# Patient Record
Sex: MALE | Race: WHITE | Employment: OTHER | URBAN - NONMETROPOLITAN AREA
[De-identification: names, ages, dates, MRNs, and addresses within clinical notes are randomized per-mention and may not be internally consistent; named-entity substitution may affect disease eponyms.]

---

## 2017-03-10 ENCOUNTER — HOSPITAL ENCOUNTER (OUTPATIENT)
Dept: MRI IMAGING | Age: 72
Discharge: HOME | End: 2017-03-10
Payer: MEDICARE

## 2017-03-10 DIAGNOSIS — M25.551 RIGHT HIP PAIN: ICD-10-CM

## 2017-03-10 PROCEDURE — 73721 MRI JNT OF LWR EXTRE W/O DYE: CPT

## 2017-07-27 ENCOUNTER — TELEPHONE (OUTPATIENT)
Dept: CARDIOLOGY | Facility: CLINIC | Age: 72
End: 2017-07-27

## 2017-07-27 NOTE — TELEPHONE ENCOUNTER
Called [facility] and got the Script for lovenox Straight aftered I talked to Vicky CORONA

## 2018-03-01 RX ORDER — LOSARTAN POTASSIUM 50 MG/1
50 TABLET ORAL DAILY
COMMUNITY
End: 2021-08-02 | Stop reason: SDUPTHER

## 2018-03-01 RX ORDER — SPIRONOLACTONE AND HYDROCHLOROTHIAZIDE 25; 25 MG/1; MG/1
1 TABLET ORAL DAILY
COMMUNITY
End: 2021-08-02 | Stop reason: SDUPTHER

## 2018-03-01 RX ORDER — NAPROXEN 500 MG/1
500 TABLET ORAL 2 TIMES DAILY WITH MEALS
COMMUNITY
End: 2018-03-21 | Stop reason: ALTCHOICE

## 2018-03-01 RX ORDER — BUTALBITAL, ACETAMINOPHEN AND CAFFEINE 50; 325; 40 MG/1; MG/1; MG/1
1 TABLET ORAL EVERY 6 HOURS PRN
COMMUNITY
End: 2018-03-21 | Stop reason: ALTCHOICE

## 2018-03-01 RX ORDER — PHENOL 1.4 %
10 AEROSOL, SPRAY (ML) MUCOUS MEMBRANE DAILY
COMMUNITY
End: 2019-04-08

## 2018-03-05 ENCOUNTER — TELEPHONE (OUTPATIENT)
Dept: CARDIOLOGY | Age: 73
End: 2018-03-05

## 2018-03-05 NOTE — TELEPHONE ENCOUNTER
----- Message from Doron Barfield sent at 2/28/2018  4:04 PM CST -----  Not sure if this goes to you or not. The patient daughter called and stated that her dad only wants to see Dr Michelle Quigley. They do not want to see Dr Audi Ahn. They are only wanting to see Dr Michelle Quigley. Can you please call the daughter back Alva Reed at [phone].   Thanks

## 2018-03-05 NOTE — TELEPHONE ENCOUNTER
Called patient back and spoke with him, patient states he just fine with seeing Dr. Vince Fuller and he doesn't see a problem with this and requests to keep it with him since it is this week.

## 2018-03-21 ENCOUNTER — OFFICE VISIT (OUTPATIENT)
Dept: CARDIOLOGY | Age: 73
End: 2018-03-21
Payer: MEDICARE

## 2018-03-21 VITALS
DIASTOLIC BLOOD PRESSURE: 64 MMHG | SYSTOLIC BLOOD PRESSURE: 128 MMHG | BODY MASS INDEX: 44.28 KG/M2 | WEIGHT: 299 LBS | HEIGHT: 69 IN | HEART RATE: 74 BPM

## 2018-03-21 DIAGNOSIS — E66.09 OBESITY DUE TO EXCESS CALORIES WITH SERIOUS COMORBIDITY, UNSPECIFIED CLASSIFICATION: ICD-10-CM

## 2018-03-21 DIAGNOSIS — Z86.79: ICD-10-CM

## 2018-03-21 DIAGNOSIS — N18.30 STAGE 3 CHRONIC KIDNEY DISEASE (HCC): ICD-10-CM

## 2018-03-21 DIAGNOSIS — I25.10 CORONARY ARTERY DISEASE INVOLVING NATIVE CORONARY ARTERY OF NATIVE HEART WITHOUT ANGINA PECTORIS: ICD-10-CM

## 2018-03-21 DIAGNOSIS — I10 ESSENTIAL HYPERTENSION: ICD-10-CM

## 2018-03-21 DIAGNOSIS — M47.816 SPONDYLOSIS OF LUMBAR REGION WITHOUT MYELOPATHY OR RADICULOPATHY: ICD-10-CM

## 2018-03-21 DIAGNOSIS — E78.5 HYPERLIPIDEMIA, UNSPECIFIED HYPERLIPIDEMIA TYPE: Primary | ICD-10-CM

## 2018-03-21 DIAGNOSIS — E78.5 DYSLIPIDEMIA: ICD-10-CM

## 2018-03-21 DIAGNOSIS — E78.5 HYPERLIPIDEMIA, UNSPECIFIED HYPERLIPIDEMIA TYPE: Chronic | ICD-10-CM

## 2018-03-21 DIAGNOSIS — G47.33 OSA (OBSTRUCTIVE SLEEP APNEA): ICD-10-CM

## 2018-03-21 PROBLEM — E66.9 OBESITY: Status: ACTIVE | Noted: 2018-03-21

## 2018-03-21 PROCEDURE — 99203 OFFICE O/P NEW LOW 30 MIN: CPT | Performed by: INTERNAL MEDICINE

## 2018-03-21 PROCEDURE — 93000 ELECTROCARDIOGRAM COMPLETE: CPT | Performed by: INTERNAL MEDICINE

## 2018-03-21 RX ORDER — ALLOPURINOL 100 MG/1
100 TABLET ORAL DAILY
COMMUNITY

## 2018-03-21 ASSESSMENT — ENCOUNTER SYMPTOMS
NAUSEA: 0
BACK PAIN: 1
ABDOMINAL DISTENTION: 0
APNEA: 0
STRIDOR: 0
SHORTNESS OF BREATH: 1
CHOKING: 0
BLOOD IN STOOL: 0
WHEEZING: 0
CHEST TIGHTNESS: 0

## 2018-03-21 NOTE — PROGRESS NOTES
by mouth 2 times daily, Disp: , Rfl:     losartan (COZAAR) 50 MG tablet, Take 50 mg by mouth daily, Disp: , Rfl:     aspirin 81 MG tablet, Take 81 mg by mouth daily, Disp: , Rfl:     spironolactone-hydrochlorothiazide (ALDACTAZIDE) 25-25 MG per tablet, Take 1 tablet by mouth daily, Disp: , Rfl:     TRAZODONE HCL PO, Take 50 mg by mouth every evening, Disp: , Rfl:     Melatonin 10 MG TABS, Take 10 mg by mouth daily, Disp: , Rfl:     Review of Systems   Constitutional: Positive for unexpected weight change (some recent increase (excess calories)). Negative for activity change, diaphoresis and fatigue. HENT: Negative for congestion, dental problem, hearing loss and tinnitus. Eyes: Negative for visual disturbance. Respiratory: Positive for shortness of breath (chronic NEGRO without recent change). Negative for apnea, choking, chest tightness, wheezing and stridor. Cardiovascular: Positive for chest pain (no CP at present or antecedent to his 2005 coronary stent (dyspnea only)) and leg swelling. Negative for palpitations. Gastrointestinal: Negative for abdominal distention, blood in stool and nausea. Endocrine: Negative for cold intolerance, heat intolerance, polydipsia and polyuria. Genitourinary: Negative for decreased urine volume and difficulty urinating. Musculoskeletal: Positive for back pain (LS spine surgery 7/17). Negative for arthralgias, gait problem and myalgias. Skin: Negative for pallor and rash. Allergic/Immunologic: Negative for environmental allergies, food allergies and immunocompromised state. Neurological: Negative for dizziness, tremors, seizures, syncope, speech difficulty, weakness, light-headedness, numbness and headaches. Psychiatric/Behavioral: Negative for agitation, confusion, dysphoric mood and sleep disturbance. The patient is not nervous/anxious.         /64   Pulse 74   Ht 5' 9\" (1.753 m)   Wt 299 lb (135.6 kg)   BMI 44.15 kg/m²   Physical Exam

## 2018-10-02 ENCOUNTER — OFFICE VISIT (OUTPATIENT)
Dept: CARDIOLOGY | Age: 73
End: 2018-10-02
Payer: MEDICARE

## 2018-10-02 VITALS
SYSTOLIC BLOOD PRESSURE: 118 MMHG | WEIGHT: 288 LBS | DIASTOLIC BLOOD PRESSURE: 64 MMHG | HEIGHT: 69 IN | BODY MASS INDEX: 42.65 KG/M2 | HEART RATE: 82 BPM

## 2018-10-02 DIAGNOSIS — I25.10 CORONARY ARTERY DISEASE INVOLVING NATIVE CORONARY ARTERY OF NATIVE HEART WITHOUT ANGINA PECTORIS: Primary | ICD-10-CM

## 2018-10-02 PROCEDURE — 1036F TOBACCO NON-USER: CPT | Performed by: INTERNAL MEDICINE

## 2018-10-02 PROCEDURE — 4040F PNEUMOC VAC/ADMIN/RCVD: CPT | Performed by: INTERNAL MEDICINE

## 2018-10-02 PROCEDURE — G8417 CALC BMI ABV UP PARAM F/U: HCPCS | Performed by: INTERNAL MEDICINE

## 2018-10-02 PROCEDURE — G8484 FLU IMMUNIZE NO ADMIN: HCPCS | Performed by: INTERNAL MEDICINE

## 2018-10-02 PROCEDURE — 1101F PT FALLS ASSESS-DOCD LE1/YR: CPT | Performed by: INTERNAL MEDICINE

## 2018-10-02 PROCEDURE — G8427 DOCREV CUR MEDS BY ELIG CLIN: HCPCS | Performed by: INTERNAL MEDICINE

## 2018-10-02 PROCEDURE — 99212 OFFICE O/P EST SF 10 MIN: CPT | Performed by: INTERNAL MEDICINE

## 2018-10-02 PROCEDURE — 1123F ACP DISCUSS/DSCN MKR DOCD: CPT | Performed by: INTERNAL MEDICINE

## 2018-10-02 PROCEDURE — G8598 ASA/ANTIPLAT THER USED: HCPCS | Performed by: INTERNAL MEDICINE

## 2018-10-02 PROCEDURE — 3017F COLORECTAL CA SCREEN DOC REV: CPT | Performed by: INTERNAL MEDICINE

## 2018-10-02 RX ORDER — ACETAMINOPHEN 500 MG
1000 TABLET ORAL 2 TIMES DAILY
COMMUNITY
End: 2019-10-07

## 2018-10-02 NOTE — LETTER
Dear Kyra Cantu MD,     Patient Active Problem List   Diagnosis    Coronary artery disease    Obesity    Dyslipidemia    Stage 3 chronic kidney disease ([address]    Degenerative joint disease (DJD) of lumbar spine    JENNIFER (obstructive sleep apnea)    Hypertension         Mr. Sendy Arias returns for routine follow up. He has CAD (2005 stent), hypertension, dyslipidemia and JENNIFER. His prior ischemic symptoms were severe NEGRO without chest pain. He has had no recurrence of those symptoms. He is becoming more active since his LS spine surgery from last summer. On exam his pressure is 118/64 with a pulse of 82. He carries 288 pounds on a 5'9\" frame. I appreciate no JVD at 45 degrees, his carotids are normal and his lungs are clear. Heart sounds are normal. There is 2+ lower extremity edema. I discussed the value of exercise and weight loss as applies to his situation. I have made no changes in his regimen.        Sincerely yours,    Benita York MD  [id number] [facility]

## 2018-10-02 NOTE — PROGRESS NOTES
Mr. Marsha Barlow returns for routine follow up. He has CAD (2005 stent), hypertension, dyslipidemia and JENNIFER. His prior ischemic symptoms were severe NEGRO without chest pain. He has had no recurrence of those symptoms. He is becoming more active since his LS spine surgery from last summer. On exam his pressure is 118/64 with a pulse of 82. He carries 288 pounds on a 5'9\" frame. I appreciate no JVD at 45 degrees, his carotids are normal and his lungs are clear. Heart sounds are normal. There is 2+ lower extremity edema. I discussed the value of exercise and weight loss as applies to his situation. I have made no changes in his regimen.

## 2019-02-05 ENCOUNTER — OFFICE VISIT (OUTPATIENT)
Dept: CARDIOLOGY | Age: 74
End: 2019-02-05
Payer: MEDICARE

## 2019-02-05 VITALS
WEIGHT: 293 LBS | HEART RATE: 71 BPM | SYSTOLIC BLOOD PRESSURE: 126 MMHG | DIASTOLIC BLOOD PRESSURE: 72 MMHG | HEIGHT: 69 IN | BODY MASS INDEX: 43.4 KG/M2

## 2019-02-05 DIAGNOSIS — G47.33 OSA (OBSTRUCTIVE SLEEP APNEA): ICD-10-CM

## 2019-02-05 DIAGNOSIS — E78.5 DYSLIPIDEMIA: ICD-10-CM

## 2019-02-05 DIAGNOSIS — I25.10 CORONARY ARTERY DISEASE INVOLVING NATIVE CORONARY ARTERY OF NATIVE HEART WITHOUT ANGINA PECTORIS: ICD-10-CM

## 2019-02-05 DIAGNOSIS — R06.09 DOE (DYSPNEA ON EXERTION): Primary | ICD-10-CM

## 2019-02-05 DIAGNOSIS — I10 ESSENTIAL HYPERTENSION: ICD-10-CM

## 2019-02-05 DIAGNOSIS — R94.31 ABNORMAL EKG: ICD-10-CM

## 2019-02-05 DIAGNOSIS — E78.5 DYSLIPIDEMIA: Primary | ICD-10-CM

## 2019-02-05 PROCEDURE — G8417 CALC BMI ABV UP PARAM F/U: HCPCS | Performed by: INTERNAL MEDICINE

## 2019-02-05 PROCEDURE — G8598 ASA/ANTIPLAT THER USED: HCPCS | Performed by: INTERNAL MEDICINE

## 2019-02-05 PROCEDURE — 99213 OFFICE O/P EST LOW 20 MIN: CPT | Performed by: INTERNAL MEDICINE

## 2019-02-05 PROCEDURE — 1036F TOBACCO NON-USER: CPT | Performed by: INTERNAL MEDICINE

## 2019-02-05 PROCEDURE — G8427 DOCREV CUR MEDS BY ELIG CLIN: HCPCS | Performed by: INTERNAL MEDICINE

## 2019-02-05 PROCEDURE — 3017F COLORECTAL CA SCREEN DOC REV: CPT | Performed by: INTERNAL MEDICINE

## 2019-02-05 PROCEDURE — 1123F ACP DISCUSS/DSCN MKR DOCD: CPT | Performed by: INTERNAL MEDICINE

## 2019-02-05 PROCEDURE — 1101F PT FALLS ASSESS-DOCD LE1/YR: CPT | Performed by: INTERNAL MEDICINE

## 2019-02-05 PROCEDURE — 4040F PNEUMOC VAC/ADMIN/RCVD: CPT | Performed by: INTERNAL MEDICINE

## 2019-02-05 PROCEDURE — 93000 ELECTROCARDIOGRAM COMPLETE: CPT | Performed by: INTERNAL MEDICINE

## 2019-02-05 PROCEDURE — G8484 FLU IMMUNIZE NO ADMIN: HCPCS | Performed by: INTERNAL MEDICINE

## 2019-02-06 ENCOUNTER — HOSPITAL ENCOUNTER (OUTPATIENT)
Dept: NON INVASIVE DIAGNOSTICS | Age: 74
Discharge: HOME | End: 2019-02-06
Payer: MEDICARE

## 2019-02-06 VITALS
HEART RATE: 83 BPM | SYSTOLIC BLOOD PRESSURE: 168 MMHG | DIASTOLIC BLOOD PRESSURE: 85 MMHG | BODY MASS INDEX: 43.63 KG/M2 | WEIGHT: 295.42 LBS

## 2019-02-06 DIAGNOSIS — R94.31 ABNORMAL EKG: ICD-10-CM

## 2019-02-06 DIAGNOSIS — E78.5 DYSLIPIDEMIA: ICD-10-CM

## 2019-02-06 DIAGNOSIS — R06.09 DOE (DYSPNEA ON EXERTION): ICD-10-CM

## 2019-02-06 DIAGNOSIS — G47.33 OSA (OBSTRUCTIVE SLEEP APNEA): ICD-10-CM

## 2019-02-06 DIAGNOSIS — I10 ESSENTIAL HYPERTENSION: ICD-10-CM

## 2019-02-06 DIAGNOSIS — I25.10 CORONARY ARTERY DISEASE INVOLVING NATIVE CORONARY ARTERY OF NATIVE HEART WITHOUT ANGINA PECTORIS: ICD-10-CM

## 2019-02-06 LAB
LV EF: 58 %
LVEF MODALITY: NORMAL

## 2019-02-06 PROCEDURE — 2580000003 HC RX 258: Performed by: INTERNAL MEDICINE

## 2019-02-06 PROCEDURE — 93306 TTE W/DOPPLER COMPLETE: CPT

## 2019-02-06 PROCEDURE — 93017 CV STRESS TEST TRACING ONLY: CPT

## 2019-02-06 PROCEDURE — 6360000004 HC RX CONTRAST MEDICATION: Performed by: INTERNAL MEDICINE

## 2019-02-06 PROCEDURE — 6360000002 HC RX W HCPCS: Performed by: INTERNAL MEDICINE

## 2019-02-06 PROCEDURE — C8929 TTE W OR WO FOL WCON,DOPPLER: HCPCS

## 2019-02-06 PROCEDURE — C8928 TTE W OR W/O FOL W/CON,STRES: HCPCS

## 2019-02-06 RX ORDER — SODIUM CHLORIDE 9 MG/ML
INJECTION, SOLUTION INTRAVENOUS
Status: COMPLETED | OUTPATIENT
Start: 2019-02-06 | End: 2019-02-06

## 2019-02-06 RX ORDER — DOBUTAMINE HYDROCHLORIDE 200 MG/100ML
10 INJECTION INTRAVENOUS CONTINUOUS PRN
Status: ACTIVE | OUTPATIENT
Start: 2019-02-06 | End: 2019-02-07

## 2019-02-06 RX ADMIN — PERFLUTREN 1.65 MG: 6.52 INJECTION, SUSPENSION INTRAVENOUS at 11:15

## 2019-02-06 RX ADMIN — DOBUTAMINE HYDROCHLORIDE 10 MCG/KG/MIN: 200 INJECTION INTRAVENOUS at 11:20

## 2019-02-06 RX ADMIN — SODIUM CHLORIDE: 9 INJECTION, SOLUTION INTRAVENOUS at 11:20

## 2019-02-19 ENCOUNTER — OFFICE VISIT (OUTPATIENT)
Dept: CARDIOLOGY | Age: 74
End: 2019-02-19
Payer: MEDICARE

## 2019-02-19 VITALS
HEART RATE: 68 BPM | DIASTOLIC BLOOD PRESSURE: 72 MMHG | SYSTOLIC BLOOD PRESSURE: 108 MMHG | HEIGHT: 69 IN | WEIGHT: 290 LBS | BODY MASS INDEX: 42.95 KG/M2

## 2019-02-19 DIAGNOSIS — G47.33 OSA (OBSTRUCTIVE SLEEP APNEA): ICD-10-CM

## 2019-02-19 DIAGNOSIS — N18.30 STAGE 3 CHRONIC KIDNEY DISEASE (HCC): ICD-10-CM

## 2019-02-19 DIAGNOSIS — E78.5 DYSLIPIDEMIA: Primary | ICD-10-CM

## 2019-02-19 DIAGNOSIS — I10 ESSENTIAL HYPERTENSION: ICD-10-CM

## 2019-02-19 DIAGNOSIS — I25.10 CORONARY ARTERY DISEASE INVOLVING NATIVE CORONARY ARTERY OF NATIVE HEART WITHOUT ANGINA PECTORIS: ICD-10-CM

## 2019-02-19 PROCEDURE — 3017F COLORECTAL CA SCREEN DOC REV: CPT | Performed by: NURSE PRACTITIONER

## 2019-02-19 PROCEDURE — 1036F TOBACCO NON-USER: CPT | Performed by: NURSE PRACTITIONER

## 2019-02-19 PROCEDURE — G8427 DOCREV CUR MEDS BY ELIG CLIN: HCPCS | Performed by: NURSE PRACTITIONER

## 2019-02-19 PROCEDURE — 1101F PT FALLS ASSESS-DOCD LE1/YR: CPT | Performed by: NURSE PRACTITIONER

## 2019-02-19 PROCEDURE — G8484 FLU IMMUNIZE NO ADMIN: HCPCS | Performed by: NURSE PRACTITIONER

## 2019-02-19 PROCEDURE — G8417 CALC BMI ABV UP PARAM F/U: HCPCS | Performed by: NURSE PRACTITIONER

## 2019-02-19 PROCEDURE — 1123F ACP DISCUSS/DSCN MKR DOCD: CPT | Performed by: NURSE PRACTITIONER

## 2019-02-19 PROCEDURE — G8598 ASA/ANTIPLAT THER USED: HCPCS | Performed by: NURSE PRACTITIONER

## 2019-02-19 PROCEDURE — 4040F PNEUMOC VAC/ADMIN/RCVD: CPT | Performed by: NURSE PRACTITIONER

## 2019-02-19 PROCEDURE — 99213 OFFICE O/P EST LOW 20 MIN: CPT | Performed by: NURSE PRACTITIONER

## 2019-02-19 RX ORDER — PRAVASTATIN SODIUM 20 MG
20 TABLET ORAL DAILY
Qty: 90 TABLET | Refills: 1 | Status: SHIPPED | OUTPATIENT
Start: 2019-02-19 | End: 2019-08-13 | Stop reason: SDUPTHER

## 2019-04-08 ENCOUNTER — OFFICE VISIT (OUTPATIENT)
Dept: CARDIOLOGY | Age: 74
End: 2019-04-08
Payer: MEDICARE

## 2019-04-08 VITALS
SYSTOLIC BLOOD PRESSURE: 138 MMHG | HEART RATE: 64 BPM | WEIGHT: 290 LBS | DIASTOLIC BLOOD PRESSURE: 86 MMHG | HEIGHT: 69 IN | BODY MASS INDEX: 42.95 KG/M2

## 2019-04-08 DIAGNOSIS — G47.33 OSA (OBSTRUCTIVE SLEEP APNEA): ICD-10-CM

## 2019-04-08 DIAGNOSIS — E78.5 DYSLIPIDEMIA: ICD-10-CM

## 2019-04-08 DIAGNOSIS — I10 ESSENTIAL HYPERTENSION: Primary | ICD-10-CM

## 2019-04-08 DIAGNOSIS — N18.30 STAGE 3 CHRONIC KIDNEY DISEASE (HCC): ICD-10-CM

## 2019-04-08 DIAGNOSIS — I25.10 CORONARY ARTERY DISEASE INVOLVING NATIVE CORONARY ARTERY OF NATIVE HEART WITHOUT ANGINA PECTORIS: ICD-10-CM

## 2019-04-08 PROCEDURE — 99212 OFFICE O/P EST SF 10 MIN: CPT | Performed by: NURSE PRACTITIONER

## 2019-04-08 PROCEDURE — G8598 ASA/ANTIPLAT THER USED: HCPCS | Performed by: NURSE PRACTITIONER

## 2019-04-08 PROCEDURE — 1123F ACP DISCUSS/DSCN MKR DOCD: CPT | Performed by: NURSE PRACTITIONER

## 2019-04-08 PROCEDURE — 1036F TOBACCO NON-USER: CPT | Performed by: NURSE PRACTITIONER

## 2019-04-08 PROCEDURE — G8417 CALC BMI ABV UP PARAM F/U: HCPCS | Performed by: NURSE PRACTITIONER

## 2019-04-08 PROCEDURE — 93000 ELECTROCARDIOGRAM COMPLETE: CPT | Performed by: NURSE PRACTITIONER

## 2019-04-08 PROCEDURE — 4040F PNEUMOC VAC/ADMIN/RCVD: CPT | Performed by: NURSE PRACTITIONER

## 2019-04-08 PROCEDURE — G8427 DOCREV CUR MEDS BY ELIG CLIN: HCPCS | Performed by: NURSE PRACTITIONER

## 2019-04-08 PROCEDURE — 3017F COLORECTAL CA SCREEN DOC REV: CPT | Performed by: NURSE PRACTITIONER

## 2019-04-08 NOTE — PROGRESS NOTES
Dear Drs. No ref. provider found & Ricardo Borges MD,    Thank you for allowing me to participate in the care of Mr. Justin Manning. He presents today at the [address] [place] [address] in the [facility]. As you know, Mr. Mercedez Hidalgo is a 68 y.o. male with history of hypertension, hyperlipidemia, sleep apnea and CAD w/ stent who presents with the chief complaint of follow-up of chronic cardiac conditions  He is a patient of Dr. Garland Faustin. Patient has a history of coronary artery disease with a stent placed in his mid LAD. He had a negative stress test earlier in the year. He was also started on Pravachol 20 mg with CoQ10. He has not had to take the COQ10. He has muscle crams but they have not worsened with the Pravachol. He otherwise denies chest pain, SOA, PND, orthopnea, syncope or near syncope. He has no other complaints. Review of Systems    Constitutional: Negative for fever, chills, diaphoresis, activity change, appetite change, fatigue and unexpected weight change. Eyes: Negative for photophobia, pain, redness and visual disturbance. Respiratory: Negative for apnea, cough, chest tightness, shortness of breath, wheezing and stridor. Cardiovascular: Negative for chest pain, palpitations and leg swelling. +NEGRO-no change  Gastrointestinal: Negative for abdominal distention. Genitourinary: Negative for dysuria, urgency and frequency. Musculoskeletal: Negative for myalgias, arthralgias and gait problem. Skin: Negative for color change, pallor, rash and wound. Neurological: Negative for dizziness, tremors, speech difficulty, weakness and numbness. Hematological: Does not bruise/bleed easily. Psychiatric/Behavioral: Negative.         Past Medical History:   Diagnosis Date    CAD (coronary artery disease)     Hyperlipidemia     Hypertension        Past Surgical History:   Procedure Laterality Date    APPENDECTOMY      BACK SURGERY      lumbar tablet, Take 1,000 mg by mouth 2 times daily, Disp: , Rfl:     allopurinol (ZYLOPRIM) 100 MG tablet, Take 100 mg by mouth daily, Disp: , Rfl:     Magnesium Oxide (MAG-OX PO), Take 400 mg by mouth 2 times daily, Disp: , Rfl:     losartan (COZAAR) 50 MG tablet, Take 50 mg by mouth daily, Disp: , Rfl:     aspirin 81 MG tablet, Take 81 mg by mouth daily, Disp: , Rfl:     spironolactone-hydrochlorothiazide (ALDACTAZIDE) 25-25 MG per tablet, Take 1 tablet by mouth daily, Disp: , Rfl:     TRAZODONE HCL PO, Take 50 mg by mouth every evening, Disp: , Rfl:     PE:  Vitals:    04/08/19 1416   BP: 138/86   Pulse: 64       Estimated body mass index is 42.83 kg/m² as calculated from the following:    Height as of this encounter: 5' 9\" (1.753 m). Weight as of this encounter: 290 lb (131.5 kg). Constitutional: He is oriented to person, place, and time. He appears well-developed and well-nourished in no acute distress. Morbidly obese  Head: Normocephalic and atraumatic. Neck:  Neck supple without JVD present. Cardiovascular: Normal rate, regular rhythm, normal heart sounds. no murmur ascultated. No gallop and no friction rub.  no carotid bruits. no peripheral edema. Pulmonary/Chest:  Lungs clear to auscultation bilaterally without evidence of respiratory distress. He without wheezes. He without rales or ronchi. Musculoskeletal: Normal range of motion. Gait is normal no assitive device. Neurological: He is alert and oriented to person, place, and time. Skin: Skin is warm and dry without rash or pallor. Psychiatric: He has a normal mood and affect. His behavior is normal. Thought content normal.     No results found for: CREATININE, HGB, PROBNP      Assessment    1. Essential hypertension    2. Dyslipidemia    3. Coronary artery disease involving native coronary artery of native heart without angina pectoris    4. Stage 3 chronic kidney disease ([address]    5.  JENNIFER (obstructive sleep apnea)

## 2019-08-13 RX ORDER — PRAVASTATIN SODIUM 20 MG
20 TABLET ORAL DAILY
Qty: 90 TABLET | Refills: 1 | Status: SHIPPED | OUTPATIENT
Start: 2019-08-13 | End: 2021-08-02 | Stop reason: SDUPTHER

## 2019-10-07 ENCOUNTER — OFFICE VISIT (OUTPATIENT)
Dept: CARDIOLOGY | Age: 74
End: 2019-10-07
Payer: MEDICARE

## 2019-10-07 VITALS
HEART RATE: 80 BPM | HEIGHT: 69 IN | SYSTOLIC BLOOD PRESSURE: 108 MMHG | WEIGHT: 296 LBS | DIASTOLIC BLOOD PRESSURE: 72 MMHG | BODY MASS INDEX: 43.84 KG/M2

## 2019-10-07 DIAGNOSIS — I25.10 CORONARY ARTERY DISEASE INVOLVING NATIVE CORONARY ARTERY OF NATIVE HEART WITHOUT ANGINA PECTORIS: Primary | ICD-10-CM

## 2019-10-07 DIAGNOSIS — I10 ESSENTIAL HYPERTENSION: ICD-10-CM

## 2019-10-07 PROCEDURE — G8427 DOCREV CUR MEDS BY ELIG CLIN: HCPCS | Performed by: NURSE PRACTITIONER

## 2019-10-07 PROCEDURE — 1123F ACP DISCUSS/DSCN MKR DOCD: CPT | Performed by: NURSE PRACTITIONER

## 2019-10-07 PROCEDURE — 3017F COLORECTAL CA SCREEN DOC REV: CPT | Performed by: NURSE PRACTITIONER

## 2019-10-07 PROCEDURE — G8484 FLU IMMUNIZE NO ADMIN: HCPCS | Performed by: NURSE PRACTITIONER

## 2019-10-07 PROCEDURE — 1036F TOBACCO NON-USER: CPT | Performed by: NURSE PRACTITIONER

## 2019-10-07 PROCEDURE — 99214 OFFICE O/P EST MOD 30 MIN: CPT | Performed by: NURSE PRACTITIONER

## 2019-10-07 PROCEDURE — G8417 CALC BMI ABV UP PARAM F/U: HCPCS | Performed by: NURSE PRACTITIONER

## 2019-10-07 PROCEDURE — G8598 ASA/ANTIPLAT THER USED: HCPCS | Performed by: NURSE PRACTITIONER

## 2019-10-07 PROCEDURE — 4040F PNEUMOC VAC/ADMIN/RCVD: CPT | Performed by: NURSE PRACTITIONER

## 2020-03-27 ENCOUNTER — TELEPHONE (OUTPATIENT)
Dept: CARDIOLOGY | Age: 75
End: 2020-03-27

## 2020-03-27 NOTE — TELEPHONE ENCOUNTER
Left vm with patient advising I have changed his appointment to vv and call back if he has any questions.

## 2020-03-27 NOTE — TELEPHONE ENCOUNTER
Robi Vuong called to inform office that they accept option to do a Virtual Visit. Patient has access to [employer]. Please call patient with any changes/questions/concerns.

## 2020-04-06 ENCOUNTER — TELEPHONE (OUTPATIENT)
Dept: CARDIOLOGY | Age: 75
End: 2020-04-06

## 2020-04-07 ENCOUNTER — TELEMEDICINE (OUTPATIENT)
Dept: CARDIOLOGY | Age: 75
End: 2020-04-07
Payer: MEDICARE

## 2020-04-07 VITALS — WEIGHT: 291 LBS | BODY MASS INDEX: 43.1 KG/M2 | HEIGHT: 69 IN

## 2020-04-07 PROCEDURE — 1123F ACP DISCUSS/DSCN MKR DOCD: CPT | Performed by: INTERNAL MEDICINE

## 2020-04-07 PROCEDURE — G8427 DOCREV CUR MEDS BY ELIG CLIN: HCPCS | Performed by: INTERNAL MEDICINE

## 2020-04-07 PROCEDURE — 3017F COLORECTAL CA SCREEN DOC REV: CPT | Performed by: INTERNAL MEDICINE

## 2020-04-07 PROCEDURE — 4040F PNEUMOC VAC/ADMIN/RCVD: CPT | Performed by: INTERNAL MEDICINE

## 2020-04-07 PROCEDURE — 99422 OL DIG E/M SVC 11-20 MIN: CPT | Performed by: INTERNAL MEDICINE

## 2020-04-07 NOTE — PROGRESS NOTES
2020    TELEHEALTH EVALUATION -- Audio/Visual (During KKRIM-36 public health emergency)    HPI:    Macey Salguero (:  1945) has requested an audio/video evaluation for the following concern(s):    79-year-old gentleman with a history of dyslipidemia, hypertension, obstructive sleep apnea, stage III chronic kidney disease, and coronary disease returns for virtual visit. Pertinent history dates back 10 to 15 years at which time he had a stent placed in his mid LAD. His symptoms that time apparently were merely difficulty taking a deep breath. He underwent stress ECHO testing in  revealing no abnormalities. At present he is free of cardiac symptoms though relatively sedentary (drives tractor). His BP has been controlled, he uses CPAP nightly and he is followed by nephrology for his stage III CKD. Review of Systems - DJD only other significant finding on ROS    Prior to Visit Medications    Medication Sig Taking?  Authorizing Provider   Specialty Vitamins Products (BRAIN) TABS Take by mouth daily Yes Historical Provider, MD   pravastatin (PRAVACHOL) 20 MG tablet Take 1 tablet by mouth daily Yes Marjo Gosselin, APRN   allopurinol (ZYLOPRIM) 100 MG tablet Take 100 mg by mouth daily Yes Historical Provider, MD   Magnesium Oxide (MAG-OX PO) Take 400 mg by mouth 2 times daily Yes Historical Provider, MD   losartan (COZAAR) 50 MG tablet Take 50 mg by mouth daily Yes Historical Provider, MD   aspirin 81 MG tablet Take 81 mg by mouth daily Yes Historical Provider, MD   spironolactone-hydrochlorothiazide (ALDACTAZIDE) 25-25 MG per tablet Take 1 tablet by mouth daily Yes Historical Provider, MD   TRAZODONE HCL PO Take 50 mg by mouth every evening Yes Historical Provider, MD   Cholecalciferol (VITAMIN D3) 5000 units TABS Take 5,000 Units by mouth daily  Historical Provider, MD       Social History     Tobacco Use    Smoking status: Never Smoker    Smokeless tobacco: Never Used   Substance Use Topics    Alcohol use: Yes     Comment: 1-2 alcoholic beverages daily    Drug use: Not on file            PHYSICAL EXAMINATION:  [ INSTRUCTIONS:  \"[x]\" Indicates a positive item  \"[]\" Indicates a negative item  -- DELETE ALL ITEMS NOT EXAMINED]  Vital Signs: (As obtained by patient/caregiver or practitioner observation) (wife took blood pressure)  Blood pressure- 124/77 Heart rate- 69   Respiratory rate-    Temperature-  Pulse oximetry-     Constitutional: [x] Appears well-developed and well-nourished [] No apparent distress      [] Abnormal-   Mental status  [x] Alert and awake  [x] Oriented to person/place/time []Able to follow commands      Eyes:  EOM    [x]  Normal  [] Abnormal-  Sclera  [x]  Normal  [] Abnormal -         Discharge [x]  None visible  [] Abnormal -    HENT:   [x] Normocephalic, atraumatic. [] Abnormal   [x] Mouth/Throat: Mucous membranes are moist.     External Ears [x] Normal  [] Abnormal-     Neck: [x] No visualized mass     Pulmonary/Chest: [x] Respiratory effort normal.  [x] No visualized signs of difficulty breathing or respiratory distress        [] Abnormal-      Musculoskeletal:   [] Normal gait with no signs of ataxia         [x] Normal range of motion of neck        [] Abnormal-       Neurological:        [x] No Facial Asymmetry (Cranial nerve 7 motor function) (limited exam to video visit)          [x] No gaze palsy        [] Abnormal-         Skin:        [x] No significant exanthematous lesions or discoloration noted on facial skin         [] Abnormal-            Psychiatric:       [x] Normal Affect [] No Hallucinations        [] Abnormal-     Other pertinent observable physical exam findings-     ASSESSMENT/PLAN:  1. Coronary disease - clinically stable albeit sedentary. Discussed need for regular exercise as means of monitoring status  2. Hypertension - controlled on present regimen  3. Dyslipidemia - acceptable 2/19 - will repeat once pandemic resolved   4.  JENNIFER - uses CPAP nightly

## 2020-08-18 ENCOUNTER — PROCEDURE VISIT (OUTPATIENT)
Dept: OTOLARYNGOLOGY | Facility: CLINIC | Age: 75
End: 2020-08-18

## 2020-08-18 ENCOUNTER — OFFICE VISIT (OUTPATIENT)
Dept: OTOLARYNGOLOGY | Facility: CLINIC | Age: 75
End: 2020-08-18

## 2020-08-18 VITALS
DIASTOLIC BLOOD PRESSURE: 70 MMHG | HEART RATE: 94 BPM | SYSTOLIC BLOOD PRESSURE: 135 MMHG | WEIGHT: 294 LBS | TEMPERATURE: 97.2 F

## 2020-08-18 DIAGNOSIS — H90.3 HEARING LOSS SENSORY, BILATERAL: Primary | ICD-10-CM

## 2020-08-18 DIAGNOSIS — H90.3 SENSORINEURAL HEARING LOSS (SNHL) OF BOTH EARS: Primary | ICD-10-CM

## 2020-08-18 DIAGNOSIS — J32.0 MAXILLARY SINUSITIS, CHRONIC: ICD-10-CM

## 2020-08-18 DIAGNOSIS — H69.81 ETD (EUSTACHIAN TUBE DYSFUNCTION), RIGHT: ICD-10-CM

## 2020-08-18 DIAGNOSIS — G47.33 OSA ON CPAP: ICD-10-CM

## 2020-08-18 DIAGNOSIS — Z99.89 OSA ON CPAP: ICD-10-CM

## 2020-08-18 DIAGNOSIS — E66.9 OBESITY, UNSPECIFIED CLASSIFICATION, UNSPECIFIED OBESITY TYPE, UNSPECIFIED WHETHER SERIOUS COMORBIDITY PRESENT: ICD-10-CM

## 2020-08-18 PROCEDURE — 99203 OFFICE O/P NEW LOW 30 MIN: CPT | Performed by: OTOLARYNGOLOGY

## 2020-08-18 RX ORDER — OXYMETAZOLINE HYDROCHLORIDE 0.05 G/100ML
2 SPRAY NASAL 3 TIMES DAILY
Qty: 2 ML | Refills: 0 | Status: SHIPPED | OUTPATIENT
Start: 2020-08-18 | End: 2020-08-21

## 2020-08-18 RX ORDER — TRAZODONE HYDROCHLORIDE 50 MG/1
TABLET ORAL
COMMUNITY
Start: 2020-07-13

## 2020-08-18 RX ORDER — CYCLOBENZAPRINE HCL 10 MG
10 TABLET ORAL
COMMUNITY
Start: 2020-07-29

## 2020-08-18 RX ORDER — LOSARTAN POTASSIUM 50 MG/1
50 TABLET ORAL DAILY
COMMUNITY
Start: 2020-07-29

## 2020-08-18 RX ORDER — SPIRONOLACTONE AND HYDROCHLOROTHIAZIDE 25; 25 MG/1; MG/1
1 TABLET ORAL
COMMUNITY

## 2020-08-18 RX ORDER — PRAVASTATIN SODIUM 20 MG
20 TABLET ORAL DAILY
COMMUNITY
Start: 2020-06-09

## 2020-08-18 RX ORDER — ALLOPURINOL 100 MG/1
100 TABLET ORAL DAILY
COMMUNITY
Start: 2020-07-13

## 2020-08-18 RX ORDER — FLUTICASONE PROPIONATE 50 MCG
2 SPRAY, SUSPENSION (ML) NASAL 2 TIMES DAILY
Qty: 48 G | Refills: 3 | Status: SHIPPED | OUTPATIENT
Start: 2020-08-18 | End: 2020-10-28

## 2020-08-18 NOTE — PROGRESS NOTES
Ihsan Meredith Jr, MD     ENT NEW PATIENT NOTE     Chief Complaint   Patient presents with   • Hearing Loss     right ear        HISTORY OF PRESENT ILLNESS:  Accompanied by:   Noone  Speedy Villalba is a  74 y.o. male with hearing loss. He has been seen by Dr Gutierrez. Tube placed in R ear.Patient says he has R sinus causing drainge up into ear.  He had CT neck which showed sinus issues some 1 yr ago. No follow then.  Neck issues subsided with blocks.  Nose- breathing no issues.  Allergies- none  Hearing test Dr Gutierrez told him hearing loss but no hearing aids recommend.  Hearing loss- noted for 10-15 yrs. Has worsening understanding [place] background noise.  Ringing- none  Dizziness- none  Presure R ear.  No therapy for sinus disease. No follow up imaging.  Sleep study- 15 years ago. Using CPAP at night. No recent re-evaluation.  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   HENT:        See HPI   Respiratory: Negative for cough, choking and shortness of breath.    Gastrointestinal: Negative for diarrhea, nausea and vomiting.   Skin: Negative for rash.   Neurological: Negative for dizziness, light-headedness and headaches.   Psychiatric/Behavioral: Negative for sleep disturbance.       Past History:  History reviewed. No pertinent past medical history.  Past Surgical History:   Procedure Laterality Date   • APPENDECTOMY     • BACK SURGERY  2016   • KNEE SURGERY  1960     History reviewed. No pertinent family history.  Social History     Tobacco Use   • Smoking status: Never Smoker   • Smokeless tobacco: Never Used   Substance Use Topics   • Alcohol use: Not Currently   • Drug use: Never     No outpatient medications have been marked as taking for the 8/18/20 encounter (Office Visit) with Ihsan Meredith Jr., MD.     Allergies:  Patient has no known allergies.        Vital Signs:   Temp:  [97.2 °F (36.2 °C)] 97.2 °F (36.2 °C)  Heart  Rate:  [94] 94  BP: (135)/(70) 135/70  EXAMINATION:  CONSTITUTIONAL:    well nourished, well-developed, alert, oriented, in no acute distress     BODY HABITUS:    obese  severe    COMMUNICATION:    able to communicate normally, normal voice quality    HEAD:     Normocephalic, without obvious abnormality, atraumatic    FACE:    structure normal, no tenderness present, no lesions/masses, no evidence of trauma    SALIVARY GLANDS:    parotid glands with no tenderness, no swelling, no masses, submandibular glands with normal size, nontender     EYE:    ocular motility normal, eyelids normal, orbits normal, no proptosis, conjunctiva clear, sclera non-icteric, pupils equal, round, reactive to light and accomodation  Color:   blue    HEARING:      response to conversational voice decreased  Bilateral    EARS:    Otoscopic exam   normal pinna with no lesions, Canals normal size and shape, Tympanic membranes normal, Ossicular chain intact, Middle ear clear   AS:      AD: thickened with monomer R sup ant TM       NOSE EXTERNAL:    APPEARANCE: normal, straight, with good projection, no tenderness, no lesions, no tenderness, good nasal support, patent nares    NOSE INTERNAL:    Anterior rhinoscopy   NASALMUCOSA:    intact mucosa, no lesions    NASAL PASSAGES:     abnormal   Left- narrowed, Right- mild obstruction from septum   NASAL VALVE:     intact, good support and no nasal obstruction   SEPTUM:     normal mucosa without crusting or lesions  Bilateral    deviation present:      deviated Right low mild   INFERIOR TURBINATES:     normal size, non-obstructing, no lesions    ORAL CAVITY:    Normal lips with no lesions, dentition normal for age, FOM intact without lesions and normal salivary flow, Mucosa intact without lesions, Hard and soft palate normal without lesions      hypertrophy  Moderate, prolapse   Moderate    PALATE:       hard palate with normal mucosa and structure      soft palate- low, thick     MALLOMPOTI: III  (soft and hard palate and base of uvula visible)    OROPHARYNX:    Direct examination  oropharyngeal mucosa normal, tonsil(s) with normal appearance    Oropharyngeal size- small because of lateral walls    NECK:    normal appearance, no masses, no lesions, larynx normal mobility, trachea midline  short, thick  severe   LARYNGEAL POSITION: low   LARYNGEAL ELEVATION:  Normal  TRACHEA:   midline and Deep    LYMPH NODES :    no adenopathy    THYROID:    no overt thyromegaly, no tenderness, nodules or mass present on palpation, position midline    CHEST/RESPIRATORY:    respiratory effort normal, no rales, rubs or wheezing, no stridor, normal appearance to chest    CARDIOVASCULAR:    regular rate and rhythm, no murmurs, gallups, no peripheral edema    NEURO/PSYCHIATRIC :    oriented appropriately for age, mood normal, affect appropriate, cranial nerves intact grossly (unless specifically described), gait normal for age    RESULTS REVIEW:    I have reviewed the patients old records in the chart.            ASSESSMENT:     Diagnosis Plan   1. Hearing loss sensory, bilateral  Comprehensive Hearing Test    SNHL with min asymmetry L worse than R   2. Maxillary sinusitis, chronic  CT Sinus Without Contrast    By hx  No prior CT to evaluate   3. ETD (Eustachian tube dysfunction), right  Comprehensive Hearing Test    Multiple etiologies   4. AIDEN on CPAP      CPAP nightly   5. Obesity, unspecified classification, unspecified obesity type, unspecified whether serious comorbidity present             PLAN:      Medical and surgical options were discussed including observation, continued medical management, medication modification and surgical management. Risks, benefits and alternatives were discussed and questions were answered. After considering the options, the patient decided to proceed with observation.  Conservative management.  Patient has history max sinusitis. I will get CT to confirm and address his drainage issues. I feel  he has ETD from snoring/AIDEN/CPAP side effects.  Plan nasal scope next visit.  Hearing- decreased from chronic noise exposure. He would benefit from hearing aids. R tympanic membrane is stiff but I see no fluid. No tube present in EAC.  Nasal saline  Afrin x 3 days  Flonase BID  CT scan sinus  Hearing aids- referral  Wear CPAP nightly  Diet   Exercise  MY CHART:  Patient is Patient is using My Chart  New Medications Ordered This Visit   Medications   • oxymetazoline (AFRIN) 0.05 % nasal spray     Si sprays into the nostril(s) as directed by provider 3 (Three) Times a Day for 3 days. Use for 3 days then stop     Dispense:  2 mL     Refill:  0   • fluticasone (FLONASE) 50 MCG/ACT nasal spray     Si sprays into the nostril(s) as directed by provider 2 (Two) Times a Day.     Dispense:  48 g     Refill:  3     Orders Placed This Encounter   Procedures   • CT Sinus Without Contrast   • Comprehensive Hearing Test          Patient understand(s) and agree(s) with the treatment plan as described.  Return After CT sinus, for Recheck Sinus, Nasal scope.       Ihsan Meredith Jr, MD  20  10:46

## 2020-08-18 NOTE — PATIENT INSTRUCTIONS
Afrin use:  Use 2 puffs each nostril 3 times daily for 3 days only!!  Stop using after 3 days unless instructed to use longer    Nasal steroid use:  Using nasal steroids:  You will be prescribed one of the following nasal steroids: Flonase, Nasacort, Nasonex, Rhinocort, Qnasl, Zetonna  2 puffs each nostril 2 times daily  Start as soon as possible    Use Afrin first and wait 10 minutes to allow the nose to open. Then administer nasal steroids.    NASAL SALINE:  Use 2 puffs each nostril 4-6 times daily and more frequently if possible.  You can buy saline spray or you can make your own and use an old spray bottle to administer  Use a humidifier at bedside  Recipe for saline:  Water                                 1 quart  Salt (table)                        1 tablespoon  Gylcerin (or Krystina Syrup)    1 teaspoon  Sodium bicarbonate           1 teaspoon  Sprays or Shelly pots are recommended    CT scan sinus ordered    Wear CPAP    Hearing aid referral     [email or web address]

## 2020-09-14 ENCOUNTER — TRANSCRIBE ORDERS (OUTPATIENT)
Dept: ADMINISTRATIVE | Facility: HOSPITAL | Age: 75
End: 2020-09-14

## 2020-09-14 DIAGNOSIS — Z01.818 PRE-OP TESTING: Primary | ICD-10-CM

## 2020-09-18 ENCOUNTER — LAB (OUTPATIENT)
Dept: LAB | Facility: HOSPITAL | Age: 75
End: 2020-09-18

## 2020-09-18 ENCOUNTER — APPOINTMENT (OUTPATIENT)
Dept: CT IMAGING | Facility: HOSPITAL | Age: 75
End: 2020-09-18

## 2020-10-09 ENCOUNTER — OFFICE VISIT (OUTPATIENT)
Dept: CARDIOLOGY | Age: 75
End: 2020-10-09
Payer: MEDICARE

## 2020-10-09 ENCOUNTER — APPOINTMENT (OUTPATIENT)
Dept: LAB | Facility: HOSPITAL | Age: 75
End: 2020-10-09

## 2020-10-09 VITALS
BODY MASS INDEX: 43.99 KG/M2 | WEIGHT: 297 LBS | SYSTOLIC BLOOD PRESSURE: 110 MMHG | HEART RATE: 77 BPM | DIASTOLIC BLOOD PRESSURE: 70 MMHG | HEIGHT: 69 IN

## 2020-10-09 PROCEDURE — G8484 FLU IMMUNIZE NO ADMIN: HCPCS | Performed by: INTERNAL MEDICINE

## 2020-10-09 PROCEDURE — G8427 DOCREV CUR MEDS BY ELIG CLIN: HCPCS | Performed by: INTERNAL MEDICINE

## 2020-10-09 PROCEDURE — 3017F COLORECTAL CA SCREEN DOC REV: CPT | Performed by: INTERNAL MEDICINE

## 2020-10-09 PROCEDURE — 93000 ELECTROCARDIOGRAM COMPLETE: CPT | Performed by: INTERNAL MEDICINE

## 2020-10-09 PROCEDURE — 4040F PNEUMOC VAC/ADMIN/RCVD: CPT | Performed by: INTERNAL MEDICINE

## 2020-10-09 PROCEDURE — G8417 CALC BMI ABV UP PARAM F/U: HCPCS | Performed by: INTERNAL MEDICINE

## 2020-10-09 PROCEDURE — 99213 OFFICE O/P EST LOW 20 MIN: CPT | Performed by: INTERNAL MEDICINE

## 2020-10-09 PROCEDURE — 1123F ACP DISCUSS/DSCN MKR DOCD: CPT | Performed by: INTERNAL MEDICINE

## 2020-10-09 PROCEDURE — 1036F TOBACCO NON-USER: CPT | Performed by: INTERNAL MEDICINE

## 2020-10-09 NOTE — PROGRESS NOTES
disease -  angina free now urinating without a negative Lexiscan. Advised to exercise regularly  2. Dyslipidemia - we will recheck lipid profile  3. Hypertension - adequately controlled  4. Pandemic response - discussed    Medical records reviewed prior to today's clinic visit including visually reviewing recent diagnostic studies such as ECHOs, labs, and angiograms as well as reading previous encounter notes. More than 15 minutes spent face-to-face with patient in evaluating, and carefully explaining problems and the planned approach and the reasons behind the decisions.

## 2020-10-13 ENCOUNTER — HOSPITAL ENCOUNTER (OUTPATIENT)
Dept: CT IMAGING | Facility: HOSPITAL | Age: 75
Discharge: HOME | End: 2020-10-13
Admitting: OTOLARYNGOLOGY

## 2020-10-13 DIAGNOSIS — J32.0 MAXILLARY SINUSITIS, CHRONIC: ICD-10-CM

## 2020-10-13 PROCEDURE — 70486 CT MAXILLOFACIAL W/O DYE: CPT

## 2020-10-28 ENCOUNTER — PROCEDURE VISIT (OUTPATIENT)
Dept: OTOLARYNGOLOGY | Facility: CLINIC | Age: 75
End: 2020-10-28

## 2020-10-28 ENCOUNTER — OFFICE VISIT (OUTPATIENT)
Dept: OTOLARYNGOLOGY | Facility: CLINIC | Age: 75
End: 2020-10-28

## 2020-10-28 VITALS
WEIGHT: 293 LBS | DIASTOLIC BLOOD PRESSURE: 72 MMHG | TEMPERATURE: 97.1 F | SYSTOLIC BLOOD PRESSURE: 142 MMHG | BODY MASS INDEX: 43.4 KG/M2 | HEART RATE: 85 BPM | HEIGHT: 69 IN

## 2020-10-28 DIAGNOSIS — J34.3 NASAL TURBINATE HYPERTROPHY: ICD-10-CM

## 2020-10-28 DIAGNOSIS — H69.81 ETD (EUSTACHIAN TUBE DYSFUNCTION), RIGHT: ICD-10-CM

## 2020-10-28 DIAGNOSIS — H69.81 ETD (EUSTACHIAN TUBE DYSFUNCTION), RIGHT: Primary | ICD-10-CM

## 2020-10-28 DIAGNOSIS — Z99.89 OSA ON CPAP: ICD-10-CM

## 2020-10-28 DIAGNOSIS — Q30.9: ICD-10-CM

## 2020-10-28 DIAGNOSIS — G47.33 OSA ON CPAP: ICD-10-CM

## 2020-10-28 DIAGNOSIS — J32.0 MAXILLARY SINUSITIS, CHRONIC: Primary | Chronic | ICD-10-CM

## 2020-10-28 DIAGNOSIS — E66.9 OBESITY, UNSPECIFIED CLASSIFICATION, UNSPECIFIED OBESITY TYPE, UNSPECIFIED WHETHER SERIOUS COMORBIDITY PRESENT: ICD-10-CM

## 2020-10-28 DIAGNOSIS — J34.2 ACQUIRED DEVIATED NASAL SEPTUM: ICD-10-CM

## 2020-10-28 DIAGNOSIS — H90.3 HEARING LOSS SENSORY, BILATERAL: ICD-10-CM

## 2020-10-28 PROCEDURE — 99213 OFFICE O/P EST LOW 20 MIN: CPT | Performed by: OTOLARYNGOLOGY

## 2020-10-28 PROCEDURE — 31231 NASAL ENDOSCOPY DX: CPT | Performed by: OTOLARYNGOLOGY

## 2020-10-28 RX ORDER — OFLOXACIN 3 MG/ML
SOLUTION/ DROPS OPHTHALMIC
COMMUNITY
Start: 2020-10-08

## 2020-10-28 RX ORDER — BROMFENAC SODIUM 0.7 MG/ML
SOLUTION/ DROPS OPHTHALMIC
COMMUNITY
Start: 2020-10-08

## 2020-10-28 RX ORDER — OXYMETAZOLINE HYDROCHLORIDE 0.05 G/100ML
2 SPRAY NASAL 3 TIMES DAILY
Qty: 2 ML | Refills: 0 | Status: SHIPPED | OUTPATIENT
Start: 2020-10-28 | End: 2020-10-31

## 2020-10-28 RX ORDER — FLUTICASONE PROPIONATE 50 MCG
2 SPRAY, SUSPENSION (ML) NASAL 2 TIMES DAILY
Qty: 48 G | Refills: 3 | Status: SHIPPED | OUTPATIENT
Start: 2020-10-28

## 2020-10-28 RX ORDER — PREDNISOLONE ACETATE 10 MG/ML
SUSPENSION/ DROPS OPHTHALMIC
COMMUNITY
Start: 2020-10-08

## 2020-10-28 NOTE — PROGRESS NOTES
Ihsan Meredith Jr, MD     ENT FOLLOW UP NOTE     Chief Complaint   Patient presents with   • Follow-up        HISTORY OF PRESENT ILLNESS:  Accompanied by:  No one  Speedy Villalba is a  74 y.o. male who is here for follow up. S/p CT sinus.  He says nose is ok.  CPAP- wearing daily.  He is here to discuss sinus findings.  No hearing aids.    Review of Systems  Reviewed per patient intake note and confirmed by me    Past History:  Past medical and surgical history, family history and social history reviewed and updated when appropriate.  Current medications and allergies reviewed and updated when appropriate.  Allergies:  Patient has no known allergies.        Vital Signs:   Temp:  [97.1 °F (36.2 °C)] 97.1 °F (36.2 °C)  Heart Rate:  [85] 85  BP: (142)/(72) 142/72  EXAMINATION:  CONSTITUTIONAL:    well nourished, well-developed, alert, oriented, in no acute distress      BODY HABITUS:    obese  severe     COMMUNICATION:    able to communicate normally, normal voice quality     HEAD:     Normocephalic, without obvious abnormality, atraumatic     FACE:    structure normal, no tenderness present, no lesions/masses, no evidence of trauma     SALIVARY GLANDS:    parotid glands with no tenderness, no swelling, no masses, submandibular glands with normal size, nontender      EYE:    ocular motility normal, eyelids normal, orbits normal, no proptosis, conjunctiva clear, sclera non-icteric, pupils equal, round, reactive to light and accomodation  Color:   blue     HEARING:      response to conversational voice decreased  Bilateral     EARS:    Otoscopic exam   normal pinna with no lesions, Canals normal size and shape, Tympanic membranes normal, Ossicular chain intact, Middle ear clear     NOSE EXTERNAL:    APPEARANCE: normal, straight, with good projection, no tenderness, no lesions, no tenderness, good nasal support, patent nares     NOSE INTERNAL:    Endoscopy bilateral- See note     ORAL CAVITY:    Normal lips with no  lesions, dentition normal for age, FOM intact without lesions and normal salivary flow, Mucosa intact without lesions, Hard and soft palate normal without lesions      hypertrophy  Moderate, prolapse   Moderate    PALATE:       hard palate with normal mucosa and structure      soft palate- low, thick     MALLOMPOTI: III (soft and hard palate and base of uvula visible)     OROPHARYNX:    Direct examination  oropharyngeal mucosa normal, tonsil(s) with normal appearance    Oropharyngeal size- small because of lateral walls     NECK:    normal appearance, no masses, no lesions, larynx normal mobility, trachea midline  short, thick  severe   LARYNGEAL POSITION: low   LARYNGEAL ELEVATION:  Normal  TRACHEA:   midline and Deep     LYMPH NODES :    no adenopathy     THYROID:    no overt thyromegaly, no tenderness, nodules or mass present on palpation, position midline     CHEST/RESPIRATORY:    respiratory effort normal, no rales, rubs or wheezing, no stridor, normal appearance to chest     CARDIOVASCULAR:    regular rate and rhythm, no murmurs, gallups, no peripheral edema     NEURO/PSYCHIATRIC :    oriented appropriately for age, mood normal, affect appropriate, cranial nerves intact grossly (unless specifically described), gait normal for age       RESULTS REVIEW:    I have reviewed the patients old records in the chart.  I reviewed the patient's new clinical results.  I have personally reviewed the patient's ct scan of the sinuses without contrast images.  I have personally reviewed the patient's ct scan of the sinuses without contrast report.    CT sinus 10/13/2020        ASSESSMENT:      Diagnosis Plan   1. Maxillary sinusitis, chronic  Case Request    Case Request    R max and infundibulum   2. Hearing loss sensory, bilateral     3. ETD (Eustachian tube dysfunction), right     4. AIDEN on CPAP     5. Obesity, unspecified classification, unspecified obesity type, unspecified whether serious comorbidity present     6.  Acquired deviated nasal septum     7. Nasal turbinate hypertrophy     8. Hypoplastic sinuses             PLAN:      Medical and surgical options were discussed including observation, continued medical management, medication modification and surgical management. Risks, benefits and alternatives were discussed and questions were answered. After considering the options, the patient decided to proceed with surgical management.  Patient has CT positive right maxillary sinusitis with OMC obstruction.  He has mucopus flowing out of the right middle meatus.  He has significant deviated nasal septum and turbinate hypertrophy as well.  I feel he would benefit from Fess, septoplasty, turbinoplasty, with attention to the right maxillary sinus and anterior ethmoids.  After discussion risk and benefits he wishes to proceed.  Covid for surgery  Flonase BID  Afrin x 3 days  Nasal saline  preop clearance Dr Jasiel Ross  MY CHART:  Patient is Patient is using My Chart  New Medications Ordered This Visit   Medications   • oxymetazoline (AFRIN) 0.05 % nasal spray     Si sprays into the nostril(s) as directed by provider 3 (Three) Times a Day for 3 days. Use for 3 days then stop     Dispense:  2 mL     Refill:  0   • fluticasone (FLONASE) 50 MCG/ACT nasal spray     Si sprays into the nostril(s) as directed by provider 2 (Two) Times a Day.     Dispense:  48 g     Refill:  3     Orders Placed This Encounter   Procedures   • Follow Anesthesia Guidelines / Standing Orders   • Provide Patient With Instructions on NPO Status      1. Maxillary sinusitis, chronic    2. Hearing loss sensory, bilateral    3. ETD (Eustachian tube dysfunction), right    4. AIDEN on CPAP    5. Obesity, unspecified classification, unspecified obesity type, unspecified whether serious comorbidity present    6. Acquired deviated nasal septum    7. Nasal turbinate hypertrophy    8. Hypoplastic sinuses        Medical and surgical options were discussed including  observation versus surgical management. Risks, benefits and alternatives were discussed and questions were answered. A septoplasty was felt to be indicated due to the patients history of  chronic nasal obstruction due to the deviated septum, nasal septal deviation blocking surgical access to the sinuses and a severe nasal septal spur into the sidewall of the nose. A functional endoscopic sinus surgery was felt to be indicated due to the patient's history of chronic rhinosinusitis (>12 weeks) with CT scan evidence of mucosal thickening, sinus opacification and bony remodeling and failure of medical management including decongestants, prescription antihistamines and intranasal corticosteroids, CT scan evidence of sinus opacification, bony remodeling and obstruction of the osteomeatal complex and chronic nasal obstruction. Turbinate surgery is indicated based on nasal findings and airway assessment. After considering the options, it was decided that surgery was the best option.    PLANNED SURGERY:  1. nasal septoplasty  2. bilateral inferior turbinoplasty  3. right endoscopic maxillary antrostomy with tissue removal  4. right endoscopic partial ethmoidectomy  5. Septoplasty  6. Turbinoplasty     INFORMED CONSENT DISCUSSION:  SEPTOPLASTY AND FUNCTIONAL ENDOSCOPIC SINUS SURGERY: A septoplasty and functional endoscopic sinus surgery was recommended. The risks and benefits were explained including but not limited to pain, bleeding (with the possible need for nasal packing), infection, risks of the general anesthesia, continued septal deviation, crusting, congestion and septal perforation, orbital injury with blurred vision or visual loss, cerebrospinal fluid leak, persistent disease, scarring, synechiae, empty nose syndrome and the possibility for the need of reoperation. Possibilities of additional sinus work or less sinus work depending on the status of the nose at the time of the operation was discussed. Alternatives  were discussed. No guarantees were made or implied. Questions were asked appropriately answered.      PREOPERATIVE WORKUP:   1. labs/ workup per anesthesia  2. Preoperative Medicine evaluation for clearance    MEDICATIONS TO START 4-5 DAYS PRIOR TO SURGERY:  New Medications Ordered This Visit   Medications   • oxymetazoline (AFRIN) 0.05 % nasal spray     Si sprays into the nostril(s) as directed by provider 3 (Three) Times a Day for 3 days. Use for 3 days then stop     Dispense:  2 mL     Refill:  0   • fluticasone (FLONASE) 50 MCG/ACT nasal spray     Si sprays into the nostril(s) as directed by provider 2 (Two) Times a Day.     Dispense:  48 g     Refill:  3        PREOPERATIVE MEDICATIONS  1. Afrin 2 puffs in holding room  2. Decadron 10 mg IV in holding room      Patient understand(s) and agree(s) with the treatment plan as described.    Return 2 weeks after surgery, for Recheck Sinus.     Ihsan Meredith Jr, MD  10/28/20  12:08 CDT

## 2020-10-28 NOTE — PROGRESS NOTES
ENT PROCEDURE NOTE:  Anesthesia: topical 4% tetracaine and oxymetazoline mix    Endoscopy Type:  Nasal Endoscopic Examination    Indications:   1. Hearing loss sensory, bilateral    2. Maxillary sinusitis, chronic    3. ETD (Eustachian tube dysfunction), right    4. AIDEN on CPAP    5. Obesity, unspecified classification, unspecified obesity type, unspecified whether serious comorbidity present         Procedure Details:    The patient was placed in the sitting position. After topical anesthesia and decongestion,  a rigid nasal endoscope  0 degree was passed along the nasal floor to the nasopharynx.  The scope was then passed to the middle and superior aspects of the nasal cavity. Systematic examination of the nasal cavity, nasopharynx and structures was performed.     Findings:  NASAL ENDOSCOPIC FINDINGS:    Nasal endoscopy   NASALMUCOSA:   NASAL PASSAGES:     abnormal   Bilateral- partially obstructed by septum and turbinates, R high, L low    NASAL VALVE:     intact, good support and no nasal obstruction   SEPTUM:     mucosa abnormal   Bilateral- red and thick bilateral     deviation present:      deviated Right high, obstructing MM and ant turbinate head of MT     deviated Left low and mid posterior alomost touching MT    touching turbinate   Right- MT   INFERIOR TURBINATES:     abnormal  Bilateral- red, enlarged    MIDDLE TURBINATES:     abnormal:        LEFT: red and beefy  RIGHT: obstructed, very red   MIDDLE MEATUS:       findings       LEFT: clear  RIGHT: Mucopus coming from uncinate area and draining posterior   SPHENO-ETHMOID RECESS:    abnormal:       LEFT: narrowed  RIGHT: Not seen   NASOPHARYNX:     Adenoids:  flat, thick, pale     Eustachian tubes:        BILATERAL: pale, thick    Palate: mobile    Vault size:  Small   SECRETIONS:     abnormal Left- clear, ncreased, Right- Mucopus, MM    Amount:  Bilateral- increased     Consistency:  Left- mildly thick, Right- Mucopus    Color:  Left- clear, Right-  yellow    Condition:  Stable.  Patient tolerated procedure well.    Complications:  None    Post-procedure instructions reviewed with Patient  Instructions documented in AVS for patient to review

## 2020-10-28 NOTE — PROGRESS NOTES
Note:  Note certain Mr. Villalba needed a repeat HT, so tymps only were done.  (seen for HT in 08/2020).

## 2020-10-28 NOTE — PATIENT INSTRUCTIONS
PREOPERATIVE SURGERY/PROCEDURE INSTRUCTIONS:  • Do not eat or drink ANYTHING after midnight, unless instructed   • Clean the operative site by showering with an antibacterial soap (like Dial, Dove, Ivory, etc) and shampooing hair  • Preoperative scrub for Surgery:   Skin: Antibacterial soap (Dial, Ivory, Dove) shower daily, including hair.  Be careful not to get into eyes  Do this daily for 5 days  • Mouth: Betadine solution 3 times daily for 5 days  • Do NOT pluck, shave hair on skin the night prior to operation  • If you are diabetic, take your blood sugar the night before and in the morning prior to coming to hospital and give results to nurse and the anesthesiologist    ENT PREOPERATIVE PROTOCOL FOR SURGERY: Begin after COVID test has been performed  After test performed, PLEASE self-quarantine to prevent possible infection!! And start below  Betadine rinses:  • Use Betadine rinse in the nose  • Spray twice in each nostril 3 times a day beginning 3 days before surgery  • Spray nose the morning of surgery, bring with you to the operating room  • Use Betadine rinse in the mouth  • Gargle, swish and spit 15 ml in mouth and rinse around teeth 3 times daily beginning 3 days prior to surgery  • Repeat morning of surgery before arriving at hospital  Betadine rinse can be prescribed at the [facility]    Betadine Iodine mixture Recipe:  • Neilmed bottle from pharmacy  • Use Ch[employer] Med packet that comes with the bottle  • Add Betadine/Povidone 10 ml (2 tsp) to the bottle  • Add Darryl baby shampoo 2.5 ml (½ tsp) to the bottle  • Fill bottle with distilled water (from grocery store)    DO NOT USE IF IODINE/BETADINE ALLERGIC, OR HISTORY OF THYROID PROBLEMS/THYROID CANCER    Non Betadine rinses:  • Nasal rinses:  • Use rinse in the nose  • Spray twice in each nostril 3 times a day beginning 3 days before surgery  • Spray nose the morning of surgery, bring with you to the operating room  • Use Same rinse in  the mouth  • Gargle, swish and spit 15 ml in mouth and rinse around teeth 3 times daily beginning 3 days prior to surgery  • Repeat morning of surgery before arriving at hospital    Nasal mixture Recipe:  • Neilmed bottle from pharmacy  • Use [employer]ito Med packet that comes with the bottle  • Add Darryl baby shampoo 2.5 ml (½ tsp) to the bottle  • Fill bottle with distilled water (from grocery store)    Remove any metallic piercings prior to surgery. You may wear plastic spacers if needed.    Do NOT apply eye makeup Morning of surgery    Please remove fingernail polish prior to surgery    STOP:  -   All natural/homeopathic medications 2 weeks prior to surgery, Ask about over the counter medications  -   Smoking 2 weeks prior to surgery  -   Blood thinners- 3-5 days prior to surgery (or as instructed by doctor)  Bring with you the morning of surgery:  -   Preoperative paperwork  -   Insurance card  -   Identification with photo  -   Home medications or up to date list  \Ihsan Meredith Jr, MD has explained the risks, benefits and alternatives to the patient/patient’s representative, in clear and simple language.  Time was allowed for questions.  Risks of procedure include but are not limited to:    As a result of this procedure being performed, the material risks generally recognized are INFECTION, ALLERGIC REACTION, RISK OF ANESTHESIA, SEVERE LOSS OF BLOOD, LOSS OR LOSS OF FUNCTION OF ANY LIMB OR ORGAN, PARALYSIS OR PARTIAL PARALYSIS, PARAPLEGIA OR QUADRIPLEGIA, DISFIGURING SCAR, BRAIN DAMAGE, CARDIAC ARREST OR DEATH, BLOOD LOSS NECESSITATING TRANSFUSION WHICH CARRIES THE RISK OF EXPOSURE TO AIDS, HEPATITIS OR OTHER INFECTIOUS DISEASES.      Procedure: Sinus surgery, Functional Endoscopic sinus surgery and Balloon Sinuplasty    Risks specific for procedure: pain, bleeding (with the possible need for nasal packing), infection, risks of the anesthesia, possible incomplete response to anesthesia requiring a conversion to a  general anesthesia at a later date, orbital injury with blurred vision or visual loss, cerebrospinal fluid leak, injury to the brain, meningitis, intracranial bleeding, stroke, persistent disease and/ or symptoms, scarring, synechiae and the possibility for the need of reoperation. Possibilities of an inability to canulate and dilate the sinuses at the time of the operation.  Possibilities of additional sinus work or less sinus work depending on the status of the nose at the time of the operation was discussed    No guarantees of outcome given or implied  Patient demonstrate understanding    Patient does wish to  proceed with proposed procedure     [email or web address]     Afrin use:  Use 2 puffs each nostril 3 times daily for 3 days only!!  Stop using after 3 days unless instructed to use longer    Nasal steroid use:  Using nasal steroids:  You will be prescribed one of the following nasal steroids: Flonase, Nasacort, Nasonex, Rhinocort, Qnasl, Zetonna  2 puffs each nostril 2 times daily  Start as soon as possible    Use Afrin first and wait 10 minutes to allow the nose to open. Then administer nasal steroids.    NASAL SALINE:  Use 2 puffs each nostril 4-6 times daily and more frequently if possible.  You can buy saline spray or you can make your own and use an old spray bottle to administer  Use a humidifier at bedside  Recipe for saline:  Water                                 1 quart  Salt (table)                        1 tablespoon  Gylcerin (or Krystina Syrup)    1 teaspoon  Sodium bicarbonate           1 teaspoon  Sprays or Shelly pots are recommended

## 2020-10-30 ENCOUNTER — TELEPHONE (OUTPATIENT)
Dept: OTOLARYNGOLOGY | Facility: CLINIC | Age: 75
End: 2020-10-30

## 2020-10-31 ENCOUNTER — APPOINTMENT (OUTPATIENT)
Dept: LAB | Facility: HOSPITAL | Age: 75
End: 2020-10-31

## 2021-06-11 ENCOUNTER — TELEPHONE (OUTPATIENT)
Dept: VASCULAR SURGERY | Facility: CLINIC | Age: 76
End: 2021-06-11

## 2021-06-11 NOTE — TELEPHONE ENCOUNTER
LM for patient regarding referral he has for podiatry. I left the office number for pt to call back and schedule appt with us.

## 2021-08-02 ENCOUNTER — OFFICE VISIT (OUTPATIENT)
Dept: CARDIOLOGY CLINIC | Age: 76
End: 2021-08-02
Payer: MEDICARE

## 2021-08-02 VITALS
WEIGHT: 300 LBS | BODY MASS INDEX: 44.43 KG/M2 | HEIGHT: 69 IN | OXYGEN SATURATION: 100 % | SYSTOLIC BLOOD PRESSURE: 120 MMHG | DIASTOLIC BLOOD PRESSURE: 72 MMHG | HEART RATE: 84 BPM

## 2021-08-02 DIAGNOSIS — I25.10 CORONARY ARTERY DISEASE INVOLVING NATIVE CORONARY ARTERY OF NATIVE HEART WITHOUT ANGINA PECTORIS: ICD-10-CM

## 2021-08-02 DIAGNOSIS — E78.5 DYSLIPIDEMIA: ICD-10-CM

## 2021-08-02 DIAGNOSIS — I10 ESSENTIAL HYPERTENSION: Primary | ICD-10-CM

## 2021-08-02 PROCEDURE — 93000 ELECTROCARDIOGRAM COMPLETE: CPT | Performed by: NURSE PRACTITIONER

## 2021-08-02 PROCEDURE — G8417 CALC BMI ABV UP PARAM F/U: HCPCS | Performed by: NURSE PRACTITIONER

## 2021-08-02 PROCEDURE — 4040F PNEUMOC VAC/ADMIN/RCVD: CPT | Performed by: NURSE PRACTITIONER

## 2021-08-02 PROCEDURE — 1036F TOBACCO NON-USER: CPT | Performed by: NURSE PRACTITIONER

## 2021-08-02 PROCEDURE — 1123F ACP DISCUSS/DSCN MKR DOCD: CPT | Performed by: NURSE PRACTITIONER

## 2021-08-02 PROCEDURE — G8427 DOCREV CUR MEDS BY ELIG CLIN: HCPCS | Performed by: NURSE PRACTITIONER

## 2021-08-02 PROCEDURE — 99214 OFFICE O/P EST MOD 30 MIN: CPT | Performed by: NURSE PRACTITIONER

## 2021-08-02 PROCEDURE — 3017F COLORECTAL CA SCREEN DOC REV: CPT | Performed by: NURSE PRACTITIONER

## 2021-08-02 RX ORDER — PRAVASTATIN SODIUM 20 MG
20 TABLET ORAL DAILY
Qty: 90 TABLET | Refills: 3 | Status: SHIPPED | OUTPATIENT
Start: 2021-08-02

## 2021-08-02 RX ORDER — GLIPIZIDE 5 MG/1
5 TABLET ORAL DAILY
COMMUNITY

## 2021-08-02 RX ORDER — SPIRONOLACTONE AND HYDROCHLOROTHIAZIDE 25; 25 MG/1; MG/1
1 TABLET ORAL DAILY
Qty: 90 TABLET | Refills: 3 | Status: SHIPPED | OUTPATIENT
Start: 2021-08-02 | End: 2022-10-20 | Stop reason: ALTCHOICE

## 2021-08-02 RX ORDER — LOSARTAN POTASSIUM 50 MG/1
50 TABLET ORAL DAILY
Qty: 90 TABLET | Refills: 3 | Status: SHIPPED | OUTPATIENT
Start: 2021-08-02

## 2021-08-02 ASSESSMENT — ENCOUNTER SYMPTOMS
SHORTNESS OF BREATH: 0
CHEST TIGHTNESS: 0
COUGH: 0
WHEEZING: 0
SORE THROAT: 0

## 2021-08-02 NOTE — PROGRESS NOTES
[facility]   Established Patient Office Visit   [facility] [address] [facility]  [phone]        OFFICE VISIT:  2021    Damaso Omalley - : 1945    Reason For Visit:  Tommie Cao is a 76 y.o. male who is here for Follow-up    1. Essential hypertension    2. Coronary artery disease involving native coronary artery of native heart without angina pectoris    3. Dyslipidemia        Patient with a history of stage III chronic kidney disease, JENNIFER, hypertension, dyslipidemia and coronary artery disease. He is a patient of Dr. Vee Licea. Patient presents to clinic today for 6-month follow-up. Patient denies any complaints of chest pain, pressure or tightness. There is no shortness of breath, orthopnea or PND. Patient denies any lightheadedness, dizziness or syncope.       Subjective    Damaso Omalley is a 76 y.o. male with the following history as recorded in [facility]:    Patient Active Problem List    Diagnosis Date Noted    Coronary artery disease 2018    Obesity 2018    Dyslipidemia 2018    Stage 3 chronic kidney disease ([address] 2018    Degenerative joint disease (DJD) of lumbar spine 2018    JENNIFER (obstructive sleep apnea) 2018    Hypertension 2018     Current Outpatient Medications   Medication Sig Dispense Refill    glipiZIDE (GLUCOTROL) 5 MG tablet Take 5 mg by mouth daily      losartan (COZAAR) 50 MG tablet Take 1 tablet by mouth daily 90 tablet 3    spironolactone-hydroCHLOROthiazide (ALDACTAZIDE) 25-25 MG per tablet Take 1 tablet by mouth daily 90 tablet 3    pravastatin (PRAVACHOL) 20 MG tablet Take 1 tablet by mouth daily 90 tablet 3    Cholecalciferol (VITAMIN D3) 5000 units TABS Take 5,000 Units by mouth daily      allopurinol (ZYLOPRIM) 100 MG tablet Take 100 mg by mouth daily      Magnesium Oxide (MAG-OX PO) Take 400 mg by mouth 2 times daily      aspirin 81 MG tablet Take 81 mg by mouth daily      TRAZODONE HCL PO Take 50 mg by mouth every evening      Specialty Vitamins Products (BRAIN) TABS Take by mouth daily (Patient not taking: Reported on 8/2/2021)       No current facility-administered medications for this visit. Allergies: Bextra [valdecoxib] and Crestor [rosuvastatin calcium]  Past Medical History:   Diagnosis Date    CAD (coronary artery disease)     Hyperlipidemia     Hypertension      Past Surgical History:   Procedure Laterality Date    APPENDECTOMY      BACK SURGERY      lumbar    CARDIAC CATHETERIZATION      stent    EYE SURGERY      HC INJECT OTHER PERPHRL NERV Bilateral 9/30/2016    HIP INJECTIONS WITH FLUOROSCOPY  performed by Ariadna Gregg MD at [address] [place] [address] SURGERY       Family History   Problem Relation Age of Onset    Hypertension Father     No Known Problems Brother      Social History     Tobacco Use    Smoking status: Never Smoker    Smokeless tobacco: Never Used   Substance Use Topics    Alcohol use: Yes     Comment: 1-2 alcoholic beverages daily          Review of Systems:    Review of Systems   Constitutional: Negative for activity change, chills, diaphoresis, fatigue and fever. HENT: Negative for congestion and sore throat. Respiratory: Negative for cough, chest tightness, shortness of breath and wheezing. Cardiovascular: Negative for chest pain, palpitations and leg swelling. Neurological: Negative for dizziness, syncope, weakness, light-headedness and headaches. Psychiatric/Behavioral: Negative for confusion. The patient is not nervous/anxious.          Objective:    /72   Pulse 84   Ht 5' 9\" (1.753 m)   Wt 300 lb (136.1 kg)   SpO2 100%   BMI 44.30 kg/m²     GENERAL - well developed and well nourished, conversant  HEENT -  PERRLA, Hearing appears normal, gentleman lids are normal.  External inspection of ears and nose appear normal.  NECK - no thyromegaly, no JVD, trachea is in the midline  CARDIOVASCULAR - PMI is in the mid line clavicular position, Normal S1 and S2 with no systolic murmur. No S3 or S4    PULMONARY - no respiratory distress. No wheezes or rales. Lungs are clear to ausculation, normal respiratory effort. ABDOMEN  - soft, non tender, no rebound  MUSCULOSKELETAL  - range of motion of the upper and lower extermites appears normal and equal and is without pain   EXTREMITIES - no significant edema   NEUROLOGIC - gait and station are normal  SKIN - turgor is normal, can warm and dry. PSYCHIATRIC - normal mood and affect, alert and orientated x 3,      ASSESSMENT:    ALL THE CARDIOLOGY PROBLEMS ARE LISTED ABOVE; HOWEVER, THE FOLLOWING SPECIFIC CARDIAC PROBLEMS / CONDITIONS WERE ADDRESSED AND TREATED DURING THE OFFICE VISIT TODAY:                                                                                            MEDICAL DECISION MAKING             Cardiac Specific Problem / Diagnosis  Discussion and Data Reviewed Diagnostic Procedures Ordered Management Options Selected           1. CAD  Stable   Review and summation of old records:    No chest pain. EKG in the office showing sinus rhythm with a heart rate of 84 bpm.  Patient is on Pravachol, aspirin. Yes Continue current medications:     Yes           2. Hypertension  Well Controlled   Blood pressure the office today 120/72. O2 sat 100%. Patient is on Cozaar 50 mg daily. Spironolactone/HCTZ 1 tablet daily. No Continue current medications:    Yes           3. Dyslipidemia Stable  patient is on Pravachol 20 mg daily. No Continue current medications:        Yes                     Orders Placed This Encounter   Procedures    EKG 12 lead     Order Specific Question:   Reason for Exam?     Answer:   Hypertension     Order Specific Question:   Reason for Exam?     Answer:   Coronary artery disease     Orders Placed This Encounter   Medications    losartan (COZAAR) 50 MG tablet     Sig: Take 1 tablet by mouth daily     Dispense:  90 tablet     Refill:  3  spironolactone-hydroCHLOROthiazide (ALDACTAZIDE) 25-25 MG per tablet     Sig: Take 1 tablet by mouth daily     Dispense:  90 tablet     Refill:  3    pravastatin (PRAVACHOL) 20 MG tablet     Sig: Take 1 tablet by mouth daily     Dispense:  90 tablet     Refill:  3       Discussed with patient. Return in about 6 months (around 2/2/2022) for Dr Danita Paredes. I greatly appreciate the opportunity to meet Bacilio Gama and your confidence in allowing me to participate in his cardiovascular care. FRANCINE Uribe NP  8/2/2021 10:13 AM CDT                    This dictation was generated by voice recognition computer software. Although all attempts are made to edit dictation for accuracy, there may be errors in the transcription that are not intended.

## 2022-04-14 ENCOUNTER — OFFICE VISIT (OUTPATIENT)
Dept: CARDIOLOGY CLINIC | Age: 77
End: 2022-04-14
Payer: MEDICARE

## 2022-04-14 VITALS
SYSTOLIC BLOOD PRESSURE: 124 MMHG | DIASTOLIC BLOOD PRESSURE: 64 MMHG | WEIGHT: 301 LBS | HEIGHT: 69 IN | BODY MASS INDEX: 44.58 KG/M2 | HEART RATE: 78 BPM | OXYGEN SATURATION: 97 %

## 2022-04-14 DIAGNOSIS — G47.33 OSA (OBSTRUCTIVE SLEEP APNEA): ICD-10-CM

## 2022-04-14 DIAGNOSIS — E11.69 TYPE 2 DIABETES MELLITUS WITH OTHER SPECIFIED COMPLICATION, WITHOUT LONG-TERM CURRENT USE OF INSULIN (HCC): ICD-10-CM

## 2022-04-14 DIAGNOSIS — E78.5 DYSLIPIDEMIA: ICD-10-CM

## 2022-04-14 DIAGNOSIS — E66.01 MORBID OBESITY WITH BMI OF 40.0-44.9, ADULT (HCC): ICD-10-CM

## 2022-04-14 DIAGNOSIS — I10 PRIMARY HYPERTENSION: ICD-10-CM

## 2022-04-14 DIAGNOSIS — I25.10 CORONARY ARTERY DISEASE INVOLVING NATIVE CORONARY ARTERY OF NATIVE HEART WITHOUT ANGINA PECTORIS: Primary | ICD-10-CM

## 2022-04-14 PROBLEM — E11.9 DIABETES MELLITUS (HCC): Status: ACTIVE | Noted: 2022-04-14

## 2022-04-14 PROCEDURE — 4040F PNEUMOC VAC/ADMIN/RCVD: CPT | Performed by: CLINICAL NURSE SPECIALIST

## 2022-04-14 PROCEDURE — 99214 OFFICE O/P EST MOD 30 MIN: CPT | Performed by: CLINICAL NURSE SPECIALIST

## 2022-04-14 PROCEDURE — G8417 CALC BMI ABV UP PARAM F/U: HCPCS | Performed by: CLINICAL NURSE SPECIALIST

## 2022-04-14 PROCEDURE — 1036F TOBACCO NON-USER: CPT | Performed by: CLINICAL NURSE SPECIALIST

## 2022-04-14 PROCEDURE — 1123F ACP DISCUSS/DSCN MKR DOCD: CPT | Performed by: CLINICAL NURSE SPECIALIST

## 2022-04-14 PROCEDURE — G8427 DOCREV CUR MEDS BY ELIG CLIN: HCPCS | Performed by: CLINICAL NURSE SPECIALIST

## 2022-04-14 RX ORDER — MULTIVIT WITH MINERALS/LUTEIN
1000 TABLET ORAL DAILY
COMMUNITY

## 2022-04-14 RX ORDER — CYCLOBENZAPRINE HCL 10 MG
10 TABLET ORAL 3 TIMES DAILY PRN
COMMUNITY

## 2022-04-14 RX ORDER — PHENOL 1.4 %
10 AEROSOL, SPRAY (ML) MUCOUS MEMBRANE NIGHTLY
COMMUNITY

## 2022-04-14 ASSESSMENT — ENCOUNTER SYMPTOMS
COUGH: 0
WHEEZING: 0
ABDOMINAL PAIN: 0
FACIAL SWELLING: 0
NAUSEA: 0
SHORTNESS OF BREATH: 1
EYE REDNESS: 0
CHEST TIGHTNESS: 0
VOMITING: 0

## 2022-04-14 NOTE — PROGRESS NOTES
 APPENDECTOMY      BACK SURGERY      lumbar    CARDIAC CATHETERIZATION      stent    EYE SURGERY      HC INJECT OTHER PERPHRL NERV Bilateral 9/30/2016    HIP INJECTIONS WITH FLUOROSCOPY  performed by Silva Kelley MD at [facility] OR    TOE SURGERY       Family History   Problem Relation Age of Onset    Hypertension Father     No Known Problems Brother      Social History     Tobacco Use    Smoking status: Never Smoker    Smokeless tobacco: Never Used   Substance Use Topics    Alcohol use: Yes     Comment: 1-2 alcoholic beverages daily      Current Outpatient Medications   Medication Sig Dispense Refill    cyclobenzaprine (FLEXERIL) 10 MG tablet Take 10 mg by mouth 3 times daily as needed for Muscle spasms      Melatonin 10 MG TABS Take 10 mg by mouth at bedtime      Ascorbic Acid (VITAMIN C) 1000 MG tablet Take 1,000 mg by mouth daily      glipiZIDE (GLUCOTROL) 5 MG tablet Take 5 mg by mouth daily      losartan (COZAAR) 50 MG tablet Take 1 tablet by mouth daily 90 tablet 3    spironolactone-hydroCHLOROthiazide (ALDACTAZIDE) 25-25 MG per tablet Take 1 tablet by mouth daily 90 tablet 3    pravastatin (PRAVACHOL) 20 MG tablet Take 1 tablet by mouth daily 90 tablet 3    Specialty Vitamins Products (BRAIN) TABS Take by mouth daily       Cholecalciferol (VITAMIN D3) 5000 units TABS Take 5,000 Units by mouth daily      allopurinol (ZYLOPRIM) 100 MG tablet Take 100 mg by mouth daily      Magnesium Oxide (MAG-OX PO) Take 400 mg by mouth 4 times daily Patient takes 2 in the am and 2 in the pm      aspirin 81 MG tablet Take 81 mg by mouth daily      TRAZODONE HCL PO Take 50 mg by mouth every evening       No current facility-administered medications for this visit. Allergies: Bextra [valdecoxib] and Crestor [rosuvastatin calcium]    Review of Systems  Review of Systems   Constitutional: Negative for activity change, diaphoresis, fatigue, fever and unexpected weight change.    HENT: Negative for facial swelling and nosebleeds. Eyes: Negative for redness and visual disturbance. Respiratory: Positive for shortness of breath. Negative for cough, chest tightness and wheezing. Cardiovascular: Negative for chest pain, palpitations and leg swelling. Gastrointestinal: Negative for abdominal pain, nausea and vomiting. Endocrine: Negative for cold intolerance and heat intolerance. Genitourinary: Negative for dysuria and hematuria. Musculoskeletal: Negative for arthralgias and myalgias. Skin: Negative for pallor and rash. Neurological: Negative for dizziness, seizures, syncope, weakness and light-headedness. Hematological: Does not bruise/bleed easily. Psychiatric/Behavioral: Negative for agitation. The patient is not nervous/anxious. Objective  Vital Signs - /64   Pulse 78   Ht 5' 9\" (1.753 m)   Wt (!) 301 lb (136.5 kg)   SpO2 97%   BMI 44.45 kg/m²   Physical Exam  Vitals and nursing note reviewed. Constitutional:       General: He is not in acute distress. Appearance: He is well-developed. He is obese. He is not diaphoretic. Comments: Deconditioned   HENT:      Head: Normocephalic and atraumatic. Right Ear: Hearing and external ear normal.      Left Ear: Hearing and external ear normal.      Nose: Nose normal.   Eyes:      General:         Right eye: No discharge. Left eye: No discharge. Pupils: Pupils are equal, round, and reactive to light. Neck:      Thyroid: No thyromegaly. Vascular: No carotid bruit or JVD. Trachea: No tracheal deviation. Cardiovascular:      Rate and Rhythm: Normal rate and regular rhythm. Heart sounds: Normal heart sounds. No murmur heard. No friction rub. No gallop. Pulmonary:      Effort: Pulmonary effort is normal. No respiratory distress. Breath sounds: Normal breath sounds. No wheezing or rales. Abdominal:      Palpations: Abdomen is soft. Tenderness:  There is no abdominal tenderness. Musculoskeletal:         General: No swelling or deformity. Cervical back: Neck supple. No muscular tenderness. Right lower leg: Edema (trace) present. Left lower leg: Edema (trace) present. Skin:     General: Skin is warm and dry. Findings: No rash. Neurological:      General: No focal deficit present. Mental Status: He is alert and oriented to person, place, and time. Cranial Nerves: No cranial nerve deficit. Psychiatric:         Mood and Affect: Mood normal.         Behavior: Behavior normal.         Judgment: Judgment normal.         Data:    Echo 2018  Conclusions      Summary   Structurally normal.   Normal mitral valve leaflet mobility. Mild mitral regurgitation. Aortic valve appears to be tricuspid. Structurally normal aortic valve. No significant aortic regurgitation or stenosis is noted. Tricuspid valve is structurally normal.   Mild tricuspid regurgitation. Mildly dilated left atrium. Normal left ventricular size with preserved LV function and an estimated   ejection fraction of approximately 55-60%. Grade I diastolic dysfunction. No evidence of left ventricular mass or thrombus noted. Signature      ----------------------------------------------------------------   Electronically signed by Andra Guzman MD(Interpreting   physician) on 02/06/2019 04:12 PM   ----------------------------------------------------------------  DSE 2/19  Conclusions      Summary   Dobutamine stress echocardiogram without clinical, electrocardiographic,   or echocardiographic evidence of myocardial ischemia. Test was supervised by Dr. Jessie Josue. Signature      ----------------------------------------------------------------   Electronically signed by Andra Guzman MD(Interpreting   physician) on 02/06/2019 04:25 PM    Assessment:     Diagnosis Orders   1.  Coronary artery disease involving native coronary artery of native heart without angina pectoris     2. Primary hypertension     3. Dyslipidemia     4. JENNIFER (obstructive sleep apnea)     5. Type 2 diabetes mellitus with other specified complication, without long-term current use of insulin ([facility]     6. Morbid obesity with BMI of 40.0-44.9, adult ([facility]         CADstable without symptoms of angina with remote history of an LAD stent in approximately 2010. Negative DSE 2019. Continue aspirin, pravastatin, losartan    Hypertensionstable on current regimen with losartan    Hyperlipidemia currently on statin therapy. He has not seen his PCP in more than a year and therefore has not had labs done. Encouraged him to make a PCP appointment for labs and diabetes management    Diabetes uncertain control. Has not been to PCP in more than a year. As discussed above he needs to make an appointment with his PCP for diabetes management and A1c testing. Discussed A1c goal of less than 7% to prevent diabetes complications including cardiovascular disease    Obstructive sleep apneacurrently not treating. Previous mass ruptured sore on his nose and he stopped using the equipment. We discussed importance of treating sleep apnea and benefits. She states she will try it again and I have encouraged him to discuss a new mask with the [address] [place] [address] obesity encouraged him to be more active and to decrease portions    Stable cardiovascular status. No evidence of overt heart failure,angina or dysrhythmia. Plan    Return in about 6 months (around 10/14/2022) for APRN. Keep diabetes under control to help prevent further heart disease. Keep Hemoglobin A1C less than 7%. Make PCP appointmentcheck cholesterol and diabetes labs with Dr Bereket Gallardo you sleep apnea    Call with any questionsor concerns  Follow up with Armando Vu MD for non cardiac problems  Report any new problems  Cardiovascular Fitness-Exercise as tolerated. Strive for 15 minutes of exercise most days of the week. Cardiac / HealthyDiet  Continue current medications as directed  Continue plan of treatment  It is always recommended that you bring your medicationsbottles with you to each visit - this is for your safety!        Francisco Luz, APRN

## 2022-04-14 NOTE — PATIENT INSTRUCTIONS
Return in about 6 months (around 10/14/2022) for APRN. Keep diabetes under control to help prevent further heart disease. Keep Hemoglobin A1C less than 7%.     Check cholesterol and diabetes labs with Dr Mendoza Huston you sleep apnea    Get some exercise

## 2022-10-20 ENCOUNTER — OFFICE VISIT (OUTPATIENT)
Dept: CARDIOLOGY CLINIC | Age: 77
End: 2022-10-20
Payer: MEDICARE

## 2022-10-20 VITALS
DIASTOLIC BLOOD PRESSURE: 64 MMHG | BODY MASS INDEX: 42.51 KG/M2 | WEIGHT: 287 LBS | SYSTOLIC BLOOD PRESSURE: 102 MMHG | HEART RATE: 83 BPM | HEIGHT: 69 IN

## 2022-10-20 DIAGNOSIS — E11.69 TYPE 2 DIABETES MELLITUS WITH OTHER SPECIFIED COMPLICATION, WITHOUT LONG-TERM CURRENT USE OF INSULIN (HCC): ICD-10-CM

## 2022-10-20 DIAGNOSIS — G47.33 OSA (OBSTRUCTIVE SLEEP APNEA): ICD-10-CM

## 2022-10-20 DIAGNOSIS — E66.01 MORBID OBESITY WITH BMI OF 40.0-44.9, ADULT (HCC): ICD-10-CM

## 2022-10-20 DIAGNOSIS — I25.10 CORONARY ARTERY DISEASE INVOLVING NATIVE CORONARY ARTERY OF NATIVE HEART WITHOUT ANGINA PECTORIS: Primary | ICD-10-CM

## 2022-10-20 DIAGNOSIS — R42 POSTURAL DIZZINESS: ICD-10-CM

## 2022-10-20 DIAGNOSIS — I10 ESSENTIAL HYPERTENSION: ICD-10-CM

## 2022-10-20 DIAGNOSIS — E78.5 DYSLIPIDEMIA: ICD-10-CM

## 2022-10-20 PROCEDURE — G8417 CALC BMI ABV UP PARAM F/U: HCPCS | Performed by: CLINICAL NURSE SPECIALIST

## 2022-10-20 PROCEDURE — G8427 DOCREV CUR MEDS BY ELIG CLIN: HCPCS | Performed by: CLINICAL NURSE SPECIALIST

## 2022-10-20 PROCEDURE — 99214 OFFICE O/P EST MOD 30 MIN: CPT | Performed by: CLINICAL NURSE SPECIALIST

## 2022-10-20 PROCEDURE — 1036F TOBACCO NON-USER: CPT | Performed by: CLINICAL NURSE SPECIALIST

## 2022-10-20 PROCEDURE — 1123F ACP DISCUSS/DSCN MKR DOCD: CPT | Performed by: CLINICAL NURSE SPECIALIST

## 2022-10-20 PROCEDURE — 93000 ELECTROCARDIOGRAM COMPLETE: CPT | Performed by: CLINICAL NURSE SPECIALIST

## 2022-10-20 PROCEDURE — G8484 FLU IMMUNIZE NO ADMIN: HCPCS | Performed by: CLINICAL NURSE SPECIALIST

## 2022-10-20 RX ORDER — SPIRONOLACTONE 25 MG/1
25 TABLET ORAL DAILY
Qty: 90 TABLET | Refills: 3 | Status: SHIPPED | OUTPATIENT
Start: 2022-10-20

## 2022-10-20 ASSESSMENT — ENCOUNTER SYMPTOMS
NAUSEA: 0
WHEEZING: 0
EYE REDNESS: 0
ABDOMINAL PAIN: 0
SHORTNESS OF BREATH: 1
FACIAL SWELLING: 0
COUGH: 0
CHEST TIGHTNESS: 0
VOMITING: 0

## 2022-10-20 NOTE — PROGRESS NOTES
[facility], [address] [place] [address], [address] [id number]  Phone: [phone]  Fax: [phone]    OFFICE VISIT:  10/20/2022    Nga Harvey - : 1945    Reason For Visit:  Rozina Razo is a 68 y.o. male who is here for Coronary Artery Disease and 6 Month Follow-Up       Diagnosis Orders   1. Coronary artery disease involving native coronary artery of native heart without angina pectoris        2. Essential hypertension  EKG 12 lead      3. Dyslipidemia        4. JENNIFER (obstructive sleep apnea)        5. Type 2 diabetes mellitus with other specified complication, without long-term current use of insulin ([address]        6. Morbid obesity with BMI of 40.0-44.9, adult ([address]        7. Postural dizziness              HPI  Patient here for follow-up with a history of CAD, hypertension, hyperlipidemia, sleep apnea (currently not treating), diabetes. Underwent stenting in his mid LAD approx . Dobutamine stress echo in 2019. At that time echocardiogram normal with exception of mild left atrial enlargement and grade 1 diastolic dysfunction    Patient denies chest pain. He has some chronic exertional dyspnea which is not worse than usual.  He is quite deconditioned and morbidly obese, but reports he has lost some 20 pound recently by improving his diet. He denies orthopnea, PND, edema, palpitations. He has had some falls recently due to dizziness with position change. He is quite deconditioned and his daughter has to help pull him up from a sitting position    Only goes to his PCP if he is having an issue. He does not go for regular blood work and/or physical exam.  He has not been to his PCP more than a year. His daughter is with him today at the office     Tray Arndt MD is PCP.   Nga Harvey has the following history as recorded in [facility]:    Patient Active Problem List    Diagnosis Date Noted    Morbid obesity with BMI of 40.0-44.9, adult ([address] 04/14/2022    Diabetes mellitus ([facility] 04/14/2022    Coronary artery disease 03/21/2018    Obesity 03/21/2018    Dyslipidemia 03/21/2018    Stage 3 chronic kidney disease ([facility] 03/21/2018    Degenerative joint disease (DJD) of lumbar spine 03/21/2018    JENNIFER (obstructive sleep apnea) 03/21/2018    Hypertension 03/21/2018     Past Medical History:   Diagnosis Date    CAD (coronary artery disease)     Hyperlipidemia     Hypertension      Past Surgical History:   Procedure Laterality Date    APPENDECTOMY      BACK SURGERY      lumbar    CARDIAC CATHETERIZATION      stent    EYE SURGERY      HC INJECT OTHER PERPHRL NERV Bilateral 9/30/2016    HIP INJECTIONS WITH FLUOROSCOPY  performed by Bebe Velez MD at [facility] OR    TOE SURGERY       Family History   Problem Relation Age of Onset    Hypertension Father     No Known Problems Brother      Social History     Tobacco Use    Smoking status: Never    Smokeless tobacco: Never   Substance Use Topics    Alcohol use: Yes     Comment: 1-2 alcoholic beverages daily      Current Outpatient Medications   Medication Sig Dispense Refill    spironolactone (ALDACTONE) 25 MG tablet Take 1 tablet by mouth daily 90 tablet 3    cyclobenzaprine (FLEXERIL) 10 MG tablet Take 10 mg by mouth 3 times daily as needed for Muscle spasms      Melatonin 10 MG TABS Take 10 mg by mouth at bedtime      Ascorbic Acid (VITAMIN C) 1000 MG tablet Take 1,000 mg by mouth daily      glipiZIDE (GLUCOTROL) 5 MG tablet Take 5 mg by mouth daily      losartan (COZAAR) 50 MG tablet Take 1 tablet by mouth daily 90 tablet 3    pravastatin (PRAVACHOL) 20 MG tablet Take 1 tablet by mouth daily 90 tablet 3    Specialty Vitamins Products (BRAIN) TABS Take by mouth daily       Cholecalciferol (VITAMIN D3) 5000 units TABS Take 5,000 Units by mouth daily      allopurinol (ZYLOPRIM) 100 MG tablet Take 100 mg by mouth daily      Magnesium Oxide (MAG-OX PO) Take 400 mg by mouth 4 times daily Patient takes 2 in the am and 2 in the pm      aspirin 81 MG tablet Take 81 mg by mouth daily      TRAZODONE HCL PO Take 50 mg by mouth every evening       No current facility-administered medications for this visit. Allergies: Bextra [valdecoxib] and Crestor [rosuvastatin calcium]    Review of Systems  Review of Systems   Constitutional:  Negative for activity change, diaphoresis, fatigue, fever and unexpected weight change. HENT:  Negative for facial swelling and nosebleeds. Eyes:  Negative for redness and visual disturbance. Respiratory:  Positive for shortness of breath. Negative for cough, chest tightness and wheezing. Cardiovascular:  Negative for chest pain, palpitations and leg swelling. Gastrointestinal:  Negative for abdominal pain, nausea and vomiting. Endocrine: Negative for cold intolerance and heat intolerance. Genitourinary:  Negative for dysuria and hematuria. Musculoskeletal:  Negative for arthralgias and myalgias. Skin:  Negative for pallor and rash. Neurological:  Negative for dizziness, seizures, syncope, weakness and light-headedness. Hematological:  Does not bruise/bleed easily. Psychiatric/Behavioral:  Negative for agitation. The patient is not nervous/anxious. Objective  Vital Signs - /64 (Position: Standing)   Pulse 83   Ht 5' 9\" (1.753 m)   Wt 287 lb (130.2 kg)   BMI 42.38 kg/m²   Wt Readings from Last 3 Encounters:   10/20/22 287 lb (130.2 kg)   04/14/22 (!) 301 lb (136.5 kg)   08/02/21 300 lb (136.1 kg)       Vitals:    10/20/22 0923 10/20/22 1255   BP: 112/72 102/64   Position:  Standing   Pulse: 83    Weight: 287 lb (130.2 kg)    Height: 5' 9\" (1.753 m)       Physical Exam  Vitals and nursing note reviewed. Constitutional:       General: He is not in acute distress. Appearance: He is well-developed. He is obese. He is not diaphoretic. Comments: Deconditioned   HENT:      Head: Normocephalic and atraumatic.       Right Ear: Hearing and external ear normal. Left Ear: Hearing and external ear normal.      Nose: Nose normal.   Eyes:      General:         Right eye: No discharge. Left eye: No discharge. Pupils: Pupils are equal, round, and reactive to light. Neck:      Thyroid: No thyromegaly. Vascular: No carotid bruit or JVD. Trachea: No tracheal deviation. Cardiovascular:      Rate and Rhythm: Normal rate and regular rhythm. Heart sounds: Normal heart sounds. No murmur heard. No friction rub. No gallop. Pulmonary:      Effort: Pulmonary effort is normal. No respiratory distress. Breath sounds: Normal breath sounds. No wheezing or rales. Abdominal:      Palpations: Abdomen is soft. Tenderness: There is no abdominal tenderness. Musculoskeletal:         General: No swelling or deformity. Cervical back: Neck supple. No muscular tenderness. Right lower leg: Edema (trace) present. Left lower leg: Edema (trace) present. Skin:     General: Skin is warm and dry. Findings: No rash. Neurological:      General: No focal deficit present. Mental Status: He is alert and oriented to person, place, and time. Cranial Nerves: No cranial nerve deficit. Psychiatric:         Mood and Affect: Mood normal.         Behavior: Behavior normal.         Judgment: Judgment normal.       Data:    Echo 2018  Conclusions      Summary   Structurally normal.   Normal mitral valve leaflet mobility. Mild mitral regurgitation. Aortic valve appears to be tricuspid. Structurally normal aortic valve. No significant aortic regurgitation or stenosis is noted. Tricuspid valve is structurally normal.   Mild tricuspid regurgitation. Mildly dilated left atrium. Normal left ventricular size with preserved LV function and an estimated   ejection fraction of approximately 55-60%. Grade I diastolic dysfunction. No evidence of left ventricular mass or thrombus noted.       Signature ----------------------------------------------------------------   Electronically signed by Katarzyna Bourgeois MD(Interpreting   physician) on 02/06/2019 04:12 PM   ----------------------------------------------------------------  DSE 2/19  Conclusions      Summary   Dobutamine stress echocardiogram without clinical, electrocardiographic,   or echocardiographic evidence of myocardial ischemia. Test was supervised by Dr. Natividad Pate. Signature      ----------------------------------------------------------------   Electronically signed by Katarzyna Bourgeois MD(Interpreting   physician) on 02/06/2019 04:25 PM    EKG shows sinus bradycardia first-degree AV block rate 83    Assessment:     Diagnosis Orders   1. Coronary artery disease involving native coronary artery of native heart without angina pectoris        2. Essential hypertension  EKG 12 lead      3. Dyslipidemia        4. JENNIFER (obstructive sleep apnea)        5. Type 2 diabetes mellitus with other specified complication, without long-term current use of insulin ([facility]        6. Morbid obesity with BMI of 40.0-44.9, adult ([facility]        7. Postural dizziness            CAD-stable without symptoms of angina with remote history of an LAD stent in approximately 2010. Negative DSE 2019. Continue aspirin, pravastatin, losartan    Hypertension-stable on current regimen with losartan    Hyperlipidemia- currently on statin therapy. He has not seen his PCP in more than a year and therefore has not had labs done. Encouraged him to make a PCP appointment for labs and diabetes management. His daughter is with him today and will help him follow through    Diabetes- uncertain control. Has not been to PCP in more than a year. As discussed above he needs to make an appointment with his PCP for diabetes management and A1c testing. He does use a blood glucose sensor which is helping him with diabetes control.   Discussed A1c goal of less than 7% to prevent diabetes complications including cardiovascular disease    Obstructive sleep apnea-currently not treating. Problems with valve in the mask fluttering when he wears it. We discussed importance of treating his apnea heart. I have encouraged him to follow through with the sleep equipment company to solve issues with the mask. His daughter will help him follow through    Morbid obesity-improvement, he has lost some 20 pounds since his last visit here through dietary changes. Praised him for his efforts and encouraged him to continue to work on weight loss    Postural dizziness-  causing falls more recently. Patient is on spironolactone/HCTZ and  lost significant weight recently. He has some mild orthostasis with about a 10 point drop in blood pressure upon standing. He may not be requiring as much medicine now that he has lost weight. Plan will be to discontinue spironolactone HCTZ and start plain spironolactone 25 mg daily. He has been encouraged to change positions slowly and consider a walker & physical therapy for strengthening    Stable cardiovascular status. No evidence of overt heart failure,angina or dysrhythmia. Plan    Return in about 6 months (around 4/20/2023) for Dr. Cecilio Lance. Make appt with Dr Joy Hollis for physical and labwork    Keep diabetes under control to help prevent further heart disease. Keep Hemoglobin A1C less than 7%. Get with sleep equipment company regarding problem with your mask    Stop Spironolactone/ HCTZ  Start Spironlactone 25mg daily    Change position slowly  Might benefit from physical therapy    Call with any questionsor concerns  Follow up with Farrah Jang MD for non cardiac problems  Report any new problems  Cardiovascular Fitness-Exercise as tolerated. Strive for 15 minutes of exercise most days of the week.     Cardiac / HealthyDiet  Continue current medications as directed  Continue plan of treatment  It is always recommended that you bring your medicationsbottles with you to each visit - this is for your safety!        Teraa Hailey, APRN

## 2022-10-20 NOTE — PATIENT INSTRUCTIONS
Return in about 6 months (around 4/20/2023) for Dr. Meredith Chavis. Make appt with Dr Maria M Mancini for physical and labwork    Keep diabetes under control to help prevent further heart disease. Keep Hemoglobin A1C less than 7%.     Get with sleep equipment company regarding problem with your mask    Stop Spironolactone/ HCTZ  Start Spironlactone 25mg daily    Change position slowly  Might benefit from physical therapy

## 2022-12-01 RX ORDER — PRAVASTATIN SODIUM 20 MG
TABLET ORAL
Qty: 90 TABLET | Refills: 2 | OUTPATIENT
Start: 2022-12-01

## 2022-12-20 RX ORDER — LOSARTAN POTASSIUM 50 MG/1
50 TABLET ORAL DAILY
Qty: 90 TABLET | Refills: 2 | Status: SHIPPED | OUTPATIENT
Start: 2022-12-20

## 2023-03-15 ENCOUNTER — HOSPITAL ENCOUNTER (OUTPATIENT)
Dept: MRI IMAGING | Age: 78
Discharge: HOME | End: 2023-03-15
Payer: MEDICARE

## 2023-03-15 DIAGNOSIS — R29.818 NEUROLOGICAL DEFICIT PRESENT: ICD-10-CM

## 2023-03-15 PROCEDURE — 70551 MRI BRAIN STEM W/O DYE: CPT

## 2023-06-06 ENCOUNTER — OFFICE VISIT (OUTPATIENT)
Dept: CARDIOLOGY CLINIC | Age: 78
End: 2023-06-06
Payer: MEDICARE

## 2023-06-06 VITALS
BODY MASS INDEX: 41.47 KG/M2 | SYSTOLIC BLOOD PRESSURE: 132 MMHG | HEART RATE: 86 BPM | OXYGEN SATURATION: 97 % | DIASTOLIC BLOOD PRESSURE: 72 MMHG | WEIGHT: 280 LBS | HEIGHT: 69 IN

## 2023-06-06 DIAGNOSIS — E78.5 DYSLIPIDEMIA: ICD-10-CM

## 2023-06-06 DIAGNOSIS — G47.33 OSA (OBSTRUCTIVE SLEEP APNEA): ICD-10-CM

## 2023-06-06 DIAGNOSIS — R42 POSTURAL DIZZINESS: ICD-10-CM

## 2023-06-06 DIAGNOSIS — E11.69 TYPE 2 DIABETES MELLITUS WITH OTHER SPECIFIED COMPLICATION, WITHOUT LONG-TERM CURRENT USE OF INSULIN (HCC): ICD-10-CM

## 2023-06-06 DIAGNOSIS — I10 PRIMARY HYPERTENSION: ICD-10-CM

## 2023-06-06 DIAGNOSIS — I25.10 CORONARY ARTERY DISEASE INVOLVING NATIVE CORONARY ARTERY OF NATIVE HEART WITHOUT ANGINA PECTORIS: Primary | ICD-10-CM

## 2023-06-06 DIAGNOSIS — E66.01 MORBID OBESITY WITH BMI OF 40.0-44.9, ADULT (HCC): ICD-10-CM

## 2023-06-06 DIAGNOSIS — R53.81 PHYSICAL DECONDITIONING: ICD-10-CM

## 2023-06-06 PROCEDURE — 3075F SYST BP GE 130 - 139MM HG: CPT | Performed by: CLINICAL NURSE SPECIALIST

## 2023-06-06 PROCEDURE — G8417 CALC BMI ABV UP PARAM F/U: HCPCS | Performed by: CLINICAL NURSE SPECIALIST

## 2023-06-06 PROCEDURE — 3078F DIAST BP <80 MM HG: CPT | Performed by: CLINICAL NURSE SPECIALIST

## 2023-06-06 PROCEDURE — 1123F ACP DISCUSS/DSCN MKR DOCD: CPT | Performed by: CLINICAL NURSE SPECIALIST

## 2023-06-06 PROCEDURE — G8427 DOCREV CUR MEDS BY ELIG CLIN: HCPCS | Performed by: CLINICAL NURSE SPECIALIST

## 2023-06-06 PROCEDURE — 1036F TOBACCO NON-USER: CPT | Performed by: CLINICAL NURSE SPECIALIST

## 2023-06-06 PROCEDURE — 99214 OFFICE O/P EST MOD 30 MIN: CPT | Performed by: CLINICAL NURSE SPECIALIST

## 2023-06-06 RX ORDER — MULTIVITAMIN
1 TABLET ORAL DAILY
COMMUNITY

## 2023-06-06 RX ORDER — CEFDINIR 300 MG/1
300 CAPSULE ORAL 2 TIMES DAILY
COMMUNITY

## 2023-06-06 ASSESSMENT — ENCOUNTER SYMPTOMS
ABDOMINAL PAIN: 0
SHORTNESS OF BREATH: 1
CHEST TIGHTNESS: 0
WHEEZING: 0
VOMITING: 0
NAUSEA: 0
COUGH: 0
FACIAL SWELLING: 0
EYE REDNESS: 0

## 2023-06-06 NOTE — PROGRESS NOTES
[facility], [address] [place] [address], [address] [address]  Phone: [phone]  Fax: [phone]    OFFICE VISIT:  2023    Dominic Copeland - : 1945    Reason For Visit:  Prince madrigal is a 68 y.o. male who is here for 6 Month Follow-Up and Coronary Artery Disease       Diagnosis Orders   1. Coronary artery disease involving native coronary artery of native heart without angina pectoris        2. Primary hypertension        3. Dyslipidemia        4. Type 2 diabetes mellitus with other specified complication, without long-term current use of insulin ([address]        5. JENNIFER (obstructive sleep apnea)        6. Postural dizziness        7. Morbid obesity with BMI of 40.0-44.9, adult ([address]                HPI  Patient here for follow-up with a history of CAD, hypertension, hyperlipidemia, sleep apnea (currently not treating), diabetes. Underwent stenting in his mid LAD approx . Dobutamine stress echo in 2019. At that time echocardiogram normal with exception of mild left atrial enlargement and grade 1 diastolic dysfunction    Patient denies chest pain. He has some chronic exertional dyspnea which is not worse than usual.  He is quite deconditioned and morbidly obese, but reports he has lost some 27 lbs over the last year recently by improving his diet. He denies orthopnea, PND, edema, palpitations. He has had some falls recently due to dizziness with position change. He is quite deconditioned and his daughter has to help pull him up from a sitting position. He has just started physical therapy last week. Most of his falls occur getting in and out of bed       Vince Torres MD is PCP.   Dominic Copeland has the following history as recorded in [facility]:    Patient Active Problem List    Diagnosis Date Noted    Morbid obesity with BMI of 40.0-44.9, adult ([facility] 2022    Diabetes mellitus ([facility] 2022    Coronary artery disease 2018    Obesity

## 2023-06-06 NOTE — PATIENT INSTRUCTIONS
Return in about 6 months (around 12/6/2023) for APRN. Change position slowly  Fall precautions  Continue physical therapy    Consider Inspire device for sleep apnea- discuss with Dr. Jose Winkler    Keep diabetes under control to help prevent further heart disease. Keep Hemoglobin A1C less than 7%.

## 2023-06-19 RX ORDER — PRAVASTATIN SODIUM 20 MG
TABLET ORAL
Qty: 90 TABLET | Refills: 2 | OUTPATIENT
Start: 2023-06-19

## 2023-07-27 ENCOUNTER — OFFICE VISIT (OUTPATIENT)
Dept: PHYSICAL THERAPY | Facility: CLINIC | Age: 78
End: 2023-07-27
Payer: MEDICARE

## 2023-07-27 DIAGNOSIS — R41.89 OTHER SYMPTOMS AND SIGNS INVOLVING COGNITIVE FUNCTIONS AND AWARENESS: Primary | ICD-10-CM

## 2023-07-27 PROCEDURE — 96125 COGNITIVE TEST BY HC PRO: CPT | Performed by: SPEECH-LANGUAGE PATHOLOGIST

## 2023-07-27 NOTE — PROGRESS NOTES
Speech/Language Pathology Initial Evaluation and Plan of Care    Patient: Speedy Villalba               : 1945  Visit Date: 2023  Referring practitioner: Rodrigue Hebert,*  Date of Initial Visit: 2023  Patient seen for 1 sessions    Visit Diagnoses:    ICD-10-CM ICD-9-CM   1. Other symptoms and signs involving cognitive functions and awareness  R41.89 799.59     Past Medical History:   Diagnosis Date    Coronary artery disease 10 years    x2 stents    Diabetes mellitus < 2 years    non-insulin dependent    Difficulty walking < 1 year    multiple falls in the last 6-8 months    HL (hearing loss) 20+ years    Wears hearing aids occasionally    Hyperlipidemia 5 years    Memory loss 3 years+/-    gradual and subtle    Sleep apnea 20+ year    Non-compliant with PEEP for tx    Stroke 2022     Past Surgical History:   Procedure Laterality Date    APPENDECTOMY      BACK SURGERY  2016    CAROTID STENT  ?    KNEE SURGERY  1960    LAMINECTOMY  2015?    discectomy & fusion       SUBJECTIVE     Patient presents with the following symptoms: Difficulty with memory and word finding.  Date of Onset: past 6 months  History of present problems: increasing difficulty with memory and being able to tell stories.   The functional impact on the patient: Moderate  Prior level of function/education: High school  Pertinent Medical History Related to this Problem: atypical parkinsonism, diabetes, hearing loss, sleep apnea, stroke, CAD      OBJECTIVE     RBANS: The Repeatable Battery for the Assessment of Neuropsychological Status (RBANS) assesses patient function in the areas of Immediate and Delayed memory, visuospatial/constructional skills, language and attention. It is used to detect and track neurocognitive deficits.   Index score Percentile Qualitative Description   Immediate Memory 73 4 Borderline   Visuospatial 78 7 Borderline   Language 78 7 Borderline   Attention 53 0.1 Extremely Low   Delayed Memory  78 78 Borderline   Total Scale 65 65 Extremely Low   Comments:       IMPRESSION/RECOMMENDATIONS  Factors Affecting Performance: cannot r/o affect of hearing loss. He appeared to understand examiner in conversation.   Learning Motivation: moderate  Education/Learning Comments:   Patient demonstrated understanding of evaluation results and plan of care.   Clinical Impression: Moderate memory loss. Cannot r/o affect of word finding on score. Further language assessment warranted.   Impact on Function: Difficulty with communication and completing ADLs  Prognosis Comment: Pending completed assessment and workup with MD.       Therapy Education/Self Care    Details: Evaluation results and POC   Given    Progress: New   Education provided to:  Patient   Level of understanding Verbalized           Total Time of Visit:            60   mins    ASSESSMENT/PLAN     Goals                                         STG Comments Status   Patient to complete memory evaluation                    LTG by:      Pending completed assessment                           ASSESSMENT:   Patient is indicated for skilled care by a Speech/Language Pathologist for further language assessment    Summary of Assessment: Patient scored in the borderline range on all subtests with the exception of Attention as well as Total Scale score which was in the extremely low range.     Therapy Recommendations: Continue assessment with language assessment for aphasia.   Screening indicates the further need for Physical Therapy due to gait problem.     PLAN:  Details of Plan of Care: Continue evaluation for aphasia/word finding    Frequency:  PRN  Duration: TBD visits        SPEECH/LANGUAGE PATHOLOGIST SIGNATURE: Alexa Banegas CCC-SLP  [place] License #: [id number]  Electronically Signed on 7/27/2023      Initial Certification  Certification Period: 7/27/2023 through 10/24/2023  I certify that the therapy services are furnished while this patient is under my care.  The  services outlined above are required by this patient, and will be reviewed every 90 days.     PHYSICIAN:     Rodrigue Hebert PA______________________________________DATE: _________     Please sign and return via fax to [phone].   Thank you so much for letting us work with Speedy. I appreciate your letting us work with your patients. If you have any questions or concerns, please don't hesitate to contact me.

## 2023-07-28 ENCOUNTER — TELEPHONE (OUTPATIENT)
Dept: NEUROLOGY | Facility: HOSPITAL | Age: 78
End: 2023-07-28

## 2023-07-31 ENCOUNTER — HOSPITAL ENCOUNTER (OUTPATIENT)
Dept: NEUROLOGY | Facility: HOSPITAL | Age: 78
Discharge: HOME | End: 2023-07-31
Payer: MEDICARE

## 2023-07-31 ENCOUNTER — LAB (OUTPATIENT)
Dept: LAB | Facility: HOSPITAL | Age: 78
End: 2023-07-31
Payer: MEDICARE

## 2023-07-31 DIAGNOSIS — G20 PARKINSONISM, UNSPECIFIED PARKINSONISM TYPE: ICD-10-CM

## 2023-07-31 DIAGNOSIS — G31.84 MCI (MILD COGNITIVE IMPAIRMENT) WITH MEMORY LOSS: ICD-10-CM

## 2023-07-31 PROCEDURE — 82390 ASSAY OF CERULOPLASMIN: CPT | Performed by: PHYSICIAN ASSISTANT

## 2023-07-31 PROCEDURE — 82525 ASSAY OF COPPER: CPT | Performed by: PHYSICIAN ASSISTANT

## 2023-07-31 PROCEDURE — 80053 COMPREHEN METABOLIC PANEL: CPT | Performed by: PHYSICIAN ASSISTANT

## 2023-07-31 PROCEDURE — 84479 ASSAY OF THYROID (T3 OR T4): CPT | Performed by: PHYSICIAN ASSISTANT

## 2023-07-31 PROCEDURE — 83735 ASSAY OF MAGNESIUM: CPT | Performed by: PHYSICIAN ASSISTANT

## 2023-07-31 PROCEDURE — 84443 ASSAY THYROID STIM HORMONE: CPT | Performed by: PHYSICIAN ASSISTANT

## 2023-07-31 PROCEDURE — 95819 EEG AWAKE AND ASLEEP: CPT

## 2023-07-31 PROCEDURE — 84207 ASSAY OF VITAMIN B-6: CPT | Performed by: PHYSICIAN ASSISTANT

## 2023-07-31 PROCEDURE — 95819 EEG AWAKE AND ASLEEP: CPT | Performed by: PSYCHIATRY & NEUROLOGY

## 2023-07-31 PROCEDURE — 85025 COMPLETE CBC W/AUTO DIFF WBC: CPT | Performed by: PHYSICIAN ASSISTANT

## 2023-07-31 PROCEDURE — 84436 ASSAY OF TOTAL THYROXINE: CPT | Performed by: PHYSICIAN ASSISTANT

## 2023-08-07 ENCOUNTER — OFFICE VISIT (OUTPATIENT)
Dept: PHYSICAL THERAPY | Facility: CLINIC | Age: 78
End: 2023-08-07
Payer: MEDICARE

## 2023-08-07 DIAGNOSIS — R47.89 WORD FINDING DIFFICULTY: ICD-10-CM

## 2023-08-07 DIAGNOSIS — R41.89 OTHER SYMPTOMS AND SIGNS INVOLVING COGNITIVE FUNCTIONS AND AWARENESS: ICD-10-CM

## 2023-08-07 DIAGNOSIS — R47.01 APHASIA: Primary | ICD-10-CM

## 2023-08-07 DIAGNOSIS — R41.3 MEMORY DEFICIT: ICD-10-CM

## 2023-08-07 PROCEDURE — 92523 SPEECH SOUND LANG COMPREHEN: CPT | Performed by: SPEECH-LANGUAGE PATHOLOGIST

## 2023-08-07 NOTE — PROGRESS NOTES
Speech/Language Pathology Initial Evaluation and Plan of Care    Patient: Speedy Villalba               : 1945  Visit Date: 2023  Referring practitioner: Rodrigue Hebert,*  Date of Initial Visit: 2023  Patient seen for 2 sessions    Visit Diagnoses:    ICD-10-CM ICD-9-CM   1. Aphasia  R47.01 784.3   2. Word finding difficulty  R47.89 V40.1   3. Memory deficit  R41.3 780.93   4. Other symptoms and signs involving cognitive functions and awareness  R41.89 799.59       Past Medical History:   Diagnosis Date    Coronary artery disease 10 years    x2 stents    Diabetes mellitus < 2 years    non-insulin dependent    Difficulty walking < 1 year    multiple falls in the last 6-8 months    HL (hearing loss) 20+ years    Wears hearing aids occasionally    Hyperlipidemia 5 years    Memory loss 3 years+/-    gradual and subtle    Sleep apnea 20+ year    Non-compliant with PEEP for tx    Stroke 2022     Past Surgical History:   Procedure Laterality Date    APPENDECTOMY      BACK SURGERY      CAROTID STENT  ?    KNEE SURGERY  1960    LAMINECTOMY  ?    discectomy & fusion       SUBJECTIVE     Assessment for language/aphasia word finding completed today.   Patient presents with the following symptoms: Difficulty with memory and word finding.  Date of Onset: past 6 months  History of present problems: increasing difficulty with memory and being able to tell stories.   The functional impact on the patient: Moderate  Prior level of function/education: High school  Pertinent Medical History Related to this Problem: atypical parkinsonism, diabetes, hearing loss, sleep apnea, stroke, CAD      OBJECTIVE     Aphasia Diagnostic Profiles (ADP): Assesses language and communication impairment associated with aphasia resulting from acquired brain damage. The ADP consists of nine sub tests and yields aphasia type and severity as well as alternative communication profile.     Lexical Retrieval Standard  Score 12 - Aphasic   ADP Phrase Length 13 - Fluent   Auditory Comprehension Standard Score 14 - Good   Repetition Standard Score 11 - Anomic   Classification: Fluent Anomic Aphasia    IMPRESSION/RECOMMENDATIONS  Factors Affecting Performance: cannot r/o affect of hearing loss. He appeared to understand examiner in conversation. Repetitions given as needed.   Learning Motivation: moderate  Education/Learning Comments:   Patient demonstrated understanding of evaluation results and plan of care.   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 Memory loss.   Impact on Function: Difficulty with communication and completing ADLs  Prognosis Comment: Pending completed assessment and workup with MD.       Therapy Education/Self Care    Details: Evaluation results and POC   Given    Progress: New   Education provided to:  Patient   Level of understanding Verbalized           Total Time of Visit:            60   mins    ASSESSMENT/PLAN     Goals                                         STG Comments Status   Patient's memory skills will be enhanced by patient by utilizing internal memory strategies and external memory strategies with minimal cues.      Patient will demonstrate improved ability to recall information by listening to paragraph and answering yes/no  and/or content questions with/without delay     Pt will improve spontaneous expression in general conversation by completing a 5 minute conversation without word finding difficulties, expressing complex concepts and ideas in conversation, utilizing self-cueing strategies when encountering difficulty with expressive language.          LTG by:      Patient will be able to remember  information needed to participate in activities of daily living     Patient will be able to use verbal expressive language skills to communicate effectively in all situations with  familiar listeners         ASSESSMENT:   Patient is indicated for skilled care by a  Speech/Language Pathologist for aphasia and memory deficits    Summary of Assessment: On the ADP patient had difficulty with phrase length and information units. Classification Mild fluent anomic aphasia.     Therapy Recommendations: Initiate therapy for memory and word finding.   Screening indicates the further need for Physical Therapy due to gait problem.     PLAN:  Details of Plan of Care: Continue evaluation for aphasia/word finding    Frequency: 1 time per week  Duration: 8 weeks  Time: 30-45 min      SPEECH/LANGUAGE PATHOLOGIST SIGNATURE: Alexa Banegas CCC-SLP  [place] License #: [id number]  Electronically Signed on 8/7/2023

## 2023-08-16 DIAGNOSIS — G31.84 MCI (MILD COGNITIVE IMPAIRMENT) WITH MEMORY LOSS: ICD-10-CM

## 2023-08-16 DIAGNOSIS — G20 PARKINSONISM, UNSPECIFIED PARKINSONISM TYPE: Primary | ICD-10-CM

## 2023-08-22 ENCOUNTER — DOCUMENTATION (OUTPATIENT)
Dept: PHYSICAL THERAPY | Facility: CLINIC | Age: 78
End: 2023-08-22

## 2023-08-22 ENCOUNTER — TELEPHONE (OUTPATIENT)
Dept: NEUROLOGY | Facility: CLINIC | Age: 78
End: 2023-08-22
Payer: MEDICARE

## 2023-08-22 DIAGNOSIS — R47.89 WORD FINDING DIFFICULTY: ICD-10-CM

## 2023-08-22 DIAGNOSIS — R41.89 OTHER SYMPTOMS AND SIGNS INVOLVING COGNITIVE FUNCTIONS AND AWARENESS: ICD-10-CM

## 2023-08-22 DIAGNOSIS — R47.01 APHASIA: Primary | ICD-10-CM

## 2023-08-22 DIAGNOSIS — R41.3 MEMORY DEFICIT: ICD-10-CM

## 2023-08-22 NOTE — TELEPHONE ENCOUNTER
Caller: BRIDGETT  Relationship to Patient: SPOUSE  Phone Number: [phone]    Reason for Call: SHE STATES PT IS CURRENTLY IN A NURSING HOME, SHE STATES HIS NURSING HOME HAS REQUESTED THE RECORDS FOR NEUROLOGY TO HELP ESTIMATE HOW LONG HE WILL BE IN THE NURSING HOME.     [address], [facility]  Phone: [phone]  Fax Number  [phone]

## 2023-08-22 NOTE — TELEPHONE ENCOUNTER
Office note faxed to [facility].  Unable to leave a message requesting a return call.

## 2023-08-22 NOTE — PROGRESS NOTES
Speech Language Pathology Discharge Note    Patient: Speedy Villalba                                                                        Date: 2023               :     1945    Referring practitioner:    No ref. provider found  Type/Date of Initial Visit:    Type: THERAPY  Noted: 2023    Patient seen for 2 sessions    Visit Diagnoses:    ICD-10-CM ICD-9-CM   1. Aphasia  R47.01 784.3   2. Word finding difficulty  R47.89 V40.1   3. Memory deficit  R41.3 780.93   4. Other symptoms and signs involving cognitive functions and awareness  R41.89 799.59     SUBJECTIVE     Documentation for discharge only today. Patient not seen for therapy today. Patient was hospitalized and subsequently discharged to nursing home for projected 100 days. Please see last therapy notefor progress on goals. Date of last visit: 23    OBJECTIVE   GOALS    Goals                                         STG Comments Status   Patient's memory skills will be enhanced by patient by utilizing internal memory strategies and external memory strategies with minimal cues.        Patient will demonstrate improved ability to recall information by listening to paragraph and answering yes/no  and/or content questions with/without delay       Pt will improve spontaneous expression in general conversation by completing a 5 minute conversation without word finding difficulties, expressing complex concepts and ideas in conversation, utilizing self-cueing strategies when encountering difficulty with expressive language.               LTG by:        Patient will be able to remember  information needed to participate in activities of daily living       Patient will be able to use verbal expressive language skills to communicate effectively in all situations with  familiar listeners           ASSESSMENT/PLAN     DISCHARGE SUMMARY   Discharge date 2023   Dates of this episode 23 through 2023   Number of visits on this episode 2    Reason for discharge transfer to another facility (nursing home)   Outcomes achieved Refer to the goals table for specifics on goals  Unable to make functional progress toward goals   Summary of care Patient seen for memory evaluation and assessment of aphasia. Patient unfortunately was hospitalized and subsequently sent to nursing home for projected 100 day stay.    Discharge plan Patient to return to referring/providing physician     Thank you for this referral and for allowing us to participate in the care of this patient. He is certainly welcome to return for therapy, if warranted, with a new referral from his MD.       Signature: Alexa Banegas, CCC-SLP  License # [id number]  Electronically signed on 8/22/2023

## 2023-08-23 ENCOUNTER — TELEPHONE (OUTPATIENT)
Dept: UROLOGY | Age: 78
End: 2023-08-23

## 2023-08-23 NOTE — TELEPHONE ENCOUNTER
Maru Moyer called to check on referral, told her did not seei chart at this time and once processed we would call patient. She said patient care giver called and two people said we did not receive, it was just sent yesterday at 2:10. Maru Moyer has confirmation and told the caregiver that she needed to give it time, call Maru Moyer, in case she needs to re fax, thanks !

## 2023-08-25 NOTE — TELEPHONE ENCOUNTER
Deena Mcintyre called to check on referral. Hussain Pompa it's been sent over several times. Please call to advise.

## 2023-08-25 NOTE — TELEPHONE ENCOUNTER
Patient Daughter calling to see if the office received a STAT referral for urine retention. She says it was faxed over again yesterday. Please return her call. Thank you!

## 2023-08-31 ENCOUNTER — OFFICE VISIT (OUTPATIENT)
Dept: UROLOGY | Age: 78
End: 2023-08-31

## 2023-08-31 VITALS — BODY MASS INDEX: 41.35 KG/M2 | HEIGHT: 69 IN | TEMPERATURE: 97.3 F

## 2023-08-31 DIAGNOSIS — R33.8 BENIGN PROSTATIC HYPERPLASIA WITH URINARY RETENTION: Primary | ICD-10-CM

## 2023-08-31 DIAGNOSIS — N40.1 BENIGN PROSTATIC HYPERPLASIA WITH URINARY RETENTION: Primary | ICD-10-CM

## 2023-08-31 LAB — POST VOID RESIDUAL (PVR): 16 ML

## 2023-08-31 RX ORDER — ALFUZOSIN HYDROCHLORIDE 10 MG/1
10 TABLET, EXTENDED RELEASE ORAL NIGHTLY
Qty: 30 TABLET | Refills: 6 | Status: SHIPPED | OUTPATIENT
Start: 2023-08-31

## 2023-08-31 RX ORDER — ALFUZOSIN HYDROCHLORIDE 10 MG/1
10 TABLET, EXTENDED RELEASE ORAL NIGHTLY
Qty: 30 TABLET | Refills: 6 | Status: SHIPPED | OUTPATIENT
Start: 2023-08-31 | End: 2023-08-31

## 2023-08-31 ASSESSMENT — ENCOUNTER SYMPTOMS
ABDOMINAL DISTENTION: 0
ABDOMINAL PAIN: 0
NAUSEA: 0
BACK PAIN: 0
VOMITING: 0

## 2023-08-31 NOTE — PROGRESS NOTES
Malinda Lundberg is a 68 y.o. male who presents today   Chief Complaint   Patient presents with    New Patient     I am here today for a fill and pull       Patient is a 80-year-old male who presents to clinic today with complaints of urinary retention. He presented to [facility] ER on 8/10/2023 with difficulties with ADLs frequent falls. He had slight elevation of his renal function with a creatinine of 1.38. He was found to have urinary retention catheter was placed with some difficulty with over 1000 cc returned. He was transferred to a [address] [place] [address] for continued rehab. He comes in today for Lackey catheter removal.  He reports no issues in the past with urinary retention. Has never seen urologist.  He does have CKD and follows with nephrology. Reports his baseline urination includes weak stream, nocturia x2, intermittency, postvoid dribbling. He denies any straining, incomplete bladder emptying, frequency, urgency. He does endorse urge incontinence although this is not daily. He usually wears a brief daily for security. History of kidney stones denies any flank pain or gross hematuria. Apparently there was some gross hematuria after difficult catheter placement. No personal or family history of prostate cancer. No recent PSA on file. Patient does have a history of diabetes but denies any neuropathy. Denies any history of smoking or occupational exposure.       Past Medical History:   Diagnosis Date    CAD (coronary artery disease)     Hyperlipidemia     Hypertension        Past Surgical History:   Procedure Laterality Date    APPENDECTOMY      BACK SURGERY      lumbar    CARDIAC CATHETERIZATION      stent    EYE SURGERY      HC INJECT OTHER PERPHRL NERV Bilateral 9/30/2016    HIP INJECTIONS WITH FLUOROSCOPY  performed by Karma Lopez MD at [facility] OR    TOE SURGERY         Current Outpatient Medications   Medication Sig Dispense Refill    Sulfamethoxazole-Trimethoprim

## 2023-08-31 NOTE — PROGRESS NOTES
Patient of JULI Bhardwaj presents today for voiding trial post episode of urinary retention. The patient denies any fever, chills or  N&V. After patient had given consent, using the catheter in place, 215cc of sterile water was installed into the bladder with no complications. Patient was able to void 50cc. Patient missed the cylinder so bladderscan was done, showing 16ml left in bladder     JULI Bhardwaj was in office at time of procedure. Patient was advised to drink clear fluids for the next couple hours and urinate. Patient was advised they may experience some blood in the urine and burning with urination for the next couple days. If the patient is unable to urinate or develops fever, chills, N&V or suprapubic pain, they will call office for an appt at clinic or seek medical treatment at nearest ER, PCP or Urgent Care if after hours. Patient verbalized understanding and all questions were answered. Patient advised to call the office with any questions or concerns. Patient is to follow up as scheduled.

## 2023-09-11 ENCOUNTER — HOSPITAL ENCOUNTER (OUTPATIENT)
Dept: NUCLEAR MEDICINE | Facility: HOSPITAL | Age: 78
Discharge: HOME | End: 2023-09-11
Payer: MEDICARE

## 2023-09-11 DIAGNOSIS — G20 PARKINSONISM, UNSPECIFIED PARKINSONISM TYPE: ICD-10-CM

## 2023-09-11 DIAGNOSIS — G31.84 MCI (MILD COGNITIVE IMPAIRMENT) WITH MEMORY LOSS: ICD-10-CM

## 2023-09-11 PROCEDURE — A9584 IODINE I-123 IOFLUPANE: HCPCS | Performed by: PHYSICIAN ASSISTANT

## 2023-09-11 PROCEDURE — 78803 RP LOCLZJ TUM SPECT 1 AREA: CPT

## 2023-09-11 PROCEDURE — 0 IOFLUPANE I 123 185 MBQ/2.5ML SOLUTION: Performed by: PHYSICIAN ASSISTANT

## 2023-09-11 RX ADMIN — IOFLUPANE I-123 192.4 MBQ: 2 INJECTION, SOLUTION INTRAVENOUS at 11:29

## 2023-09-13 ENCOUNTER — TELEPHONE (OUTPATIENT)
Dept: NEUROLOGY | Facility: CLINIC | Age: 78
End: 2023-09-13
Payer: MEDICARE

## 2023-09-13 NOTE — TELEPHONE ENCOUNTER
Caller: CHUY Breen call back number: [phone]    What was the call regarding: PT HAD NM BRAIN SPECT DONE ON 09.11.2023 THEY SAW  RESULTS ON MY CHART TEST WAS INCONCLUSIVE      WANTED TO KNOW IF HE NEEDS TO HAVE DONE AGAIN?     PLEASE CALL ASAP TO SET UP ANOTHER APPT / SCAN     Is it okay if the provider responds through MyChart: NO CALL    PLEASE ADVISE

## 2023-09-13 NOTE — TELEPHONE ENCOUNTER
Explained that I will send Tyrese a message to see what he recommends.  The results were non-diagnostic due to motion and positioning limitations.

## 2023-09-14 NOTE — TELEPHONE ENCOUNTER
Provider: BERNADETTE    Caller: MAYA    [facility]hip to Patient: [facility]    Phone Number: [phone] Fax: [phone]    Reason for Call: PATIENT PRIMARY CARE PHYSICIAN IS REQUESTING THE RESULTS OF PATIENT BRAIN SCAN. PLEASE [phone].      PLEASE REVIEW    THANK YOU

## 2023-09-15 ENCOUNTER — TELEPHONE (OUTPATIENT)
Dept: NEUROLOGY | Facility: CLINIC | Age: 78
End: 2023-09-15
Payer: MEDICARE

## 2023-09-15 ENCOUNTER — NURSE ONLY (OUTPATIENT)
Dept: UROLOGY | Age: 78
End: 2023-09-15

## 2023-09-15 DIAGNOSIS — R33.8 BENIGN PROSTATIC HYPERPLASIA WITH URINARY RETENTION: Primary | ICD-10-CM

## 2023-09-15 DIAGNOSIS — N40.1 BENIGN PROSTATIC HYPERPLASIA WITH URINARY RETENTION: Primary | ICD-10-CM

## 2023-09-15 LAB — POST VOID RESIDUAL (PVR): 14 ML

## 2023-09-15 NOTE — TELEPHONE ENCOUNTER
Provider: BERNADETTE    Caller: BRIDGETT     Relationship to Patient: SPOUSE     Phone Number: [phone]    Reason for Call: PATIENT SPOUSE IS REQUESTING A DIFFERENT TYPE OF TEST TO  RECEIVE RESULTS  DUE TO PATIENT MOTION DURING THE SPECT SCAN.     PLEASE REVIEW      THANK YOU

## 2023-09-15 NOTE — PROGRESS NOTES
Bladder Scan interpretation  Estimation of residual urine via abdominal ultrasound  Residual Urine: 14 ml  Indication: BPH w/ urinary retention  Position: Supine  Examination: Incremental scanning of the suprapubic area using 3 MHz transducer using copious amounts of acoustic gel. Findings: An anechoic area was demonstrated which represented the bladder, with measurement of residual urine as noted. Patient will follow up in 3 months with PSA prior.

## 2023-09-18 ENCOUNTER — TELEPHONE (OUTPATIENT)
Dept: NEUROLOGY | Facility: CLINIC | Age: 78
End: 2023-09-18
Payer: MEDICARE

## 2023-09-18 NOTE — TELEPHONE ENCOUNTER
Provider: BERNADETTE    Caller: BRIDGETT    Relationship: WIFE    Phone Number: [phone]     Reason for Call: PT'S WIFE CALLED AND IS WANTING TO KNOW IF THERE IS ANY OTHER TEST THAT BE COMPLETED AS WIFE THINKS THE NUCLEAR TEST WAS TO HARD ON PT AND IS NOT SURE IF HE CAN COMPLETE TEST AGAIN.     PLEASE REVIEW AND ADVISE.  THANK YOU.

## 2023-09-22 ENCOUNTER — HOSPITAL ENCOUNTER (EMERGENCY)
Facility: HOSPITAL | Age: 78
Discharge: HOME | End: 2023-09-22
Attending: STUDENT IN AN ORGANIZED HEALTH CARE EDUCATION/TRAINING PROGRAM
Payer: MEDICARE

## 2023-09-22 ENCOUNTER — APPOINTMENT (OUTPATIENT)
Dept: CT IMAGING | Facility: HOSPITAL | Age: 78
End: 2023-09-22
Payer: MEDICARE

## 2023-09-22 ENCOUNTER — APPOINTMENT (OUTPATIENT)
Dept: GENERAL RADIOLOGY | Facility: HOSPITAL | Age: 78
End: 2023-09-22
Payer: MEDICARE

## 2023-09-22 VITALS
RESPIRATION RATE: 18 BRPM | TEMPERATURE: 98 F | HEIGHT: 69 IN | BODY MASS INDEX: 41.43 KG/M2 | DIASTOLIC BLOOD PRESSURE: 74 MMHG | SYSTOLIC BLOOD PRESSURE: 165 MMHG | HEART RATE: 70 BPM | OXYGEN SATURATION: 98 % | WEIGHT: 279.7 LBS

## 2023-09-22 DIAGNOSIS — S09.90XA CLOSED HEAD INJURY WITHOUT LOSS OF CONSCIOUSNESS, INITIAL ENCOUNTER: ICD-10-CM
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
## 2023-09-22 DIAGNOSIS — R53.1 GENERALIZED WEAKNESS: ICD-10-CM

## 2023-09-22 DIAGNOSIS — D64.9 ANEMIA, UNSPECIFIED TYPE: ICD-10-CM

## 2023-09-22 DIAGNOSIS — Z86.73 HISTORY OF STROKE: ICD-10-CM

## 2023-09-22 LAB
ALBUMIN SERPL-MCNC: 3.7 G/DL (ref 3.5–5.2)
ALBUMIN/GLOB SERPL: 1.4 G/DL
ALP SERPL-CCNC: 78 U/L (ref 39–117)
ALT SERPL W P-5'-P-CCNC: 22 U/L (ref 1–41)
ANION GAP SERPL CALCULATED.3IONS-SCNC: 10 MMOL/L (ref 5–15)
AST SERPL-CCNC: 22 U/L (ref 1–40)
BASOPHILS # BLD AUTO: 0.04 10*3/MM3 (ref 0–0.2)
BASOPHILS NFR BLD AUTO: 0.4 % (ref 0–1.5)
BILIRUB SERPL-MCNC: 0.6 MG/DL (ref 0–1.2)
BILIRUB UR QL STRIP: NEGATIVE
BUN SERPL-MCNC: 21 MG/DL (ref 8–23)
BUN/CREAT SERPL: 14.7 (ref 7–25)
CALCIUM SPEC-SCNC: 9.2 MG/DL (ref 8.6–10.5)
CHLORIDE SERPL-SCNC: 103 MMOL/L (ref 98–107)
CLARITY UR: CLEAR
CO2 SERPL-SCNC: 24 MMOL/L (ref 22–29)
COLOR UR: YELLOW
CREAT SERPL-MCNC: 1.43 MG/DL (ref 0.76–1.27)
DEPRECATED RDW RBC AUTO: 44.8 FL (ref 37–54)
EGFRCR SERPLBLD CKD-EPI 2021: 50.5 ML/MIN/1.73
EOSINOPHIL # BLD AUTO: 0.16 10*3/MM3 (ref 0–0.4)
EOSINOPHIL NFR BLD AUTO: 1.5 % (ref 0.3–6.2)
ERYTHROCYTE [DISTWIDTH] IN BLOOD BY AUTOMATED COUNT: 13.2 % (ref 12.3–15.4)
FLUAV RNA RESP QL NAA+PROBE: NOT DETECTED
FLUBV RNA RESP QL NAA+PROBE: NOT DETECTED
GLOBULIN UR ELPH-MCNC: 2.6 GM/DL
GLUCOSE SERPL-MCNC: 123 MG/DL (ref 65–99)
GLUCOSE UR STRIP-MCNC: NEGATIVE MG/DL
HCT VFR BLD AUTO: 37.2 % (ref 37.5–51)
HGB BLD-MCNC: 12.3 G/DL (ref 13–17.7)
HGB UR QL STRIP.AUTO: NEGATIVE
IMM GRANULOCYTES # BLD AUTO: 0.05 10*3/MM3 (ref 0–0.05)
IMM GRANULOCYTES NFR BLD AUTO: 0.5 % (ref 0–0.5)
IRON 24H UR-MRATE: 87 MCG/DL (ref 59–158)
IRON SATN MFR SERPL: 30 % (ref 20–50)
KETONES UR QL STRIP: NEGATIVE
LEUKOCYTE ESTERASE UR QL STRIP.AUTO: NEGATIVE
LYMPHOCYTES # BLD AUTO: 1.03 10*3/MM3 (ref 0.7–3.1)
LYMPHOCYTES NFR BLD AUTO: 9.7 % (ref 19.6–45.3)
MAGNESIUM SERPL-MCNC: 2.4 MG/DL (ref 1.6–2.4)
MCH RBC QN AUTO: 30.4 PG (ref 26.6–33)
MCHC RBC AUTO-ENTMCNC: 33.1 G/DL (ref 31.5–35.7)
MCV RBC AUTO: 92.1 FL (ref 79–97)
MONOCYTES # BLD AUTO: 0.77 10*3/MM3 (ref 0.1–0.9)
MONOCYTES NFR BLD AUTO: 7.3 % (ref 5–12)
NEUTROPHILS NFR BLD AUTO: 8.52 10*3/MM3 (ref 1.7–7)
NEUTROPHILS NFR BLD AUTO: 80.6 % (ref 42.7–76)
NITRITE UR QL STRIP: NEGATIVE
NRBC BLD AUTO-RTO: 0 /100 WBC (ref 0–0.2)
PH UR STRIP.AUTO: 7 [PH] (ref 5–8)
PLATELET # BLD AUTO: 220 10*3/MM3 (ref 140–450)
PMV BLD AUTO: 9.6 FL (ref 6–12)
POTASSIUM SERPL-SCNC: 4.7 MMOL/L (ref 3.5–5.2)
PROT SERPL-MCNC: 6.3 G/DL (ref 6–8.5)
PROT UR QL STRIP: NEGATIVE
RBC # BLD AUTO: 4.04 10*6/MM3 (ref 4.14–5.8)
SARS-COV-2 RNA RESP QL NAA+PROBE: NOT DETECTED
SODIUM SERPL-SCNC: 137 MMOL/L (ref 136–145)
SP GR UR STRIP: 1.01 (ref 1–1.03)
TIBC SERPL-MCNC: 294 MCG/DL (ref 298–536)
TRANSFERRIN SERPL-MCNC: 197 MG/DL (ref 200–360)
UROBILINOGEN UR QL STRIP: NORMAL
WBC NRBC COR # BLD: 10.57 10*3/MM3 (ref 3.4–10.8)

## 2023-09-22 PROCEDURE — 93005 ELECTROCARDIOGRAM TRACING: CPT | Performed by: STUDENT IN AN ORGANIZED HEALTH CARE EDUCATION/TRAINING PROGRAM

## 2023-09-22 PROCEDURE — 71045 X-RAY EXAM CHEST 1 VIEW: CPT

## 2023-09-22 PROCEDURE — 70450 CT HEAD/BRAIN W/O DYE: CPT

## 2023-09-22 PROCEDURE — 80053 COMPREHEN METABOLIC PANEL: CPT | Performed by: STUDENT IN AN ORGANIZED HEALTH CARE EDUCATION/TRAINING PROGRAM

## 2023-09-22 PROCEDURE — 99284 EMERGENCY DEPT VISIT MOD MDM: CPT

## 2023-09-22 PROCEDURE — 84466 ASSAY OF TRANSFERRIN: CPT | Performed by: STUDENT IN AN ORGANIZED HEALTH CARE EDUCATION/TRAINING PROGRAM

## 2023-09-22 PROCEDURE — P9612 CATHETERIZE FOR URINE SPEC: HCPCS

## 2023-09-22 PROCEDURE — 83735 ASSAY OF MAGNESIUM: CPT | Performed by: STUDENT IN AN ORGANIZED HEALTH CARE EDUCATION/TRAINING PROGRAM

## 2023-09-22 PROCEDURE — 83540 ASSAY OF IRON: CPT | Performed by: STUDENT IN AN ORGANIZED HEALTH CARE EDUCATION/TRAINING PROGRAM

## 2023-09-22 PROCEDURE — 81003 URINALYSIS AUTO W/O SCOPE: CPT | Performed by: STUDENT IN AN ORGANIZED HEALTH CARE EDUCATION/TRAINING PROGRAM

## 2023-09-22 PROCEDURE — 87636 SARSCOV2 & INF A&B AMP PRB: CPT | Performed by: STUDENT IN AN ORGANIZED HEALTH CARE EDUCATION/TRAINING PROGRAM

## 2023-09-22 PROCEDURE — 85025 COMPLETE CBC W/AUTO DIFF WBC: CPT | Performed by: STUDENT IN AN ORGANIZED HEALTH CARE EDUCATION/TRAINING PROGRAM

## 2023-09-22 PROCEDURE — 93010 ELECTROCARDIOGRAM REPORT: CPT | Performed by: INTERNAL MEDICINE

## 2023-09-22 RX ORDER — SODIUM CHLORIDE 0.9 % (FLUSH) 0.9 %
10 SYRINGE (ML) INJECTION AS NEEDED
Status: DISCONTINUED | OUTPATIENT
Start: 2023-09-22 | End: 2023-09-22 | Stop reason: HOSPADM

## 2023-09-22 NOTE — ED NOTES
"Pt attempted to void with another nurse and was unable. Pt states that he will try again in a \"little bit\"  "

## 2023-09-22 NOTE — ED PROVIDER NOTES
"EMERGENCY DEPARTMENT ATTENDING NOTE    Patient Name: Speedy Villalba    Chief Complaint   Patient presents with    Weakness - Generalized    Fall       PATIENT PRESENTATION:  Speedy Villalba is a very pleasant 77 y.o. male presenting to the emergency department due to a fall with weakness.    Per EMS report patient reportedly has had generalized weakness for about 6 months.  He resides in an assisted living facility.  Reportedly he fell tonight.  When they presented they asked him thing is bothering him and he said no ultimately he just wanted to get checked out which is why he presents the emergency department.  They noticed no signs of trauma really no complaints no significant vital sign around this.    On review the patient states that he walks with a walker normally he was not tonight.  He states he fell to the ground he is denies any complaints or any pain but states that he think he might of hit his head.  No loss of consciousness.  Denies any neck or back pain any chest pain shortness of breath any pain to any of his extremities.  He states he is taking all of his medications though he missed some last night.  Denies any chest pain shortness of breath nausea vomit abdominal pain.      PHYSICAL EXAM:   VS: /78 (BP Location: Right arm, Patient Position: Lying)   Pulse 80   Temp 98.1 °F (36.7 °C) (Oral)   Resp 18   Ht 175.3 cm (69\")   Wt 127 kg (279 lb 11.2 oz)   SpO2 99%   BMI 41.30 kg/m²   GENERAL: Well-appearing elderly gentleman sitting up in stretcher no acute distress; well-nourished, well-developed, awake, alert, no acute distress, nontoxic appearing, comfortable  EYES: PERRL, sclerae anicteric, extraocular movements grossly intact, symmetric lids  EARS, NOSE, MOUTH, THROAT: atraumatic external nose and ears, moist mucous membranes  NECK: symmetric, trachea midline  RESPIRATORY: unlabored respiratory effort, clear to auscultation bilaterally, good air movement  CARDIOVASCULAR: no murmurs, " peripheral pulses 2+ and equal in all extremities  GI: soft, nontender, nondistended  RECTAL (Chaperone: DAMIEN Meyers): soft brown stool, negative stool guaiac  MUSCULOSKELETAL/EXTREMITIES: extremities without obvious deformity  SKIN: warm and dry with no obvious rashes  NEUROLOGIC: moving all 4 extremities symmetrically, CN II-XII grossly intact; GCS 15  PSYCHIATRIC: alert, pleasant and cooperative. Appropriate mood and affect.      MEDICAL DECISION MAKING:    Speedy Villalba is a 77 y.o. male who presented to the ED after a ground-level fall with no complaints related to his fall and also having had generalized weakness for about 6 months.    Differential Diagnosis Considered: Closed head injury, intracranial bleeding, electrolyte derangement, dehydration    Labs Ordered:  Labs Reviewed   COMPREHENSIVE METABOLIC PANEL - Abnormal; Notable for the following components:       Result Value    Glucose 123 (*)     Creatinine 1.43 (*)     eGFR 50.5 (*)     All other components within normal limits    Narrative:     GFR Normal >60  Chronic Kidney Disease <60  Kidney Failure <15    The GFR formula is only valid for adults with stable renal function between ages 18 and 70.   CBC WITH AUTO DIFFERENTIAL - Abnormal; Notable for the following components:    RBC 4.04 (*)     Hemoglobin 12.3 (*)     Hematocrit 37.2 (*)     Neutrophil % 80.6 (*)     Lymphocyte % 9.7 (*)     Neutrophils, Absolute 8.52 (*)     All other components within normal limits   IRON PROFILE - Abnormal; Notable for the following components:    Transferrin 197 (*)     TIBC 294 (*)     All other components within normal limits   COVID-19 AND FLU A/B, NP SWAB IN TRANSPORT MEDIA 8-12 HR TAT - Normal    Narrative:     Fact sheet for providers: https://www.fda.gov/media/582373/download    Fact sheet for patients: https://www.fda.gov/media/378221/download    Test performed by PCR.   URINALYSIS W/ CULTURE IF INDICATED - Normal    Narrative:     In absence of clinical  symptoms, the presence of pyuria, bacteria, and/or nitrites on the urinalysis result does not correlate with infection.  Urine microscopic not indicated.   MAGNESIUM - Normal   CBC AND DIFFERENTIAL    Narrative:     The following orders were created for panel order CBC & Differential.  Procedure                               Abnormality         Status                     ---------                               -----------         ------                     CBC Auto Differential[[id number]]        Abnormal            Final result                 Please view results for these tests on the individual orders.        Imaging Ordered:   XR Chest 1 View   ED Interpretation   No acute findings.      CT Head Without Contrast    (Results Pending)       Internal chart review:   Past Medical History:   Diagnosis Date    Coronary artery disease 10 years    x2 stents    Diabetes mellitus < 2 years    non-insulin dependent    Difficulty walking < 1 year    multiple falls in the last 6-8 months    HL (hearing loss) 20+ years    Wears hearing aids occasionally    Hyperlipidemia 5 years    Memory loss 3 years+/-    gradual and subtle    Sleep apnea 20+ year    Non-compliant with PEEP for tx    Stroke 11/2022       Past Surgical History:   Procedure Laterality Date    APPENDECTOMY      BACK SURGERY  2016    CAROTID STENT  2010?    KNEE SURGERY  1960    LAMINECTOMY  2015?    discectomy & fusion       Allergies   Allergen Reactions    Serna-2 Inhibitors Unknown - Low Severity         Current Facility-Administered Medications:     [COMPLETED] Insert Peripheral IV, , , Once **AND** sodium chloride 0.9 % flush 10 mL, 10 mL, Intravenous, PRN, Costa Chou MD    Current Outpatient Medications:     allopurinol (ZYLOPRIM) 100 MG tablet, Take 1 tablet by mouth Daily., Disp: , Rfl:     Ascorbic Acid (VITAMIN C PO), Take  by mouth Daily., Disp: , Rfl:     aspirin 81 MG EC tablet, Take 1 tablet by mouth Daily., Disp: , Rfl:     Cholecalciferol  (VITAMIN D3) 125 MCG (5000 UT) tablet tablet, Take 1 tablet by mouth Daily., Disp: , Rfl:     cyclobenzaprine (FLEXERIL) 10 MG tablet, Take 1 tablet by mouth every night at bedtime., Disp: , Rfl:     glipizide (GLUCOTROL XL) 5 MG ER tablet, Take 1 tablet by mouth Daily., Disp: , Rfl:     losartan (COZAAR) 50 MG tablet, Take 1 tablet by mouth Daily., Disp: , Rfl:     MAGNESIUM-OXIDE PO, Take  by mouth Daily., Disp: , Rfl:     Melatonin 10 MG tablet, Take  by mouth At Night As Needed., Disp: , Rfl:     multivitamin (THERAGRAN) tablet tablet, Take 1 tablet by mouth Daily., Disp: , Rfl:     pravastatin (PRAVACHOL) 20 MG tablet, Take 1 tablet by mouth Daily., Disp: , Rfl:     spironolactone (ALDACTONE) 25 MG tablet, Take 1 tablet by mouth Daily., Disp: , Rfl:     traZODone (DESYREL) 50 MG tablet, Take 1 tablet by mouth Every Night., Disp: , Rfl:     My EKG interpretation: Normal sinus rhythm with a rate of 87.  No acute ST elevations ST depressions or T wave inversions.    My lab interpretation: Creatinine slight elevated to 1.43 from 1.29 but patient has a prior values of 1.6 suspect benign chronic kidney disease.  Otherwise CMP unremarkable.  Urinalysis with no evidence of infection.  Negative COVID influenza testing.  Normal white blood cell count.  Normal magnesium.  Stool guaiac negative.     My imaging interpretation: CT of the head with no acute findings.  Chest x-ray with no acute findings.    ED Course and Re-evaluation: 78yo M presenting emergency department after a ground-level fall essentially asymptomatic but presenting for evaluation after reported head to his head and also complaining of 6 months of generalized weakness.  Patient extremely well-appearing on arrival he is not complaining of any headache or anything at all.  Given his age CT head was pursued it is negative.  Basic labs were drawn he has a slight new anemia from priors.  So guaiac was checked to ensure no GI bleeding even though patient  denying any blood in stool or dark stools and it is soft brown stool and stool guaiac negative.  Low suspicion for dangerous etiology of patient's generalized weakness.  Patient was discharged home with plan to follow-up with primary care provider for by early next week for further management given return precautions to the emergency department for worsening symptoms.      ED Diagnosis:  Ground-level fall; Closed head injury without loss of consciousness, initial encounter; Generalized weakness; History of stroke; Anemia, unspecified type    Disposition: to home  Follow up plan: PCP follow up within 4 days, return to ED immediately if symptoms worsen        Signed:  Costa Chou MD  Emergency Medicine Physician    Please note that portions of this note were completed with a voice recognition program.      Costa Chou MD  09/22/23 0604

## 2023-09-22 NOTE — DISCHARGE INSTRUCTIONS
Patient today you are seen after fall and all of your work-up is reassuring.  You have a slightly lower blood count mini pen the past if no signs of any bleeding.  Please call her primary care provider schedule close follow-up appointment by early next week. If any of your symptoms worsen prior please return to the emergency department medially.

## 2023-09-22 NOTE — ED NOTES
Pt called his son to come and pick him up and take him back to the assisted living. Pt stated his son will call him back with more details.

## 2023-09-23 LAB
QT INTERVAL: 368 MS
QTC INTERVAL: 442 MS

## 2023-09-24 ENCOUNTER — APPOINTMENT (OUTPATIENT)
Dept: CT IMAGING | Facility: HOSPITAL | Age: 78
End: 2023-09-24
Payer: MEDICARE

## 2023-09-24 ENCOUNTER — HOSPITAL ENCOUNTER (EMERGENCY)
Facility: HOSPITAL | Age: 78
Discharge: NURSING, REHAB OR LONG-TERM CARE | End: 2023-09-24
Attending: INTERNAL MEDICINE | Admitting: INTERNAL MEDICINE
Payer: MEDICARE

## 2023-09-24 VITALS
RESPIRATION RATE: 16 BRPM | OXYGEN SATURATION: 98 % | TEMPERATURE: 98.5 F | SYSTOLIC BLOOD PRESSURE: 102 MMHG | HEART RATE: 74 BPM | DIASTOLIC BLOOD PRESSURE: 74 MMHG
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
## 2023-09-24 DIAGNOSIS — S09.90XA INJURY OF HEAD, INITIAL ENCOUNTER: ICD-10-CM

## 2023-09-24 PROCEDURE — 70450 CT HEAD/BRAIN W/O DYE: CPT

## 2023-09-24 PROCEDURE — 99284 EMERGENCY DEPT VISIT MOD MDM: CPT

## 2023-09-24 PROCEDURE — 72125 CT NECK SPINE W/O DYE: CPT

## 2023-09-24 NOTE — ED PROVIDER NOTES
Subjective   History of Present Illness  77-year-old male presents emergency department status post fall.  He states he fell backwards and hit his head.  He was bending over to  his cell phone and lost his balance.  He lives at the [facility], assisted living facility.  When I asked him if he thought it was time to go to a nursing home for rehab, he stated no.  He has no chest pain, no shortness of breath.  He has no acute bowel or kidney changes.  He has no other pain except for his head.    The patient was seen on 9/22/2023 for fall.    Review of Systems   Constitutional:  Negative for chills and fever.   Respiratory:  Negative for cough and shortness of breath.    Cardiovascular:  Negative for chest pain.   Gastrointestinal:  Negative for constipation and diarrhea.   Genitourinary:  Negative for dysuria and frequency.   Musculoskeletal:         Head injury status post fall.     Past Medical History:   Diagnosis Date    Coronary artery disease 10 years    x2 stents    Diabetes mellitus < 2 years    non-insulin dependent    Difficulty walking < 1 year    multiple falls in the last 6-8 months    HL (hearing loss) 20+ years    Wears hearing aids occasionally    Hyperlipidemia 5 years    Memory loss 3 years+/-    gradual and subtle    Sleep apnea 20+ year    Non-compliant with PEEP for tx    Stroke 11/2022       Allergies   Allergen Reactions    Serna-2 Inhibitors Unknown - Low Severity       Past Surgical History:   Procedure Laterality Date    APPENDECTOMY      BACK SURGERY  2016    CAROTID STENT  2010?    KNEE SURGERY  1960    LAMINECTOMY  2015?    discectomy & fusion       Family History   Problem Relation Age of Onset    Parkinsonism Father        Social History     Socioeconomic History    Marital status:    Tobacco Use    Smoking status: Never     Passive exposure: Past    Smokeless tobacco: Never   Vaping Use    Vaping Use: Never used   Substance and Sexual Activity    Alcohol use: Yes      Alcohol/week: 3.0 standard drinks     Types: 3 Drinks containing 0.5 oz of alcohol per week    Drug use: Never    Sexual activity: Not Currently     Partners: Female     Birth control/protection: Vasectomy           Objective   Physical Exam  Vitals reviewed.   Constitutional:       Appearance: He is obese.   HENT:      Head: Normocephalic and atraumatic.      Right Ear: External ear normal.      Left Ear: External ear normal.      Nose: Nose normal.   Eyes:      General: No scleral icterus.     Conjunctiva/sclera: Conjunctivae normal.   Cardiovascular:      Rate and Rhythm: Normal rate and regular rhythm.      Heart sounds: Normal heart sounds.   Pulmonary:      Effort: Pulmonary effort is normal.      Breath sounds: Normal breath sounds.   Musculoskeletal:         General: No swelling or tenderness.      Cervical back: Normal range of motion and neck supple.   Skin:     General: Skin is warm and dry.      Findings: Bruising and lesion present.      Comments: Patient has a raised hematoma and abrasion to the left posterior part of his head.   Neurological:      Mental Status: He is alert and oriented to person, place, and time.      Cranial Nerves: No cranial nerve deficit.   Psychiatric:         Mood and Affect: Mood normal.         Behavior: Behavior normal.       Procedures           ED Course  ED Course as of 09/24/23 1908   Sun Sep 24, 2023   1905 I again offered the patient the possibility of nursing home placement.  He states that he wants to stay in his assisted living facility.  He states he is having someone come and sit with him tonight.  He again notes no other injury.  Ask him to move his arms and legs about, he is able to do that.  He has no abdominal pain.  No pain with palpation of the thoracic or lumbar spine.  He states family is on the way to bring him close and to take him home. [AJ]      ED Course User Index  [AJ] Pantera Carnes, DO      The patient declines need for pain medication at this  time.          CT Head Without Contrast   Final Result   Impression:         No acute intracranial abnormality.       Chronic right maxillary sinus disease.       This report was signed and finalized on 9/24/2023 6:51 PM CDT by Victor Hugo Chance.          CT Cervical Spine Without Contrast   Final Result       No acute fracture or traumatic malalignment.               This report was signed and finalized on 9/24/2023 6:55 PM CDT by Victor Hugo Chance.                                       Medical Decision Making  Amount and/or Complexity of Data Reviewed  Radiology: ordered.     Details: CT head and cervical spine.        Final diagnoses:   Fall, initial encounter   Injury of head, initial encounter       ED Disposition  ED Disposition       ED Disposition   Discharge    Condition   Stable    Comment   --               Luca Benson MD  [address] [place] [address] [phone]    In 2 days           Medication List      No changes were made to your prescriptions during this visit.            Pantera Carnes,   09/24/23 1828       Pantera Carnes,   09/24/23 1904

## 2023-09-25 NOTE — DISCHARGE INSTRUCTIONS
Fall precautions.  Keep area on the back of the head clean and dry.  Watch for signs of infection to include redness swelling or discharge.  Please keep someone with you for assistance.  Follow-up with PCP in 2 to 3 days, recommend outpatient PT/OT for core strengthening and rehabilitation.  Return to the emergency department if symptoms worsen or fail to improve.  To include confusion, headache, dizziness, redness of the skin at the abrasion.

## 2023-10-23 ENCOUNTER — PATIENT MESSAGE (OUTPATIENT)
Dept: NEUROLOGY | Facility: CLINIC | Age: 78
End: 2023-10-23
Payer: MEDICARE

## 2023-10-24 NOTE — TELEPHONE ENCOUNTER
Explained that Tyrese talked to radiology, movement wasn't the problem, it was a technical problem.  He will discuss it with them at his appointment on 11-10.

## 2023-10-30 ENCOUNTER — APPOINTMENT (OUTPATIENT)
Dept: GENERAL RADIOLOGY | Facility: HOSPITAL | Age: 78
DRG: 641 | End: 2023-10-30
Payer: MEDICARE

## 2023-10-30 ENCOUNTER — APPOINTMENT (OUTPATIENT)
Dept: CT IMAGING | Facility: HOSPITAL | Age: 78
DRG: 641 | End: 2023-10-30
Payer: MEDICARE

## 2023-10-30 ENCOUNTER — HOSPITAL ENCOUNTER (INPATIENT)
Facility: HOSPITAL | Age: 78
LOS: 3 days | Discharge: NURSING, REHAB OR LONG-TERM CARE | DRG: 641 | End: 2023-11-04
Attending: EMERGENCY MEDICINE | Admitting: FAMILY MEDICINE
Payer: MEDICARE

## 2023-10-30 DIAGNOSIS — R53.1 GENERALIZED WEAKNESS: Primary | ICD-10-CM

## 2023-10-30 DIAGNOSIS — Z74.09 IMPAIRED FUNCTIONAL MOBILITY AND ACTIVITY TOLERANCE: ICD-10-CM

## 2023-10-30 DIAGNOSIS — Z78.9 IMPAIRED MOBILITY AND ADLS: ICD-10-CM

## 2023-10-30 DIAGNOSIS — Z74.09 IMPAIRED MOBILITY AND ADLS: ICD-10-CM

## 2023-10-30 PROBLEM — I25.10 CORONARY ARTERY DISEASE INVOLVING NATIVE CORONARY ARTERY OF NATIVE HEART WITHOUT ANGINA PECTORIS: Status: ACTIVE | Noted: 2023-10-30
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
## 2023-10-30 PROBLEM — K52.9 GASTROENTERITIS: Status: ACTIVE | Noted: 2023-10-30

## 2023-10-30 PROBLEM — R41.89 COGNITIVE DECLINE: Status: ACTIVE | Noted: 2023-10-30

## 2023-10-30 PROBLEM — R29.6 FALLS: Status: ACTIVE | Noted: 2023-10-30

## 2023-10-30 PROBLEM — I10 PRIMARY HYPERTENSION: Status: ACTIVE | Noted: 2023-10-30

## 2023-10-30 PROBLEM — E11.9 TYPE 2 DIABETES MELLITUS WITHOUT COMPLICATION, WITHOUT LONG-TERM CURRENT USE OF INSULIN: Status: ACTIVE | Noted: 2023-10-30

## 2023-10-30 LAB
ANION GAP SERPL CALCULATED.3IONS-SCNC: 7 MMOL/L (ref 5–15)
BACTERIA UR QL AUTO: ABNORMAL /HPF
BASOPHILS # BLD AUTO: 0.03 10*3/MM3 (ref 0–0.2)
BASOPHILS NFR BLD AUTO: 0.4 % (ref 0–1.5)
BILIRUB UR QL STRIP: NEGATIVE
BUN SERPL-MCNC: 19 MG/DL (ref 8–23)
BUN/CREAT SERPL: 15.7 (ref 7–25)
CALCIUM SPEC-SCNC: 9.6 MG/DL (ref 8.6–10.5)
CHLORIDE SERPL-SCNC: 100 MMOL/L (ref 98–107)
CLARITY UR: CLEAR
CO2 SERPL-SCNC: 29 MMOL/L (ref 22–29)
COLOR UR: YELLOW
CREAT SERPL-MCNC: 1.21 MG/DL (ref 0.76–1.27)
CRP SERPL-MCNC: 2.77 MG/DL (ref 0–0.5)
D-LACTATE SERPL-SCNC: 1 MMOL/L (ref 0.5–2)
DEPRECATED RDW RBC AUTO: 48.4 FL (ref 37–54)
EGFRCR SERPLBLD CKD-EPI 2021: 61.7 ML/MIN/1.73
EOSINOPHIL # BLD AUTO: 0.12 10*3/MM3 (ref 0–0.4)
EOSINOPHIL NFR BLD AUTO: 1.6 % (ref 0.3–6.2)
ERYTHROCYTE [DISTWIDTH] IN BLOOD BY AUTOMATED COUNT: 13.6 % (ref 12.3–15.4)
FLUAV RNA RESP QL NAA+PROBE: NOT DETECTED
FLUBV RNA RESP QL NAA+PROBE: NOT DETECTED
GLUCOSE BLDC GLUCOMTR-MCNC: 103 MG/DL (ref 70–130)
GLUCOSE BLDC GLUCOMTR-MCNC: 188 MG/DL (ref 70–130)
GLUCOSE SERPL-MCNC: 117 MG/DL (ref 65–99)
GLUCOSE UR STRIP-MCNC: NEGATIVE MG/DL
HCT VFR BLD AUTO: 40.2 % (ref 37.5–51)
HGB BLD-MCNC: 13.1 G/DL (ref 13–17.7)
HGB UR QL STRIP.AUTO: NEGATIVE
HYALINE CASTS UR QL AUTO: ABNORMAL /LPF
IMM GRANULOCYTES # BLD AUTO: 0.03 10*3/MM3 (ref 0–0.05)
IMM GRANULOCYTES NFR BLD AUTO: 0.4 % (ref 0–0.5)
KETONES UR QL STRIP: ABNORMAL
LEUKOCYTE ESTERASE UR QL STRIP.AUTO: ABNORMAL
LYMPHOCYTES # BLD AUTO: 0.77 10*3/MM3 (ref 0.7–3.1)
LYMPHOCYTES NFR BLD AUTO: 10.2 % (ref 19.6–45.3)
MCH RBC QN AUTO: 31.3 PG (ref 26.6–33)
MCHC RBC AUTO-ENTMCNC: 32.6 G/DL (ref 31.5–35.7)
MCV RBC AUTO: 96.2 FL (ref 79–97)
MONOCYTES # BLD AUTO: 0.65 10*3/MM3 (ref 0.1–0.9)
MONOCYTES NFR BLD AUTO: 8.6 % (ref 5–12)
NEUTROPHILS NFR BLD AUTO: 5.97 10*3/MM3 (ref 1.7–7)
NEUTROPHILS NFR BLD AUTO: 78.8 % (ref 42.7–76)
NITRITE UR QL STRIP: NEGATIVE
NRBC BLD AUTO-RTO: 0 /100 WBC (ref 0–0.2)
PH UR STRIP.AUTO: 7.5 [PH] (ref 5–8)
PLATELET # BLD AUTO: 176 10*3/MM3 (ref 140–450)
PMV BLD AUTO: 9.5 FL (ref 6–12)
POTASSIUM SERPL-SCNC: 4.5 MMOL/L (ref 3.5–5.2)
PROCALCITONIN SERPL-MCNC: 0.31 NG/ML (ref 0–0.25)
PROT UR QL STRIP: NEGATIVE
RBC # BLD AUTO: 4.18 10*6/MM3 (ref 4.14–5.8)
RBC # UR STRIP: ABNORMAL /HPF
REF LAB TEST METHOD: ABNORMAL
SARS-COV-2 RNA RESP QL NAA+PROBE: NOT DETECTED
SODIUM SERPL-SCNC: 136 MMOL/L (ref 136–145)
SP GR UR STRIP: >1.03 (ref 1–1.03)
SQUAMOUS #/AREA URNS HPF: ABNORMAL /HPF
UROBILINOGEN UR QL STRIP: ABNORMAL
WBC # UR STRIP: ABNORMAL /HPF
WBC NRBC COR # BLD: 7.57 10*3/MM3 (ref 3.4–10.8)

## 2023-10-30 PROCEDURE — G0378 HOSPITAL OBSERVATION PER HR: HCPCS

## 2023-10-30 PROCEDURE — 84145 PROCALCITONIN (PCT): CPT | Performed by: EMERGENCY MEDICINE

## 2023-10-30 PROCEDURE — 74177 CT ABD & PELVIS W/CONTRAST: CPT

## 2023-10-30 PROCEDURE — 25810000003 SODIUM CHLORIDE 0.9 % SOLUTION: Performed by: FAMILY MEDICINE

## 2023-10-30 PROCEDURE — 99285 EMERGENCY DEPT VISIT HI MDM: CPT

## 2023-10-30 PROCEDURE — 85025 COMPLETE CBC W/AUTO DIFF WBC: CPT | Performed by: EMERGENCY MEDICINE

## 2023-10-30 PROCEDURE — 83605 ASSAY OF LACTIC ACID: CPT | Performed by: EMERGENCY MEDICINE

## 2023-10-30 PROCEDURE — 93005 ELECTROCARDIOGRAM TRACING: CPT | Performed by: EMERGENCY MEDICINE

## 2023-10-30 PROCEDURE — 87636 SARSCOV2 & INF A&B AMP PRB: CPT | Performed by: EMERGENCY MEDICINE

## 2023-10-30 PROCEDURE — 80048 BASIC METABOLIC PNL TOTAL CA: CPT | Performed by: EMERGENCY MEDICINE

## 2023-10-30 PROCEDURE — 93010 ELECTROCARDIOGRAM REPORT: CPT | Performed by: INTERNAL MEDICINE

## 2023-10-30 PROCEDURE — 25510000001 IOPAMIDOL 61 % SOLUTION: Performed by: EMERGENCY MEDICINE

## 2023-10-30 PROCEDURE — 63710000001 INSULIN LISPRO (HUMAN) PER 5 UNITS: Performed by: FAMILY MEDICINE

## 2023-10-30 PROCEDURE — 25010000002 ENOXAPARIN PER 10 MG: Performed by: FAMILY MEDICINE

## 2023-10-30 PROCEDURE — 82948 REAGENT STRIP/BLOOD GLUCOSE: CPT

## 2023-10-30 PROCEDURE — 86140 C-REACTIVE PROTEIN: CPT | Performed by: EMERGENCY MEDICINE

## 2023-10-30 PROCEDURE — 70450 CT HEAD/BRAIN W/O DYE: CPT

## 2023-10-30 PROCEDURE — 81001 URINALYSIS AUTO W/SCOPE: CPT | Performed by: FAMILY MEDICINE

## 2023-10-30 PROCEDURE — 71045 X-RAY EXAM CHEST 1 VIEW: CPT

## 2023-10-30 PROCEDURE — 36415 COLL VENOUS BLD VENIPUNCTURE: CPT

## 2023-10-30 RX ORDER — SODIUM CHLORIDE 0.9 % (FLUSH) 0.9 %
10 SYRINGE (ML) INJECTION AS NEEDED
Status: DISCONTINUED | OUTPATIENT
Start: 2023-10-30 | End: 2023-11-04 | Stop reason: HOSPADM

## 2023-10-30 RX ORDER — SPIRONOLACTONE 25 MG/1
25 TABLET ORAL DAILY
Status: DISCONTINUED | OUTPATIENT
Start: 2023-10-30 | End: 2023-11-04 | Stop reason: HOSPADM

## 2023-10-30 RX ORDER — BISACODYL 5 MG/1
5 TABLET, DELAYED RELEASE ORAL DAILY PRN
Status: DISCONTINUED | OUTPATIENT
Start: 2023-10-30 | End: 2023-11-04 | Stop reason: HOSPADM

## 2023-10-30 RX ORDER — DEXTROSE MONOHYDRATE 25 G/50ML
25 INJECTION, SOLUTION INTRAVENOUS
Status: DISCONTINUED | OUTPATIENT
Start: 2023-10-30 | End: 2023-11-04 | Stop reason: HOSPADM

## 2023-10-30 RX ORDER — ASPIRIN 81 MG/1
81 TABLET ORAL DAILY
Status: DISCONTINUED | OUTPATIENT
Start: 2023-10-30 | End: 2023-11-04 | Stop reason: HOSPADM

## 2023-10-30 RX ORDER — SODIUM CHLORIDE 0.9 % (FLUSH) 0.9 %
10 SYRINGE (ML) INJECTION EVERY 12 HOURS SCHEDULED
Status: DISCONTINUED | OUTPATIENT
Start: 2023-10-30 | End: 2023-11-04 | Stop reason: HOSPADM

## 2023-10-30 RX ORDER — IBUPROFEN 600 MG/1
1 TABLET ORAL
Status: DISCONTINUED | OUTPATIENT
Start: 2023-10-30 | End: 2023-11-04 | Stop reason: HOSPADM

## 2023-10-30 RX ORDER — LOSARTAN POTASSIUM 50 MG/1
50 TABLET ORAL DAILY
Status: DISCONTINUED | OUTPATIENT
Start: 2023-10-30 | End: 2023-11-04 | Stop reason: HOSPADM

## 2023-10-30 RX ORDER — ACETAMINOPHEN 325 MG/1
650 TABLET ORAL EVERY 4 HOURS PRN
Status: DISCONTINUED | OUTPATIENT
Start: 2023-10-30 | End: 2023-11-04 | Stop reason: HOSPADM

## 2023-10-30 RX ORDER — INSULIN LISPRO 100 [IU]/ML
2-7 INJECTION, SOLUTION INTRAVENOUS; SUBCUTANEOUS
Status: DISCONTINUED | OUTPATIENT
Start: 2023-10-30 | End: 2023-11-04 | Stop reason: HOSPADM

## 2023-10-30 RX ORDER — ENOXAPARIN SODIUM 100 MG/ML
40 INJECTION SUBCUTANEOUS
Status: DISCONTINUED | OUTPATIENT
Start: 2023-10-30 | End: 2023-11-04 | Stop reason: HOSPADM

## 2023-10-30 RX ORDER — POLYETHYLENE GLYCOL 3350 17 G/17G
17 POWDER, FOR SOLUTION ORAL DAILY PRN
Status: DISCONTINUED | OUTPATIENT
Start: 2023-10-30 | End: 2023-11-04 | Stop reason: HOSPADM

## 2023-10-30 RX ORDER — PRAVASTATIN SODIUM 20 MG
20 TABLET ORAL NIGHTLY
Status: DISCONTINUED | OUTPATIENT
Start: 2023-10-30 | End: 2023-11-04 | Stop reason: HOSPADM

## 2023-10-30 RX ORDER — NICOTINE POLACRILEX 4 MG
15 LOZENGE BUCCAL
Status: DISCONTINUED | OUTPATIENT
Start: 2023-10-30 | End: 2023-11-04 | Stop reason: HOSPADM

## 2023-10-30 RX ORDER — TRAZODONE HYDROCHLORIDE 50 MG/1
50 TABLET ORAL NIGHTLY
Status: DISCONTINUED | OUTPATIENT
Start: 2023-10-30 | End: 2023-11-04 | Stop reason: HOSPADM

## 2023-10-30 RX ORDER — BISACODYL 10 MG
10 SUPPOSITORY, RECTAL RECTAL DAILY PRN
Status: DISCONTINUED | OUTPATIENT
Start: 2023-10-30 | End: 2023-11-04 | Stop reason: HOSPADM

## 2023-10-30 RX ORDER — AMOXICILLIN 250 MG
2 CAPSULE ORAL 2 TIMES DAILY
Status: DISCONTINUED | OUTPATIENT
Start: 2023-10-30 | End: 2023-11-04 | Stop reason: HOSPADM

## 2023-10-30 RX ORDER — SODIUM CHLORIDE 9 MG/ML
40 INJECTION, SOLUTION INTRAVENOUS AS NEEDED
Status: DISCONTINUED | OUTPATIENT
Start: 2023-10-30 | End: 2023-11-04 | Stop reason: HOSPADM

## 2023-10-30 RX ORDER — SODIUM CHLORIDE 9 MG/ML
75 INJECTION, SOLUTION INTRAVENOUS CONTINUOUS
Status: DISCONTINUED | OUTPATIENT
Start: 2023-10-30 | End: 2023-11-02

## 2023-10-30 RX ORDER — ONDANSETRON 2 MG/ML
4 INJECTION INTRAMUSCULAR; INTRAVENOUS EVERY 6 HOURS PRN
Status: DISCONTINUED | OUTPATIENT
Start: 2023-10-30 | End: 2023-11-04 | Stop reason: HOSPADM

## 2023-10-30 RX ADMIN — IOPAMIDOL 100 ML: 612 INJECTION, SOLUTION INTRAVENOUS at 12:33

## 2023-10-30 RX ADMIN — LOSARTAN POTASSIUM 50 MG: 50 TABLET, FILM COATED ORAL at 17:57

## 2023-10-30 RX ADMIN — PRAVASTATIN SODIUM 20 MG: 20 TABLET ORAL at 20:33

## 2023-10-30 RX ADMIN — SPIRONOLACTONE 25 MG: 25 TABLET ORAL at 17:57

## 2023-10-30 RX ADMIN — ENOXAPARIN SODIUM 40 MG: 100 INJECTION SUBCUTANEOUS at 18:00

## 2023-10-30 RX ADMIN — INSULIN LISPRO 2 UNITS: 100 INJECTION, SOLUTION INTRAVENOUS; SUBCUTANEOUS at 20:33

## 2023-10-30 RX ADMIN — SODIUM CHLORIDE 75 ML/HR: 900 INJECTION INTRAVENOUS at 17:02

## 2023-10-30 RX ADMIN — Medication 10 ML: at 20:28

## 2023-10-30 RX ADMIN — TRAZODONE HYDROCHLORIDE 50 MG: 50 TABLET ORAL at 20:32

## 2023-10-30 RX ADMIN — ASPIRIN 81 MG: 81 TABLET, COATED ORAL at 17:57

## 2023-10-30 NOTE — ED NOTES
"Nursing report ED to floor  Speedy Villalba  77 y.o.  male    HPI:   Chief Complaint   Patient presents with    Diarrhea       Admitting doctor:   Uriah Manriquez MD    Consulting provider(s):  Consults       No orders found from 10/1/2023 to 10/31/2023.             Admitting diagnosis:   The primary encounter diagnosis was Generalized weakness. A diagnosis of Impaired mobility and ADLs was also pertinent to this visit.    Code status:   Current Code Status       Date Active Code Status Order ID Comments User Context       10/30/2023 1527 CPR (Attempt to Resuscitate) [id number]  Uriah Manriquez MD ED        Question Answer    Code Status (Patient has no pulse and is not breathing) CPR (Attempt to Resuscitate)    Medical Interventions (Patient has pulse or is breathing) Full Support                    Allergies:   Serna-2 inhibitors    Intake and Output    Intake/Output Summary (Last 24 hours) at 10/30/2023 1615  Last data filed at 10/30/2023 1400  Gross per 24 hour   Intake --   Output 600 ml   Net -600 ml       Weight:       10/30/23  0848   Weight: 124 kg (273 lb)       Most recent vitals:   Vitals:    10/30/23 0848 10/30/23 1147 10/30/23 1400 10/30/23 1415   BP: 126/68 137/82 131/84    Pulse: 73 76 97 97   Resp: 20      Temp: 98 °F (36.7 °C)      SpO2: 97% 100%  97%   Weight: 124 kg (273 lb)      Height: 175.3 cm (69\")        Oxygen Therapy: .    Active LDAs/IV Access:   Lines, Drains & Airways       Active LDAs       Name Placement date Placement time Site Days    Peripheral IV 10/30/23 1008 Left;Posterior Hand 10/30/23  1008  Hand  less than 1                    Labs (abnormal labs have a star):   Labs Reviewed   BASIC METABOLIC PANEL - Abnormal; Notable for the following components:       Result Value    Glucose 117 (*)     All other components within normal limits    Narrative:     GFR Normal >60  Chronic Kidney Disease <60  Kidney Failure <15    The GFR formula is only valid for adults with " "stable renal function between ages 18 and 70.   PROCALCITONIN - Abnormal; Notable for the following components:    Procalcitonin 0.31 (*)     All other components within normal limits    Narrative:     As a Marker for Sepsis (Non-Neonates):    1. <0.5 ng/mL represents a low risk of severe sepsis and/or septic shock.  2. >2 ng/mL represents a high risk of severe sepsis and/or septic shock.    As a Marker for Lower Respiratory Tract Infections that require antibiotic therapy:    PCT on Admission    Antibiotic Therapy       6-12 Hrs later    >0.5                Strongly Recommended  >0.25 - <0.5        Recommended   0.1 - 0.25          Discouraged              Remeasure/reassess PCT  <0.1                Strongly Discouraged     Remeasure/reassess PCT    As 28 day mortality risk marker: \"Change in Procalcitonin Result\" (>80% or <=80%) if Day 0 (or Day 1) and Day 4 values are available. Refer to http://www.[employer]-pct-calculator.com    Change in PCT <=80%  A decrease of PCT levels below or equal to 80% defines a positive change in PCT test result representing a higher risk for 28-day all-cause mortality of patients diagnosed with severe sepsis for septic shock.    Change in PCT >80%  A decrease of PCT levels of more than 80% defines a negative change in PCT result representing a lower risk for 28-day all-cause mortality of patients diagnosed with severe sepsis or septic shock.      C-REACTIVE PROTEIN - Abnormal; Notable for the following components:    C-Reactive Protein 2.77 (*)     All other components within normal limits   CBC WITH AUTO DIFFERENTIAL - Abnormal; Notable for the following components:    Neutrophil % 78.8 (*)     Lymphocyte % 10.2 (*)     All other components within normal limits   COVID-19 AND FLU A/B, NP SWAB IN TRANSPORT MEDIA 8-12 HR TAT - Normal    Narrative:     Fact sheet for providers: https://www.fda.gov/media/175624/download    Fact sheet for patients: " https://www.fda.gov/media/378549/download    Test performed by PCR.   LACTIC ACID, PLASMA - Normal   COVID PRE-OP / PRE-PROCEDURE SCREENING ORDER (NO ISOLATION)    Narrative:     The following orders were created for panel order COVID PRE-OP / PRE-PROCEDURE SCREENING ORDER (NO ISOLATION) - Swab, Nasal Cavity.  Procedure                               Abnormality         Status                     ---------                               -----------         ------                     COVID-19 and FLU A/B PCR...[[id number]]  Normal              Final result                 Please view results for these tests on the individual orders.   URINALYSIS W/ MICROSCOPIC IF INDICATED (NO CULTURE)   POCT GLUCOSE FINGERSTICK   POCT GLUCOSE FINGERSTICK   POCT GLUCOSE FINGERSTICK   POCT GLUCOSE FINGERSTICK   CBC AND DIFFERENTIAL    Narrative:     The following orders were created for panel order CBC & Differential.  Procedure                               Abnormality         Status                     ---------                               -----------         ------                     CBC Auto Differential[[id number]]        Abnormal            Final result                 Please view results for these tests on the individual orders.       Meds given in ED:   Medications   sodium chloride 0.9 % flush 10 mL (has no administration in time range)   dextrose (GLUTOSE) oral gel 15 g (has no administration in time range)   dextrose (D50W) (25 g/50 mL) IV injection 25 g (has no administration in time range)   glucagon (GLUCAGEN) injection 1 mg (has no administration in time range)   Insulin Lispro (humaLOG) injection 2-7 Units (has no administration in time range)   iopamidol (ISOVUE-300) 61 % injection 100 mL (100 mL Intravenous Given 10/30/23 1233)           NIH Stroke Scale:       Isolation/Infection(s):  No active isolations   No active infections     COVID Testing  Collected .  Resulted .    Nursing report ED to floor:  Mental status:  A & O x3  Ambulatory status: Max assist   Precautions: Fall    ED nurse phone extentsion- ..

## 2023-10-30 NOTE — ED PROVIDER NOTES
Subjective   History of Present Illness  Patient is 77 years old who came the ER complaining of diarrhea and a couple episode of vomiting while in the ED he states he is not have any diarrhea but he having cough.    Diarrhea  The primary symptoms include nausea, vomiting and diarrhea. Primary symptoms do not include weight loss, fatigue, abdominal pain, melena, jaundice, hematochezia, myalgias or arthralgias. The illness began 2 days ago.   The illness does not include chills, anorexia, bloating, constipation, tenesmus or back pain. Associated medical issues do not include GERD, gallstones, bowel resection, irritable bowel syndrome or hemorrhoids.       Review of Systems   Constitutional: Negative.  Negative for chills, fatigue and weight loss.   HENT: Negative.     Respiratory:  Positive for cough.    Gastrointestinal:  Positive for diarrhea, nausea and vomiting. Negative for abdominal distention, abdominal pain, anorexia, bloating, constipation, hematochezia, jaundice and melena.   Endocrine: Negative.    Genitourinary: Negative.    Musculoskeletal: Negative.  Negative for arthralgias, back pain, myalgias and neck pain.   Skin:  Negative for color change and pallor.   Neurological: Negative.  Negative for syncope, weakness, light-headedness, numbness and headaches.   Hematological: Negative.  Does not bruise/bleed easily.   All other systems reviewed and are negative.      Past Medical History:   Diagnosis Date    Coronary artery disease 10 years    x2 stents    Diabetes mellitus < 2 years    non-insulin dependent    Difficulty walking < 1 year    multiple falls in the last 6-8 months    HL (hearing loss) 20+ years    Wears hearing aids occasionally    Hyperlipidemia 5 years    Memory loss 3 years+/-    gradual and subtle    Sleep apnea 20+ year    Non-compliant with PEEP for tx    Stroke 11/2022       Allergies   Allergen Reactions    Serna-2 Inhibitors Unknown - Low Severity       Past Surgical History:    Procedure Laterality Date    APPENDECTOMY      BACK SURGERY  2016    CAROTID STENT  2010?    KNEE SURGERY  1960    LAMINECTOMY  2015?    discectomy & fusion       Family History   Problem Relation Age of Onset    Parkinsonism Father        Social History     Socioeconomic History    Marital status:    Tobacco Use    Smoking status: Never     Passive exposure: Past    Smokeless tobacco: Never   Vaping Use    Vaping Use: Never used   Substance and Sexual Activity    Alcohol use: Yes     Alcohol/week: 3.0 standard drinks of alcohol     Types: 3 Drinks containing 0.5 oz of alcohol per week    Drug use: Never    Sexual activity: Not Currently     Partners: Female     Birth control/protection: Vasectomy           Objective   Physical Exam  Vitals and nursing note reviewed. Exam conducted with a chaperone present.   Constitutional:       General: He is awake.      Appearance: Normal appearance. He is well-developed. He is ill-appearing.   HENT:      Head: Normocephalic and atraumatic.   Eyes:      General: Lids are normal.      Conjunctiva/sclera: Conjunctivae normal.      Pupils: Pupils are equal, round, and reactive to light.   Cardiovascular:      Rate and Rhythm: Normal rate and regular rhythm.      Chest Wall: PMI is not displaced.      Pulses: Normal pulses.      Heart sounds: Normal heart sounds.   Pulmonary:      Effort: Pulmonary effort is normal. Tachypnea present.      Breath sounds: Wheezing and rales present. No decreased breath sounds.   Abdominal:      General: Abdomen is protuberant. Bowel sounds are normal.      Palpations: Abdomen is soft.      Tenderness: There is no abdominal tenderness.   Musculoskeletal:         General: Normal range of motion.      Cervical back: Full passive range of motion without pain, normal range of motion and neck supple.   Skin:     General: Skin is warm and dry.      Capillary Refill: Capillary refill takes less than 2 seconds.   Neurological:      General: No focal  deficit present.      Mental Status: He is alert. He is disoriented and confused.      GCS: GCS eye subscore is 4. GCS verbal subscore is 4. GCS motor subscore is 5.      Cranial Nerves: Cranial nerves 2-12 are intact. No cranial nerve deficit.      Motor: Motor function is intact.      Deep Tendon Reflexes: Reflexes are normal and symmetric.   Psychiatric:         Behavior: Behavior is cooperative.         Procedures           ED Course  ED Course as of 10/30/23 1525   Mon Oct 30, 2023   1011 Normal sinus rhythm [TS]   1521 Patient's work-up is negative [TS]   1522 CTs are negative [TS]   1522 Chest x-ray has shown a questionable pneumonia retrocardiac with a CT of the abdomen pelvis does not reveal. [TS]   1522 Patient is not able to ambulate to get out of the bed to take care of himself he is not is not going to be able to be in assisted living by himself discussed with hospitalist will admit. [TS]      ED Course User Index  [TS] Yovani Alcocer MD                                           Medical Decision Making  Patient with diarrhea but also having some cough and congestion will get lab work-up and x-rays and CAT scan of the abdomen pelvis.    Problems Addressed:  Generalized weakness: acute illness or injury     Details: Generalized weakness and fatigue not a safe discharge.  Impaired mobility and ADLs: acute illness or injury    Amount and/or Complexity of Data Reviewed  Labs: ordered.     Details: Labs reviewed  Radiology: ordered.  ECG/medicine tests: ordered.  Discussion of management or test interpretation with external provider(s): Discussed with     Risk  Prescription drug management.  Risk Details: Will probably need to go to a nursing home.        Final diagnoses:   Generalized weakness   Impaired mobility and ADLs       ED Disposition  ED Disposition       None            No follow-up provider specified.       Medication List      No changes were made to your prescriptions during this visit.             Yovani Alcocer MD  10/30/23 0915       Yovani Alcocer MD  10/30/23 1526

## 2023-10-30 NOTE — H&P
[facility] Medicine Services  HISTORY AND PHYSICAL    Date of Admission: 10/30/2023  Primary Care Physician: Luca Benson MD    Subjective   Primary Historian: Patient    Chief Complaint: Vomiting    History of Present Illness  77 year old male with PMH of CAD, DM 2 NID, falls, memory loss that presents to the ER today with complaints of nausea and vomiting since yesterday. He was noted to have some degree of confusion, he stares and is slow to answer, but does appear oriented.     Review of Systems   Otherwise complete ROS reviewed and negative except as mentioned in the HPI.    Past Medical History:   Past Medical History:   Diagnosis Date    Coronary artery disease 10 years    x2 stents    Diabetes mellitus < 2 years    non-insulin dependent    Difficulty walking < 1 year    multiple falls in the last 6-8 months    HL (hearing loss) 20+ years    Wears hearing aids occasionally    Hyperlipidemia 5 years    Memory loss 3 years+/-    gradual and subtle    Sleep apnea 20+ year    Non-compliant with PEEP for tx    Stroke 11/2022     Past Surgical History:  Past Surgical History:   Procedure Laterality Date    APPENDECTOMY      BACK SURGERY  2016    CAROTID STENT  2010?    KNEE SURGERY  1960    LAMINECTOMY  2015?    discectomy & fusion     Social History:  reports that he has never smoked. He has been exposed to tobacco smoke. He has never used smokeless tobacco. He reports current alcohol use of about 3.0 standard drinks of alcohol per week. He reports that he does not use drugs.    Family History: family history includes Parkinsonism in his father.       Allergies:  Allergies   Allergen Reactions    Serna-2 Inhibitors Unknown - Low Severity     Medications:  Prior to Admission medications    Medication Sig Start Date End Date Taking? Authorizing Provider   allopurinol (ZYLOPRIM) 100 MG tablet Take 1 tablet by mouth Daily. 7/13/20   Provider, MD Michael   Ascorbic  "Acid (VITAMIN C PO) Take  by mouth Daily.    Michael George MD   aspirin 81 MG EC tablet Take 1 tablet by mouth Daily.    Michael George MD   Cholecalciferol (VITAMIN D3) 125 MCG (5000 UT) tablet tablet Take 1 tablet by mouth Daily.    Michael George MD   cyclobenzaprine (FLEXERIL) 10 MG tablet Take 1 tablet by mouth every night at bedtime. 7/29/20   Michael George MD   glipizide (GLUCOTROL XL) 5 MG ER tablet Take 1 tablet by mouth Daily.    Michael George MD   losartan (COZAAR) 50 MG tablet Take 1 tablet by mouth Daily. 7/29/20   Michael George MD   MAGNESIUM-OXIDE PO Take  by mouth Daily.    Michael George MD   Melatonin 10 MG tablet Take  by mouth At Night As Needed.    Michael George MD   multivitamin (THERAGRAN) tablet tablet Take 1 tablet by mouth Daily.    Michael George MD   pravastatin (PRAVACHOL) 20 MG tablet Take 1 tablet by mouth Daily. 6/9/20   Michael George MD   spironolactone (ALDACTONE) 25 MG tablet Take 1 tablet by mouth Daily.    Michael George MD   traZODone (DESYREL) 50 MG tablet Take 1 tablet by mouth Every Night. 7/13/20   Michael George MD     I have utilized all available immediate resources to obtain, update, or review the patient's current medications (including all prescriptions, over-the-counter products, herbals, cannabis/cannabidiol products, and vitamin/mineral/dietary (nutritional) supplements).    Objective     Vital Signs: /84   Pulse 97   Temp 98 °F (36.7 °C)   Resp 20   Ht 175.3 cm (69\")   Wt 124 kg (273 lb)   SpO2 97%   BMI 40.32 kg/m²   Physical Exam  Constitutional:       Appearance: He is well-developed. He is diaphoretic. He is not ill-appearing.   HENT:      Head: Normocephalic and atraumatic.      Right Ear: External ear normal.      Left Ear: External ear normal.      Nose: Nose normal.      Mouth/Throat:      Mouth: Mucous membranes are dry.   Eyes:      General:         " Right eye: No discharge.         Left eye: No discharge.      Extraocular Movements: Extraocular movements intact.      Conjunctiva/sclera: Conjunctivae normal.      Pupils: Pupils are equal, round, and reactive to light.   Neck:      Vascular: No JVD.   Cardiovascular:      Rate and Rhythm: Regular rhythm. Tachycardia present.      Heart sounds: Normal heart sounds. No murmur heard.  Pulmonary:      Effort: Pulmonary effort is normal. No respiratory distress.      Breath sounds: Normal breath sounds. No wheezing or rales.   Chest:      Chest wall: No tenderness.   Abdominal:      General: Bowel sounds are normal. There is no distension.      Palpations: Abdomen is soft.      Tenderness: There is no abdominal tenderness. There is no guarding or rebound.   Musculoskeletal:         General: No tenderness or deformity. Normal range of motion.      Cervical back: Normal range of motion and neck supple. No rigidity.      Comments: 1 plus pre tibial edema   Skin:     General: Skin is warm and dry.      Findings: No rash.   Neurological:      General: No focal deficit present.      Mental Status: He is alert and oriented to person, place, and time.      Cranial Nerves: No cranial nerve deficit.      Sensory: No sensory deficit.      Motor: No abnormal muscle tone.      Deep Tendon Reflexes: Reflexes normal.   Psychiatric:         Mood and Affect: Mood normal.         Behavior: Behavior normal.     Results Reviewed:  Lab Results (last 24 hours)       Procedure Component Value Units Date/Time    Procalcitonin [[id number]]  (Abnormal) Collected: 10/30/23 1105    Specimen: Blood Updated: 10/30/23 1138     Procalcitonin 0.31 ng/mL     Narrative:      As a Marker for Sepsis (Non-Neonates):    1. <0.5 ng/mL represents a low risk of severe sepsis and/or septic shock.  2. >2 ng/mL represents a high risk of severe sepsis and/or septic shock.    As a Marker for Lower Respiratory Tract Infections that require antibiotic therapy:    PCT  "on Admission    Antibiotic Therapy       6-12 Hrs later    >0.5                Strongly Recommended  >0.25 - <0.5        Recommended   0.1 - 0.25          Discouraged              Remeasure/reassess PCT  <0.1                Strongly Discouraged     Remeasure/reassess PCT    As 28 day mortality risk marker: \"Change in Procalcitonin Result\" (>80% or <=80%) if Day 0 (or Day 1) and Day 4 values are available. Refer to http://www.[facility]-pct-calculator.com    Change in PCT <=80%  A decrease of PCT levels below or equal to 80% defines a positive change in PCT test result representing a higher risk for 28-day all-cause mortality of patients diagnosed with severe sepsis for septic shock.    Change in PCT >80%  A decrease of PCT levels of more than 80% defines a negative change in PCT result representing a lower risk for 28-day all-cause mortality of patients diagnosed with severe sepsis or septic shock.       C-reactive Protein [[id number]]  (Abnormal) Collected: 10/30/23 1105    Specimen: Blood Updated: 10/30/23 1134     C-Reactive Protein 2.77 mg/dL     Basic Metabolic Panel [[id number]]  (Abnormal) Collected: 10/30/23 1105    Specimen: Blood Updated: 10/30/23 1134     Glucose 117 mg/dL      BUN 19 mg/dL      Creatinine 1.21 mg/dL      Sodium 136 mmol/L      Potassium 4.5 mmol/L      Chloride 100 mmol/L      CO2 29.0 mmol/L      Calcium 9.6 mg/dL      BUN/Creatinine Ratio 15.7     Anion Gap 7.0 mmol/L      eGFR 61.7 mL/min/1.73     Narrative:      GFR Normal >60  Chronic Kidney Disease <60  Kidney Failure <15    The GFR formula is only valid for adults with stable renal function between ages 18 and 70.    Lactic Acid, Plasma [[id number]]  (Normal) Collected: 10/30/23 1105    Specimen: Blood Updated: 10/30/23 1131     Lactate 1.0 mmol/L     CBC & Differential [[id number]]  (Abnormal) Collected: 10/30/23 1105    Specimen: Blood Updated: 10/30/23 1113    Narrative:      The following orders were created for panel order CBC & " Differential.  Procedure                               Abnormality         Status                     ---------                               -----------         ------                     CBC Auto Differential[[id number]]        Abnormal            Final result                 Please view results for these tests on the individual orders.    CBC Auto Differential [[id number]]  (Abnormal) Collected: 10/30/23 1105    Specimen: Blood Updated: 10/30/23 1113     WBC 7.57 10*3/mm3      RBC 4.18 10*6/mm3      Hemoglobin 13.1 g/dL      Hematocrit 40.2 %      MCV 96.2 fL      MCH 31.3 pg      MCHC 32.6 g/dL      RDW 13.6 %      RDW-SD 48.4 fl      MPV 9.5 fL      Platelets 176 10*3/mm3      Neutrophil % 78.8 %      Lymphocyte % 10.2 %      Monocyte % 8.6 %      Eosinophil % 1.6 %      Basophil % 0.4 %      Immature Grans % 0.4 %      Neutrophils, Absolute 5.97 10*3/mm3      Lymphocytes, Absolute 0.77 10*3/mm3      Monocytes, Absolute 0.65 10*3/mm3      Eosinophils, Absolute 0.12 10*3/mm3      Basophils, Absolute 0.03 10*3/mm3      Immature Grans, Absolute 0.03 10*3/mm3      nRBC 0.0 /100 WBC     COVID PRE-OP / PRE-PROCEDURE SCREENING ORDER (NO ISOLATION) - Swab, Nasal Cavity [[id number]]  (Normal) Collected: 10/30/23 1006    Specimen: Swab from Nasal Cavity Updated: 10/30/23 1046    Narrative:      The following orders were created for panel order COVID PRE-OP / PRE-PROCEDURE SCREENING ORDER (NO ISOLATION) - Swab, Nasal Cavity.  Procedure                               Abnormality         Status                     ---------                               -----------         ------                     COVID-19 and FLU A/B PCR...[[id number]]  Normal              Final result                 Please view results for these tests on the individual orders.    COVID-19 and FLU A/B PCR - Swab, Nasopharynx [[id number]]  (Normal) Collected: 10/30/23 1006    Specimen: Swab from Nasopharynx Updated: 10/30/23 1046     COVID19 Not Detected      Influenza A PCR Not Detected     Influenza B PCR Not Detected    Narrative:      Fact sheet for providers: https://www.fda.gov/media/877574/download    Fact sheet for patients: https://www.fda.gov/media/102189/download    Test performed by PCR.          Imaging Results (Last 24 Hours)       Procedure Component Value Units Date/Time    CT Head Without Contrast [[id number]] Collected: 10/30/23 1506     Updated: 10/30/23 1511    Narrative:      EXAMINATION: CT HEAD WO CONTRAST-      10/30/2023 2:54 PM CDT     HISTORY: Mental status change, unknown cause     In order to have a CT radiation dose as low as reasonably achievable  Automated Exposure Control was utilized for adjustment of the mA and/or  KV according to patient size.     DLP in mGycm= 797.     Noncontrast head CT.  Axial, sagittal, and coronal sequences.     Comparison is made with 9/24/2023.     Chronic RIGHT maxillary sinus opacification.  Increased ethmoid and sphenoid sinus mucosal thickening.     Mild atrophy and small vessel disease.  No hemorrhage or mass effect.     No acute infarct is seen.       Impression:      1. Sinusitis changes.  2. Stable age-related changes.  3. No acute intracranial abnormality is seen.                                      This report was signed and finalized on 10/30/2023 3:08 PM CDT by Dr. Eyal Morin MD.       CT Abdomen Pelvis With Contrast [[id number]] Collected: 10/30/23 1254     Updated: 10/30/23 1301    Narrative:      EXAMINATION: CT ABDOMEN PELVIS W CONTRAST-      10/30/2023 12:20 PM CDT     HISTORY: Nausea/vomiting. Diarrhea.     In order to have a CT radiation dose as low as reasonably achievable  Automated Exposure Control was utilized for adjustment of the mA and/or  KV according to patient size.     DLP in mGycm= 886.     Abdomen/pelvis CT with IV contrast injection.  Axial, sagittal, and coronal sequences.     No comparison.     Respiratory motion at the lung bases.  No acute lung disease is seen.  Heart  size is at the upper limits of normal.     Normal liver.  Gallstones incidentally noted.  There is no sign of cholecystitis and there is no bile duct dilation.  Normal pancreas and spleen.  Normal adrenal glands.  Symmetric renal size though there is a large partially exophytic cyst  arising from the midportion of the RIGHT kidney.  RIGHT renal cyst = 73 x 72 mm.  No hydronephrosis.     Aortic calcification with no aneurysm.  Normal size aorta, IVC, and portal vein.     There is no bowel dilation.  History of appendectomy noted.  Scattered sigmoid diverticula.  No diverticulitis or colitis.     Pelvic phleboliths noted.     The urinary bladder is mildly distended.  No bladder mass or inflammation is seen.     RIGHT convex lumbar scoliosis.  L4-S1 hardware fusion.  Multilevel degenerative disc, endplate, and facet change within the  lumbar spine and lower thoracic spine.       Impression:      1. No mass, fluid collection, or inflammatory process is seen within the  abdomen or pelvis.  There is no stomach or bowel dilation.                                   This report was signed and finalized on 10/30/2023 12:57 PM CDT by Dr. Eyal Morin MD.       XR Chest 1 View [[id number]] Collected: 10/30/23 1003     Updated: 10/30/23 1007    Narrative:      EXAMINATION: XR CHEST 1 VW-     10/30/2023 9:55 AM CDT     HISTORY: Cough     COMPARISON:  9/22/2023 chest x-ray.     1 view chest x-ray.     Low lung volumes.  Magnified heart size.     End-stage degenerative change at the LEFT shoulder.     Monitoring device overlies the upper LEFT chest.     No pneumothorax or heart failure.       Impression:      1. Low lung volumes with magnified heart size.  2. Limited visualization of the retrocardiac LEFT lower lobe though an  abdomen/pelvis CT is planned and should visualize this area to assess  for occult infiltrate.     This report was signed and finalized on 10/30/2023 10:04 AM CDT by Dr. Eyal Morin MD.             I have  personally reviewed and interpreted the radiology studies and ECG obtained at time of admission.     Assessment / Plan   Assessment:   Active Hospital Problems    Diagnosis     **Gastroenteritis     Coronary artery disease involving native coronary artery of native heart without angina pectoris     Cognitive decline     Falls     Type 2 diabetes mellitus without complication, without long-term current use of insulin     Primary hypertension      Treatment Plan  The patient will be admitted to my service here at [facility].   Observe in medical floor  Vitals every 4 hours  Cardiac carbohydrate controlled diet  IVF NS 75 cc/hour  BMP in AM with magnesium and phosphorus level  TSH/T4 level  Urinalysis with reflex    Home medications reviewed  wWill hold Flexeril due to concerns with confusion and falls  PT/OT evaluation in AM  Patient records shows 2 visits to the ER within the last 2 months for falls, he was offered placement at subacute rehabiliation but has refused. He did move to an assisted living facility.     DVT prophylaxis > Lovenox 40 mg SQ daily    Medical Decision Making  Number and Complexity of problems: 4 complex medical problems  Differential Diagnosis: Posterior stroke, Frontal dementia    Conditions and Status        Condition is unchanged.     [facility] Data  External documents reviewed: [employer] EHR  Cardiac tracing (EKG, telemetry) interpretation: NSR  Radiology interpretation: See above  Labs reviewed: See above  Any tests that were considered but not ordered: none     Decision rules/scores evaluated (example [id number], [place], etc): N/A     Discussed with: Patient     Care Planning  Shared decision making: Patient  Code status and discussions: Full code    Disposition  Social Determinants of Health that impact treatment or disposition: none, current assisted living resident  Estimated length of stay is overnight.     I confirmed that the patient's advanced care plan is present, code status is  documented, and a surrogate decision maker is listed in the patient's medical record.     The patient's surrogate decision maker is family, see records.     The patient was seen and examined by me on 10/30/2023 at 1523.    Electronically signed by Uriah Manriquez MD, 10/30/23, 15:23 CDT.

## 2023-10-30 NOTE — PLAN OF CARE
Goal Outcome Evaluation:  Plan of Care Reviewed With: patient, spouse        Progress: no change          Admitted from ER.  VSS.  RA.  Turn q2.  IVF as ordered.  Lovenox.  Confused to time and situation.  Tolerating regular diet.  Accuchecks as ordered.  Safety maintained.

## 2023-10-31 LAB
ANION GAP SERPL CALCULATED.3IONS-SCNC: 9 MMOL/L (ref 5–15)
BUN SERPL-MCNC: 19 MG/DL (ref 8–23)
BUN/CREAT SERPL: 16.1 (ref 7–25)
CALCIUM SPEC-SCNC: 9 MG/DL (ref 8.6–10.5)
CHLORIDE SERPL-SCNC: 103 MMOL/L (ref 98–107)
CO2 SERPL-SCNC: 24 MMOL/L (ref 22–29)
CREAT SERPL-MCNC: 1.18 MG/DL (ref 0.76–1.27)
EGFRCR SERPLBLD CKD-EPI 2021: 63.6 ML/MIN/1.73
GLUCOSE BLDC GLUCOMTR-MCNC: 110 MG/DL (ref 70–130)
GLUCOSE BLDC GLUCOMTR-MCNC: 126 MG/DL (ref 70–130)
GLUCOSE BLDC GLUCOMTR-MCNC: 134 MG/DL (ref 70–130)
GLUCOSE BLDC GLUCOMTR-MCNC: 144 MG/DL (ref 70–130)
GLUCOSE BLDC GLUCOMTR-MCNC: 152 MG/DL (ref 70–130)
GLUCOSE BLDC GLUCOMTR-MCNC: 85 MG/DL (ref 70–130)
GLUCOSE SERPL-MCNC: 101 MG/DL (ref 65–99)
MAGNESIUM SERPL-MCNC: 2 MG/DL (ref 1.6–2.4)
PHOSPHATE SERPL-MCNC: 3.6 MG/DL (ref 2.5–4.5)
POTASSIUM SERPL-SCNC: 4.2 MMOL/L (ref 3.5–5.2)
QT INTERVAL: 394 MS
QTC INTERVAL: 425 MS
SODIUM SERPL-SCNC: 136 MMOL/L (ref 136–145)
T4 FREE SERPL-MCNC: 1.29 NG/DL (ref 0.93–1.7)
TSH SERPL DL<=0.05 MIU/L-ACNC: 1.66 UIU/ML (ref 0.27–4.2)

## 2023-10-31 PROCEDURE — 97166 OT EVAL MOD COMPLEX 45 MIN: CPT

## 2023-10-31 PROCEDURE — 97116 GAIT TRAINING THERAPY: CPT

## 2023-10-31 PROCEDURE — 97530 THERAPEUTIC ACTIVITIES: CPT

## 2023-10-31 PROCEDURE — G0378 HOSPITAL OBSERVATION PER HR: HCPCS

## 2023-10-31 PROCEDURE — 97162 PT EVAL MOD COMPLEX 30 MIN: CPT

## 2023-10-31 PROCEDURE — 83735 ASSAY OF MAGNESIUM: CPT | Performed by: FAMILY MEDICINE

## 2023-10-31 PROCEDURE — 82948 REAGENT STRIP/BLOOD GLUCOSE: CPT

## 2023-10-31 PROCEDURE — 80048 BASIC METABOLIC PNL TOTAL CA: CPT | Performed by: FAMILY MEDICINE

## 2023-10-31 PROCEDURE — 97535 SELF CARE MNGMENT TRAINING: CPT

## 2023-10-31 PROCEDURE — 84443 ASSAY THYROID STIM HORMONE: CPT | Performed by: FAMILY MEDICINE

## 2023-10-31 PROCEDURE — 25810000003 SODIUM CHLORIDE 0.9 % SOLUTION: Performed by: FAMILY MEDICINE

## 2023-10-31 PROCEDURE — 63710000001 INSULIN LISPRO (HUMAN) PER 5 UNITS: Performed by: FAMILY MEDICINE

## 2023-10-31 PROCEDURE — 84100 ASSAY OF PHOSPHORUS: CPT | Performed by: FAMILY MEDICINE

## 2023-10-31 PROCEDURE — 84439 ASSAY OF FREE THYROXINE: CPT | Performed by: FAMILY MEDICINE

## 2023-10-31 RX ORDER — NYSTATIN 100000 [USP'U]/G
POWDER TOPICAL EVERY 12 HOURS SCHEDULED
Status: DISCONTINUED | OUTPATIENT
Start: 2023-10-31 | End: 2023-11-04 | Stop reason: HOSPADM

## 2023-10-31 RX ORDER — ZINC SULFATE 50(220)MG
220 CAPSULE ORAL DAILY
COMMUNITY

## 2023-10-31 RX ORDER — TRAZODONE HYDROCHLORIDE 100 MG/1
100 TABLET ORAL NIGHTLY
COMMUNITY

## 2023-10-31 RX ORDER — MULTIPLE VITAMINS W/ MINERALS TAB 9MG-400MCG
1 TAB ORAL DAILY
COMMUNITY

## 2023-10-31 RX ADMIN — SODIUM CHLORIDE 75 ML/HR: 900 INJECTION INTRAVENOUS at 20:36

## 2023-10-31 RX ADMIN — DOCUSATE SODIUM 50 MG AND SENNOSIDES 8.6 MG 2 TABLET: 8.6; 5 TABLET, FILM COATED ORAL at 08:25

## 2023-10-31 RX ADMIN — NYSTATIN: 100000 POWDER TOPICAL at 08:23

## 2023-10-31 RX ADMIN — INSULIN LISPRO 2 UNITS: 100 INJECTION, SOLUTION INTRAVENOUS; SUBCUTANEOUS at 20:36

## 2023-10-31 RX ADMIN — SPIRONOLACTONE 25 MG: 25 TABLET ORAL at 08:22

## 2023-10-31 RX ADMIN — DOCUSATE SODIUM 50 MG AND SENNOSIDES 8.6 MG 2 TABLET: 8.6; 5 TABLET, FILM COATED ORAL at 20:36

## 2023-10-31 RX ADMIN — LOSARTAN POTASSIUM 50 MG: 50 TABLET, FILM COATED ORAL at 08:22

## 2023-10-31 RX ADMIN — Medication 10 ML: at 20:37

## 2023-10-31 RX ADMIN — PRAVASTATIN SODIUM 20 MG: 20 TABLET ORAL at 20:36

## 2023-10-31 RX ADMIN — NYSTATIN: 100000 POWDER TOPICAL at 20:37

## 2023-10-31 RX ADMIN — SODIUM CHLORIDE 75 ML/HR: 900 INJECTION INTRAVENOUS at 05:48

## 2023-10-31 RX ADMIN — TRAZODONE HYDROCHLORIDE 50 MG: 50 TABLET ORAL at 20:36

## 2023-10-31 RX ADMIN — ASPIRIN 81 MG: 81 TABLET, COATED ORAL at 08:22

## 2023-10-31 NOTE — PLAN OF CARE
Goal Outcome Evaluation:  Plan of Care Reviewed With: patient        Progress: no change  Outcome Evaluation: Patient disoriented to place. Room air. IVF. Denies pain. Incontinent of urine; brief in place. Tolerating diet. Accuchecks ACHS; SSI. Bed-alarm. Lovenox for VTE prevention. VSS. Safety maintained.

## 2023-10-31 NOTE — DISCHARGE PLACEMENT REQUEST
"Speedy Bell R \"Bill\" (77 y.o. Male)       Date of Birth   1945    Social Security Number       Address   [address]  [facility] [address]    Home Phone   [phone]    MRN   [id number]       Mandaeism   Buddhism    Marital Status                               Admission Date   10/30/23    Admission Type   Emergency    Admitting Provider   Uriah Manriquez MD    Attending Provider   Uriah Manriquez MD    Department, Room/Bed   [facility] 3C, 372/1       Discharge Date       Discharge Disposition       Discharge Destination                                 Attending Provider: Uriah Manriquez MD    Allergies: Serna-2 Inhibitors    Isolation: None   Infection: None   Code Status: CPR    Ht: 175.3 cm (69\")   Wt: 124 kg (273 lb)    Admission Cmt: None   Principal Problem: Gastroenteritis [K52.9]                   Active Insurance as of 10/30/2023       Primary Coverage       Payor Plan Insurance Group Employer/Plan Group    MEDICARE MEDICARE A & B        Payor Plan Address Payor Plan Phone Number Payor Plan Fax Number Effective Dates    [address] [phone]  12/1/2010 - None Entered    [facility] [id number]         Subscriber Name Subscriber Birth Date Member ID       SPEEDY BELL 1945 [id number]               Secondary Coverage       Payor Plan Insurance Group Employer/Plan Group    AETNA COMMERCIAL AETNA HEALTH AND LIFE  SUP               Payor Plan Address Payor Plan Phone Number Payor Plan Fax Number Effective Dates    [address] [address]   1/1/2018 - None Entered    [facility] [address]         Subscriber Name Subscriber Birth Date Member ID       SPEEDY BELL 1945 [id number]                     Emergency Contacts        (Rel.) Home Phone Work Phone Mobile Phone    LorimorMeghan almaguer (Daughter) -- -- [phone]    ARABELLABRIDGETT (Spouse) -- -- [phone]    ArabellaMaciel (Son) -- -- [phone]                "

## 2023-10-31 NOTE — PLAN OF CARE
Goal Outcome Evaluation:  Plan of Care Reviewed With: patient        Progress: no change  Outcome Evaluation: OT don completed.  Pt alert and oriented x2.  Unable to state correct location or date without verbal cues.  Pt lives in retirement and reports he is typically independent with ADLs and mobility except LB dressing which he requires maxA for.  He reports he falls frequently.  Pt has multiple bruises and abrasions on BLE.  He has a moderate memory deficit and delayed processing for command following.  Pt requires increased time and cueing to follow commands.  He needed maxA for supine>sit and minAx2 for transfers.  Pt leans posteriorly in both sitting and standing but balance and leaning is much worse in standing.  Pt requires verbal cues during transfers to avoid posterior leaning.  He amb in room and to chair with Jeannette and rw.  He is independent with set up for grooming and needs maxA to doff socks and don shoes.  OT will continue to treat pt to increase his balance, safety and independence with ADLs.  He is at a high risk of falls due to cognitive deficits and significantly impaired balance.Recommend SNF at discharge due to his high fall risk.

## 2023-10-31 NOTE — PLAN OF CARE
Goal Outcome Evaluation:              Outcome Evaluation: PT eval completed.  Pt pleasant and agreeable to therapy.  Oriented to self.  Pt w/ flat affect.  Per staff pt walked frequently at the [facility] w/ a rollator wx where he resides.  Demonstrates 4 to 4+/5 LE strength.  Decrease sitting and standing balance.  Initially leaning posteriorly on his heels.  When asked to shift wt forward on his toes, pt raised up on his toes and proceded to ambulate on his toes.  Pt education to improve standing w/ foot flat.  Pt was able to walk at bedside w/ rwx w/ min assist of 2 and frequent cues for improved gait mechanics.  Pt will benefit from continued PT services to improve activity tolerance, balance, safety awareness, to improve mobility reducing need for assist and to reduce fall risk.  Pt would benefit from rehab prior to returning to this facility.  Pending pt progress subacute rehab prior to returning home.      Anticipated Discharge Disposition (PT): sub acute care setting

## 2023-10-31 NOTE — CASE MANAGEMENT/SOCIAL WORK
Discharge Planning Assessment  [facility]     Patient Name: Speedy Villalba  MRN: [id number]  Today's Date: 10/31/2023    Admit Date: 10/30/2023        Discharge Needs Assessment       Row Name 10/31/23 1434       Living Environment    People in Home facility resident;spouse    Current Living Arrangements assisted living facility    Potentially Unsafe Housing Conditions none    Primary Care Provided by other (see comments)    Provides Primary Care For no one    Family Caregiver if Needed child(mitchell), adult;other (see comments)    Quality of Family Relationships helpful;involved;supportive    Able to Return to Prior Arrangements no       Resource/Environmental Concerns    Resource/Environmental Concerns none       Transition Planning    Patient/Family Anticipates Transition to inpatient rehabilitation facility    Patient/Family Anticipated Services at Transition skilled nursing    Transportation Anticipated family or friend will provide       Discharge Needs Assessment    Readmission Within the Last 30 Days no previous admission in last 30 days    Equipment Currently Used at Home rollator;cpap    Concerns to be Addressed care coordination/care conferences;discharge planning    Anticipated Changes Related to Illness none    Outpatient/Agency/Support Group Needs skilled nursing facility    Discharge Facility/Level of Care Needs nursing facility, skilled    Provided Post Acute Provider List? Yes    Post Acute Provider List Nursing Home    Provided Post Acute Provider Quality & Resource List? Yes    Post Acute Provider Quality and Resource List Nursing Home    Delivered To Support Person    Method of Delivery Telephone    Discharge Coordination/Progress Pt is from [facility]. Spoke with his son Maciel and daughter in law Meghan [phone]. Therapy has recommended snf at d/c. He has been in [facility] in the past and they do not want him going there. Discussed options in [place] and son requests a referral to  [facility]. Referral being sent.                   Discharge Plan    No documentation.                 Continued Care and Services - Admitted Since 10/30/2023       Destination       Service Provider Request Status Selected Services Address Phone Fax Patient Preferred    [facility] Pending - Request Sent N/A [address] [address] [phone] --                  Expected Discharge Date and Time       Expected Discharge Date Expected Discharge Time    Nov 1, 2023            Demographic Summary    No documentation.                  Functional Status    No documentation.                  Psychosocial    No documentation.                  Abuse/Neglect    No documentation.                  Legal    No documentation.                  Substance Abuse    No documentation.                  Patient Forms    No documentation.                     JOHN Jauregui

## 2023-10-31 NOTE — DISCHARGE SUMMARY
[facility] Medicine Services  DISCHARGE SUMMARY       Date of Admission: 10/30/2023  Date of Discharge:  10/31/2023  Primary Care Physician: Luca Benson MD    Presenting Problem/History of Present Illness:  77 year old male with PMH of CAD, DM 2 NID, falls, memory loss that presents to the ER today with complaints of nausea and vomiting since yesterday. He was noted to have some degree of confusion, he stares and is slow to answer, but does appear oriented.      Final Discharge Diagnoses:  Active Hospital Problems    Diagnosis     **Gastroenteritis     Coronary artery disease involving native coronary artery of native heart without angina pectoris     Cognitive decline     Falls     Type 2 diabetes mellitus without complication, without long-term current use of insulin     Primary hypertension      Pertinent Test Results:     Imaging Results (All)       Procedure Component Value Units Date/Time    CT Head Without Contrast [[id number]] Collected: 10/30/23 1506     Updated: 10/30/23 1511    Narrative:      EXAMINATION: CT HEAD WO CONTRAST-      10/30/2023 2:54 PM CDT     HISTORY: Mental status change, unknown cause     In order to have a CT radiation dose as low as reasonably achievable  Automated Exposure Control was utilized for adjustment of the mA and/or  KV according to patient size.     DLP in mGycm= 797.     Noncontrast head CT.  Axial, sagittal, and coronal sequences.     Comparison is made with 9/24/2023.     Chronic RIGHT maxillary sinus opacification.  Increased ethmoid and sphenoid sinus mucosal thickening.     Mild atrophy and small vessel disease.  No hemorrhage or mass effect.     No acute infarct is seen.       Impression:      1. Sinusitis changes.  2. Stable age-related changes.  3. No acute intracranial abnormality is seen.                                      This report was signed and finalized on 10/30/2023 3:08 PM CDT by Dr. Eyal Morin,  MD.       CT Abdomen Pelvis With Contrast [[id number]] Collected: 10/30/23 1254     Updated: 10/30/23 1301    Narrative:      EXAMINATION: CT ABDOMEN PELVIS W CONTRAST-      10/30/2023 12:20 PM CDT     HISTORY: Nausea/vomiting. Diarrhea.     In order to have a CT radiation dose as low as reasonably achievable  Automated Exposure Control was utilized for adjustment of the mA and/or  KV according to patient size.     DLP in mGycm= 886.     Abdomen/pelvis CT with IV contrast injection.  Axial, sagittal, and coronal sequences.     No comparison.     Respiratory motion at the lung bases.  No acute lung disease is seen.  Heart size is at the upper limits of normal.     Normal liver.  Gallstones incidentally noted.  There is no sign of cholecystitis and there is no bile duct dilation.  Normal pancreas and spleen.  Normal adrenal glands.  Symmetric renal size though there is a large partially exophytic cyst  arising from the midportion of the RIGHT kidney.  RIGHT renal cyst = 73 x 72 mm.  No hydronephrosis.     Aortic calcification with no aneurysm.  Normal size aorta, IVC, and portal vein.     There is no bowel dilation.  History of appendectomy noted.  Scattered sigmoid diverticula.  No diverticulitis or colitis.     Pelvic phleboliths noted.     The urinary bladder is mildly distended.  No bladder mass or inflammation is seen.     RIGHT convex lumbar scoliosis.  L4-S1 hardware fusion.  Multilevel degenerative disc, endplate, and facet change within the  lumbar spine and lower thoracic spine.       Impression:      1. No mass, fluid collection, or inflammatory process is seen within the  abdomen or pelvis.  There is no stomach or bowel dilation.                                   This report was signed and finalized on 10/30/2023 12:57 PM CDT by Dr. Eyal Morin MD.       XR Chest 1 View [[id number]] Collected: 10/30/23 1003     Updated: 10/30/23 1007    Narrative:      EXAMINATION: XR CHEST 1 VW-     10/30/2023 9:55 AM  CDT     HISTORY: Cough     COMPARISON:  9/22/2023 chest x-ray.     1 view chest x-ray.     Low lung volumes.  Magnified heart size.     End-stage degenerative change at the LEFT shoulder.     Monitoring device overlies the upper LEFT chest.     No pneumothorax or heart failure.       Impression:      1. Low lung volumes with magnified heart size.  2. Limited visualization of the retrocardiac LEFT lower lobe though an  abdomen/pelvis CT is planned and should visualize this area to assess  for occult infiltrate.     This report was signed and finalized on 10/30/2023 10:04 AM CDT by Dr. Eyal Morin MD.             LAB RESULTS:      Lab 10/30/23  1105   WBC 7.57   HEMOGLOBIN 13.1   HEMATOCRIT 40.2   PLATELETS 176   NEUTROS ABS 5.97   IMMATURE GRANS (ABS) 0.03   LYMPHS ABS 0.77   MONOS ABS 0.65   EOS ABS 0.12   MCV 96.2   CRP 2.77*   PROCALCITONIN 0.31*   LACTATE 1.0         Lab 10/31/23  0624 10/31/23  0451 10/30/23  1105   SODIUM 136  --  136   POTASSIUM 4.2  --  4.5   CHLORIDE 103  --  100   CO2 24.0  --  29.0   ANION GAP 9.0  --  7.0   BUN 19  --  19   CREATININE 1.18  --  1.21   EGFR 63.6  --  61.7   GLUCOSE 101*  --  117*   CALCIUM 9.0  --  9.6   MAGNESIUM 2.0  --   --    PHOSPHORUS 3.6  --   --    TSH  --  1.660  --                          Brief Urine Lab Results  (Last result in the past 365 days)        Color   Clarity   Blood   Leuk Est   Nitrite   Protein   CREAT   Urine HCG        10/30/23 1609 Yellow   Clear   Negative   Trace   Negative   Negative                 Microbiology Results (last 10 days)       Procedure Component Value - Date/Time    COVID PRE-OP / PRE-PROCEDURE SCREENING ORDER (NO ISOLATION) - Swab, Nasal Cavity [[id number]]  (Normal) Collected: 10/30/23 1006    Lab Status: Final result Specimen: Swab from Nasal Cavity Updated: 10/30/23 1046    Narrative:      The following orders were created for panel order COVID PRE-OP / PRE-PROCEDURE SCREENING ORDER (NO ISOLATION) - Swab, Nasal  Cavity.  Procedure                               Abnormality         Status                     ---------                               -----------         ------                     COVID-19 and FLU A/B PCR...[[id number]]  Normal              Final result                 Please view results for these tests on the individual orders.    COVID-19 and FLU A/B PCR - Swab, Nasopharynx [[id number]]  (Normal) Collected: 10/30/23 1006    Lab Status: Final result Specimen: Swab from Nasopharynx Updated: 10/30/23 1046     COVID19 Not Detected     Influenza A PCR Not Detected     Influenza B PCR Not Detected    Narrative:      Fact sheet for providers: https://www.fda.gov/media/043754/download    Fact sheet for patients: https://www.fda.gov/media/677858/download    Test performed by PCR.          Hospital Course:   77-year-old male with past medical history of coronary artery disease diabetes falls memory loss.presented to the emergency room yesterday after increased weakness associated to nausea vomiting and diarrhea for the last couple of days.  He was admitted to medical floor treated with IV fluids CT abdomen and pelvis was obtained which was normal CT brain was obtained which was normal.  COVID-19 test was negative.  His symptoms of nausea vomiting and diarrhea resolved he is tolerating orals.  He does have history of falls and we recommended evaluation with physical therapy and Occupational Therapy.  They are recommending physical therapy 5 times a week and consideration of subacute nursing home placement.  The patient is not interested at this time discussed being offered to him a few times before when he has visited the ER.  He would like to be discharged to assisted living.  We will try to arrange for home health care physical therapy and occupational outpatient and follow-up with case management for possible placement placement if he decides to undergo to subacute rehabilitation.    Physical Exam on Discharge:  BP  "147/81 (BP Location: Right arm, Patient Position: Sitting)   Pulse 85   Temp 97.7 °F (36.5 °C) (Oral)   Resp 18   Ht 175.3 cm (69\")   Wt 124 kg (273 lb)   SpO2 95%   BMI 40.32 kg/m²   Physical Exam  Constitutional:       Appearance: Normal appearance. He is not ill-appearing, toxic-appearing or diaphoretic.   HENT:      Head: Normocephalic and atraumatic.      Nose: Nose normal.      Mouth/Throat:      Mouth: Mucous membranes are moist.   Eyes:      Extraocular Movements: Extraocular movements intact.      Pupils: Pupils are equal, round, and reactive to light.   Cardiovascular:      Rate and Rhythm: Normal rate and regular rhythm.   Pulmonary:      Effort: Pulmonary effort is normal. No respiratory distress.      Breath sounds: Normal breath sounds.   Abdominal:      General: Abdomen is flat. Bowel sounds are normal. There is no distension.      Palpations: Abdomen is soft.      Tenderness: There is no abdominal tenderness.   Musculoskeletal:         General: Normal range of motion.      Cervical back: Normal range of motion and neck supple.      Right lower leg: No edema.      Left lower leg: No edema.   Skin:     General: Skin is warm and dry.   Neurological:      General: No focal deficit present.      Mental Status: He is alert and oriented to person, place, and time. Mental status is at baseline.      Motor: No weakness.   Psychiatric:         Mood and Affect: Mood normal.         Behavior: Behavior normal.       Condition on Discharge:   Stable    Discharge Disposition:  Assisted living    Discharge Medications:     Discharge Medications        Continue These Medications        Instructions Start Date   allopurinol 100 MG tablet  Commonly known as: ZYLOPRIM   100 mg, Oral, Daily      aspirin 81 MG EC tablet   81 mg, Oral, Daily      Centrum Silver Adult 50+ tablet tablet   1 tablet, Oral, Daily      cyclobenzaprine 10 MG tablet  Commonly known as: FLEXERIL   10 mg, Oral, Every Night at Bedtime    "   glipizide 5 MG ER tablet  Commonly known as: GLUCOTROL XL   5 mg, Oral, Daily      losartan 50 MG tablet  Commonly known as: COZAAR   50 mg, Oral, Daily      Magnesium Oxide -Mg Supplement 400 (240 Mg) MG tablet   1 tablet, Oral, Daily      Melatonin 10 MG tablet   30 mg, Oral, Nightly      pravastatin 20 MG tablet  Commonly known as: PRAVACHOL   20 mg, Oral, Nightly      spironolactone 25 MG tablet  Commonly known as: ALDACTONE   25 mg, Oral, Daily      traZODone 100 MG tablet  Commonly known as: DESYREL   100 mg, Oral, Nightly      vitamin D3 125 MCG (5000 UT) tablet tablet   5,000 Units, Oral, Daily      zinc sulfate 220 (50 Zn) MG capsule  Commonly known as: ZINCATE   220 mg, Oral, Daily             Discharge Diet:   Cardiac ADA    Activity at Discharge:   Recommending PT/OT at assisted living or outpatient    Follow-up Appointments:   Future Appointments   Date Time Provider Department Center   11/10/2023  1:45 PM Rodrigue Hebert PA [facility]       Test Results Pending at Discharge: none    Electronically signed by Uriah Manriquez MD, 10/31/23, 16:08 CDT.    Time: 25 minutes.     After discussing placement options with the base the patient and asking him to try to get up from bed there is noted that he can only turn and is not able to even dangle the legs from the bed or follow other instructions.  He appears very weak I have told him that in this condition he cannot really take care of himself at home and is at risk of injury or worse.  He has agreed to try for placement at a subacute rehabilitation facility which still has to be arranged.  Thus the discharge will be deferred.    Uriah Manriquez MD  10/31/23  16:27 CDT

## 2023-10-31 NOTE — THERAPY TREATMENT NOTE
Acute Care - Physical Therapy Treatment Note   [place]     Patient Name: Speedy Villalba  : 1945  MRN: [id number]  Today's Date: 10/31/2023   Onset of Illness/Injury or Date of Surgery: 10/30/23  Visit Dx:     ICD-10-CM ICD-9-CM   1. Generalized weakness  R53.1 780.79   2. Impaired mobility and ADLs  Z74.09 V49.89    Z78.9    3. Impaired functional mobility and activity tolerance [Z74.09]  Z74.09 V49.89     Patient Active Problem List   Diagnosis    Gastroenteritis    Coronary artery disease involving native coronary artery of native heart without angina pectoris    Cognitive decline    Falls    Type 2 diabetes mellitus without complication, without long-term current use of insulin    Primary hypertension     Past Medical History:   Diagnosis Date    Coronary artery disease 10 years    x2 stents    Diabetes mellitus < 2 years    non-insulin dependent    Difficulty walking < 1 year    multiple falls in the last 6-8 months    HL (hearing loss) 20+ years    Wears hearing aids occasionally    Hyperlipidemia 5 years    Memory loss 3 years+/-    gradual and subtle    Primary hypertension 10/30/2023    Sleep apnea 20+ year    Non-compliant with PEEP for tx    Stroke 2022     Past Surgical History:   Procedure Laterality Date    APPENDECTOMY      BACK SURGERY  2016    CAROTID STENT  2010?    KNEE SURGERY  1960    LAMINECTOMY  2015?    discectomy & fusion     PT Assessment (last 12 hours)       PT Evaluation and Treatment       Row Name 10/31/23 3271          Physical Therapy Time and Intention    Subjective Information no complaints  -LY     Document Type therapy note (daily note)  -LY     Mode of Treatment physical therapy  -LY       Row Name 10/31/23 1543          General Information    Existing Precautions/Restrictions fall  -LY       Row Name 10/31/23 8371          Pain    Pretreatment Pain Rating 0/10 - no pain  -LY     Posttreatment Pain Rating 0/10 - no pain  -LY       Row Name 10/31/23 9883           Bed Mobility    Bed Mobility sit-supine  -LY     Sit-Supine [place] (Bed Mobility) verbal cues;minimum assist (75% patient effort);2 person assist  -LY       Row Name 10/31/23 8589          Transfers    Transfers toilet transfer;stand-sit transfer  -LY       Row Name 10/31/23 [facility]          Sit-Stand Transfer    Sit-Stand [place] (Transfers) verbal cues;minimum assist (75% patient effort);2 person assist  -LY     Assistive Device (Sit-Stand Transfers) walker, front-wheeled  -LY     Comment, (Sit-Stand Transfer) focus on anterior weight shift, cues for hand placement, increased time to complete transitions, posterior lean able to correct with cues  -LY       Row Name 10/31/23 3177          Stand-Sit Transfer    Stand-Sit [place] (Transfers) verbal cues;minimum assist (75% patient effort);2 person assist  -LY     Comment, (Stand-Sit Transfer) decreased hip flexion  -LY       Row Name 10/31/23 0753          Toilet Transfer    Type (Toilet Transfer) sit-stand;stand-sit  -LY     [place] Level (Toilet Transfer) verbal cues;minimum assist (75% patient effort);2 person assist  -LY     Assistive Device (Toilet Transfer) commode;grab bars/safety frame;walker, front-wheeled  -LY       Row Name 10/31/23 6111          Gait/Stairs (Locomotion)    [place] Level (Gait) verbal cues;minimum assist (75% patient effort);1 person assist  -LY     Assistive Device (Gait) walker, front-wheeled  -LY     Distance in Feet (Gait) 20'  practiced 2 times with seated rest break  -LY     Deviations/Abnormal Patterns (Gait) festinating/shuffling;gait speed decreased;stride length decreased  -LY     Bilateral Gait Deviations heel strike decreased  -LY       Row Name 10/31/23 8167          Positioning and Restraints    Pre-Treatment Position sitting in chair/recliner  -LY     Post Treatment Position bed  -LY     In Bed fowlers;call light within reach;encouraged to call for assist;exit alarm on  -LY               User Key  (r)  = Recorded By, (t) = Taken By, (c) = Cosigned By      Initials Name Provider Type    Raiza Lua, PTA Physical Therapist Assistant                      PT Recommendation and Plan         Outcome Measures       Row Name 10/31/23 0915             How much help from another is currently needed...    Putting on and taking off regular lower body clothing? 2  -AC      Bathing (including washing, rinsing, and drying) 2  -AC      Toileting (which includes using toilet bed pan or urinal) 2  -AC      Putting on and taking off regular upper body clothing 3  -AC      Taking care of personal grooming (such as brushing teeth) 4  -AC      Eating meals 4  -AC      AM-PAC 6 Clicks Score (OT) 17  -AC         Functional Assessment    Outcome Measure Options AM-PAC 6 Clicks Daily Activity (OT)  -AC                User Key  (r) = Recorded By, (t) = Taken By, (c) = Cosigned By      Initials Name Provider Type    AC Ahmet Hayes, OTR/L, CNT Occupational Therapist                     Time Calculation:    PT Charges       Row Name 10/31/23 1441 10/31/23 1137          Time Calculation    Start Time 1357  -LY 0934  -     Stop Time 1435  -LY 1029  -     Time Calculation (min) 38 min  -LY 55 min  -JE     PT Received On 10/31/23  -LY 10/31/23  -     PT Goal Re-Cert Due Date -- 11/10/23  -        Time Calculation- PT    Total Timed Code Minutes- PT 38 minute(s)  -LY --        Timed Charges    43556 - Gait Training Minutes  24  -LY --     94519 - PT Therapeutic Activity Minutes 14  -LY --        Total Minutes    Timed Charges Total Minutes 38  -LY --      Total Minutes 38  -LY --               User Key  (r) = Recorded By, (t) = Taken By, (c) = Cosigned By      Initials Name Provider Type    Raiza Lua, PTA Physical Therapist Assistant    Unique Sommer, PT Physical Therapist                  Therapy Charges for Today       Code Description Service Date Service Provider Modifiers Qty    32356796331 HC GAIT TRAINING EA  15 MIN 10/31/2023 Raiza Rosado, PTA GP 2    79655676675 HC PT THERAPEUTIC ACT EA 15 MIN 10/31/2023 Raiza Rosado, RASHAD GP 1            PT G-Codes  Outcome Measure Options: AM-PAC 6 Clicks Daily Activity (OT)  AM-PAC 6 Clicks Score (PT): 12  AM-PAC 6 Clicks Score (OT): 17    Raiza Rosado PTA  10/31/2023

## 2023-10-31 NOTE — THERAPY EVALUATION
Patient Name: Speedy Villalba  : 1945    MRN: [id number]                              Today's Date: 10/31/2023       Admit Date: 10/30/2023    Visit Dx:     ICD-10-CM ICD-9-CM   1. Generalized weakness  R53.1 780.79   2. Impaired mobility and ADLs  Z74.09 V49.89    Z78.9    3. Impaired functional mobility and activity tolerance [Z74.09]  Z74.09 V49.89     Patient Active Problem List   Diagnosis    Gastroenteritis    Coronary artery disease involving native coronary artery of native heart without angina pectoris    Cognitive decline    Falls    Type 2 diabetes mellitus without complication, without long-term current use of insulin    Primary hypertension     Past Medical History:   Diagnosis Date    Coronary artery disease 10 years    x2 stents    Diabetes mellitus < 2 years    non-insulin dependent    Difficulty walking < 1 year    multiple falls in the last 6-8 months    HL (hearing loss) 20+ years    Wears hearing aids occasionally    Hyperlipidemia 5 years    Memory loss 3 years+/-    gradual and subtle    Primary hypertension 10/30/2023    Sleep apnea 20+ year    Non-compliant with PEEP for tx    Stroke 2022     Past Surgical History:   Procedure Laterality Date    APPENDECTOMY      BACK SURGERY  2016    CAROTID STENT  ?    KNEE SURGERY  1960    LAMINECTOMY  ?    discectomy & fusion      General Information       Row Name 10/31/23 0934          Physical Therapy Time and Intention    Document Type evaluation;other (see comments)  -JE     Mode of Treatment physical therapy  -JE       Row Name 10/31/23 0934          General Information    Patient Profile Reviewed yes  -JE     Prior Level of Function independent:;all household mobility;feeding;grooming;bathing;min assist:;dressing;dependent:;cooking;cleaning;driving  uses rollator  -JE     Existing Precautions/Restrictions fall  -JE     Barriers to Rehab cognitive status;physical barrier  -JE       Row Name 10/31/23 0934          Living  Environment    People in Home spouse;facility resident  -     Name(s) of People in Home resides at [facility] w/ his spouse  -       Row Name 10/31/23 0924          Cognition    Orientation Status (Cognition) oriented to;person;disoriented to;time;place;situation  stated he was at [facility]  -       Row Name 10/31/23 0952          Safety Issues, Functional Mobility    Safety Issues Affecting Function (Mobility) ability to follow commands;at risk behavior observed;awareness of need for assistance;friction/shear risk;insight into deficits/self-awareness;positioning of assistive device;safety precaution awareness  -     Impairments Affecting Function (Mobility) balance;cognition;endurance/activity tolerance;postural/trunk control  -     Cognitive Impairments, Mobility Safety/Performance awareness, need for assistance;insight into deficits/self-awareness;safety precaution awareness  -               User Key  (r) = Recorded By, (t) = Taken By, (c) = Cosigned By      Initials Name Provider Type    Unique Sommer, PT Physical Therapist                   Mobility       Row Name 10/31/23 1030          Bed Mobility    Bed Mobility rolling left;sidelying-sit  -JULIA     Rolling Left [place] (Bed Mobility) minimum assist (75% patient effort);moderate assist (50% patient effort);verbal cues  -JULIA     Sidelying-Sit [place] (Bed Mobility) moderate assist (50% patient effort);verbal cues  -JULIA     Assistive Device (Bed Mobility) bed rails;head of bed elevated  -     Comment, (Bed Mobility) increase time and effort  -       Row Name 10/31/23 1030          Sit-Stand Transfer    Sit-Stand [place] (Transfers) minimum assist (75% patient effort);moderate assist (50% patient effort);2 person assist;verbal cues  -JULIA     Comment, (Sit-Stand Transfer) worked on sit to/from stand from chair w/ emphasis on hand placement and technique  -       Row Name 10/31/23 1030          Gait/Stairs (Locomotion)     [place] Level (Gait) minimum assist (75% patient effort);2 person assist;verbal cues  -     Assistive Device (Gait) walker, front-wheeled  -     Distance in Feet (Gait) steps at bedside ~ 4-5 ft forward/backward  -     Deviations/Abnormal Patterns (Gait) gait speed decreased;stride length decreased;base of support, narrow  walking/standing on toes  -     Bilateral Gait Deviations heel strike decreased;weight shift ability decreased  -               User Key  (r) = Recorded By, (t) = Taken By, (c) = Cosigned By      Initials Name Provider Type    Unique Sommer, PT Physical Therapist                   Obj/Interventions       Row Name 10/31/23 0934          Range of Motion Comprehensive    Comment, General Range of Motion AROM B UE WFLS except L shld limited to ~ 50%  -       Row Name 10/31/23 0934          Strength Comprehensive (MMT)    Comment, General Manual Muscle Testing (MMT) Assessment defer to OT for formal UE strength assessment, however moves against gravity, slow to move, B LEs grossly 4 to 4+/5  -       Row Name 10/31/23 0934          Balance    Balance Assessment sitting static balance;sitting dynamic balance;sit to stand dynamic balance;standing static balance;standing dynamic balance  -     Static Sitting Balance standby assist  -     Dynamic Sitting Balance contact guard;standby assist  -     Position, Sitting Balance supported;sitting edge of bed  -     Sit to Stand Dynamic Balance minimal assist;moderate assist;verbal cues  -     Static Standing Balance minimal assist  -     Dynamic Standing Balance minimal assist;2-person assist;verbal cues  -     Position/Device Used, Standing Balance supported;walker, front-wheeled  -       Row Name 10/31/23 0934          Sensory Assessment (Somatosensory)    Sensory Assessment (Somatosensory) other (see comments)  no reported c/os  -               User Key  (r) = Recorded By, (t) = Taken By, (c) = Cosigned By       Initials Name Provider Type    Unique Sommer, PT Physical Therapist                   Goals/Plan       Row Name 10/31/23 0934          Bed Mobility Goal 1 (PT)    Activity/Assistive Device (Bed Mobility Goal 1, PT) rolling to left;rolling to right;scooting;sit to supine/supine to sit;sidelying to sit/sit to sidelying  -JE     [place] Level/Cues Needed (Bed Mobility Goal 1, PT) contact guard required  -JE     Time Frame (Bed Mobility Goal 1, PT) long term goal (LTG);10 days  -JE     Progress/Outcomes (Bed Mobility Goal 1, PT) goal ongoing  -       Row Name 10/31/23 0934          Transfer Goal 1 (PT)    Activity/Assistive Device (Transfer Goal 1, PT) sit-to-stand/stand-to-sit;bed-to-chair/chair-to-bed;other (see comments)  rwx for bed to/from chair  -JE     [place] Level/Cues Needed (Transfer Goal 1, PT) contact guard required  -JE     Time Frame (Transfer Goal 1, PT) long term goal (LTG);10 days  -JE     Progress/Outcome (Transfer Goal 1, PT) goal ongoing  -       Row Name 10/31/23 0934          Gait Training Goal 1 (PT)    Activity/Assistive Device (Gait Training Goal 1, PT) gait (walking locomotion);assistive device use;decrease fall risk;diminish gait deviation;improve balance and speed;increase endurance/gait distance;normalize weight shifts;walker, rolling  -JE     [place] Level (Gait Training Goal 1, PT) contact guard required  -JE     Distance (Gait Training Goal 1, PT) 50 ft  -JE     Time Frame (Gait Training Goal 1, PT) long term goal (LTG);10 days  -JE     Progress/Outcome (Gait Training Goal 1, PT) goal ongoing  -       Row Name 10/31/23 0934          Therapy Assessment/Plan (PT)    Planned Therapy Interventions (PT) balance training;bed mobility training;gait training;postural re-education;patient/family education;ROM (range of motion);strengthening;transfer training;other (see comments)  safety/fals prevention  -               User Key  (r) = Recorded By, (t) = Taken By, (c)  = Cosigned By      Initials Name Provider Type    Unique Sommer, PT Physical Therapist                   Clinical Impression       Row Name 10/31/23 0934          Pain    Pretreatment Pain Rating 0/10 - no pain  -     Posttreatment Pain Rating 0/10 - no pain  -       Row Name 10/31/23 0934          Plan of Care Review    Outcome Evaluation PT eval completed.  Pt pleasant and agreeable to therapy.  Oriented to self.  Pt w/ flat affect.  Per staff pt walked frequently at the [facility] w/ a rollator wx where he resides.  Demonstrates 4 to 4+/5 LE strength.  Decrease sitting and standing balance.  Initially leaning posteriorly on his heels.  When asked to shift wt forward on his toes, pt raised up on his toes and proceded to ambulate on his toes.  Pt education to improve standing w/ foot flat.  Pt was able to walk at bedside w/ rwx w/ min assist of 2 and frequent cues for improved gait mechanics.  Pt will benefit from continued PT services to improve activity tolerance, balance, safety awareness, to improve mobility reducing need for assist and to reduce fall risk.  Pt would benefit from rehab prior to returning to this facility.  Pending pt progress subacute rehab prior to returning home.  -       Row Name 10/31/23 0934          Therapy Assessment/Plan (PT)    Patient/Family Therapy Goals Statement (PT) to improve mobility  -     Rehab Potential (PT) fair, will monitor progress closely  -     Criteria for Skilled Interventions Met (PT) yes;meets criteria;skilled treatment is necessary  -     Therapy Frequency (PT) 2 times/day  -     Predicted Duration of Therapy Intervention (PT) until discharge or goals achieved  -       Row Name 10/31/23 0934          Vital Signs    Pre Patient Position Supine  -     Intra Patient Position Standing  -     Post Patient Position Sitting  -       Row Name 10/31/23 0934          Positioning and Restraints    Pre-Treatment Position in bed  -     Post Treatment  Position chair  -JE     In Chair notified nsg;reclined;call light within reach;encouraged to call for assist;exit alarm on;legs elevated  -               User Key  (r) = Recorded By, (t) = Taken By, (c) = Cosigned By      Initials Name Provider Type    Unique Sommer, PT Physical Therapist                   Outcome Measures       Row Name 10/31/23 0934 10/31/23 0823       How much help from another person do you currently need...    Turning from your back to your side while in flat bed without using bedrails? 2  -JE 3  -PB    Moving from lying on back to sitting on the side of a flat bed without bedrails? 2  -JE 2  -PB    Moving to and from a bed to a chair (including a wheelchair)? 3  -JE 2  -PB    Standing up from a chair using your arms (e.g., wheelchair, bedside chair)? 3  -JE 2  -PB    Climbing 3-5 steps with a railing? 1  -JE 1  -PB    To walk in hospital room? 3  - 2  -PB    AM-PAC 6 Clicks Score (PT) 14  - 12  -PB    Highest level of mobility 4 --> Transferred to chair/commode  -JE 4 --> Transferred to chair/commode  -PB      Row Name 10/31/23 0934 10/31/23 0915       Functional Assessment    Outcome Measure Options AM-PAC 6 Clicks Basic Mobility (PT)  - AM-PAC 6 Clicks Daily Activity (OT)  -              User Key  (r) = Recorded By, (t) = Taken By, (c) = Cosigned By      Initials Name Provider Type    Ahmet Chapman, OTR/L, CNT Occupational Therapist    PB Kristina Rick, RN Registered Nurse    Unique Sommer, PT Physical Therapist                                 Physical Therapy Education       Title: PT OT SLP Therapies (In Progress)       Topic: Physical Therapy (In Progress)       Point: Mobility training (Done)       Learning Progress Summary             Patient Acceptance, E,TB,D, VU,DU,NR by JULIA at 10/31/2023 1614    Comment: education re: purpose of PT/importance of activity, for safety/falls prevention, improved mobility w/ bed mobility, tfers and gait technique, w/  increase awareness of upright balance                         Point: Home exercise program (Not Started)       Learner Progress:  Not documented in this visit.              Point: Precautions (Done)       Learning Progress Summary             Patient Acceptance, E,TB,D, VU,DU,NR by UJLIA at 10/31/2023 1099    Comment: education re: purpose of PT/importance of activity, for safety/falls prevention, improved mobility w/ bed mobility, tfers and gait technique, w/ increase awareness of upright balance                                         User Key       Initials Effective Dates Name Provider Type Discipline     08/02/18 -  Unique Giron, PT Physical Therapist PT                  PT Recommendation and Plan  Planned Therapy Interventions (PT): balance training, bed mobility training, gait training, postural re-education, patient/family education, ROM (range of motion), strengthening, transfer training, other (see comments) (safety/fals prevention)  Outcome Evaluation: PT eval completed.  Pt pleasant and agreeable to therapy.  Oriented to self.  Pt w/ flat affect.  Per staff pt walked frequently at the [facility] w/ a rollator wx where he resides.  Demonstrates 4 to 4+/5 LE strength.  Decrease sitting and standing balance.  Initially leaning posteriorly on his heels.  When asked to shift wt forward on his toes, pt raised up on his toes and proceded to ambulate on his toes.  Pt education to improve standing w/ foot flat.  Pt was able to walk at bedside w/ rwx w/ min assist of 2 and frequent cues for improved gait mechanics.  Pt will benefit from continued PT services to improve activity tolerance, balance, safety awareness, to improve mobility reducing need for assist and to reduce fall risk.  Pt would benefit from rehab prior to returning to this facility.  Pending pt progress subacute rehab prior to returning home.     Time Calculation:         PT Charges       Row Name 10/31/23 4933 10/31/23 1136          Time Calculation     Start Time 1357  -LY 0934  -JE     Stop Time 1435  -LY 1029  -JE     Time Calculation (min) 38 min  -LY 55 min  -JE     PT Received On 10/31/23  -LY 10/31/23  -     PT Goal Re-Cert Due Date -- 11/10/23  -JE        Time Calculation- PT    Total Timed Code Minutes- PT 38 minute(s)  -LY --        Timed Charges    55329 - Gait Training Minutes  24  -LY --     67175 - PT Therapeutic Activity Minutes 14  -LY --        Total Minutes    Timed Charges Total Minutes 38  -LY --      Total Minutes 38  -LY --               User Key  (r) = Recorded By, (t) = Taken By, (c) = Cosigned By      Initials Name Provider Type    LY Raiza Rosado, PTA Physical Therapist Assistant    Unique Sommer, PT Physical Therapist                  Therapy Charges for Today       Code Description Service Date Service Provider Modifiers Qty    93520263429 HC PT EVAL MOD COMPLEXITY 4 10/31/2023 Unique Giron, PT GP 1            PT G-Codes  Outcome Measure Options: AM-PAC 6 Clicks Basic Mobility (PT)  AM-PAC 6 Clicks Score (PT): 14  AM-PAC 6 Clicks Score (OT): 17  PT Discharge Summary  Anticipated Discharge Disposition (PT): sub acute care setting    Unique Giron, PT  10/31/2023

## 2023-11-01 LAB
GLUCOSE BLDC GLUCOMTR-MCNC: 102 MG/DL (ref 70–130)
GLUCOSE BLDC GLUCOMTR-MCNC: 127 MG/DL (ref 70–130)
GLUCOSE BLDC GLUCOMTR-MCNC: 130 MG/DL (ref 70–130)
GLUCOSE BLDC GLUCOMTR-MCNC: 97 MG/DL (ref 70–130)
GLUCOSE BLDC GLUCOMTR-MCNC: 97 MG/DL (ref 70–130)

## 2023-11-01 PROCEDURE — 25010000002 ENOXAPARIN PER 10 MG: Performed by: FAMILY MEDICINE

## 2023-11-01 PROCEDURE — 97535 SELF CARE MNGMENT TRAINING: CPT

## 2023-11-01 PROCEDURE — 82948 REAGENT STRIP/BLOOD GLUCOSE: CPT

## 2023-11-01 PROCEDURE — 97530 THERAPEUTIC ACTIVITIES: CPT

## 2023-11-01 PROCEDURE — 97116 GAIT TRAINING THERAPY: CPT

## 2023-11-01 PROCEDURE — 25810000003 SODIUM CHLORIDE 0.9 % SOLUTION: Performed by: FAMILY MEDICINE

## 2023-11-01 RX ADMIN — LOSARTAN POTASSIUM 50 MG: 50 TABLET, FILM COATED ORAL at 09:46

## 2023-11-01 RX ADMIN — Medication 10 ML: at 09:47

## 2023-11-01 RX ADMIN — SPIRONOLACTONE 25 MG: 25 TABLET ORAL at 09:46

## 2023-11-01 RX ADMIN — PRAVASTATIN SODIUM 20 MG: 20 TABLET ORAL at 21:19

## 2023-11-01 RX ADMIN — ENOXAPARIN SODIUM 40 MG: 100 INJECTION SUBCUTANEOUS at 06:57

## 2023-11-01 RX ADMIN — TRAZODONE HYDROCHLORIDE 50 MG: 50 TABLET ORAL at 21:19

## 2023-11-01 RX ADMIN — NYSTATIN: 100000 POWDER TOPICAL at 21:01

## 2023-11-01 RX ADMIN — NYSTATIN: 100000 POWDER TOPICAL at 09:48

## 2023-11-01 RX ADMIN — SODIUM CHLORIDE 75 ML/HR: 900 INJECTION INTRAVENOUS at 10:11

## 2023-11-01 RX ADMIN — ASPIRIN 81 MG: 81 TABLET, COATED ORAL at 09:46

## 2023-11-01 RX ADMIN — DOCUSATE SODIUM 50 MG AND SENNOSIDES 8.6 MG 2 TABLET: 8.6; 5 TABLET, FILM COATED ORAL at 09:46

## 2023-11-01 NOTE — THERAPY TREATMENT NOTE
Acute Care - Physical Therapy Treatment Note  [facility]     Patient Name: Speedy Villalba  : 1945  MRN: [id number]  Today's Date: 2023   Onset of Illness/Injury or Date of Surgery: 10/30/23  Visit Dx:     ICD-10-CM ICD-9-CM   1. Generalized weakness  R53.1 780.79   2. Impaired mobility and ADLs  Z74.09 V49.89    Z78.9    3. Impaired functional mobility and activity tolerance [Z74.09]  Z74.09 V49.89     Patient Active Problem List   Diagnosis    Gastroenteritis    Coronary artery disease involving native coronary artery of native heart without angina pectoris    Cognitive decline    Falls    Type 2 diabetes mellitus without complication, without long-term current use of insulin    Primary hypertension     Past Medical History:   Diagnosis Date    Coronary artery disease 10 years    x2 stents    Diabetes mellitus < 2 years    non-insulin dependent    Difficulty walking < 1 year    multiple falls in the last 6-8 months    HL (hearing loss) 20+ years    Wears hearing aids occasionally    Hyperlipidemia 5 years    Memory loss 3 years+/-    gradual and subtle    Primary hypertension 10/30/2023    Sleep apnea 20+ year    Non-compliant with PEEP for tx    Stroke 2022     Past Surgical History:   Procedure Laterality Date    APPENDECTOMY      BACK SURGERY  2016    CAROTID STENT  2010?    KNEE SURGERY  1960    LAMINECTOMY  2015?    discectomy & fusion     PT Assessment (last 12 hours)       PT Evaluation and Treatment       Row Name 23 1340 23 1029       Physical Therapy Time and Intention    Subjective Information no complaints  -LY complains of;weakness;pain  -LY    Document Type therapy note (daily note)  -LY therapy note (daily note)  -LY    Mode of Treatment physical therapy  -LY physical therapy  -LY      Row Name 23 1340 23 1029       General Information    Existing Precautions/Restrictions fall  -LY fall  -LY      Row Name 23 1340 23 1029       Pain     "Pretreatment Pain Rating 0/10 - no pain  -LY --    Posttreatment Pain Rating 0/10 - no pain  -LY --    Pain Intervention(s) -- Medication (See MAR);Ambulation/increased activity  -LY    Additional Documentation -- Pain Scale: FACES Pre/Post-Treatment (Group)  -LY      Row Name 11/01/23 1029          Pain Scale: FACES Pre/Post-Treatment    Pain: FACES Scale, Pretreatment 0-->no hurt  -LY     Posttreatment Pain Rating 2-->hurts little bit  -LY     Pre/Posttreatment Pain Comment \"in my joints\"  -LY       Row Name 11/01/23 1340 11/01/23 1029       Bed Mobility    Comment, (Bed Mobility) up in chair  -LY up in chair  -LY      Row Name 11/01/23 1340 11/01/23 1029       Transfers    Comment, (Transfers) posterior lean  -LY cont with posterior lean, able to correct with verbal/tactile cues  -LY      Row Name 11/01/23 1340 11/01/23 1029       Sit-Stand Transfer    Sit-Stand [place] (Transfers) verbal cues;minimum assist (75% patient effort)  -LY minimum assist (75% patient effort)  -LY    Assistive Device (Sit-Stand Transfers) walker, front-wheeled  -LY walker, front-wheeled  -LY      Row Name 11/01/23 1340 11/01/23 1029       Stand-Sit Transfer    Stand-Sit [place] (Transfers) verbal cues;minimum assist (75% patient effort)  -LY verbal cues;minimum assist (75% patient effort)  -LY    Assistive Device (Stand-Sit Transfers) walker, front-wheeled  -LY --    Comment, (Stand-Sit Transfer) -- cont with decreased B hip flexion, required 3-5 min to complete transition to standing   -LY      Row Name 11/01/23 1029          Toilet Transfer    Type (Toilet Transfer) sit-stand;stand-sit  -LY     [place] Level (Toilet Transfer) verbal cues;minimum assist (75% patient effort)  -LY     Assistive Device (Toilet Transfer) commode;grab bars/safety frame;walker, front-wheeled  -LY       Row Name 11/01/23 1340 11/01/23 1029       Gait/Stairs (Locomotion)    [place] Level (Gait) verbal cues;contact guard  -LY verbal " "cues;contact guard  -LY    Assistive Device (Gait) walker, front-wheeled  -LY walker, front-wheeled  -LY    Distance in Feet (Gait) 50'  practiced gait 4 times with seated rest break  -LY 50'  amb 20' to the BR than practiced gait training 2 times  -LY    Pattern (Gait) step-through  -LY step-through  -LY    Deviations/Abnormal Patterns (Gait) festinating/shuffling;gait speed decreased;stride length decreased  -LY festinating/shuffling;gait speed decreased;stride length decreased  -LY    Bilateral Gait Deviations heel strike decreased  -LY heel strike decreased  -LY    Comment, (Gait/Stairs) -- improved gait mechanics after working on pregait activities in standing that focused on stride length and step height   -LY      Row Name 11/01/23 1029          Plan of Care Review    Plan of Care Reviewed With patient  -LY     Progress improving  -LY     Outcome Evaluation PT tx completed. Pt up in chair on arrival and is eager for PT. Cont to demo bradykinesia and freezing at times with mobility. Level assist has improved compared to yesterday. Requires min x1 for sit to stand, cues for anterior weight shift and t/f weight anterior to heels. Cont to present with decreased B hip flexion with transitions. In standing worked on pre gait activities with focus on increased stride length and step height. Able to amb to BR then up to 50' with RWX and CGA and improved gait mechanics. Pt required 3-5 min to complete stand to sit at toilet secondary to  \"freezing.\" Required hygiene care after BM. Will cont to follow.  -LY       Row Name 11/01/23 1340 11/01/23 1029       Positioning and Restraints    Pre-Treatment Position sitting in chair/recliner  -LY sitting in chair/recliner  -LY    Post Treatment Position chair  -LY chair  -LY    In Chair reclined;call light within reach;encouraged to call for assist;exit alarm on  -LY reclined;call light within reach;encouraged to call for assist  -LY              User Key  (r) = Recorded By, " (t) = Taken By, (c) = Cosigned By      Initials Name Provider Type    Raiza Lua, PTA Physical Therapist Assistant                    Physical Therapy Education       Title: PT OT SLP Therapies (In Progress)       Topic: Physical Therapy (In Progress)       Point: Mobility training (Done)       Learning Progress Summary             Patient Acceptance, E,TB,D, VU,DU,NR by  at 10/31/2023 1614    Comment: education re: purpose of PT/importance of activity, for safety/falls prevention, improved mobility w/ bed mobility, tfers and gait technique, w/ increase awareness of upright balance                         Point: Home exercise program (Not Started)       Learner Progress:  Not documented in this visit.              Point: Precautions (Done)       Learning Progress Summary             Patient Acceptance, E,TB,D, VU,DU,NR by  at 10/31/2023 1614    Comment: education re: purpose of PT/importance of activity, for safety/falls prevention, improved mobility w/ bed mobility, tfers and gait technique, w/ increase awareness of upright balance                                         User Key       Initials Effective Dates Name Provider Type Discipline     08/02/18 -  Unique Giron, PT Physical Therapist PT                  PT Recommendation and Plan  Anticipated Discharge Disposition (PT): sub acute care setting  Plan of Care Reviewed With: patient  Progress: improving  Outcome Evaluation: PT tx completed. Pt up in chair on arrival and is eager for PT. Cont to demo bradykinesia and freezing at times with mobility. Level assist has improved compared to yesterday. Requires min x1 for sit to stand, cues for anterior weight shift and t/f weight anterior to heels. Cont to present with decreased B hip flexion with transitions. In standing worked on pre gait activities with focus on increased stride length and step height. Able to amb to BR then up to 50' with RWX and CGA and improved gait mechanics. Pt required 3-5  "min to complete stand to sit at toilet secondary to  \"freezing.\" Required hygiene care after BM. Will cont to follow.   Outcome Measures       Row Name 11/01/23 1029 11/01/23 0900 10/31/23 0915       How much help from another person do you currently need...    Turning from your back to your side while in flat bed without using bedrails? 2  -LY -- --    Moving from lying on back to sitting on the side of a flat bed without bedrails? 2  -LY -- --    Moving to and from a bed to a chair (including a wheelchair)? 3  -LY -- --    Standing up from a chair using your arms (e.g., wheelchair, bedside chair)? 3  -LY -- --    Climbing 3-5 steps with a railing? 1  -LY -- --    To walk in hospital room? 3  -LY -- --    AM-PAC 6 Clicks Score (PT) 14  -LY -- --    Highest level of mobility 4 --> Transferred to chair/commode  -LY -- --       How much help from another is currently needed...    Putting on and taking off regular lower body clothing? -- 2  -AC 2  -AC    Bathing (including washing, rinsing, and drying) -- 2  -AC 2  -AC    Toileting (which includes using toilet bed pan or urinal) -- 2  -AC 2  -AC    Putting on and taking off regular upper body clothing -- 3  -AC 3  -AC    Taking care of personal grooming (such as brushing teeth) -- 4  -AC 4  -AC    Eating meals -- 4  -AC 4  -AC    AM-PAC 6 Clicks Score (OT) -- 17  -AC 17  -AC       Functional Assessment    Outcome Measure Options AM-PAC 6 Clicks Basic Mobility (PT)  -LY AM-PAC 6 Clicks Daily Activity (OT)  -AC AM-PAC 6 Clicks Daily Activity (OT)  -AC              User Key  (r) = Recorded By, (t) = Taken By, (c) = Cosigned By      Initials Name Provider Type    Ahmet Chapman, OTR/L, CNT Occupational Therapist    Raiza Lua, PTA Physical Therapist Assistant                     Time Calculation:    PT Charges       Row Name 11/01/23 1429 11/01/23 1131          Time Calculation    Start Time 1340  -LY 1029  -LY     Stop Time 1410  -LY 1124  -LY     Time " Calculation (min) 30 min  -LY 55 min  -LY     PT Received On 11/01/23  -LY 11/01/23  -LY        Time Calculation- PT    Total Timed Code Minutes- PT 30 minute(s)  -LY 55 minute(s)  -LY        Timed Charges    02894 - Gait Training Minutes  30  -LY 25  -LY     14201 - PT Therapeutic Activity Minutes -- 30  -LY        Total Minutes    Timed Charges Total Minutes 30  -LY 55  -LY      Total Minutes 30  -LY 55  -LY               User Key  (r) = Recorded By, (t) = Taken By, (c) = Cosigned By      Initials Name Provider Type    Raiza Lua PTA Physical Therapist Assistant                  Therapy Charges for Today       Code Description Service Date Service Provider Modifiers Qty    68192340370 HC GAIT TRAINING EA 15 MIN 10/31/2023 Raiza Rosado, PTA GP 2    86412692291 HC PT THERAPEUTIC ACT EA 15 MIN 10/31/2023 Raiza Rosado, PTA GP 1    70256593487 HC GAIT TRAINING EA 15 MIN 11/1/2023 Raiza Rosado, PTA GP 2    23896721138 HC PT THERAPEUTIC ACT EA 15 MIN 11/1/2023 Raiza Rosado, PTA GP 2    58660261858 HC GAIT TRAINING EA 15 MIN 11/1/2023 Raiza Rosado, PTA GP 2            PT G-Codes  Outcome Measure Options: AM-PAC 6 Clicks Basic Mobility (PT)  AM-PAC 6 Clicks Score (PT): 14  AM-PAC 6 Clicks Score (OT): 17    Raiza Rosado PTA  11/1/2023

## 2023-11-01 NOTE — PLAN OF CARE
Goal Outcome Evaluation:  Plan of Care Reviewed With: patient           Outcome Evaluation: pt denies pain or nausea; AOx4 this shift; up in chair; BM today; accu checks; safety maintained

## 2023-11-01 NOTE — PLAN OF CARE
Goal Outcome Evaluation:  Plan of Care Reviewed With: patient        Progress: improving  Outcome Evaluation: OT tx completed.  Pt c/o weakness.  Came to EOB with maxA.  Has difficulty initating movement and freezes at times.  Pt transferred sit>stand with Jeannette with significant cueing to bring nose over toes.  Pt amb to chair with CGA.  Once in chair he completed UB and silvia area bathing with modA.  He completed oral hygiene with set up.  MaxA needed to don shoes.  OT will continue to treat pt to increase his balance, safety and endurance for ADL.  Recommend SNF at discharge.

## 2023-11-01 NOTE — CASE MANAGEMENT/SOCIAL WORK
Continued Stay Note   [place]     Patient Name: Speedy Villalba  MRN: [id number]  Today's Date: 11/1/2023    Admit Date: 10/30/2023    Plan: [facility]   Discharge Plan       Row Name 11/01/23 1414       Plan    Plan [facility]    Patient/Family in Agreement with Plan yes    [facility] meeting with [facility]'s family.                   Discharge Codes    No documentation.                 Expected Discharge Date and Time       Expected Discharge Date Expected Discharge Time    Oct 31, 2023               JOHN Jauregui

## 2023-11-01 NOTE — THERAPY TREATMENT NOTE
Acute Care - Occupational Therapy Treatment Note  [facility]     Patient Name: Speedy Villalba  : 1945  MRN: [id number]  Today's Date: 2023  Onset of Illness/Injury or Date of Surgery: 10/30/23  Date of Referral to OT: 10/30/23  Referring Physician: Dr. Manriquez    Admit Date: 10/30/2023       ICD-10-CM ICD-9-CM   1. Generalized weakness  R53.1 780.79   2. Impaired mobility and ADLs  Z74.09 V49.89    Z78.9    3. Impaired functional mobility and activity tolerance [Z74.09]  Z74.09 V49.89     Patient Active Problem List   Diagnosis    Gastroenteritis    Coronary artery disease involving native coronary artery of native heart without angina pectoris    Cognitive decline    Falls    Type 2 diabetes mellitus without complication, without long-term current use of insulin    Primary hypertension     Past Medical History:   Diagnosis Date    Coronary artery disease 10 years    x2 stents    Diabetes mellitus < 2 years    non-insulin dependent    Difficulty walking < 1 year    multiple falls in the last 6-8 months    HL (hearing loss) 20+ years    Wears hearing aids occasionally    Hyperlipidemia 5 years    Memory loss 3 years+/-    gradual and subtle    Primary hypertension 10/30/2023    Sleep apnea 20+ year    Non-compliant with PEEP for tx    Stroke 2022     Past Surgical History:   Procedure Laterality Date    APPENDECTOMY      BACK SURGERY  2016    CAROTID STENT  ?    KNEE SURGERY  1960    LAMINECTOMY  2015?    discectomy & fusion         OT ASSESSMENT FLOWSHEET (last 12 hours)       OT Evaluation and Treatment       Row Name 23 0900                   OT Time and Intention    Subjective Information complains of;weakness  -AC        Document Type therapy note (daily note)  -AC        Mode of Treatment occupational therapy  -AC           General Information    Existing Precautions/Restrictions fall  -AC        Barriers to Rehab cognitive status;physical barrier;previous functional deficit  -AC            Pain Assessment    Pretreatment Pain Rating 0/10 - no pain  -AC        Posttreatment Pain Rating 0/10 - no pain  -AC           Cognition    Personal Safety Interventions fall prevention program maintained;gait belt;muscle strengthening facilitated;nonskid shoes/slippers when out of bed;supervised activity  -           Activities of Daily Living    BADL Assessment/Intervention bathing;lower body dressing  -           Bathing Assessment/Intervention    [place] Level (Bathing) upper body;moderate assist (50% patient effort);lower body;maximum assist (25% patient effort)  -        Position (Bathing) supported sitting  -AC           Lower Body Dressing Assessment/Training    [place] Level (Lower Body Dressing) don;shoes/slippers;maximum assist (25% patient effort)  -        Position (Lower Body Dressing) edge of bed sitting  -           Grooming Assessment/Training    [place] Level (Grooming) oral care regimen  -        Position (Grooming) supported sitting  -AC           Bed Mobility    Bed Mobility scooting/bridging;supine-sit  -AC        Scooting/Bridging [place] (Bed Mobility) maximum assist (25% patient effort)  -        Supine-Sit [place] (Bed Mobility) minimum assist (75% patient effort)  -        Comment, (Bed Mobility) pt freezes, needs encouragement to continue to move  -           Functional Mobility    Functional Mobility- Ind. Level contact guard assist;verbal cues required  -        Functional Mobility- Device walker, front-wheeled  -        Functional Mobility- Comment steps from bed to chair  -           Transfer Assessment/Treatment    Comment, (Transfers) verbal cues for nose over toes to avoid leaning posteriorly  -           Sit-Stand Transfer    Sit-Stand [place] (Transfers) minimum assist (75% patient effort);verbal cues;nonverbal cues (demo/gesture)  -        Assistive Device (Sit-Stand Transfers) walker, front-wheeled  -            Stand-Sit Transfer    Stand-Sit [place] (Transfers) minimum assist (75% patient effort);verbal cues;nonverbal cues (demo/gesture)  -        Assistive Device (Stand-Sit Transfers) walker, front-wheeled  -           Plan of Care Review    Plan of Care Reviewed With patient  -AC        Progress improving  -AC        Outcome Evaluation OT tx completed.  Pt c/o weakness.  Came to EOB with maxA.  Has difficulty initating movement and freezes at times.  Pt transferred sit>stand with Jeannette with significant cueing to bring nose over toes.  Pt amb to chair with CGA.  Once in chair he completed UB and silvia area bathing with modA.  He completed oral hygiene with set up.  MaxA needed to don shoes.  OT will continue to treat pt to increase his balance, safety and endurance for ADL.  Recommend SNF at discharge.  -AC           Positioning and Restraints    Pre-Treatment Position in bed  -AC        Post Treatment Position chair  -AC        In Bed --  -AC        In Chair reclined;call light within reach;encouraged to call for assist;legs elevated;notified ns  -                  User Key  (r) = Recorded By, (t) = Taken By, (c) = Cosigned By      Initials Name Effective Dates     Ahmet Hayes, OTR/L, CNT 02/03/23 -                      Occupational Therapy Education       Title: PT OT SLP Therapies (In Progress)       Topic: Occupational Therapy (In Progress)       Point: ADL training (Done)       Description:   Instruct learner(s) on proper safety adaptation and remediation techniques during self care or transfers.   Instruct in proper use of assistive devices.                  Learning Progress Summary             Patient Acceptance, E,D, VU,NR by  at 11/1/2023 0955    Comment: transfer techniques, balance, body mechanics    Acceptance, E, NR by  at 10/31/2023 1036                         Point: Home exercise program (In Progress)       Description:   Instruct learner(s) on appropriate technique for monitoring,  assisting and/or progressing therapeutic exercises/activities.                  Learning Progress Summary             Patient Acceptance, E, NR by  at 10/31/2023 1036                         Point: Body mechanics (Done)       Description:   Instruct learner(s) on proper positioning and spine alignment during self-care, functional mobility activities and/or exercises.                  Learning Progress Summary             Patient Acceptance, E,D, VU,NR by  at 11/1/2023 0955    Comment: transfer techniques, balance, body mechanics    Acceptance, E, NR by  at 10/31/2023 1036                                         User Key       Initials Effective Dates Name Provider Type Discipline     02/03/23 -  Ahmet Hayes, OTR/L, CNT Occupational Therapist OT                      OT Recommendation and Plan  Planned Therapy Interventions (OT): activity tolerance training, BADL retraining, functional balance retraining, occupation/activity based interventions, patient/caregiver education/training, transfer/mobility retraining  Therapy Frequency (OT): 5 times/wk  Plan of Care Review  Plan of Care Reviewed With: patient  Progress: improving  Outcome Evaluation: OT tx completed.  Pt c/o weakness.  Came to EOB with maxA.  Has difficulty initating movement and freezes at times.  Pt transferred sit>stand with Jeannette with significant cueing to bring nose over toes.  Pt amb to chair with CGA.  Once in chair he completed UB and silvia area bathing with modA.  He completed oral hygiene with set up.  MaxA needed to don shoes.  OT will continue to treat pt to increase his balance, safety and endurance for ADL.  Recommend SNF at discharge.  Plan of Care Reviewed With: patient  Outcome Evaluation: OT tx completed.  Pt c/o weakness.  Came to EOB with maxA.  Has difficulty initating movement and freezes at times.  Pt transferred sit>stand with Jeannette with significant cueing to bring nose over toes.  Pt amb to chair with CGA.  Once in chair  he completed UB and silvia area bathing with modA.  He completed oral hygiene with set up.  Magdalena needed to don shoes.  OT will continue to treat pt to increase his balance, safety and endurance for ADL.  Recommend SNF at discharge.     Outcome Measures       Row Name 11/01/23 0900 10/31/23 0915          How much help from another is currently needed...    Putting on and taking off regular lower body clothing? 2  -AC 2  -AC     Bathing (including washing, rinsing, and drying) 2  -AC 2  -AC     Toileting (which includes using toilet bed pan or urinal) 2  -AC 2  -AC     Putting on and taking off regular upper body clothing 3  -AC 3  -AC     Taking care of personal grooming (such as brushing teeth) 4  -AC 4  -AC     Eating meals 4  -AC 4  -AC     AM-PAC 6 Clicks Score (OT) 17  -AC 17  -AC        Functional Assessment    Outcome Measure Options AM-PAC 6 Clicks Daily Activity (OT)  -AC AM-PAC 6 Clicks Daily Activity (OT)  -               User Key  (r) = Recorded By, (t) = Taken By, (c) = Cosigned By      Initials Name Provider Type    Ahmet Chapman OTR/L, FAUSTO Occupational Therapist                    Time Calculation:    Time Calculation- OT       Row Name 11/01/23 0956             Time Calculation- OT    OT Start Time 0900  -      OT Stop Time 0955  -      OT Time Calculation (min) 55 min  -      Total Timed Code Minutes- OT 55 minute(s)  -      OT Received On 11/01/23  -                User Key  (r) = Recorded By, (t) = Taken By, (c) = Cosigned By      Initials Name Provider Type    Ahmet Chapman OTR/L, FAUSTO Occupational Therapist                  Therapy Charges for Today       Code Description Service Date Service Provider Modifiers Qty    46631171853 HC OT EVAL MOD COMPLEXITY 4 10/31/2023 Ahmet Hayes OTR/L, CNT GO 1    12263384019 HC OT SELF CARE/MGMT/TRAIN EA 15 MIN 10/31/2023 Ahmet Hayes OTR/L, FAUSTO GO 1    51833802135 HC OT SELF CARE/MGMT/TRAIN EA 15 MIN 11/1/2023 Ahmet Hayes  NOE, OTR/L, CNT GO 4                 Ahmet Hayes, OTR/L, CNT  11/1/2023

## 2023-11-01 NOTE — THERAPY TREATMENT NOTE
Acute Care - Physical Therapy Treatment Note  [facility]     Patient Name: Speedy Villalba  : 1945  MRN: [id number]  Today's Date: 2023   Onset of Illness/Injury or Date of Surgery: 10/30/23  Visit Dx:     ICD-10-CM ICD-9-CM   1. Generalized weakness  R53.1 780.79   2. Impaired mobility and ADLs  Z74.09 V49.89    Z78.9    3. Impaired functional mobility and activity tolerance [Z74.09]  Z74.09 V49.89     Patient Active Problem List   Diagnosis    Gastroenteritis    Coronary artery disease involving native coronary artery of native heart without angina pectoris    Cognitive decline    Falls    Type 2 diabetes mellitus without complication, without long-term current use of insulin    Primary hypertension     Past Medical History:   Diagnosis Date    Coronary artery disease 10 years    x2 stents    Diabetes mellitus < 2 years    non-insulin dependent    Difficulty walking < 1 year    multiple falls in the last 6-8 months    HL (hearing loss) 20+ years    Wears hearing aids occasionally    Hyperlipidemia 5 years    Memory loss 3 years+/-    gradual and subtle    Primary hypertension 10/30/2023    Sleep apnea 20+ year    Non-compliant with PEEP for tx    Stroke 2022     Past Surgical History:   Procedure Laterality Date    APPENDECTOMY      BACK SURGERY  2016    CAROTID STENT  2010?    KNEE SURGERY  1960    LAMINECTOMY  2015?    discectomy & fusion     PT Assessment (last 12 hours)       PT Evaluation and Treatment       Row Name 23 1029          Physical Therapy Time and Intention    Subjective Information complains of;weakness;pain  -LY     Document Type therapy note (daily note)  -LY     Mode of Treatment physical therapy  -LY       Row Name 23 1029          General Information    Existing Precautions/Restrictions fall  -LY       Row Name 23 1029          Pain    Pain Intervention(s) Medication (See MAR);Ambulation/increased activity  -LY     Additional Documentation Pain Scale:  "FACES Pre/Post-Treatment (Group)  -LY       Row Name 11/01/23 1029          Pain Scale: FACES Pre/Post-Treatment    Pain: FACES Scale, Pretreatment 0-->no hurt  -LY     Posttreatment Pain Rating 2-->hurts little bit  -LY     Pre/Posttreatment Pain Comment \"in my joints\"  -LY       Row Name 11/01/23 1029          Bed Mobility    Comment, (Bed Mobility) up in chair  -LY       Row Name 11/01/23 1029          Transfers    Comment, (Transfers) cont with posterior lean, able to correct with verbal/tactile cues  -LY       Row Name 11/01/23 1029          Sit-Stand Transfer    Sit-Stand [place] (Transfers) minimum assist (75% patient effort)  -LY     Assistive Device (Sit-Stand Transfers) walker, front-wheeled  -LY       Row Name 11/01/23 1029          Stand-Sit Transfer    Stand-Sit [place] (Transfers) verbal cues;minimum assist (75% patient effort)  -LY     Comment, (Stand-Sit Transfer) cont with decreased B hip flexion, required 3-5 min to complete transition to standing   -LY       Row Name 11/01/23 1029          Toilet Transfer    Type (Toilet Transfer) sit-stand;stand-sit  -LY     [place] Level (Toilet Transfer) verbal cues;minimum assist (75% patient effort)  -LY     Assistive Device (Toilet Transfer) commode;grab bars/safety frame;walker, front-wheeled  -LY       Row Name 11/01/23 1029          Gait/Stairs (Locomotion)    [place] Level (Gait) verbal cues;contact guard  -LY     Assistive Device (Gait) walker, front-wheeled  -LY     Distance in Feet (Gait) 50'  amb 20' to the BR than practiced gait training 2 times  -LY     Pattern (Gait) step-through  -LY     Deviations/Abnormal Patterns (Gait) festinating/shuffling;gait speed decreased;stride length decreased  -LY     Bilateral Gait Deviations heel strike decreased  -LY     Comment, (Gait/Stairs) improved gait mechanics after working on pregait activities in standing that focused on stride length and step height   -LY       Row Name 11/01/23 1029 " "         Plan of Care Review    Plan of Care Reviewed With patient  -LY     Progress improving  -LY     Outcome Evaluation PT tx completed. Pt up in chair on arrival and is eager for PT. Cont to demo bradykinesia and freezing at times with mobility. Level assist has improved compared to yesterday. Requires min x1 for sit to stand, cues for anterior weight shift and t/f weight anterior to heels. Cont to present with decreased B hip flexion with transitions. In standing worked on pre gait activities with focus on increased stride length and step height. Able to amb to BR then up to 50' with RWX and CGA and improved gait mechanics. Pt required 3-5 min to complete stand to sit at toilet secondary to  \"freezing.\" Required hygiene care after BM. Will cont to follow.  -LY       Row Name 11/01/23 1029          Positioning and Restraints    Pre-Treatment Position sitting in chair/recliner  -LY     Post Treatment Position chair  -LY     In Chair reclined;call light within reach;encouraged to call for assist  -LY               User Key  (r) = Recorded By, (t) = Taken By, (c) = Cosigned By      Initials Name Provider Type    LY Raiza Rosado, PTA Physical Therapist Assistant                    Physical Therapy Education       Title: PT OT SLP Therapies (In Progress)       Topic: Physical Therapy (In Progress)       Point: Mobility training (Done)       Learning Progress Summary             Patient Acceptance, E,TB,LUDY, SERENE,PAULINO,NR by JULIA at 10/31/2023 1614    Comment: education re: purpose of PT/importance of activity, for safety/falls prevention, improved mobility w/ bed mobility, tfers and gait technique, w/ increase awareness of upright balance                         Point: Home exercise program (Not Started)       Learner Progress:  Not documented in this visit.              Point: Precautions (Done)       Learning Progress Summary             Patient Acceptance, E,TB,LUDY, SERENE,PAULINO,NR by JULIA at 10/31/2023 1614    Comment: education " "re: purpose of PT/importance of activity, for safety/falls prevention, improved mobility w/ bed mobility, tfers and gait technique, w/ increase awareness of upright balance                                         User Key       Initials Effective Dates Name Provider Type Discipline    JULIA 08/02/18 -  Unique Giron, PT Physical Therapist PT                  PT Recommendation and Plan  Anticipated Discharge Disposition (PT): sub acute care setting  Plan of Care Reviewed With: patient  Progress: improving  Outcome Evaluation: PT tx completed. Pt up in chair on arrival and is eager for PT. Cont to demo bradykinesia and freezing at times with mobility. Level assist has improved compared to yesterday. Requires min x1 for sit to stand, cues for anterior weight shift and t/f weight anterior to heels. Cont to present with decreased B hip flexion with transitions. In standing worked on pre gait activities with focus on increased stride length and step height. Able to amb to BR then up to 50' with RWX and CGA and improved gait mechanics. Pt required 3-5 min to complete stand to sit at toilet secondary to  \"freezing.\" Required hygiene care after BM. Will cont to follow.   Outcome Measures       Row Name 11/01/23 1029 11/01/23 0900 10/31/23 0915       How much help from another person do you currently need...    Turning from your back to your side while in flat bed without using bedrails? 2  -LY -- --    Moving from lying on back to sitting on the side of a flat bed without bedrails? 2  -LY -- --    Moving to and from a bed to a chair (including a wheelchair)? 3  -LY -- --    Standing up from a chair using your arms (e.g., wheelchair, bedside chair)? 3  -LY -- --    Climbing 3-5 steps with a railing? 1  -LY -- --    To walk in hospital room? 3  -LY -- --    AM-PAC 6 Clicks Score (PT) 14  -LY -- --    Highest level of mobility 4 --> Transferred to chair/commode  -LY -- --       How much help from another is currently needed... "    Putting on and taking off regular lower body clothing? -- 2  -AC 2  -AC    Bathing (including washing, rinsing, and drying) -- 2  -AC 2  -AC    Toileting (which includes using toilet bed pan or urinal) -- 2  -AC 2  -AC    Putting on and taking off regular upper body clothing -- 3  -AC 3  -AC    Taking care of personal grooming (such as brushing teeth) -- 4  -AC 4  -AC    Eating meals -- 4  -AC 4  -AC    AM-PAC 6 Clicks Score (OT) -- 17  -AC 17  -AC       Functional Assessment    Outcome Measure Options AM-PAC 6 Clicks Basic Mobility (PT)  -LY AM-PAC 6 Clicks Daily Activity (OT)  -AC AM-PAC 6 Clicks Daily Activity (OT)  -AC              User Key  (r) = Recorded By, (t) = Taken By, (c) = Cosigned By      Initials Name Provider Type    AC Ahmet Hayes, OTR/L, CNT Occupational Therapist    Raiza Lua PTA Physical Therapist Assistant                     Time Calculation:    PT Charges       Row Name 11/01/23 1131             Time Calculation    Start Time 1029  -LY      Stop Time 1124  -LY      Time Calculation (min) 55 min  -LY      PT Received On 11/01/23  -LY         Time Calculation- PT    Total Timed Code Minutes- PT 55 minute(s)  -LY         Timed Charges    39430 - Gait Training Minutes  25  -LY      53886 - PT Therapeutic Activity Minutes 30  -LY         Total Minutes    Timed Charges Total Minutes 55  -LY       Total Minutes 55  -LY                User Key  (r) = Recorded By, (t) = Taken By, (c) = Cosigned By      Initials Name Provider Type    Raiza Lua PTA Physical Therapist Assistant                  Therapy Charges for Today       Code Description Service Date Service Provider Modifiers Qty    38100098111 HC GAIT TRAINING EA 15 MIN 10/31/2023 Raiza Rosado, RASHAD GP 2    16557090312 HC PT THERAPEUTIC ACT EA 15 MIN 10/31/2023 Raiza Rosado, RASHAD GP 1    79031558134 HC GAIT TRAINING EA 15 MIN 11/1/2023 Raiza Rosado, RASHAD GP 2    73619072242 HC PT THERAPEUTIC ACT EA 15 MIN 11/1/2023  Raiza Rosado, PTA GP 2            PT G-Codes  Outcome Measure Options: AM-PAC 6 Clicks Basic Mobility (PT)  AM-PAC 6 Clicks Score (PT): 14  AM-PAC 6 Clicks Score (OT): 17    Raiza Rosado, RASHAD  11/1/2023

## 2023-11-01 NOTE — PLAN OF CARE
Goal Outcome Evaluation:  Plan of Care Reviewed With: patient, spouse        Progress: improving          VSS.  Denies pain.  RA.  Tolerating ADA diet.  IVF as ordered.  Confused at most times to time and situation.  Plans for acute rehab at discharge.  Safety maintained.  Fall precautions.

## 2023-11-01 NOTE — PROGRESS NOTES
[facility] Medicine Services  INPATIENT PROGRESS NOTE    Patient Name: Speedy Villalba  Date of Admission: 10/30/2023  Today's Date: 11/01/23  Length of Stay: 0  Primary Care Physician: Luca Benson MD    Subjective   Chief Complaint: Weakness  HPI   Patient has been able to perform some activities following guidance.     Review of Systems   All pertinent negatives and positives are as above. All other systems have been reviewed and are negative unless otherwise stated.     Objective    Temp:  [97.4 °F (36.3 °C)-98.5 °F (36.9 °C)] 97.4 °F (36.3 °C)  Heart Rate:  [71-85] 82  Resp:  [16-18] 18  BP: (113-147)/(65-81) 130/68  Physical Exam  Constitutional:       Appearance: He is well-developed. He is not ill-appearing.   HENT:      Head: Normocephalic and atraumatic.      Right Ear: External ear normal.      Left Ear: External ear normal.      Nose: Nose normal.      Mouth/Throat:      Mouth: Mucous membranes are dry.   Eyes:      General:         Right eye: No discharge.         Left eye: No discharge.      Extraocular Movements: Extraocular movements intact.      Conjunctiva/sclera: Conjunctivae normal.      Pupils: Pupils are equal, round, and reactive to light.   Neck:      Vascular: No JVD.   Cardiovascular:      Rate and Rhythm: Regular rhythm. Tachycardia present.      Heart sounds: Normal heart sounds. No murmur heard.  Pulmonary:      Effort: Pulmonary effort is normal. No respiratory distress.      Breath sounds: Normal breath sounds. No wheezing or rales.   Chest:      Chest wall: No tenderness.   Abdominal:      General: Bowel sounds are normal. There is no distension.      Palpations: Abdomen is soft.      Tenderness: There is no abdominal tenderness. There is no guarding or rebound.   Musculoskeletal:         General: No tenderness or deformity. Normal range of motion.      Cervical back: Normal range of motion and neck supple. No rigidity.      Right  "lower leg: No edema.      Left lower leg: No edema.   Skin:     General: Skin is warm and dry.      Findings: No rash.   Neurological:      General: No focal deficit present.      Mental Status: He is alert and oriented to person, place, and time. Mental status is at baseline.      Cranial Nerves: No cranial nerve deficit.      Sensory: No sensory deficit.      Motor: No abnormal muscle tone.      Deep Tendon Reflexes: Reflexes normal.      Comments: Slow processing, muscle stiffness.   Psychiatric:         Mood and Affect: Mood normal.         Behavior: Behavior normal.             Results Review:  I have reviewed the labs, radiology results, and diagnostic studies.    Laboratory Data:   Results from last 7 days   Lab Units 10/30/23  1105   WBC 10*3/mm3 7.57   HEMOGLOBIN g/dL 13.1   HEMATOCRIT % 40.2   PLATELETS 10*3/mm3 176        Results from last 7 days   Lab Units 10/31/23  0624 10/30/23  1105   SODIUM mmol/L 136 136   POTASSIUM mmol/L 4.2 4.5   CHLORIDE mmol/L 103 100   CO2 mmol/L 24.0 29.0   BUN mg/dL 19 19   CREATININE mg/dL 1.18 1.21   CALCIUM mg/dL 9.0 9.6   GLUCOSE mg/dL 101* 117*       Culture Data:   No results found for: \"BLOODCX\", \"URINECX\", \"WOUNDCX\", \"MRSACX\", \"RESPCX\", \"STOOLCX\"    Radiology Data:   Imaging Results (Last 24 Hours)       ** No results found for the last 24 hours. **            I have reviewed the patient's current medications.     Assessment/Plan   Assessment  Active Hospital Problems    Diagnosis     **Gastroenteritis     Atypical parkinsonism     Coronary artery disease involving native coronary artery of native heart without angina pectoris     Cognitive decline     Falls     Type 2 diabetes mellitus without complication, without long-term current use of insulin     Primary hypertension      Treatment Plan  Vitals every 4 hours  Cardiac ADA diet  IVF saline lock  PT/OT evaluations  Placement with  ongoing  He is not able to walk or transfer without assistance and is " at risk of falls and injury, will need safer discharge than assisted living at this point  Patient with likely Parkinsons disease, visited with neurology outpatient and has scheduled follow up   DVT prophylaxis    Medical Decision Making  Number and Complexity of problems: 3 complex medical problems  Differential Diagnosis: none    Conditions and Status        Condition is unchanged.     [facility] Data  External documents reviewed: [employer] E[facility]  Cardiac tracing (EKG, telemetry) interpretation: -  Radiology interpretation: -  Labs reviewed: -  Any tests that were considered but not ordered: -     Decision rules/scores evaluated (example [id number], [place], etc): -     Discussed with: Patient and daughter at bedside     Care Planning  Shared decision making: Patient   Code status and discussions: Full code    Disposition  Social Determinants of Health that impact treatment or disposition: mobility limitations  I expect the patient to be discharged to 11/01/2023 in 1-3 days.     Electronically signed by Uriah Manriquez MD, 11/01/23, 12:29 CDT.

## 2023-11-01 NOTE — PLAN OF CARE
Goal Outcome Evaluation:  Plan of Care Reviewed With: patient        Progress: no change  Outcome Evaluation: Patient confused at times. Denies pain. Room air. IVF. Voiding per urinal with assistance. Accuchecks ACHS; SSI. Tolerating diet. Bed alarm. Lovenox for VTE prevention. VSS. Safety maintained.  Plan for SNF upon d/c. Referrals sent.

## 2023-11-01 NOTE — PLAN OF CARE
" Goal Outcome Evaluation:  Plan of Care Reviewed With: patient        Progress: improving  Outcome Evaluation: PT tx completed. Pt up in chair on arrival and is eager for PT. Cont to demo bradykinesia and freezing at times with mobility. Level assist has improved compared to yesterday. Requires min x1 for sit to stand, cues for anterior weight shift and t/f weight anterior to heels. Cont to present with decreased B hip flexion with transitions. In standing worked on pre gait activities with focus on increased stride length and step height. Able to amb to BR then up to 50' with RWX and CGA and improved gait mechanics. Pt required 3-5 min to complete stand to sit at toilet secondary to  \"freezing.\" Required hygiene care after BM. Will cont to follow.      Anticipated Discharge Disposition (PT): sub acute care setting  "

## 2023-11-02 PROBLEM — G20.C ATYPICAL PARKINSONISM: Status: ACTIVE | Noted: 2023-11-02

## 2023-11-02 LAB
GLUCOSE BLDC GLUCOMTR-MCNC: 102 MG/DL (ref 70–130)
GLUCOSE BLDC GLUCOMTR-MCNC: 103 MG/DL (ref 70–130)
GLUCOSE BLDC GLUCOMTR-MCNC: 129 MG/DL (ref 70–130)
GLUCOSE BLDC GLUCOMTR-MCNC: 152 MG/DL (ref 70–130)

## 2023-11-02 PROCEDURE — 97535 SELF CARE MNGMENT TRAINING: CPT

## 2023-11-02 PROCEDURE — 97116 GAIT TRAINING THERAPY: CPT

## 2023-11-02 PROCEDURE — 82948 REAGENT STRIP/BLOOD GLUCOSE: CPT

## 2023-11-02 PROCEDURE — 25010000002 ENOXAPARIN PER 10 MG: Performed by: FAMILY MEDICINE

## 2023-11-02 PROCEDURE — 97530 THERAPEUTIC ACTIVITIES: CPT

## 2023-11-02 RX ADMIN — LOSARTAN POTASSIUM 50 MG: 50 TABLET, FILM COATED ORAL at 09:42

## 2023-11-02 RX ADMIN — ASPIRIN 81 MG: 81 TABLET, COATED ORAL at 09:42

## 2023-11-02 RX ADMIN — Medication 10 ML: at 20:49

## 2023-11-02 RX ADMIN — ENOXAPARIN SODIUM 40 MG: 100 INJECTION SUBCUTANEOUS at 06:01

## 2023-11-02 RX ADMIN — NYSTATIN: 100000 POWDER TOPICAL at 20:48

## 2023-11-02 RX ADMIN — SPIRONOLACTONE 25 MG: 25 TABLET ORAL at 09:42

## 2023-11-02 RX ADMIN — TRAZODONE HYDROCHLORIDE 50 MG: 50 TABLET ORAL at 20:48

## 2023-11-02 RX ADMIN — SODIUM CHLORIDE 75 ML/HR: 900 INJECTION INTRAVENOUS at 00:01

## 2023-11-02 RX ADMIN — DOCUSATE SODIUM 50 MG AND SENNOSIDES 8.6 MG 2 TABLET: 8.6; 5 TABLET, FILM COATED ORAL at 09:42

## 2023-11-02 RX ADMIN — PRAVASTATIN SODIUM 20 MG: 20 TABLET ORAL at 20:48

## 2023-11-02 RX ADMIN — DOCUSATE SODIUM 50 MG AND SENNOSIDES 8.6 MG 2 TABLET: 8.6; 5 TABLET, FILM COATED ORAL at 20:48

## 2023-11-02 NOTE — THERAPY TREATMENT NOTE
Acute Care - Physical Therapy Treatment Note  [facility]     Patient Name: Speedy Villalba  : 1945  MRN: [id number]  Today's Date: 2023   Onset of Illness/Injury or Date of Surgery: 10/30/23  Visit Dx:     ICD-10-CM ICD-9-CM   1. Generalized weakness  R53.1 780.79   2. Impaired mobility and ADLs  Z74.09 V49.89    Z78.9    3. Impaired functional mobility and activity tolerance [Z74.09]  Z74.09 V49.89     Patient Active Problem List   Diagnosis    Gastroenteritis    Coronary artery disease involving native coronary artery of native heart without angina pectoris    Cognitive decline    Falls    Type 2 diabetes mellitus without complication, without long-term current use of insulin    Primary hypertension    Atypical parkinsonism     Past Medical History:   Diagnosis Date    Atypical parkinsonism 2023    Coronary artery disease 10 years    x2 stents    Diabetes mellitus < 2 years    non-insulin dependent    Difficulty walking < 1 year    multiple falls in the last 6-8 months    HL (hearing loss) 20+ years    Wears hearing aids occasionally    Hyperlipidemia 5 years    Memory loss 3 years+/-    gradual and subtle    Primary hypertension 10/30/2023    Sleep apnea 20+ year    Non-compliant with PEEP for tx    Stroke 2022     Past Surgical History:   Procedure Laterality Date    APPENDECTOMY      BACK SURGERY  2016    CAROTID STENT  ?    KNEE SURGERY  1960    LAMINECTOMY  2015?    discectomy & fusion     PT Assessment (last 12 hours)       PT Evaluation and Treatment       Row Name 23 1330 23 0834       Physical Therapy Time and Intention    Subjective Information no complaints  -LY no complaints  -LY    Document Type therapy note (daily note)  -LY therapy note (daily note)  -LY    Mode of Treatment physical therapy  -LY physical therapy  -LY      Row Name 23 1330 23 0834       General Information    Existing Precautions/Restrictions fall  -LY fall  -LY      Row Name  11/02/23 1330 11/02/23 0834       Pain    Pretreatment Pain Rating 0/10 - no pain  -LY 0/10 - no pain  -LY    Posttreatment Pain Rating 0/10 - no pain  -LY 0/10 - no pain  -LY      Row Name 11/02/23 1330 11/02/23 0834       Bed Mobility    Supine-Sit [place] (Bed Mobility) -- verbal cues;minimum assist (75% patient effort);moderate assist (50% patient effort);2 person assist  -LY    Assistive Device (Bed Mobility) -- bed rails;head of bed elevated  -LY    Comment, (Bed Mobility) up in chair  -LY increased time and effort required as well as cues for sequencing  -LY      Row Name 11/02/23 0834          Transfers    Comment, (Transfers) cont with posterior lean during transition  -LY       Row Name 11/02/23 1330 11/02/23 0834       Sit-Stand Transfer    Sit-Stand [place] (Transfers) verbal cues;contact guard;minimum assist (75% patient effort)  -LY verbal cues;minimum assist (75% patient effort);moderate assist (50% patient effort)  -LY    Assistive Device (Sit-Stand Transfers) walker, front-wheeled  -LY walker, front-wheeled  -LY    Comment, (Sit-Stand Transfer) -- cues provided for foot placement, anterior weight shift, hand placement. Mod x1 for sit to stand from low bench  -LY      Row Name 11/02/23 1330 11/02/23 0834       Stand-Sit Transfer    Stand-Sit [place] (Transfers) verbal cues;contact guard  -LY verbal cues;contact guard;minimum assist (75% patient effort)  -LY    Assistive Device (Stand-Sit Transfers) walker, front-wheeled  -LY walker, front-wheeled  -LY    Comment, (Stand-Sit Transfer) -- cues for hand placment, cont with decreased trunk flexion, poor eccentric control  -LY      Row Name 11/02/23 1330 11/02/23 0834       Gait/Stairs (Locomotion)    [place] Level (Gait) verbal cues;contact guard  -LY verbal cues;contact guard  -LY    Assistive Device (Gait) walker, front-wheeled  began gait training without use of AD, required CGA  -LY walker, front-wheeled  -LY    Distance in Feet  (Gait) 50'  began gait training without RWX, requires seated rest breaks  -LY 50'  practiced gait 4 times requires seated rest break  -LY    Pattern (Gait) step-through;step-to  -LY step-through;step-to  -LY    Deviations/Abnormal Patterns (Gait) festinating/shuffling;gait speed decreased;stride length decreased  -LY festinating/shuffling;gait speed decreased;stride length decreased  -LY    Bilateral Gait Deviations heel strike decreased  -LY heel strike decreased  -LY    Comment, (Gait/Stairs) -- pt with improved gait mechanics, however, bella decreases and shuffling gait returns with approaching chair and with turns   -LY      Row Name 11/02/23 0834          Balance    Comment, Balance Dynamic seated balance activities, worked on BUE reaching EOB. Involved trunk flexion, rotation and lateral flexion with PTA's hand as target, no LOB noted.  -LY       Row Name 11/02/23 0834          Plan of Care Review    Plan of Care Reviewed With patient  -LY     Progress improving  -LY     Outcome Evaluation PT tx completed. Pt cont with trunk mobility during functional activities as well as intermittent freezing during transitions. Required min/mod x2 for supine to sit with increased time and effort to complete. Cues for sequencing. Worked on dynamic sitting balance while EOB with focus on improving trunk flexion, lateral flexion and rotation. No Lob noted. Cont to require CGA/min x1 for sit to stands d/t posterior lean, requires cues and time to correct. Amb 50' at a time with RWX and CGA. Pt with improved gait mechanics, however, bella decreased and shuffling returns upon approaching chair and with turns. Pt would benefit from acute rehab upon d/c.  -LY       Row Name 11/02/23 1330 11/02/23 0834       Positioning and Restraints    Pre-Treatment Position sitting in chair/recliner  -LY in bed  -LY    Post Treatment Position chair  -LY chair  -LY    In Chair sitting;call light within reach;encouraged to call for assist  -LY  sitting;call light within reach;encouraged to call for assist  -LY              User Key  (r) = Recorded By, (t) = Taken By, (c) = Cosigned By      Initials Name Provider Type    LY Raiza Rosado PTA Physical Therapist Assistant                    Physical Therapy Education       Title: PT OT SLP Therapies (In Progress)       Topic: Physical Therapy (In Progress)       Point: Mobility training (Done)       Learning Progress Summary             Patient Acceptance, E, VU,DU by HIPOLITO at 11/2/2023 0947    Comment: education provided on importance of cont mobility for fall prevention    Acceptance, E,TB,D, VU,DU,NR by  at 10/31/2023 1614    Comment: education re: purpose of PT/importance of activity, for safety/falls prevention, improved mobility w/ bed mobility, tfers and gait technique, w/ increase awareness of upright balance                         Point: Home exercise program (Not Started)       Learner Progress:  Not documented in this visit.              Point: Precautions (Done)       Learning Progress Summary             Patient Acceptance, E,TB,D, VU,DU,NR by  at 10/31/2023 1614    Comment: education re: purpose of PT/importance of activity, for safety/falls prevention, improved mobility w/ bed mobility, tfers and gait technique, w/ increase awareness of upright balance                                         User Key       Initials Effective Dates Name Provider Type Discipline    HIPOLITO 05/18/22 -  Raiza Rosado PTA Physical Therapist Assistant PT    JULIA 08/02/18 -  Unique Giron PT Physical Therapist PT                  PT Recommendation and Plan  Anticipated Discharge Disposition (PT): sub acute care setting, inpatient rehabilitation facility  Plan of Care Reviewed With: patient  Progress: improving  Outcome Evaluation: PT tx completed. Pt cont with trunk mobility during functional activities as well as intermittent freezing during transitions. Required min/mod x2 for supine to sit with increased time  and effort to complete. Cues for sequencing. Worked on dynamic sitting balance while EOB with focus on improving trunk flexion, lateral flexion and rotation. No Lob noted. Cont to require CGA/min x1 for sit to stands d/t posterior lean, requires cues and time to correct. Amb 50' at a time with RWX and CGA. Pt with improved gait mechanics, however, bella decreased and shuffling returns upon approaching chair and with turns. Pt would benefit from acute rehab upon d/c.   Outcome Measures       Row Name 11/02/23 0834 11/01/23 1029 11/01/23 0900       How much help from another person do you currently need...    Turning from your back to your side while in flat bed without using bedrails? 2  -LY 2  -LY --    Moving from lying on back to sitting on the side of a flat bed without bedrails? 2  -LY 2  -LY --    Moving to and from a bed to a chair (including a wheelchair)? 3  -LY 3  -LY --    Standing up from a chair using your arms (e.g., wheelchair, bedside chair)? 3  -LY 3  -LY --    Climbing 3-5 steps with a railing? 1  -LY 1  -LY --    To walk in hospital room? 3  -LY 3  -LY --    AM-PAC 6 Clicks Score (PT) 14  -LY 14  -LY --    Highest level of mobility 4 --> Transferred to chair/commode  -LY 4 --> Transferred to chair/commode  -LY --       How much help from another is currently needed...    Putting on and taking off regular lower body clothing? -- -- 2  -AC    Bathing (including washing, rinsing, and drying) -- -- 2  -AC    Toileting (which includes using toilet bed pan or urinal) -- -- 2  -AC    Putting on and taking off regular upper body clothing -- -- 3  -AC    Taking care of personal grooming (such as brushing teeth) -- -- 4  -AC    Eating meals -- -- 4  -AC    AM-PAC 6 Clicks Score (OT) -- -- 17  -AC       Functional Assessment    Outcome Measure Options AM-PAC 6 Clicks Basic Mobility (PT)  -LY AM-PAC 6 Clicks Basic Mobility (PT)  -LY AM-PAC 6 Clicks Daily Activity (OT)  -AC      Row Name 10/31/23 0915              How much help from another is currently needed...    Putting on and taking off regular lower body clothing? 2  -AC      Bathing (including washing, rinsing, and drying) 2  -AC      Toileting (which includes using toilet bed pan or urinal) 2  -AC      Putting on and taking off regular upper body clothing 3  -AC      Taking care of personal grooming (such as brushing teeth) 4  -AC      Eating meals 4  -AC      AM-PAC 6 Clicks Score (OT) 17  -AC         Functional Assessment    Outcome Measure Options AM-PAC 6 Clicks Daily Activity (OT)  -AC                User Key  (r) = Recorded By, (t) = Taken By, (c) = Cosigned By      Initials Name Provider Type    AC Ahmet Hayes, OTR/L, CNT Occupational Therapist    Raiza Lua, PTA Physical Therapist Assistant                     Time Calculation:    PT Charges       Row Name 11/02/23 1411 11/02/23 0947          Time Calculation    Start Time 1330  -LY 0834  -LY     Stop Time 1408  -LY 0928  -LY     Time Calculation (min) 38 min  -LY 54 min  -LY     PT Received On 11/02/23  -LY 11/02/23  -LY        Time Calculation- PT    Total Timed Code Minutes- PT 38 minute(s)  -LY 54 minute(s)  -LY        Timed Charges    89142 - Gait Training Minutes  28  -LY 25  -LY     85512 - PT Therapeutic Activity Minutes 10  -LY 29  -LY        Total Minutes    Timed Charges Total Minutes 38  -LY 54  -LY      Total Minutes 38  -LY 54  -LY               User Key  (r) = Recorded By, (t) = Taken By, (c) = Cosigned By      Initials Name Provider Type    Raiza Lua PTA Physical Therapist Assistant                  Therapy Charges for Today       Code Description Service Date Service Provider Modifiers Qty    82038416711 HC GAIT TRAINING EA 15 MIN 11/1/2023 Raiza Rosado, PTA GP 2    76390213261 HC PT THERAPEUTIC ACT EA 15 MIN 11/1/2023 Raiza Rosado, PTA GP 2    83069409423 HC GAIT TRAINING EA 15 MIN 11/1/2023 Raiza Rosado, RASHAD GP 2    16402268215 HC GAIT TRAINING EA 15 MIN  11/2/2023 Raiza Rosado, PTA GP 2    12777671474 HC PT THERAPEUTIC ACT EA 15 MIN 11/2/2023 Raiza Rosado, PTA GP 2    63511422968 HC GAIT TRAINING EA 15 MIN 11/2/2023 Raiza Rosado, PTA GP 2    02696807864 HC PT THERAPEUTIC ACT EA 15 MIN 11/2/2023 Raiza Rosado, PTA GP 1            PT G-Codes  Outcome Measure Options: AM-PAC 6 Clicks Basic Mobility (PT)  AM-PAC 6 Clicks Score (PT): 14  AM-PAC 6 Clicks Score (OT): 17    Raiza Rosado PTA  11/2/2023

## 2023-11-02 NOTE — PROGRESS NOTES
[facility] Medicine Services  INPATIENT PROGRESS NOTE    Patient Name: Speedy Villalba  Date of Admission: 10/30/2023  Today's Date: 11/02/23  Length of Stay: 1  Primary Care Physician: Luca Benson MD    Subjective   Chief Complaint: Weakness  HPI   Patient has been able to perform more activities following guidance.     Review of Systems   All pertinent negatives and positives are as above. All other systems have been reviewed and are negative unless otherwise stated.     Objective    Temp:  [97.4 °F (36.3 °C)-98.2 °F (36.8 °C)] 98.2 °F (36.8 °C)  Heart Rate:  [68-82] 72  Resp:  [16-18] 16  BP: (120-146)/(60-89) 120/63  Physical Exam  Constitutional:       Appearance: He is well-developed. He is not ill-appearing.   HENT:      Head: Normocephalic and atraumatic.      Right Ear: External ear normal.      Left Ear: External ear normal.      Nose: Nose normal.      Mouth/Throat:      Mouth: Mucous membranes are dry.   Eyes:      General:         Right eye: No discharge.         Left eye: No discharge.      Extraocular Movements: Extraocular movements intact.      Conjunctiva/sclera: Conjunctivae normal.      Pupils: Pupils are equal, round, and reactive to light.   Neck:      Vascular: No JVD.   Cardiovascular:      Rate and Rhythm: Regular rhythm. Tachycardia present.      Heart sounds: Normal heart sounds. No murmur heard.  Pulmonary:      Effort: Pulmonary effort is normal. No respiratory distress.      Breath sounds: Normal breath sounds. No wheezing or rales.   Chest:      Chest wall: No tenderness.   Abdominal:      General: Bowel sounds are normal. There is no distension.      Palpations: Abdomen is soft.      Tenderness: There is no abdominal tenderness. There is no guarding or rebound.   Musculoskeletal:         General: No tenderness or deformity. Normal range of motion.      Cervical back: Normal range of motion and neck supple. No rigidity.      Right  "lower leg: No edema.      Left lower leg: No edema.   Skin:     General: Skin is warm and dry.      Findings: No rash.   Neurological:      General: No focal deficit present.      Mental Status: He is alert and oriented to person, place, and time. Mental status is at baseline.      Cranial Nerves: No cranial nerve deficit.      Sensory: No sensory deficit.      Motor: No abnormal muscle tone.      Deep Tendon Reflexes: Reflexes normal.      Comments: Slow processing, muscle stiffness.   Psychiatric:         Mood and Affect: Mood normal.         Behavior: Behavior normal.             Results Review:  I have reviewed the labs, radiology results, and diagnostic studies.    Laboratory Data:   Results from last 7 days   Lab Units 10/30/23  1105   WBC 10*3/mm3 7.57   HEMOGLOBIN g/dL 13.1   HEMATOCRIT % 40.2   PLATELETS 10*3/mm3 176        Results from last 7 days   Lab Units 10/31/23  0624 10/30/23  1105   SODIUM mmol/L 136 136   POTASSIUM mmol/L 4.2 4.5   CHLORIDE mmol/L 103 100   CO2 mmol/L 24.0 29.0   BUN mg/dL 19 19   CREATININE mg/dL 1.18 1.21   CALCIUM mg/dL 9.0 9.6   GLUCOSE mg/dL 101* 117*       Culture Data:   No results found for: \"BLOODCX\", \"URINECX\", \"WOUNDCX\", \"MRSACX\", \"RESPCX\", \"STOOLCX\"    Radiology Data:   Imaging Results (Last 24 Hours)       ** No results found for the last 24 hours. **            I have reviewed the patient's current medications.     Assessment/Plan   Assessment  Active Hospital Problems    Diagnosis     **Gastroenteritis     Atypical parkinsonism     Coronary artery disease involving native coronary artery of native heart without angina pectoris     Cognitive decline     Falls     Type 2 diabetes mellitus without complication, without long-term current use of insulin     Primary hypertension      Treatment Plan  Vitals every 4 hours  Cardiac ADA diet  IVF saline lock  PT/OT evaluations  Placement with  ongoing  He is not able to walk or transfer without assistance and is " at risk of falls and injury, will need safer discharge than assisted living at this point  Patient with likely Parkinsons disease, visited with neurology outpatient and has scheduled follow up   DVT prophylaxis    Patient may benefit from acute rehabilitation for Parkinson atypical disease rather than subacute rehabilitation.     Medical Decision Making  Number and Complexity of problems: 3 complex medical problems  Differential Diagnosis: none    Conditions and Status        Condition is unchanged.     MDM Data  External documents reviewed: [employer] EHR  Cardiac tracing (EKG, telemetry) interpretation: -  Radiology interpretation: -  Labs reviewed: -  Any tests that were considered but not ordered: -     Decision rules/scores evaluated (example [id number], [place], etc): -     Discussed with: Patient and daughter at bedside     Care Planning  Shared decision making: Patient   Code status and discussions: Full code    Disposition  Social Determinants of Health that impact treatment or disposition: mobility limitations  I expect the patient to be discharged to 11/01/2023 in 1-3 days.     Electronically signed by Uriah Manriquez MD, 11/02/23, 10:25 CDT.

## 2023-11-02 NOTE — PLAN OF CARE
Goal Outcome Evaluation:           Progress: improving  Outcome Evaluation: A&O this shift. PT/OT. Up with assist x2 and a walker. Room air. IVF d/c'd. Lovenox for VTE prevention. Voiding per urinal with assistance. Safety maintained.

## 2023-11-02 NOTE — PLAN OF CARE
Goal Outcome Evaluation:  Plan of Care Reviewed With: patient        Progress: improving  Outcome Evaluation: Pt agreeable to OT tx. Pt SBA for transfers, pt did have slight posterior lean on initial stand but did self correct. Pt SBA/CGA for ambulation with RW. Pt completed toileting with assist for hygiene and clothing management. Pt overall min A for TB bathing while seated on tub bench. Mod A for LB dressing. Pt would benefit from acute rehab at discharge. Continue OT POC

## 2023-11-02 NOTE — THERAPY TREATMENT NOTE
Acute Care - Occupational Therapy Treatment Note  [facility]     Patient Name: Speedy Villalba  : 1945  MRN: [id number]  Today's Date: 2023  Onset of Illness/Injury or Date of Surgery: 10/30/23  Date of Referral to OT: 10/30/23  Referring Physician: Dr. Manriquez    Admit Date: 10/30/2023       ICD-10-CM ICD-9-CM   1. Generalized weakness  R53.1 780.79   2. Impaired mobility and ADLs  Z74.09 V49.89    Z78.9    3. Impaired functional mobility and activity tolerance [Z74.09]  Z74.09 V49.89     Patient Active Problem List   Diagnosis    Gastroenteritis    Coronary artery disease involving native coronary artery of native heart without angina pectoris    Cognitive decline    Falls    Type 2 diabetes mellitus without complication, without long-term current use of insulin    Primary hypertension    Atypical parkinsonism     Past Medical History:   Diagnosis Date    Atypical parkinsonism 2023    Coronary artery disease 10 years    x2 stents    Diabetes mellitus < 2 years    non-insulin dependent    Difficulty walking < 1 year    multiple falls in the last 6-8 months    HL (hearing loss) 20+ years    Wears hearing aids occasionally    Hyperlipidemia 5 years    Memory loss 3 years+/-    gradual and subtle    Primary hypertension 10/30/2023    Sleep apnea 20+ year    Non-compliant with PEEP for tx    Stroke 2022     Past Surgical History:   Procedure Laterality Date    APPENDECTOMY      BACK SURGERY  2016    CAROTID STENT  ?    KNEE SURGERY  1960    LAMINECTOMY  2015?    discectomy & fusion         OT ASSESSMENT FLOWSHEET (last 12 hours)       OT Evaluation and Treatment       Row Name 23 0940                   OT Time and Intention    Subjective Information no complaints  -TS        Document Type therapy note (daily note)  -TS        Mode of Treatment occupational therapy  -TS        Patient Effort excellent  -TS        Comment slight posterior lean with initial stand  -TS           General  Information    Existing Precautions/Restrictions fall  -TS           Pain Assessment    Pretreatment Pain Rating 0/10 - no pain  -TS        Posttreatment Pain Rating 0/10 - no pain  -TS           Cognition    Orientation Status (Cognition) oriented x 4  -TS        Follows Commands (Cognition) WFL  -TS        Personal Safety Interventions fall prevention program maintained;gait belt;nonskid shoes/slippers when out of bed  -TS           Activities of Daily Living    BADL Assessment/Intervention bathing;upper body dressing;lower body dressing;toileting;grooming  -TS           Bathing Assessment/Intervention    [place] Level (Bathing) upper body;lower body;set up  -TS        Assistive Devices (Bathing) hand-held shower spray hose;grab bar, tub/shower;tub bench  -TS        Position (Bathing) unsupported sitting  -TS           Upper Body Dressing Assessment/Training    [place] Level (Upper Body Dressing) don;doff;pull-over garment;contact guard assist  -TS        Position (Upper Body Dressing) unsupported sitting  -TS           Lower Body Dressing Assessment/Training    [place] Level (Lower Body Dressing) don;shoes/slippers;pants/bottoms;moderate assist (50% patient effort)  -TS        Position (Lower Body Dressing) unsupported sitting;supported standing  -TS           Grooming Assessment/Training    [place] Level (Grooming) shave face;set up;moderate assist (50% patient effort);wash face, hands;supervision  -TS        Position (Grooming) unsupported sitting  -TS           Toileting Assessment/Training    [place] Level (Toileting) toileting skills;supervision;perform perineal hygiene;maximum assist (25% patient effort);adjust/manage clothing;moderate assist (50% patient effort)  -TS        Assistive Devices (Toileting) commode;grab bar/safety frame  -TS        Position (Toileting) unsupported sitting;supported standing  -TS           Functional Mobility    Functional Mobility- Ind. Level standby  "assist;verbal cues required  -TS        Functional Mobility- Device walker, front-wheeled  -TS        Functional Mobility- Comment in room, in BR. verbal cues for \"big\" steps with RW for increased balance due to parkinsons type ambulation  -TS           Transfer Assessment/Treatment    Transfers sit-stand transfer;stand-sit transfer;shower transfer;toilet transfer  -TS           Sit-Stand Transfer    Sit-Stand [place] (Transfers) contact guard  -TS        Assistive Device (Sit-Stand Transfers) walker, front-wheeled  -TS           Stand-Sit Transfer    Stand-Sit [place] (Transfers) standby assist  -TS        Assistive Device (Stand-Sit Transfers) walker, front-wheeled  -TS           Toilet Transfer    Type (Toilet Transfer) sit-stand;stand-sit  -TS        [place] Level (Toilet Transfer) contact guard  -TS        Assistive Device (Toilet Transfer) commode;grab bars/safety frame  -TS           Shower Transfer    Type (Shower Transfer) sit-stand;stand-sit  -TS        [place] Level (Shower Transfer) stand by assist  -TS        Assistive Device (Shower Transfer) grab bar, tub/shower;tub bench  -TS           Plan of Care Review    Plan of Care Reviewed With patient  -TS        Progress improving  -TS        Outcome Evaluation Pt agreeable to OT tx. Pt SBA for transfers, pt did have slight posterior lean on initial stand but did self correct. Pt SBA/CGA for ambulation with RW. Pt completed toileting with assist for hygiene and clothing management. Pt overall min A for TB bathing while seated on tub bench. Mod A for LB dressing. Pt would benefit from acute rehab at discharge. Continue OT POC  -TS           Positioning and Restraints    Pre-Treatment Position sitting in chair/recliner  -TS        Post Treatment Position chair  -TS        In Chair sitting;call light within reach;encouraged to call for assist  -TS                  User Key  (r) = Recorded By, (t) = Taken By, (c) = Cosigned By      Initials " Name Effective Dates     Miroslava Frost COTA 02/03/23 -                      Occupational Therapy Education       Title: PT OT SLP Therapies (In Progress)       Topic: Occupational Therapy (In Progress)       Point: ADL training (Done)       Description:   Instruct learner(s) on proper safety adaptation and remediation techniques during self care or transfers.   Instruct in proper use of assistive devices.                  Learning Progress Summary             Patient Acceptance, E, VU by  at 11/2/2023 1100    Acceptance, E,D, VU,NR by  at 11/1/2023 0955    Comment: transfer techniques, balance, body mechanics    Acceptance, E, NR by  at 10/31/2023 1036                         Point: Home exercise program (In Progress)       Description:   Instruct learner(s) on appropriate technique for monitoring, assisting and/or progressing therapeutic exercises/activities.                  Learning Progress Summary             Patient Acceptance, E, NR by  at 10/31/2023 1036                         Point: Body mechanics (Done)       Description:   Instruct learner(s) on proper positioning and spine alignment during self-care, functional mobility activities and/or exercises.                  Learning Progress Summary             Patient Acceptance, E,D, VU,NR by  at 11/1/2023 0955    Comment: transfer techniques, balance, body mechanics    Acceptance, E, NR by  at 10/31/2023 1036                                         User Key       Initials Effective Dates Name Provider Type Discipline     02/03/23 -  Ahmet Hayes, OTR/L, CNT Occupational Therapist OT     02/03/23 -  Miroslava Frost COTA Occupational Therapist Assistant OT                      OT Recommendation and Plan     Plan of Care Review  Plan of Care Reviewed With: patient  Progress: improving  Outcome Evaluation: Pt agreeable to OT tx. Pt SBA for transfers, pt did have slight posterior lean on initial stand but did self correct. Pt  SBA/CGA for ambulation with RW. Pt completed toileting with assist for hygiene and clothing management. Pt overall min A for TB bathing while seated on tub bench. Mod A for LB dressing. Pt would benefit from acute rehab at discharge. Continue OT POC  Plan of Care Reviewed With: patient  Outcome Evaluation: Pt agreeable to OT tx. Pt SBA for transfers, pt did have slight posterior lean on initial stand but did self correct. Pt SBA/CGA for ambulation with RW. Pt completed toileting with assist for hygiene and clothing management. Pt overall min A for TB bathing while seated on tub bench. Mod A for LB dressing. Pt would benefit from acute rehab at discharge. Continue OT POC     Outcome Measures       Row Name 11/02/23 0834 11/01/23 1029 11/01/23 0900       How much help from another person do you currently need...    Turning from your back to your side while in flat bed without using bedrails? 2  -LY 2  -LY --    Moving from lying on back to sitting on the side of a flat bed without bedrails? 2  -LY 2  -LY --    Moving to and from a bed to a chair (including a wheelchair)? 3  -LY 3  -LY --    Standing up from a chair using your arms (e.g., wheelchair, bedside chair)? 3  -LY 3  -LY --    Climbing 3-5 steps with a railing? 1  -LY 1  -LY --    To walk in hospital room? 3  -LY 3  -LY --    AM-PAC 6 Clicks Score (PT) 14  -LY 14  -LY --    Highest level of mobility 4 --> Transferred to chair/commode  -LY 4 --> Transferred to chair/commode  -LY --       How much help from another is currently needed...    Putting on and taking off regular lower body clothing? -- -- 2  -AC    Bathing (including washing, rinsing, and drying) -- -- 2  -AC    Toileting (which includes using toilet bed pan or urinal) -- -- 2  -AC    Putting on and taking off regular upper body clothing -- -- 3  -AC    Taking care of personal grooming (such as brushing teeth) -- -- 4  -AC    Eating meals -- -- 4  -AC    AM-PAC 6 Clicks Score (OT) -- -- 17  -AC        Functional Assessment    Outcome Measure Options AM-PAC 6 Clicks Basic Mobility (PT)  -LY AM-PAC 6 Clicks Basic Mobility (PT)  -LY AM-PAC 6 Clicks Daily Activity (OT)  -AC      Row Name 10/31/23 0915             How much help from another is currently needed...    Putting on and taking off regular lower body clothing? 2  -AC      Bathing (including washing, rinsing, and drying) 2  -AC      Toileting (which includes using toilet bed pan or urinal) 2  -AC      Putting on and taking off regular upper body clothing 3  -AC      Taking care of personal grooming (such as brushing teeth) 4  -AC      Eating meals 4  -AC      AM-PAC 6 Clicks Score (OT) 17  -AC         Functional Assessment    Outcome Measure Options AM-PAC 6 Clicks Daily Activity (OT)  -AC                User Key  (r) = Recorded By, (t) = Taken By, (c) = Cosigned By      Initials Name Provider Type    AC Ahmet Hayes, OTR/L, CNT Occupational Therapist    Raiza Lua, RASHAD Physical Therapist Assistant                    Time Calculation:    Time Calculation- OT       Row Name 11/02/23 1101 11/02/23 0947          Time Calculation- OT    OT Start Time 0940  -TS --     OT Stop Time 1050  -TS --     OT Time Calculation (min) 70 min  -TS --     Total Timed Code Minutes- OT 70 minute(s)  -TS --     OT Received On 11/02/23  -TS --        Timed Charges    61025 - Gait Training Minutes  -- 25  -LY     24692 - OT Self Care/Mgmt Minutes 70  -TS --        Total Minutes    Timed Charges Total Minutes 70  -TS 25  -LY      Total Minutes 70  -TS 25  -LY               User Key  (r) = Recorded By, (t) = Taken By, (c) = Cosigned By      Initials Name Provider Type    TS Miroslava Frost COTA Occupational Therapist Assistant    Raiza Lua, RASHAD Physical Therapist Assistant                  Therapy Charges for Today       Code Description Service Date Service Provider Modifiers Qty    95673529190 HC OT SELF CARE/MGMT/TRAIN EA 15 MIN 11/2/2023 Miroslava Frost  RODRIGO LIU GO 5                 iMroslava Frost, RODRIGO  11/2/2023

## 2023-11-02 NOTE — PLAN OF CARE
Goal Outcome Evaluation:  Plan of Care Reviewed With: patient        Progress: improving  Outcome Evaluation: PT tx completed. Pt cont with trunk mobility during functional activities as well as intermittent freezing during transitions. Required min/mod x2 for supine to sit with increased time and effort to complete. Cues for sequencing. Worked on dynamic sitting balance while EOB with focus on improving trunk flexion, lateral flexion and rotation. No Lob noted. Cont to require CGA/min x1 for sit to stands d/t posterior lean, requires cues and time to correct. Amb 50' at a time with RWX and CGA. Pt with improved gait mechanics, however, bella decreased and shuffling returns upon approaching chair and with turns. Pt would benefit from acute rehab upon d/c.      Anticipated Discharge Disposition (PT): sub acute care setting, inpatient rehabilitation facility

## 2023-11-02 NOTE — THERAPY TREATMENT NOTE
Acute Care - Physical Therapy Treatment Note  [facility]     Patient Name: Speedy Villalba  : 1945  MRN: [id number]  Today's Date: 2023   Onset of Illness/Injury or Date of Surgery: 10/30/23  Visit Dx:     ICD-10-CM ICD-9-CM   1. Generalized weakness  R53.1 780.79   2. Impaired mobility and ADLs  Z74.09 V49.89    Z78.9    3. Impaired functional mobility and activity tolerance [Z74.09]  Z74.09 V49.89     Patient Active Problem List   Diagnosis    Gastroenteritis    Coronary artery disease involving native coronary artery of native heart without angina pectoris    Cognitive decline    Falls    Type 2 diabetes mellitus without complication, without long-term current use of insulin    Primary hypertension    Atypical parkinsonism     Past Medical History:   Diagnosis Date    Atypical parkinsonism 2023    Coronary artery disease 10 years    x2 stents    Diabetes mellitus < 2 years    non-insulin dependent    Difficulty walking < 1 year    multiple falls in the last 6-8 months    HL (hearing loss) 20+ years    Wears hearing aids occasionally    Hyperlipidemia 5 years    Memory loss 3 years+/-    gradual and subtle    Primary hypertension 10/30/2023    Sleep apnea 20+ year    Non-compliant with PEEP for tx    Stroke 2022     Past Surgical History:   Procedure Laterality Date    APPENDECTOMY      BACK SURGERY  2016    CAROTID STENT  ?    KNEE SURGERY  1960    LAMINECTOMY  2015?    discectomy & fusion     PT Assessment (last 12 hours)       PT Evaluation and Treatment       Row Name 23 0834          Physical Therapy Time and Intention    Subjective Information no complaints  -LY     Document Type therapy note (daily note)  -LY     Mode of Treatment physical therapy  -LY       Row Name 23 0834          General Information    Existing Precautions/Restrictions fall  -LY       Row Name 23 0834          Pain    Pretreatment Pain Rating 0/10 - no pain  -LY     Posttreatment Pain Rating  0/10 - no pain  -LY       Row Name 11/02/23 0834          Bed Mobility    Supine-Sit [place] (Bed Mobility) verbal cues;minimum assist (75% patient effort);moderate assist (50% patient effort);2 person assist  -LY     Assistive Device (Bed Mobility) bed rails;head of bed elevated  -LY     Comment, (Bed Mobility) increased time and effort required as well as cues for sequencing  -LY       Row Name 11/02/23 0834          Transfers    Comment, (Transfers) cont with posterior lean during transition  -LY       Row Name 11/02/23 0834          Sit-Stand Transfer    Sit-Stand [place] (Transfers) verbal cues;minimum assist (75% patient effort);moderate assist (50% patient effort)  -LY     Assistive Device (Sit-Stand Transfers) walker, front-wheeled  -LY     Comment, (Sit-Stand Transfer) cues provided for foot placement, anterior weight shift, hand placement. Mod x1 for sit to stand from low bench  -LY       Row Name 11/02/23 0834          Stand-Sit Transfer    Stand-Sit [place] (Transfers) verbal cues;contact guard;minimum assist (75% patient effort)  -LY     Assistive Device (Stand-Sit Transfers) walker, front-wheeled  -LY     Comment, (Stand-Sit Transfer) cues for hand placment, cont with decreased trunk flexion, poor eccentric control  -LY       Row Name 11/02/23 0834          Gait/Stairs (Locomotion)    [place] Level (Gait) verbal cues;contact guard  -LY     Assistive Device (Gait) walker, front-wheeled  -LY     Distance in Feet (Gait) 50'  practiced gait 4 times requires seated rest break  -LY     Pattern (Gait) step-through;step-to  -LY     Deviations/Abnormal Patterns (Gait) festinating/shuffling;gait speed decreased;stride length decreased  -LY     Bilateral Gait Deviations heel strike decreased  -LY     Comment, (Gait/Stairs) pt with improved gait mechanics, however, bella decreases and shuffling gait returns with approaching chair and with turns   -LY       Row Name 11/02/23 0834           Balance    Comment, Balance Dynamic seated balance activities, worked on BUE reaching EOB. Involved trunk flexion, rotation and lateral flexion with PTA's hand as target, no LOB noted.  -LY       Row Name 11/02/23 0834          Plan of Care Review    Plan of Care Reviewed With patient  -LY     Progress improving  -LY     Outcome Evaluation PT tx completed. Pt cont with trunk mobility during functional activities as well as intermittent freezing during transitions. Required min/mod x2 for supine to sit with increased time and effort to complete. Cues for sequencing. Worked on dynamic sitting balance while EOB with focus on improving trunk flexion, lateral flexion and rotation. No Lob noted. Cont to require CGA/min x1 for sit to stands d/t posterior lean, requires cues and time to correct. Amb 50' at a time with RWX and CGA. Pt with improved gait mechanics, however, bella decreased and shuffling returns upon approaching chair and with turns. Pt would benefit from acute rehab upon d/c.  -LY       Row Name 11/02/23 0834          Positioning and Restraints    Pre-Treatment Position in bed  -LY     Post Treatment Position chair  -LY     In Chair sitting;call light within reach;encouraged to call for assist  -LY               User Key  (r) = Recorded By, (t) = Taken By, (c) = Cosigned By      Initials Name Provider Type    LY Raiza Rosado, PTA Physical Therapist Assistant                    Physical Therapy Education       Title: PT OT SLP Therapies (In Progress)       Topic: Physical Therapy (In Progress)       Point: Mobility training (Done)       Learning Progress Summary             Patient Acceptance, SERENE ZARAGOZA DU by HIPOLITO at 11/2/2023 0947    Comment: education provided on importance of cont mobility for fall prevention    Acceptance, NIK,DEDRICK,SERENE BOSTON DU,NR by JULIA at 10/31/2023 1614    Comment: education re: purpose of PT/importance of activity, for safety/falls prevention, improved mobility w/ bed mobility, tfers and gait  technique, w/ increase awareness of upright balance                         Point: Home exercise program (Not Started)       Learner Progress:  Not documented in this visit.              Point: Precautions (Done)       Learning Progress Summary             Patient Acceptance, E,TB,D, VU,DU,NR by  at 10/31/2023 3824    Comment: education re: purpose of PT/importance of activity, for safety/falls prevention, improved mobility w/ bed mobility, tfers and gait technique, w/ increase awareness of upright balance                                         User Key       Initials Effective Dates Name Provider Type Discipline    LY 05/18/22 -  Raiza Rosado, PTA Physical Therapist Assistant PT    JULIA 08/02/18 -  Unique Giron, PT Physical Therapist PT                  PT Recommendation and Plan  Anticipated Discharge Disposition (PT): sub acute care setting, inpatient rehabilitation facility  Plan of Care Reviewed With: patient  Progress: improving  Outcome Evaluation: PT tx completed. Pt cont with trunk mobility during functional activities as well as intermittent freezing during transitions. Required min/mod x2 for supine to sit with increased time and effort to complete. Cues for sequencing. Worked on dynamic sitting balance while EOB with focus on improving trunk flexion, lateral flexion and rotation. No Lob noted. Cont to require CGA/min x1 for sit to stands d/t posterior lean, requires cues and time to correct. Amb 50' at a time with RWX and CGA. Pt with improved gait mechanics, however, bella decreased and shuffling returns upon approaching chair and with turns. Pt would benefit from acute rehab upon d/c.   Outcome Measures       Row Name 11/02/23 0834 11/01/23 1029 11/01/23 0900       How much help from another person do you currently need...    Turning from your back to your side while in flat bed without using bedrails? 2  -LY 2  -LY --    Moving from lying on back to sitting on the side of a flat bed without  bedrails? 2  -LY 2  -LY --    Moving to and from a bed to a chair (including a wheelchair)? 3  -LY 3  -LY --    Standing up from a chair using your arms (e.g., wheelchair, bedside chair)? 3  -LY 3  -LY --    Climbing 3-5 steps with a railing? 1  -LY 1  -LY --    To walk in hospital room? 3  -LY 3  -LY --    AM-PAC 6 Clicks Score (PT) 14  -LY 14  -LY --    Highest level of mobility 4 --> Transferred to chair/commode  -LY 4 --> Transferred to chair/commode  -LY --       How much help from another is currently needed...    Putting on and taking off regular lower body clothing? -- -- 2  -AC    Bathing (including washing, rinsing, and drying) -- -- 2  -AC    Toileting (which includes using toilet bed pan or urinal) -- -- 2  -AC    Putting on and taking off regular upper body clothing -- -- 3  -AC    Taking care of personal grooming (such as brushing teeth) -- -- 4  -AC    Eating meals -- -- 4  -AC    AM-PAC 6 Clicks Score (OT) -- -- 17  -AC       Functional Assessment    Outcome Measure Options AM-PAC 6 Clicks Basic Mobility (PT)  -LY AM-PAC 6 Clicks Basic Mobility (PT)  -LY AM-PAC 6 Clicks Daily Activity (OT)  -AC      Row Name 10/31/23 0915             How much help from another is currently needed...    Putting on and taking off regular lower body clothing? 2  -AC      Bathing (including washing, rinsing, and drying) 2  -AC      Toileting (which includes using toilet bed pan or urinal) 2  -AC      Putting on and taking off regular upper body clothing 3  -AC      Taking care of personal grooming (such as brushing teeth) 4  -AC      Eating meals 4  -AC      AM-PAC 6 Clicks Score (OT) 17  -AC         Functional Assessment    Outcome Measure Options AM-PAC 6 Clicks Daily Activity (OT)  -AC                User Key  (r) = Recorded By, (t) = Taken By, (c) = Cosigned By      Initials Name Provider Type    AC Ahmet Hayes, OTR/L, CNT Occupational Therapist    Raiza Lua, PTA Physical Therapist Assistant                      Time Calculation:    PT Charges       Row Name 11/02/23 0947             Time Calculation    Start Time 0834  -LY      Stop Time 0928  -LY      Time Calculation (min) 54 min  -LY      PT Received On 11/02/23  -LY         Time Calculation- PT    Total Timed Code Minutes- PT 54 minute(s)  -LY         Timed Charges    31264 - Gait Training Minutes  25  -LY      91210 - PT Therapeutic Activity Minutes 29  -LY         Total Minutes    Timed Charges Total Minutes 54  -LY       Total Minutes 54  -LY                User Key  (r) = Recorded By, (t) = Taken By, (c) = Cosigned By      Initials Name Provider Type    Raiza Lua PTA Physical Therapist Assistant                  Therapy Charges for Today       Code Description Service Date Service Provider Modifiers Qty    63361670028 HC GAIT TRAINING EA 15 MIN 11/1/2023 Raiza Rosado, PTA GP 2    73334710162 HC PT THERAPEUTIC ACT EA 15 MIN 11/1/2023 Raiza Rosado, PTA GP 2    58778064505 HC GAIT TRAINING EA 15 MIN 11/1/2023 Raiza Rosado, PTA GP 2    05357501204 HC GAIT TRAINING EA 15 MIN 11/2/2023 Raiza Rosado, PTA GP 2    59646957688 HC PT THERAPEUTIC ACT EA 15 MIN 11/2/2023 Raiza Rosado, PTA GP 2            PT G-Codes  Outcome Measure Options: AM-PAC 6 Clicks Basic Mobility (PT)  AM-PAC 6 Clicks Score (PT): 14  AM-PAC 6 Clicks Score (OT): 17    Raiza Rosado PTA  11/2/2023

## 2023-11-02 NOTE — PLAN OF CARE
Goal Outcome Evaluation:  Plan of Care Reviewed With: patient        Progress: no change  Outcome Evaluation: Patient confused at times. Denies pain and nausea. IVF. Voiding; incontinent at times. Accuchecks ACHS; SSI. Bed alarm. VSS. Safety maintained.

## 2023-11-02 NOTE — CASE MANAGEMENT/SOCIAL WORK
Continued Stay Note   [place]     Patient Name: Speedy Villalba  MRN: [id number]  Today's Date: 11/2/2023    Admit Date: 10/30/2023    Plan: [facility]   Discharge Plan       Row Name 11/02/23 1432       Plan    Plan [facility]    Patient/Family in Agreement with Plan yes    Plan Comments [facility] was mentioned and pt's daughter Meghan was ok with checking on. [phone] and pt does not have a qualifying dx at this time.                   Discharge Codes    No documentation.                 Expected Discharge Date and Time       Expected Discharge Date Expected Discharge Time    Nov 4, 2023               JOHN Jauregui

## 2023-11-03 LAB
GLUCOSE BLDC GLUCOMTR-MCNC: 100 MG/DL (ref 70–130)
GLUCOSE BLDC GLUCOMTR-MCNC: 106 MG/DL (ref 70–130)
GLUCOSE BLDC GLUCOMTR-MCNC: 124 MG/DL (ref 70–130)

## 2023-11-03 PROCEDURE — 97116 GAIT TRAINING THERAPY: CPT

## 2023-11-03 PROCEDURE — 25010000002 ENOXAPARIN PER 10 MG: Performed by: FAMILY MEDICINE

## 2023-11-03 PROCEDURE — 82948 REAGENT STRIP/BLOOD GLUCOSE: CPT

## 2023-11-03 PROCEDURE — 97530 THERAPEUTIC ACTIVITIES: CPT

## 2023-11-03 PROCEDURE — 97535 SELF CARE MNGMENT TRAINING: CPT

## 2023-11-03 RX ADMIN — TRAZODONE HYDROCHLORIDE 50 MG: 50 TABLET ORAL at 20:39

## 2023-11-03 RX ADMIN — DOCUSATE SODIUM 50 MG AND SENNOSIDES 8.6 MG 2 TABLET: 8.6; 5 TABLET, FILM COATED ORAL at 08:16

## 2023-11-03 RX ADMIN — Medication 10 ML: at 20:39

## 2023-11-03 RX ADMIN — CARBIDOPA AND LEVODOPA 1 TABLET: 25; 100 TABLET ORAL at 09:31

## 2023-11-03 RX ADMIN — NYSTATIN: 100000 POWDER TOPICAL at 20:50

## 2023-11-03 RX ADMIN — NYSTATIN: 100000 POWDER TOPICAL at 08:16

## 2023-11-03 RX ADMIN — Medication 10 ML: at 08:16

## 2023-11-03 RX ADMIN — CARBIDOPA AND LEVODOPA 1 TABLET: 25; 100 TABLET ORAL at 20:39

## 2023-11-03 RX ADMIN — ASPIRIN 81 MG: 81 TABLET, COATED ORAL at 08:15

## 2023-11-03 RX ADMIN — ENOXAPARIN SODIUM 40 MG: 100 INJECTION SUBCUTANEOUS at 05:18

## 2023-11-03 RX ADMIN — LOSARTAN POTASSIUM 50 MG: 50 TABLET, FILM COATED ORAL at 08:16

## 2023-11-03 RX ADMIN — SPIRONOLACTONE 25 MG: 25 TABLET ORAL at 08:17

## 2023-11-03 RX ADMIN — CARBIDOPA AND LEVODOPA 1 TABLET: 25; 100 TABLET ORAL at 16:16

## 2023-11-03 RX ADMIN — PRAVASTATIN SODIUM 20 MG: 20 TABLET ORAL at 20:39

## 2023-11-03 NOTE — THERAPY TREATMENT NOTE
Acute Care - Occupational Therapy Treatment Note  [facility]     Patient Name: Speedy Villalba  : 1945  MRN: [id number]  Today's Date: 11/3/2023  Onset of Illness/Injury or Date of Surgery: 10/30/23  Date of Referral to OT: 10/30/23  Referring Physician: Dr. Manriquez    Admit Date: 10/30/2023       ICD-10-CM ICD-9-CM   1. Generalized weakness  R53.1 780.79   2. Impaired mobility and ADLs  Z74.09 V49.89    Z78.9    3. Impaired functional mobility and activity tolerance [Z74.09]  Z74.09 V49.89     Patient Active Problem List   Diagnosis    Gastroenteritis    Coronary artery disease involving native coronary artery of native heart without angina pectoris    Cognitive decline    Falls    Type 2 diabetes mellitus without complication, without long-term current use of insulin    Primary hypertension    Atypical parkinsonism     Past Medical History:   Diagnosis Date    Atypical parkinsonism 2023    Coronary artery disease 10 years    x2 stents    Diabetes mellitus < 2 years    non-insulin dependent    Difficulty walking < 1 year    multiple falls in the last 6-8 months    HL (hearing loss) 20+ years    Wears hearing aids occasionally    Hyperlipidemia 5 years    Memory loss 3 years+/-    gradual and subtle    Primary hypertension 10/30/2023    Sleep apnea 20+ year    Non-compliant with PEEP for tx    Stroke 2022     Past Surgical History:   Procedure Laterality Date    APPENDECTOMY      BACK SURGERY  2016    CAROTID STENT  ?    KNEE SURGERY  1960    LAMINECTOMY  2015?    discectomy & fusion         OT ASSESSMENT FLOWSHEET (last 12 hours)       OT Evaluation and Treatment       Row Name 23 0810                   OT Time and Intention    Subjective Information no complaints  -TS        Document Type therapy note (daily note)  -TS        Mode of Treatment occupational therapy  -TS        Patient Effort good  -TS           General Information    Existing Precautions/Restrictions fall  -TS            Pain Assessment    Pretreatment Pain Rating 0/10 - no pain  -TS        Posttreatment Pain Rating 0/10 - no pain  -TS           Cognition    Orientation Status (Cognition) oriented x 4  -TS        Follows Commands (Cognition) WNL  -TS        Personal Safety Interventions fall prevention program maintained;gait belt;nonskid shoes/slippers when out of bed  -TS           Activities of Daily Living    BADL Assessment/Intervention upper body dressing;lower body dressing;feeding  -TS           Upper Body Dressing Assessment/Training    [place] Level (Upper Body Dressing) don;pull-over garment;set up;contact guard assist  -TS        Position (Upper Body Dressing) edge of bed sitting  -TS           Lower Body Dressing Assessment/Training    [place] Level (Lower Body Dressing) don;pants/bottoms;shoes/slippers;socks;minimum assist (75% patient effort);moderate assist (50% patient effort)  -TS        Position (Lower Body Dressing) edge of bed sitting;supported standing  -TS           Self-Feeding Assessment/Training    [place] Level (Feeding) liquids to mouth;scoop food and bring to mouth;set up  -TS        Position (Self-Feeding) supported sitting  -TS           Bed Mobility    Supine-Sit [place] (Bed Mobility) contact guard  -TS        Assistive Device (Bed Mobility) bed rails;head of bed elevated  -TS           Functional Mobility    Functional Mobility- Ind. Level contact guard assist  HHA  -TS        Functional Mobility- Comment HHA from EOB to recliner  -TS           Transfer Assessment/Treatment    Transfers sit-stand transfer;stand-sit transfer  -TS           Sit-Stand Transfer    Sit-Stand [place] (Transfers) contact guard  -TS           Stand-Sit Transfer    Stand-Sit [place] (Transfers) contact guard  -TS           Plan of Care Review    Plan of Care Reviewed With patient  -TS        Progress improving  -TS        Outcome Evaluation Pt CGA to come to EOB this AM. Once EOB pt completed UB  dressing with CGA. Pt min/mod A for LB dressing due to lack of reach and decreased balance. Pt would benefit from acute rehab at discharge. Continue OT POC  -TS           Positioning and Restraints    Pre-Treatment Position in bed  -TS        Post Treatment Position chair  -TS        In Chair sitting;call light within reach;encouraged to call for assist;notified nsg;exit alarm on  -TS                  User Key  (r) = Recorded By, (t) = Taken By, (c) = Cosigned By      Initials Name Effective Dates    TS Miroslava Frost COTA 02/03/23 -                      Occupational Therapy Education       Title: PT OT SLP Therapies (In Progress)       Topic: Occupational Therapy (In Progress)       Point: ADL training (Done)       Description:   Instruct learner(s) on proper safety adaptation and remediation techniques during self care or transfers.   Instruct in proper use of assistive devices.                  Learning Progress Summary             Patient Acceptance, E, VU by  at 11/2/2023 1100    Acceptance, E,D, VU,NR by  at 11/1/2023 0955    Comment: transfer techniques, balance, body mechanics    Acceptance, E, NR by  at 10/31/2023 1036                         Point: Home exercise program (In Progress)       Description:   Instruct learner(s) on appropriate technique for monitoring, assisting and/or progressing therapeutic exercises/activities.                  Learning Progress Summary             Patient Acceptance, E, NR by  at 10/31/2023 1036                         Point: Body mechanics (Done)       Description:   Instruct learner(s) on proper positioning and spine alignment during self-care, functional mobility activities and/or exercises.                  Learning Progress Summary             Patient Acceptance, E,D, VU,NR by  at 11/1/2023 0955    Comment: transfer techniques, balance, body mechanics    Acceptance, E, NR by  at 10/31/2023 1036                                         User Key        Initials Effective Dates Name Provider Type Discipline     02/03/23 -  Ahmet Hayes, OTR/L, CNT Occupational Therapist OT    TS 02/03/23 -  Miroslava Frost COTA Occupational Therapist Assistant OT                      OT Recommendation and Plan     Plan of Care Review  Plan of Care Reviewed With: patient  Progress: improving  Outcome Evaluation: Pt CGA to come to EOB this AM. Once EOB pt completed UB dressing with CGA. Pt min/mod A for LB dressing due to lack of reach and decreased balance. Pt would benefit from acute rehab at discharge. Continue OT POC  Plan of Care Reviewed With: patient  Outcome Evaluation: Pt CGA to come to EOB this AM. Once EOB pt completed UB dressing with CGA. Pt min/mod A for LB dressing due to lack of reach and decreased balance. Pt would benefit from acute rehab at discharge. Continue OT POC     Outcome Measures       Row Name 11/03/23 1200 11/03/23 0931 11/02/23 0834       How much help from another person do you currently need...    Turning from your back to your side while in flat bed without using bedrails? -- 3  -LY 2  -LY    Moving from lying on back to sitting on the side of a flat bed without bedrails? -- 2  -LY 2  -LY    Moving to and from a bed to a chair (including a wheelchair)? -- 3  -LY 3  -LY    Standing up from a chair using your arms (e.g., wheelchair, bedside chair)? -- 3  -LY 3  -LY    Climbing 3-5 steps with a railing? -- 1  -LY 1  -LY    To walk in hospital room? -- 3  -LY 3  -LY    AM-PAC 6 Clicks Score (PT) -- 15  -LY 14  -LY    Highest level of mobility -- 4 --> Transferred to chair/commode  -LY 4 --> Transferred to chair/commode  -LY       How much help from another is currently needed...    Putting on and taking off regular lower body clothing? 2  -TS -- --    Bathing (including washing, rinsing, and drying) 2  -TS -- --    Toileting (which includes using toilet bed pan or urinal) 3  -TS -- --    Putting on and taking off regular upper body clothing 3   -TS -- --    Taking care of personal grooming (such as brushing teeth) 4  -TS -- --    Eating meals 4  -TS -- --    AM-PAC 6 Clicks Score (OT) 18  -TS -- --       Functional Assessment    Outcome Measure Options -- AM-PAC 6 Clicks Basic Mobility (PT)  -LY AM-PAC 6 Clicks Basic Mobility (PT)  -LY      Row Name 11/01/23 1029 11/01/23 0900          How much help from another person do you currently need...    Turning from your back to your side while in flat bed without using bedrails? 2  -LY --     Moving from lying on back to sitting on the side of a flat bed without bedrails? 2  -LY --     Moving to and from a bed to a chair (including a wheelchair)? 3  -LY --     Standing up from a chair using your arms (e.g., wheelchair, bedside chair)? 3  -LY --     Climbing 3-5 steps with a railing? 1  -LY --     To walk in hospital room? 3  -LY --     AM-PAC 6 Clicks Score (PT) 14  -LY --     Highest level of mobility 4 --> Transferred to chair/commode  -LY --        How much help from another is currently needed...    Putting on and taking off regular lower body clothing? -- 2  -AC     Bathing (including washing, rinsing, and drying) -- 2  -AC     Toileting (which includes using toilet bed pan or urinal) -- 2  -AC     Putting on and taking off regular upper body clothing -- 3  -AC     Taking care of personal grooming (such as brushing teeth) -- 4  -AC     Eating meals -- 4  -AC     AM-PAC 6 Clicks Score (OT) -- 17  -AC        Functional Assessment    Outcome Measure Options AM-PAC 6 Clicks Basic Mobility (PT)  -LY AM-PAC 6 Clicks Daily Activity (OT)  -AC               User Key  (r) = Recorded By, (t) = Taken By, (c) = Cosigned By      Initials Name Provider Type    AC Ahmet Hayes, OTR/L, CNT Occupational Therapist    Miroslava Bird, RODRIGO Occupational Therapist Assistant    LY Raiza Rosado, PTA Physical Therapist Assistant                    Time Calculation:    Time Calculation- OT       Row Name 11/03/23 2369  11/03/23 1055          Time Calculation- OT    OT Start Time 0810  -TS --     OT Stop Time 0850  -TS --     OT Time Calculation (min) 40 min  -TS --     Total Timed Code Minutes- OT 40 minute(s)  -TS --     OT Received On 11/03/23  -TS --        Timed Charges    52906 - Gait Training Minutes  -- 26  -LY     37101 - OT Self Care/Mgmt Minutes 40  -TS --        Total Minutes    Timed Charges Total Minutes 40  -TS 26  -LY      Total Minutes 40  -TS 26  -LY               User Key  (r) = Recorded By, (t) = Taken By, (c) = Cosigned By      Initials Name Provider Type    TS Miroslava Frost COTA Occupational Therapist Assistant    Raiza Lua, PTA Physical Therapist Assistant                  Therapy Charges for Today       Code Description Service Date Service Provider Modifiers Qty    04634643682 HC OT SELF CARE/MGMT/TRAIN EA 15 MIN 11/2/2023 Miroslava Frost COTA GO 5    53838352995 HC OT SELF CARE/MGMT/TRAIN EA 15 MIN 11/3/2023 Miroslava Frost COTA GO 3                 RODRIGO Maldonado  11/3/2023

## 2023-11-03 NOTE — PLAN OF CARE
Goal Outcome Evaluation:  Plan of Care Reviewed With: patient        Progress: improving  Outcome Evaluation: PT tx completed. Pt up in chair on arrival, eager to participate with therapy. Requires CGA/mi x1 to stand with cues provided for hand placement, anterior weight shift and to scoot to edge of seat. Amb 50' at a time with CGA and without RWX. Worked on dynamic standing/walking balance activities. Pt had 2 LOB and required min x1 to recover. As pt fatigues pt demos decreased eccentric control and posterior lean increases with stand to sits. Pt would benefit from cont rehab upon d/c.      Anticipated Discharge Disposition (PT): sub acute care setting, inpatient rehabilitation facility

## 2023-11-03 NOTE — PLAN OF CARE
Goal Outcome Evaluation:  Plan of Care Reviewed With: patient        Progress: improving  Outcome Evaluation: IID; Bedcheck; No complaints of pain or nausea; up with assist x2 and a walker; Room air; slow to respond verbally at baseline; voiding per urinal; incontinent at times; Disoriented to time; case management working on placement at discharge; resting between care; safety maintained

## 2023-11-03 NOTE — PLAN OF CARE
Goal Outcome Evaluation: Pt is alert and oriented, no c/o pain, vitals are stable, no s/s of distress, will cont to monitor           Progress: improving

## 2023-11-03 NOTE — PROGRESS NOTES
[facility] Medicine Services  INPATIENT PROGRESS NOTE    Patient Name: Speedy Villalba  Date of Admission: 10/30/2023  Today's Date: 11/03/23  Length of Stay: 2  Primary Care Physician: Luca Benson MD    Subjective   Chief Complaint: Weakness  HPI   Appears less stiff after starting Sinemet.     Review of Systems   All pertinent negatives and positives are as above. All other systems have been reviewed and are negative unless otherwise stated.     Objective    Temp:  [97.6 °F (36.4 °C)-98 °F (36.7 °C)] 97.7 °F (36.5 °C)  Heart Rate:  [67-80] 75  Resp:  [16] 16  BP: (120-147)/(56-77) 143/56  Physical Exam  Constitutional:       Appearance: He is well-developed. He is not ill-appearing.   HENT:      Head: Normocephalic and atraumatic.      Right Ear: External ear normal.      Left Ear: External ear normal.      Nose: Nose normal.      Mouth/Throat:      Mouth: Mucous membranes are dry.   Eyes:      General:         Right eye: No discharge.         Left eye: No discharge.      Extraocular Movements: Extraocular movements intact.      Conjunctiva/sclera: Conjunctivae normal.      Pupils: Pupils are equal, round, and reactive to light.   Neck:      Vascular: No JVD.   Cardiovascular:      Rate and Rhythm: Regular rhythm. Tachycardia present.      Heart sounds: Normal heart sounds. No murmur heard.  Pulmonary:      Effort: Pulmonary effort is normal. No respiratory distress.      Breath sounds: Normal breath sounds. No wheezing or rales.   Chest:      Chest wall: No tenderness.   Abdominal:      General: Bowel sounds are normal. There is no distension.      Palpations: Abdomen is soft.      Tenderness: There is no abdominal tenderness. There is no guarding or rebound.   Musculoskeletal:         General: No tenderness or deformity. Normal range of motion.      Cervical back: Normal range of motion and neck supple. No rigidity.      Right lower leg: No edema.      Left  "lower leg: No edema.   Skin:     General: Skin is warm and dry.      Findings: No rash.   Neurological:      General: No focal deficit present.      Mental Status: He is alert and oriented to person, place, and time. Mental status is at baseline.      Cranial Nerves: No cranial nerve deficit.      Sensory: No sensory deficit.      Motor: No abnormal muscle tone.      Deep Tendon Reflexes: Reflexes normal.      Comments: Slow processing, muscle stiffness.   Psychiatric:         Mood and Affect: Mood normal.         Behavior: Behavior normal.             Results Review:  I have reviewed the labs, radiology results, and diagnostic studies.    Laboratory Data:   Results from last 7 days   Lab Units 10/30/23  1105   WBC 10*3/mm3 7.57   HEMOGLOBIN g/dL 13.1   HEMATOCRIT % 40.2   PLATELETS 10*3/mm3 176        Results from last 7 days   Lab Units 10/31/23  0624 10/30/23  1105   SODIUM mmol/L 136 136   POTASSIUM mmol/L 4.2 4.5   CHLORIDE mmol/L 103 100   CO2 mmol/L 24.0 29.0   BUN mg/dL 19 19   CREATININE mg/dL 1.18 1.21   CALCIUM mg/dL 9.0 9.6   GLUCOSE mg/dL 101* 117*       Culture Data:   No results found for: \"BLOODCX\", \"URINECX\", \"WOUNDCX\", \"MRSACX\", \"RESPCX\", \"STOOLCX\"    Radiology Data:   Imaging Results (Last 24 Hours)       ** No results found for the last 24 hours. **            I have reviewed the patient's current medications.     Assessment/Plan   Assessment  Active Hospital Problems    Diagnosis     **Gastroenteritis     Atypical parkinsonism     Coronary artery disease involving native coronary artery of native heart without angina pectoris     Cognitive decline     Falls     Type 2 diabetes mellitus without complication, without long-term current use of insulin     Primary hypertension      Treatment Plan  Vitals every 4 hours  Cardiac ADA diet  IVF saline lock  PT/OT evaluations  Placement with  ongoing  He is not able to walk or transfer without assistance and is at risk of falls and injury, " will need safer discharge than assisted living at this point  Patient with likely Parkinsons disease, visited with neurology outpatient and has scheduled follow up   DVT prophylaxis    Patient may benefit from acute rehabilitation for Parkinson atypical disease rather than subacute rehabilitation.     Medical Decision Making  Number and Complexity of problems: 3 complex medical problems  Differential Diagnosis: none    Conditions and Status        Condition is unchanged.     MDM Data  External documents reviewed: [employer] EHR  Cardiac tracing (EKG, telemetry) interpretation: -  Radiology interpretation: -  Labs reviewed: -  Any tests that were considered but not ordered: -     Decision rules/scores evaluated (example [id number], [place], etc): -     Discussed with: Patient and daughter at bedside     Care Planning  Shared decision making: Patient   Code status and discussions: Full code    Disposition  Social Determinants of Health that impact treatment or disposition: mobility limitations  I expect the patient to be discharged to 11/01/2023 in 1-3 days.     Electronically signed by Uriah Manriquez MD, 11/03/23, 12:25 CDT.

## 2023-11-03 NOTE — PLAN OF CARE
Goal Outcome Evaluation:  Plan of Care Reviewed With: patient        Progress: improving  Outcome Evaluation: Pt CGA to come to EOB this AM. Once EOB pt completed UB dressing with CGA. Pt min/mod A for LB dressing due to lack of reach and decreased balance. Pt would benefit from acute rehab at discharge. Continue OT POC

## 2023-11-03 NOTE — PLAN OF CARE
Spoke with evaluating therapist regarding pt's POC. Based on pt's current signs and symptoms, pt would greatly benefit from a facility where the [place] program is offered. This PTA has reached out via email with an OT therapist at a local facility to inquire if this program is still offered.  However, I understand the pt would need to be agreeable to the facility that offers this program. I will notify staff involved in pt's care when I hear from the providing therapist.

## 2023-11-03 NOTE — THERAPY TREATMENT NOTE
Acute Care - Physical Therapy Treatment Note  [facility]     Patient Name: Speedy Villalba  : 1945  MRN: [id number]  Today's Date: 11/3/2023   Onset of Illness/Injury or Date of Surgery: 10/30/23  Visit Dx:     ICD-10-CM ICD-9-CM   1. Generalized weakness  R53.1 780.79   2. Impaired mobility and ADLs  Z74.09 V49.89    Z78.9    3. Impaired functional mobility and activity tolerance [Z74.09]  Z74.09 V49.89     Patient Active Problem List   Diagnosis    Gastroenteritis    Coronary artery disease involving native coronary artery of native heart without angina pectoris    Cognitive decline    Falls    Type 2 diabetes mellitus without complication, without long-term current use of insulin    Primary hypertension    Atypical parkinsonism     Past Medical History:   Diagnosis Date    Atypical parkinsonism 2023    Coronary artery disease 10 years    x2 stents    Diabetes mellitus < 2 years    non-insulin dependent    Difficulty walking < 1 year    multiple falls in the last 6-8 months    HL (hearing loss) 20+ years    Wears hearing aids occasionally    Hyperlipidemia 5 years    Memory loss 3 years+/-    gradual and subtle    Primary hypertension 10/30/2023    Sleep apnea 20+ year    Non-compliant with PEEP for tx    Stroke 2022     Past Surgical History:   Procedure Laterality Date    APPENDECTOMY      BACK SURGERY  2016    CAROTID STENT  ?    KNEE SURGERY  1960    LAMINECTOMY  2015?    discectomy & fusion     PT Assessment (last 12 hours)       PT Evaluation and Treatment       Row Name 23 0931          Physical Therapy Time and Intention    Subjective Information no complaints  -LY     Document Type therapy note (daily note)  -LY     Mode of Treatment physical therapy  -LY       Row Name 23 0931          General Information    Existing Precautions/Restrictions fall  -LY       Row Name 23 1000          Pain    Pretreatment Pain Rating 0/10 - no pain  -LY     Posttreatment Pain Rating  0/10 - no pain  -LY       Row Name 11/03/23 1000          Bed Mobility    Comment, (Bed Mobility) up in chair  -LY       Row Name 11/03/23 1000          Transfers    Comment, (Transfers) posterior lean  -LY       Row Name 11/03/23 1000          Sit-Stand Transfer    Sit-Stand Logan (Transfers) verbal cues;contact guard;minimum assist (75% patient effort)  -LY     Assistive Device (Sit-Stand Transfers) walker, front-wheeled  -LY     Comment, (Sit-Stand Transfer) cues provided for hand placement, anterior weight shift and to scoot to edge of seat  -LY       Row Name 11/03/23 1000          Stand-Sit Transfer    Stand-Sit Logan (Transfers) verbal cues;contact guard  -LY     Assistive Device (Stand-Sit Transfers) walker, front-wheeled  -LY     Comment, (Stand-Sit Transfer) as pt fatigues, decreased eccentric control noted  -LY       Row Name 11/03/23 1000          Gait/Stairs (Locomotion)    Logan Level (Gait) verbal cues;contact guard  -LY     Distance in Feet (Gait) 50  practiced 4 times, requires seated rest breaks  -LY     Pattern (Gait) step-through;step-to  -LY     Deviations/Abnormal Patterns (Gait) festinating/shuffling;gait speed decreased;stride length decreased  -LY     Bilateral Gait Deviations heel strike decreased  -LY     Comment, (Gait/Stairs) cont with shuffling gait while approaching destination and with turns  -LY       Row Name 11/03/23 1000          Balance    Comment, Balance worked on dynamic standing/walking activities, pt had 2 LOB that required min x1 to correct  -LY       Row Name 11/03/23 1000          Plan of Care Review    Plan of Care Reviewed With patient  -LY     Progress improving  -LY     Outcome Evaluation PT tx completed. Pt up in chair on arrival, eager to participate with therapy. Requires CGA/mi x1 to stand with cues provided for hand placement, anterior weight shift and to scoot to edge of seat. Amb 50' at a time with CGA and without RWX. Worked on dynamic  standing/walking balance activities. Pt had 2 LOB and required min x1 to recover. As pt fatigues pt demos decreased eccentric control and posterior lean increases with stand to sits. Pt would benefit from cont rehab upon d/c.  -LY       Row Name 11/03/23 1000          Positioning and Restraints    Pre-Treatment Position sitting in chair/recliner  -LY     Post Treatment Position chair  -LY     In Chair reclined;call light within reach;encouraged to call for assist  -LY               User Key  (r) = Recorded By, (t) = Taken By, (c) = Cosigned By      Initials Name Provider Type    LY Raiza Rosado PTA Physical Therapist Assistant                    Physical Therapy Education       Title: PT OT SLP Therapies (In Progress)       Topic: Physical Therapy (In Progress)       Point: Mobility training (Done)       Learning Progress Summary             Patient Acceptance, E, VU,DU by HIPOLITO at 11/2/2023 0947    Comment: education provided on importance of cont mobility for fall prevention    Acceptance, E,TB,D, VU,DU,NR by  at 10/31/2023 1614    Comment: education re: purpose of PT/importance of activity, for safety/falls prevention, improved mobility w/ bed mobility, tfers and gait technique, w/ increase awareness of upright balance                         Point: Home exercise program (Not Started)       Learner Progress:  Not documented in this visit.              Point: Precautions (Done)       Learning Progress Summary             Patient Acceptance, E,TB,D, VU,DU,NR by  at 10/31/2023 1614    Comment: education re: purpose of PT/importance of activity, for safety/falls prevention, improved mobility w/ bed mobility, tfers and gait technique, w/ increase awareness of upright balance                                         User Key       Initials Effective Dates Name Provider Type Discipline    LY 05/18/22 -  Raiza Rosado PTA Physical Therapist Assistant PT     08/02/18 -  Unique Giron PT Physical Therapist PT                   PT Recommendation and Plan  Anticipated Discharge Disposition (PT): sub acute care setting, inpatient rehabilitation facility  Plan of Care Reviewed With: patient  Progress: improving  Outcome Evaluation: PT tx completed. Pt up in chair on arrival, eager to participate with therapy. Requires CGA/mi x1 to stand with cues provided for hand placement, anterior weight shift and to scoot to edge of seat. Amb 50' at a time with CGA and without RWX. Worked on dynamic standing/walking balance activities. Pt had 2 LOB and required min x1 to recover. As pt fatigues pt demos decreased eccentric control and posterior lean increases with stand to sits. Pt would benefit from cont rehab upon d/c.   Outcome Measures       Row Name 11/03/23 0931 11/02/23 0834 11/01/23 1029       How much help from another person do you currently need...    Turning from your back to your side while in flat bed without using bedrails? 3  -LY 2  -LY 2  -LY    Moving from lying on back to sitting on the side of a flat bed without bedrails? 2  -LY 2  -LY 2  -LY    Moving to and from a bed to a chair (including a wheelchair)? 3  -LY 3  -LY 3  -LY    Standing up from a chair using your arms (e.g., wheelchair, bedside chair)? 3  -LY 3  -LY 3  -LY    Climbing 3-5 steps with a railing? 1  -LY 1  -LY 1  -LY    To walk in hospital room? 3  -LY 3  -LY 3  -LY    AM-PAC 6 Clicks Score (PT) 15  -LY 14  -LY 14  -LY    Highest level of mobility 4 --> Transferred to chair/commode  -LY 4 --> Transferred to chair/commode  -LY 4 --> Transferred to chair/commode  -LY       Functional Assessment    Outcome Measure Options AM-PAC 6 Clicks Basic Mobility (PT)  -LY AM-PAC 6 Clicks Basic Mobility (PT)  -LY AM-PAC 6 Clicks Basic Mobility (PT)  -LY      Row Name 11/01/23 0900             How much help from another is currently needed...    Putting on and taking off regular lower body clothing? 2  -AC      Bathing (including washing, rinsing, and drying) 2  -AC       Toileting (which includes using toilet bed pan or urinal) 2  -AC      Putting on and taking off regular upper body clothing 3  -AC      Taking care of personal grooming (such as brushing teeth) 4  -AC      Eating meals 4  -AC      AM-PAC 6 Clicks Score (OT) 17  -AC         Functional Assessment    Outcome Measure Options AM-PAC 6 Clicks Daily Activity (OT)  -AC                User Key  (r) = Recorded By, (t) = Taken By, (c) = Cosigned By      Initials Name Provider Type    AC Ahmet Hayes, OTR/L, CNT Occupational Therapist    Raiza Lua PTA Physical Therapist Assistant                     Time Calculation:    PT Charges       Row Name 11/03/23 1055             Time Calculation    Start Time 0931  -LY      Stop Time 1015  -LY      Time Calculation (min) 44 min  -LY      PT Received On 11/03/23  -LY         Time Calculation- PT    Total Timed Code Minutes- PT 44 minute(s)  -LY         Timed Charges    11916 - Gait Training Minutes  26  -LY      11375 - PT Therapeutic Activity Minutes 18  -LY         Total Minutes    Timed Charges Total Minutes 44  -LY       Total Minutes 44  -LY                User Key  (r) = Recorded By, (t) = Taken By, (c) = Cosigned By      Initials Name Provider Type    Raiza Lua, RASHAD Physical Therapist Assistant                  Therapy Charges for Today       Code Description Service Date Service Provider Modifiers Qty    61121162410 HC GAIT TRAINING EA 15 MIN 11/2/2023 Raiza Rosado, PTA GP 2    66809958553 HC PT THERAPEUTIC ACT EA 15 MIN 11/2/2023 Raiza Rosado, PTA GP 2    94361966731 HC GAIT TRAINING EA 15 MIN 11/2/2023 Raiza Rosado, PTA GP 2    83717663088 HC PT THERAPEUTIC ACT EA 15 MIN 11/2/2023 Raiza Rosado, PTA GP 1    00631697656 HC GAIT TRAINING EA 15 MIN 11/3/2023 Raiza Rosado, PTA GP 2    35753169601 HC PT THERAPEUTIC ACT EA 15 MIN 11/3/2023 Raiza Rosado, PTA GP 1            PT G-Codes  Outcome Measure Options: AM-PAC 6 Clicks Basic Mobility  (PT)  AM-PAC 6 Clicks Score (PT): 15  AM-PAC 6 Clicks Score (OT): 17    Raiza Rosado, PTA  11/3/2023

## 2023-11-04 VITALS
WEIGHT: 273 LBS | BODY MASS INDEX: 40.43 KG/M2 | DIASTOLIC BLOOD PRESSURE: 50 MMHG | HEART RATE: 66 BPM | OXYGEN SATURATION: 94 % | TEMPERATURE: 99 F | HEIGHT: 69 IN | SYSTOLIC BLOOD PRESSURE: 114 MMHG | RESPIRATION RATE: 18 BRPM

## 2023-11-04 LAB — GLUCOSE BLDC GLUCOMTR-MCNC: 97 MG/DL (ref 70–130)

## 2023-11-04 PROCEDURE — 25010000002 ENOXAPARIN PER 10 MG: Performed by: FAMILY MEDICINE

## 2023-11-04 PROCEDURE — 82948 REAGENT STRIP/BLOOD GLUCOSE: CPT

## 2023-11-04 RX ADMIN — SPIRONOLACTONE 25 MG: 25 TABLET ORAL at 09:19

## 2023-11-04 RX ADMIN — Medication 10 ML: at 09:19

## 2023-11-04 RX ADMIN — NYSTATIN: 100000 POWDER TOPICAL at 09:19

## 2023-11-04 RX ADMIN — LOSARTAN POTASSIUM 50 MG: 50 TABLET, FILM COATED ORAL at 09:19

## 2023-11-04 RX ADMIN — CARBIDOPA AND LEVODOPA 1 TABLET: 25; 100 TABLET ORAL at 09:19

## 2023-11-04 RX ADMIN — DOCUSATE SODIUM 50 MG AND SENNOSIDES 8.6 MG 2 TABLET: 8.6; 5 TABLET, FILM COATED ORAL at 09:19

## 2023-11-04 RX ADMIN — ASPIRIN 81 MG: 81 TABLET, COATED ORAL at 09:19

## 2023-11-04 RX ADMIN — ENOXAPARIN SODIUM 40 MG: 100 INJECTION SUBCUTANEOUS at 06:44

## 2023-11-04 NOTE — THERAPY DISCHARGE NOTE
Acute Care - Physical Therapy Discharge Summary  [facility]       Patient Name: Speedy Villalba  : 1945  MRN: [id number]    Today's Date: 2023  Onset of Illness/Injury or Date of Surgery: 10/30/23       Referring Physician: Dr. Manriquez      Admit Date: 10/30/2023      PT Recommendation and Plan    Visit Dx:    ICD-10-CM ICD-9-CM   1. Generalized weakness  R53.1 780.79   2. Impaired mobility and ADLs  Z74.09 V49.89    Z78.9    3. Impaired functional mobility and activity tolerance [Z74.09]  Z74.09 V49.89        Outcome Measures       Row Name 23 1200 23 0931 23 0834       How much help from another person do you currently need...    Turning from your back to your side while in flat bed without using bedrails? -- 3  -LY 2  -LY    Moving from lying on back to sitting on the side of a flat bed without bedrails? -- 2  -LY 2  -LY    Moving to and from a bed to a chair (including a wheelchair)? -- 3  -LY 3  -LY    Standing up from a chair using your arms (e.g., wheelchair, bedside chair)? -- 3  -LY 3  -LY    Climbing 3-5 steps with a railing? -- 1  -LY 1  -LY    To walk in hospital room? -- 3  -LY 3  -LY    AM-PAC 6 Clicks Score (PT) -- 15  -LY 14  -LY    Highest level of mobility -- 4 --> Transferred to chair/commode  -LY 4 --> Transferred to chair/commode  -LY       How much help from another is currently needed...    Putting on and taking off regular lower body clothing? 2  -TS -- --    Bathing (including washing, rinsing, and drying) 2  -TS -- --    Toileting (which includes using toilet bed pan or urinal) 3  -TS -- --    Putting on and taking off regular upper body clothing 3  -TS -- --    Taking care of personal grooming (such as brushing teeth) 4  -TS -- --    Eating meals 4  -TS -- --    AM-PAC 6 Clicks Score (OT) 18  -TS -- --       Functional Assessment    Outcome Measure Options -- AM-PAC 6 Clicks Basic Mobility (PT)  -LY AM-PAC 6 Clicks Basic Mobility (PT)  -LY               User Key  (r) = Recorded By, (t) = Taken By, (c) = Cosigned By      Initials Name Provider Type    Miroslava Bird COTA Occupational Therapist Assistant    Raiza Lua, RASHAD Physical Therapist Assistant                         PT Rehab Goals       Row Name 11/04/23 1500             Bed Mobility Goal 1 (PT)    Activity/Assistive Device (Bed Mobility Goal 1, PT) rolling to left;rolling to right;scooting;sit to supine/supine to sit;sidelying to sit/sit to sidelying  -AB      Blair Level/Cues Needed (Bed Mobility Goal 1, PT) contact guard required  -AB      Time Frame (Bed Mobility Goal 1, PT) long term goal (LTG);10 days  -AB      Progress/Outcomes (Bed Mobility Goal 1, PT) goal not met  -AB         Transfer Goal 1 (PT)    Activity/Assistive Device (Transfer Goal 1, PT) sit-to-stand/stand-to-sit;bed-to-chair/chair-to-bed;other (see comments)  rwx for bed to/from chair  -AB      Blair Level/Cues Needed (Transfer Goal 1, PT) contact guard required  -AB      Time Frame (Transfer Goal 1, PT) long term goal (LTG);10 days  -AB      Progress/Outcome (Transfer Goal 1, PT) goal met  -AB         Gait Training Goal 1 (PT)    Activity/Assistive Device (Gait Training Goal 1, PT) gait (walking locomotion);assistive device use;decrease fall risk;diminish gait deviation;improve balance and speed;increase endurance/gait distance;normalize weight shifts;walker, rolling  -AB      Blair Level (Gait Training Goal 1, PT) contact guard required  -AB      Distance (Gait Training Goal 1, PT) 50 ft  -AB      Time Frame (Gait Training Goal 1, PT) long term goal (LTG);10 days  -AB      Progress/Outcome (Gait Training Goal 1, PT) goal met  -AB                User Key  (r) = Recorded By, (t) = Taken By, (c) = Cosigned By      Initials Name Provider Type Discipline    Nancy Senior, PTA Physical Therapist Assistant PT                        PT Discharge Summary  Anticipated Discharge Disposition (PT): sub  acute care setting, inpatient rehabilitation facility  Reason for Discharge: Discharge from facility  Outcomes Achieved: Refer to plan of care for updates on goals achieved  Discharge Destination: SNF      Nancy Krause, PTA   11/4/2023

## 2023-11-04 NOTE — DISCHARGE SUMMARY
[facility] Medicine Services  DISCHARGE SUMMARY       Date of Admission: 10/30/2023  Date of Discharge:  11/4/2023  Primary Care Physician: Luca Benson MD    Presenting Problem/History of Present Illness:  77 year old male with PMH of CAD, DM 2 NID, falls, memory loss that presents to the ER today with complaints of nausea and vomiting since yesterday. He was noted to have some degree of confusion, he stares and is slow to answer, but does appear oriented.      Final Discharge Diagnoses:  Active Hospital Problems    Diagnosis     **Gastroenteritis     Atypical parkinsonism     Coronary artery disease involving native coronary artery of native heart without angina pectoris     Cognitive decline     Falls     Type 2 diabetes mellitus without complication, without long-term current use of insulin     Primary hypertension        Consults: None    Procedures Performed: None    Pertinent Test Results:       Imaging Results (All)       Procedure Component Value Units Date/Time    CT Head Without Contrast [[id number]] Collected: 10/30/23 1506     Updated: 10/30/23 1511    Narrative:      EXAMINATION: CT HEAD WO CONTRAST-      10/30/2023 2:54 PM CDT     HISTORY: Mental status change, unknown cause     In order to have a CT radiation dose as low as reasonably achievable  Automated Exposure Control was utilized for adjustment of the mA and/or  KV according to patient size.     DLP in mGycm= 797.     Noncontrast head CT.  Axial, sagittal, and coronal sequences.     Comparison is made with 9/24/2023.     Chronic RIGHT maxillary sinus opacification.  Increased ethmoid and sphenoid sinus mucosal thickening.     Mild atrophy and small vessel disease.  No hemorrhage or mass effect.     No acute infarct is seen.       Impression:      1. Sinusitis changes.  2. Stable age-related changes.  3. No acute intracranial abnormality is seen.                                      This report  was signed and finalized on 10/30/2023 3:08 PM CDT by Dr. Eyal Morin MD.       CT Abdomen Pelvis With Contrast [[id number]] Collected: 10/30/23 1254     Updated: 10/30/23 1301    Narrative:      EXAMINATION: CT ABDOMEN PELVIS W CONTRAST-      10/30/2023 12:20 PM CDT     HISTORY: Nausea/vomiting. Diarrhea.     In order to have a CT radiation dose as low as reasonably achievable  Automated Exposure Control was utilized for adjustment of the mA and/or  KV according to patient size.     DLP in mGycm= 886.     Abdomen/pelvis CT with IV contrast injection.  Axial, sagittal, and coronal sequences.     No comparison.     Respiratory motion at the lung bases.  No acute lung disease is seen.  Heart size is at the upper limits of normal.     Normal liver.  Gallstones incidentally noted.  There is no sign of cholecystitis and there is no bile duct dilation.  Normal pancreas and spleen.  Normal adrenal glands.  Symmetric renal size though there is a large partially exophytic cyst  arising from the midportion of the RIGHT kidney.  RIGHT renal cyst = 73 x 72 mm.  No hydronephrosis.     Aortic calcification with no aneurysm.  Normal size aorta, IVC, and portal vein.     There is no bowel dilation.  History of appendectomy noted.  Scattered sigmoid diverticula.  No diverticulitis or colitis.     Pelvic phleboliths noted.     The urinary bladder is mildly distended.  No bladder mass or inflammation is seen.     RIGHT convex lumbar scoliosis.  L4-S1 hardware fusion.  Multilevel degenerative disc, endplate, and facet change within the  lumbar spine and lower thoracic spine.       Impression:      1. No mass, fluid collection, or inflammatory process is seen within the  abdomen or pelvis.  There is no stomach or bowel dilation.                                   This report was signed and finalized on 10/30/2023 12:57 PM CDT by Dr. Eyal Morin MD.       XR Chest 1 View [[id number]] Collected: 10/30/23 1003     Updated: 10/30/23 1007     Narrative:      EXAMINATION: XR CHEST 1 VW-     10/30/2023 9:55 AM CDT     HISTORY: Cough     COMPARISON:  9/22/2023 chest x-ray.     1 view chest x-ray.     Low lung volumes.  Magnified heart size.     End-stage degenerative change at the LEFT shoulder.     Monitoring device overlies the upper LEFT chest.     No pneumothorax or heart failure.       Impression:      1. Low lung volumes with magnified heart size.  2. Limited visualization of the retrocardiac LEFT lower lobe though an  abdomen/pelvis CT is planned and should visualize this area to assess  for occult infiltrate.     This report was signed and finalized on 10/30/2023 10:04 AM CDT by Dr. Eyal Morin MD.             LAB RESULTS:      Lab 10/30/23  1105   WBC 7.57   HEMOGLOBIN 13.1   HEMATOCRIT 40.2   PLATELETS 176   NEUTROS ABS 5.97   IMMATURE GRANS (ABS) 0.03   LYMPHS ABS 0.77   MONOS ABS 0.65   EOS ABS 0.12   MCV 96.2   CRP 2.77*   PROCALCITONIN 0.31*   LACTATE 1.0         Lab 10/31/23  0624 10/31/23  0451 10/30/23  1105   SODIUM 136  --  136   POTASSIUM 4.2  --  4.5   CHLORIDE 103  --  100   CO2 24.0  --  29.0   ANION GAP 9.0  --  7.0   BUN 19  --  19   CREATININE 1.18  --  1.21   EGFR 63.6  --  61.7   GLUCOSE 101*  --  117*   CALCIUM 9.0  --  9.6   MAGNESIUM 2.0  --   --    PHOSPHORUS 3.6  --   --    TSH  --  1.660  --                          Brief Urine Lab Results  (Last result in the past 365 days)        Color   Clarity   Blood   Leuk Est   Nitrite   Protein   CREAT   Urine HCG        10/30/23 1609 Yellow   Clear   Negative   Trace   Negative   Negative                 Microbiology Results (last 10 days)       Procedure Component Value - Date/Time    COVID PRE-OP / PRE-PROCEDURE SCREENING ORDER (NO ISOLATION) - Swab, Nasal Cavity [[id number]]  (Normal) Collected: 10/30/23 1006    Lab Status: Final result Specimen: Swab from Nasal Cavity Updated: 10/30/23 1046    Narrative:      The following orders were created for panel order COVID PRE-OP /  PRE-PROCEDURE SCREENING ORDER (NO ISOLATION) - Swab, Nasal Cavity.  Procedure                               Abnormality         Status                     ---------                               -----------         ------                     COVID-19 and FLU A/B PCR...[[id number]]  Normal              Final result                 Please view results for these tests on the individual orders.    COVID-19 and FLU A/B PCR - Swab, Nasopharynx [[id number]]  (Normal) Collected: 10/30/23 1006    Lab Status: Final result Specimen: Swab from Nasopharynx Updated: 10/30/23 1046     COVID19 Not Detected     Influenza A PCR Not Detected     Influenza B PCR Not Detected    Narrative:      Fact sheet for providers: https://www.fda.gov/media/302967/download    Fact sheet for patients: https://www.fda.gov/media/503132/download    Test performed by PCR.          Hospital Course:   77-year-old male with history of coronary artery disease diabetes mellitus type 2 non-insulin-dependent memory loss and frequent falls that is admitted to medical floor after presenting with complaints of nausea vomiting diarrhea consistent with gastroenteritis.  The symptoms quickly resolved.  He was rehydrated with IV transition to orals.  Testing in the ER for COVID was negative.    More pressing concern was the fact that he had been losing strength and mobility falling a few times and at the time of presentation he could barely get up from the floor.  After resolution of dehydration and gastroenteritis he is did remain very weak and had trouble getting up from the bed unless he was prompted and guided.  Reviewing his records it was noted that he had visited with Rodrigue Hebert from neurology on July 10, 2023, at that point he was diagnosed with a typical parkinsonism and some work-up was ordered and pending he was not started on any medication.    We considered inpatient neurology services were not available-I have started him on a low-dose Sinemet  "which has provided good results this far without any significant side effects.    He is stable medically for discharge but does need subacute rehabilitation he had secure placement at a local skilled nursing facility.  He has follow-up with neurology outpatient for further recommendations regarding parkinsonism.    Physical Exam on Discharge:  /50 (BP Location: Right arm, Patient Position: Lying)   Pulse 66   Temp 99 °F (37.2 °C) (Oral)   Resp 18   Ht 175.3 cm (69\")   Wt 124 kg (273 lb)   SpO2 94%   BMI 40.32 kg/m²   Physical Exam  Constitutional:       Appearance: Normal appearance. He is not ill-appearing, toxic-appearing or diaphoretic.   HENT:      Head: Normocephalic and atraumatic.      Nose: Nose normal.      Mouth/Throat:      Mouth: Mucous membranes are moist. Mucous membranes are dry.   Eyes:      Extraocular Movements: Extraocular movements intact.      Conjunctiva/sclera: Conjunctivae normal.      Pupils: Pupils are equal, round, and reactive to light.   Cardiovascular:      Rate and Rhythm: Normal rate and regular rhythm.      Pulses: Normal pulses.   Pulmonary:      Effort: Pulmonary effort is normal.      Breath sounds: Normal breath sounds. No rales.   Abdominal:      General: Abdomen is flat. Bowel sounds are normal. There is no distension.      Palpations: Abdomen is soft.      Tenderness: There is no abdominal tenderness. There is no guarding.   Musculoskeletal:         General: Normal range of motion.      Right lower leg: No edema.      Left lower leg: No edema.   Skin:     General: Skin is warm.      Capillary Refill: Capillary refill takes less than 2 seconds.   Neurological:      General: No focal deficit present.      Mental Status: He is alert and oriented to person, place, and time.   Psychiatric:         Mood and Affect: Mood normal.         Behavior: Behavior normal.     Condition on Discharge:   Stable    Discharge Disposition:  Home-Health Care [facility] [place]    Discharge " Medications:     Discharge Medications        New Medications        Instructions Start Date   carbidopa-levodopa  MG per tablet  Commonly known as: SINEMET   1 tablet, Oral, 3 Times Daily             Continue These Medications        Instructions Start Date   allopurinol 100 MG tablet  Commonly known as: ZYLOPRIM   100 mg, Oral, Daily      aspirin 81 MG EC tablet   81 mg, Oral, Daily      Centrum Silver Adult 50+ tablet tablet   1 tablet, Oral, Daily      cyclobenzaprine 10 MG tablet  Commonly known as: FLEXERIL   10 mg, Oral, Every Night at Bedtime      glipizide 5 MG ER tablet  Commonly known as: GLUCOTROL XL   5 mg, Oral, Daily      losartan 50 MG tablet  Commonly known as: COZAAR   50 mg, Oral, Daily      Magnesium Oxide -Mg Supplement 400 (240 Mg) MG tablet   1 tablet, Oral, Daily      Melatonin 10 MG tablet   30 mg, Oral, Nightly      pravastatin 20 MG tablet  Commonly known as: PRAVACHOL   20 mg, Oral, Nightly      spironolactone 25 MG tablet  Commonly known as: ALDACTONE   25 mg, Oral, Daily      traZODone 100 MG tablet  Commonly known as: DESYREL   100 mg, Oral, Nightly      vitamin D3 125 MCG (5000 UT) tablet tablet   5,000 Units, Oral, Daily      zinc sulfate 220 (50 Zn) MG capsule  Commonly known as: ZINCATE   220 mg, Oral, Daily             Discharge Diet:   Cardiac ADA, regular consistency, thin liquids    Activity at Discharge:   Activity as tolerated, fall precautions    Follow-up Appointments:   Future Appointments   Date Time Provider Department Center   11/10/2023  1:45 PM Rodrigue Hebert PA [facility]       Test Results Pending at Discharge: None    Electronically signed by Uriah Manriquez MD, 11/04/23, 10:15 CDT.    Time: 34 minutes.

## 2023-11-04 NOTE — CASE MANAGEMENT/SOCIAL WORK
Continued Stay Note  [facility]     Patient Name: Speedy Villalba  MRN: [id number]  Today's Date: 11/4/2023    Admit Date: 10/30/2023    Plan: [facility]   Discharge Plan       Row Name 11/04/23 1037       Plan    Plan [facility]    Patient/Family in Agreement with Plan yes    Final Discharge Disposition Code 03 - skilled nursing facility (SNF)    Final Note Pt is being discharged to [facility] today, SNF level care and Jennifer aware of d/c status- has access to see d/c summary in epic.  Informed Meghan Villalba Daughter   [phone] which can transport and will be here shortly.                   Discharge Codes    No documentation.                 Expected Discharge Date and Time       Expected Discharge Date Expected Discharge Time    Nov 4, 2023               JOHN Lee

## 2023-11-04 NOTE — PLAN OF CARE
Patient stable at this time; report called to ISABEL Dhaliwal at [facility]; daughter is on her way to provide transportation.

## 2023-11-04 NOTE — THERAPY DISCHARGE NOTE
Acute Care - Occupational Therapy Discharge Summary  [facility]     Patient Name: Speedy Villalba  : 1945  MRN: [id number]    Today's Date: 2023  Onset of Illness/Injury or Date of Surgery: 10/30/23    Date of Referral to OT: 10/30/23  Referring Physician: Dr. Manriquez      Admit Date: 10/30/2023        OT Recommendation and Plan    Visit Dx:    ICD-10-CM ICD-9-CM   1. Generalized weakness  R53.1 780.79   2. Impaired mobility and ADLs  Z74.09 V49.89    Z78.9    3. Impaired functional mobility and activity tolerance [Z74.09]  Z74.09 V49.89                OT Rehab Goals       Row Name 23 1600             Transfer Goal 1 (OT)    Activity/Assistive Device (Transfer Goal 1, OT) bed-to-chair/chair-to-bed;toilet;shower chair  -LS      [place] Level/Cues Needed (Transfer Goal 1, OT) contact guard required  -LS      Time Frame (Transfer Goal 1, OT) long term goal (LTG);10 days  -LS      Progress/Outcome (Transfer Goal 1, OT) goal not met  -LS         Bathing Goal 1 (OT)    Activity/Device (Bathing Goal 1, OT) shower chair;bathing skills, all;long-handled sponge  -LS      [place] Level/Cues Needed (Bathing Goal 1, OT) minimum assist (75% or more patient effort)  -LS      Time Frame (Bathing Goal 1, OT) long term goal (LTG);10 days  -LS      Progress/Outcomes (Bathing Goal 1, OT) goal not met  -LS         Balance Goal 1 (OT)    Activity/Assistive Device (Balance Goal 1, OT) standing, static  -LS      [place] Level/Cues Needed (Balance Goal 1, OT) contact guard assist  -LS      Time Frame (Balance Goal 1, OT) long term goal (LTG);10 days  -LS      Progress/Outcomes (Balance Goal 1, OT) goal not met  -LS                User Key  (r) = Recorded By, (t) = Taken By, (c) = Cosigned By      Initials Name Provider Type Discipline    LS Cici Miranda COTA Occupational Therapist Assistant THERAPIES                     Outcome Measures       Row Name 23 1200 23 0931 23 0834       How  much help from another person do you currently need...    Turning from your back to your side while in flat bed without using bedrails? -- 3  -LY 2  -LY    Moving from lying on back to sitting on the side of a flat bed without bedrails? -- 2  -LY 2  -LY    Moving to and from a bed to a chair (including a wheelchair)? -- 3  -LY 3  -LY    Standing up from a chair using your arms (e.g., wheelchair, bedside chair)? -- 3  -LY 3  -LY    Climbing 3-5 steps with a railing? -- 1  -LY 1  -LY    To walk in hospital room? -- 3  -LY 3  -LY    AM-PAC 6 Clicks Score (PT) -- 15  -LY 14  -LY    Highest level of mobility -- 4 --> Transferred to chair/commode  -LY 4 --> Transferred to chair/commode  -LY       How much help from another is currently needed...    Putting on and taking off regular lower body clothing? 2  -TS -- --    Bathing (including washing, rinsing, and drying) 2  -TS -- --    Toileting (which includes using toilet bed pan or urinal) 3  -TS -- --    Putting on and taking off regular upper body clothing 3  -TS -- --    Taking care of personal grooming (such as brushing teeth) 4  -TS -- --    Eating meals 4  -TS -- --    AM-PAC 6 Clicks Score (OT) 18  -TS -- --       Functional Assessment    Outcome Measure Options -- AM-PAC 6 Clicks Basic Mobility (PT)  -LY AM-PAC 6 Clicks Basic Mobility (PT)  -LY              User Key  (r) = Recorded By, (t) = Taken By, (c) = Cosigned By      Initials Name Provider Type    Miroslava Bird COTA Occupational Therapist Assistant    Raiza Lua, PTA Physical Therapist Assistant                            OT Discharge Summary  Anticipated Discharge Disposition (OT): skilled nursing facility  Reason for Discharge: Discharge from facility  Outcomes Achieved: Refer to plan of care for updates on goals achieved  Discharge Destination: SNF      RODRIGO Rogers  11/4/2023

## 2023-11-04 NOTE — PLAN OF CARE
Goal Outcome Evaluation:  Plan of Care Reviewed With: patient  Progress: no change         Pt with no c/o pain this shift. IID maintained in place. Up with asst and walker. Bed check in place. Voiding per urinal/incont at times. Cont to whistle intermittently to get staff. Lovenox for VTE. VSS. Safety maintained.

## 2023-11-09 ENCOUNTER — TELEPHONE (OUTPATIENT)
Dept: NEUROLOGY | Facility: CLINIC | Age: 78
End: 2023-11-09
Payer: MEDICARE

## 2023-11-11 ENCOUNTER — APPOINTMENT (OUTPATIENT)
Dept: GENERAL RADIOLOGY | Facility: HOSPITAL | Age: 78
End: 2023-11-11
Payer: MEDICARE

## 2023-11-11 ENCOUNTER — HOSPITAL ENCOUNTER (EMERGENCY)
Facility: HOSPITAL | Age: 78
Discharge: HOME | End: 2023-11-11
Attending: EMERGENCY MEDICINE
Payer: MEDICARE

## 2023-11-11 ENCOUNTER — APPOINTMENT (OUTPATIENT)
Dept: CT IMAGING | Facility: HOSPITAL | Age: 78
End: 2023-11-11
Payer: MEDICARE

## 2023-11-11 VITALS
WEIGHT: 273 LBS | RESPIRATION RATE: 20 BRPM | HEART RATE: 74 BPM | TEMPERATURE: 98.9 F | OXYGEN SATURATION: 99 % | DIASTOLIC BLOOD PRESSURE: 82 MMHG | SYSTOLIC BLOOD PRESSURE: 146 MMHG | HEIGHT: 69 IN | BODY MASS INDEX: 40.43 KG/M2

## 2023-11-11 DIAGNOSIS — K80.20 CALCULUS OF GALLBLADDER WITHOUT CHOLECYSTITIS WITHOUT OBSTRUCTION: ICD-10-CM

## 2023-11-11 DIAGNOSIS — N39.0 ACUTE UTI (URINARY TRACT INFECTION): Primary | ICD-10-CM

## 2023-11-11 DIAGNOSIS — S70.01XA CONTUSION OF RIGHT HIP, INITIAL ENCOUNTER: ICD-10-CM

## 2023-11-11 DIAGNOSIS — N28.1 RENAL CYST: ICD-10-CM

## 2023-11-11 DIAGNOSIS — N28.9 MILD RENAL INSUFFICIENCY: ICD-10-CM

## 2023-11-11 DIAGNOSIS — R31.9 HEMATURIA, UNSPECIFIED TYPE: ICD-10-CM

## 2023-11-11 LAB
ANION GAP SERPL CALCULATED.3IONS-SCNC: 9 MMOL/L (ref 5–15)
BACTERIA UR QL AUTO: ABNORMAL /HPF
BASOPHILS # BLD AUTO: 0.03 10*3/MM3 (ref 0–0.2)
BASOPHILS NFR BLD AUTO: 0.3 % (ref 0–1.5)
BILIRUB UR QL STRIP: NEGATIVE
BUN SERPL-MCNC: 18 MG/DL (ref 8–23)
BUN/CREAT SERPL: 14.1 (ref 7–25)
CALCIUM SPEC-SCNC: 9.5 MG/DL (ref 8.6–10.5)
CHLORIDE SERPL-SCNC: 100 MMOL/L (ref 98–107)
CLARITY UR: CLEAR
CO2 SERPL-SCNC: 27 MMOL/L (ref 22–29)
COLOR UR: YELLOW
CREAT SERPL-MCNC: 1.28 MG/DL (ref 0.76–1.27)
DEPRECATED RDW RBC AUTO: 45.7 FL (ref 37–54)
EGFRCR SERPLBLD CKD-EPI 2021: 57.6 ML/MIN/1.73
EOSINOPHIL # BLD AUTO: 0.18 10*3/MM3 (ref 0–0.4)
EOSINOPHIL NFR BLD AUTO: 1.6 % (ref 0.3–6.2)
ERYTHROCYTE [DISTWIDTH] IN BLOOD BY AUTOMATED COUNT: 12.9 % (ref 12.3–15.4)
GLUCOSE SERPL-MCNC: 115 MG/DL (ref 65–99)
GLUCOSE UR STRIP-MCNC: NEGATIVE MG/DL
HCT VFR BLD AUTO: 40.7 % (ref 37.5–51)
HGB BLD-MCNC: 13 G/DL (ref 13–17.7)
HGB UR QL STRIP.AUTO: ABNORMAL
HOLD SPECIMEN: NORMAL
HYALINE CASTS UR QL AUTO: ABNORMAL /LPF
IMM GRANULOCYTES # BLD AUTO: 0.05 10*3/MM3 (ref 0–0.05)
IMM GRANULOCYTES NFR BLD AUTO: 0.5 % (ref 0–0.5)
INR PPP: 1.17 (ref 0.91–1.09)
KETONES UR QL STRIP: NEGATIVE
LEUKOCYTE ESTERASE UR QL STRIP.AUTO: ABNORMAL
LYMPHOCYTES # BLD AUTO: 0.81 10*3/MM3 (ref 0.7–3.1)
LYMPHOCYTES NFR BLD AUTO: 7.4 % (ref 19.6–45.3)
MCH RBC QN AUTO: 30.5 PG (ref 26.6–33)
MCHC RBC AUTO-ENTMCNC: 31.9 G/DL (ref 31.5–35.7)
MCV RBC AUTO: 95.5 FL (ref 79–97)
MONOCYTES # BLD AUTO: 0.66 10*3/MM3 (ref 0.1–0.9)
MONOCYTES NFR BLD AUTO: 6 % (ref 5–12)
NEUTROPHILS NFR BLD AUTO: 84.2 % (ref 42.7–76)
NEUTROPHILS NFR BLD AUTO: 9.26 10*3/MM3 (ref 1.7–7)
NITRITE UR QL STRIP: NEGATIVE
NRBC BLD AUTO-RTO: 0 /100 WBC (ref 0–0.2)
PH UR STRIP.AUTO: 6.5 [PH] (ref 5–8)
PLATELET # BLD AUTO: 253 10*3/MM3 (ref 140–450)
PMV BLD AUTO: 10 FL (ref 6–12)
POTASSIUM SERPL-SCNC: 5.1 MMOL/L (ref 3.5–5.2)
PROT UR QL STRIP: NEGATIVE
PROTHROMBIN TIME: 15.1 SECONDS (ref 11.8–14.8)
RBC # BLD AUTO: 4.26 10*6/MM3 (ref 4.14–5.8)
RBC # UR STRIP: ABNORMAL /HPF
REF LAB TEST METHOD: ABNORMAL
SODIUM SERPL-SCNC: 136 MMOL/L (ref 136–145)
SP GR UR STRIP: 1.01 (ref 1–1.03)
SQUAMOUS #/AREA URNS HPF: ABNORMAL /HPF
UROBILINOGEN UR QL STRIP: ABNORMAL
WBC # UR STRIP: ABNORMAL /HPF
WBC NRBC COR # BLD: 10.99 10*3/MM3 (ref 3.4–10.8)
WHOLE BLOOD HOLD COAG: NORMAL
WHOLE BLOOD HOLD SPECIMEN: NORMAL

## 2023-11-11 PROCEDURE — 81001 URINALYSIS AUTO W/SCOPE: CPT | Performed by: EMERGENCY MEDICINE

## 2023-11-11 PROCEDURE — 85025 COMPLETE CBC W/AUTO DIFF WBC: CPT | Performed by: EMERGENCY MEDICINE

## 2023-11-11 PROCEDURE — 99285 EMERGENCY DEPT VISIT HI MDM: CPT

## 2023-11-11 PROCEDURE — 87086 URINE CULTURE/COLONY COUNT: CPT | Performed by: EMERGENCY MEDICINE

## 2023-11-11 PROCEDURE — 80048 BASIC METABOLIC PNL TOTAL CA: CPT | Performed by: EMERGENCY MEDICINE

## 2023-11-11 PROCEDURE — 25510000001 IOPAMIDOL 61 % SOLUTION: Performed by: EMERGENCY MEDICINE

## 2023-11-11 PROCEDURE — 73502 X-RAY EXAM HIP UNI 2-3 VIEWS: CPT

## 2023-11-11 PROCEDURE — 74177 CT ABD & PELVIS W/CONTRAST: CPT

## 2023-11-11 PROCEDURE — 85610 PROTHROMBIN TIME: CPT | Performed by: EMERGENCY MEDICINE

## 2023-11-11 PROCEDURE — 36415 COLL VENOUS BLD VENIPUNCTURE: CPT

## 2023-11-11 RX ORDER — AMOXICILLIN AND CLAVULANATE POTASSIUM 875; 125 MG/1; MG/1
1 TABLET, FILM COATED ORAL 2 TIMES DAILY
Qty: 14 TABLET | Refills: 0 | Status: SHIPPED | OUTPATIENT
Start: 2023-11-11 | End: 2023-11-18

## 2023-11-11 RX ADMIN — IOPAMIDOL 100 ML: 612 INJECTION, SOLUTION INTRAVENOUS at 14:53

## 2023-11-11 NOTE — ED PROVIDER NOTES
Subjective   History of Present Illness  77-year-old gentleman who came the ER complaint of gross hematuria he had fallen a couple days ago on his right hip and since then he was having hematuria no other complaint associate with this.  There is a faint bruise in the anterior lower part of the abdomen but there is no flank ecchymosis there is no evidence of any retroperitoneal hemorrhage on clinical exam.  There is no obvious trauma to the genitalia.  Hip range of motion is intact but causes some pain.  There is no focal neurological deficit associate with this.  Patient states that after the fall has not been able to weight-bear on that leg because it causes pain.    Blood in Urine  This is a new problem. The current episode started yesterday. The problem is unchanged. He describes the hematuria as gross hematuria. He describes his urine color as dark red. Obstructive symptoms do not include dribbling, incomplete emptying or a slower stream. Associated symptoms include bone pain. Pertinent negatives include no abdominal pain, bladder pain, chills, dysuria, fever, flank pain, hesitancy, nausea, urinary retention or vomiting. His past medical history is significant for hypertension. There is no history of BPH.       Review of Systems   Constitutional: Negative.  Negative for chills, fatigue and fever.   HENT: Negative.  Negative for congestion.    Respiratory: Negative.  Negative for cough, chest tightness and stridor.    Cardiovascular: Negative.  Negative for chest pain.   Gastrointestinal: Negative.  Negative for abdominal distention, abdominal pain, nausea and vomiting.   Endocrine: Negative.    Genitourinary:  Positive for hematuria. Negative for difficulty urinating, dysuria, flank pain, hesitancy and incomplete emptying.   Musculoskeletal: Negative.    Skin: Negative.  Negative for color change.   Neurological: Negative.  Negative for dizziness and headaches.   All other systems reviewed and are  negative.      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    Cognitive decline 10/30/2023    Coronary artery disease 10 years    x2 stents    Diabetes mellitus < 2 years    non-insulin dependent    Difficulty walking < 1 year    multiple falls in the last 6-8 months    HL (hearing loss) 20+ years    Wears hearing aids occasionally    Hyperlipidemia 5 years    Memory loss 3 years+/-    gradual and subtle    Primary hypertension 10/30/2023    Sleep apnea 20+ year    Non-compliant with PEEP for tx    Stroke 11/2022       Allergies   Allergen Reactions    Serna-2 Inhibitors Unknown - Low Severity       Past Surgical History:   Procedure Laterality Date    APPENDECTOMY      BACK SURGERY  2016    CAROTID STENT  2010?    KNEE SURGERY  1960    LAMINECTOMY  2015?    discectomy & fusion       Family History   Problem Relation Age of Onset    Parkinsonism Father        Social History     Socioeconomic History    Marital status:    Tobacco Use    Smoking status: Never     Passive exposure: Past    Smokeless tobacco: Never   Vaping Use    Vaping Use: Never used   Substance and Sexual Activity    Alcohol use: Yes     Alcohol/week: 3.0 standard drinks of alcohol     Types: 3 Drinks containing 0.5 oz of alcohol per week    Drug use: Never    Sexual activity: Not Currently     Partners: Female     Birth control/protection: Vasectomy           Objective   Physical Exam  Vitals and nursing note reviewed. Exam conducted with a chaperone present.   Constitutional:       General: He is awake. He is not in acute distress.     Appearance: Normal appearance. He is well-developed. He is not ill-appearing or diaphoretic.   HENT:      Head: Normocephalic. No raccoon eyes, Govea's sign, abrasion, contusion or laceration.      Jaw: There is normal jaw occlusion. No tenderness.      Right Ear: Tympanic membrane and external ear normal.      Left Ear: Tympanic membrane and external ear normal.      Nose: Nose  normal.   Eyes:      General: Lids are normal.      Extraocular Movements: Extraocular movements intact.      Conjunctiva/sclera: Conjunctivae normal.      Pupils: Pupils are equal, round, and reactive to light.   Neck:      Vascular: Normal carotid pulses. No JVD.      Trachea: Trachea normal. No tracheal tenderness or tracheal deviation.   Cardiovascular:      Rate and Rhythm: Normal rate and regular rhythm.      Pulses: Normal pulses.      Heart sounds: Normal heart sounds.   Pulmonary:      Effort: Pulmonary effort is normal. No accessory muscle usage or respiratory distress.      Breath sounds: Normal breath sounds and air entry. No stridor.   Chest:      Chest wall: No mass, lacerations, deformity, swelling, tenderness or crepitus.   Abdominal:      General: Abdomen is flat. Bowel sounds are normal. There is no distension.      Palpations: Abdomen is soft. There is no pulsatile mass.      Tenderness: There is no abdominal tenderness. There is no right CVA tenderness or left CVA tenderness.      Hernia: No hernia is present.   Musculoskeletal:         General: No tenderness or deformity. Normal range of motion.      Cervical back: Normal range of motion and neck supple. No deformity, rigidity, tenderness, bony tenderness or crepitus. No spinous process tenderness or muscular tenderness. Normal range of motion.      Thoracic back: Normal. No spasms, tenderness or bony tenderness.      Lumbar back: Normal. No deformity, tenderness or bony tenderness. Normal range of motion.      Right hip: Tenderness present. No deformity or crepitus. Decreased range of motion. Normal strength.      Right lower leg: Normal. No edema.      Left lower leg: No edema.   Skin:     General: Skin is warm and dry.      Coloration: Skin is not cyanotic, mottled or pale.   Neurological:      General: No focal deficit present.      Mental Status: He is alert and oriented to person, place, and time. Mental status is at baseline.      GCS:  GCS eye subscore is 4. GCS verbal subscore is 5. GCS motor subscore is 6.      Cranial Nerves: No cranial nerve deficit.      Sensory: Sensation is intact. No sensory deficit.      Motor: Motor function is intact. No weakness or abnormal muscle tone.      Coordination: Coordination is intact.      Deep Tendon Reflexes: Reflexes are normal and symmetric. Reflexes normal.   Psychiatric:         Speech: Speech normal.         Behavior: Behavior normal. Behavior is cooperative.         Procedures           ED Course  ED Course as of 11/11/23 1533   Sat Nov 11, 2023   1528 Patient came into the ED with hematuria he does not any gross hematuria no evidence of any pelvic organ injuries.  He is got a UTI with some darker urine.  There is no evidence of any ureteric calculus or mass in the bladder and there is no evidence of any fracture the patient was discharged home he is got mild renal insufficiency which has been seen in the past also. [TS]      ED Course User Index  [TS] Yovani Alcocer MD                                           Medical Decision Making  Patient hematuria after a fall with right hip pain we will get x-rays and CAT scans.    Problems Addressed:  Acute UTI (urinary tract infection): acute illness or injury     Details: Patient has got a UTI which probably is giving rise to this hematuria that he is talking about will place on antibiotics.  Calculus of gallbladder without cholecystitis without obstruction:     Details: No evidence of acute cholecystitis or cholangitis  Contusion of right hip, initial encounter: acute illness or injury     Details: No evidence of fracture CTs x-rays are negative  Hematuria, unspecified type: acute illness or injury     Details: As above  Mild renal insufficiency: acute illness or injury     Details: Mild renal insufficiency require extra p.o. fluids there is no evidence of any obstructive uropathy at this time.  Renal cyst:     Details: They have been seen in the past also  patient will require follow-up as an outpatient for this.    Amount and/or Complexity of Data Reviewed  Labs: ordered.     Details: Labs reviewed  Radiology: ordered.     Details: CAT scan was reviewed    Risk  Prescription drug management.  Risk Details: Patient came to the ED with hematuria no evidence of any gross hematuria at this time.  Is got a UTI.  CT of the abdomen pelvis is negative for any acute bony pathology there is no ureteric calculus noted.  Patient has been advised to follow-up with urology and to return the ER for any worsening symptoms.  His hip x-rays are negative there is no evidence of acute fractures and he is able to move his legs.  There is no L-spine T-spine C-spine tenderness noted.  Scalp is atraumatic patient was discharged home.        Final diagnoses:   Acute UTI (urinary tract infection)   Hematuria, unspecified type   Mild renal insufficiency   Contusion of right hip, initial encounter   Calculus of gallbladder without cholecystitis without obstruction   Renal cyst       ED Disposition  ED Disposition       ED Disposition   Discharge    Condition   Stable    Comment   --               Luca Benson MD  [address] [place] [address] [phone]    Schedule an appointment as soon as possible for a visit in 2 days           Medication List        New Prescriptions      amoxicillin-clavulanate 875-125 MG per tablet  Commonly known as: AUGMENTIN  Take 1 tablet by mouth 2 (Two) Times a Day for 7 days.               Where to Get Your Medications        These medications were sent to  PHARM[facility]Y - [place], [place] - [address] - [phone]  - [phone]   [address] [address]      Phone: [phone]   amoxicillin-clavulanate 875-125 MG per tablet            Yovani Alcocer MD  11/11/23 1345       Yovani Alcocer MD  11/11/23 7830

## 2023-11-12 LAB — BACTERIA SPEC AEROBE CULT: NO GROWTH

## 2023-11-14 ENCOUNTER — TELEPHONE (OUTPATIENT)
Dept: NEUROLOGY | Facility: CLINIC | Age: 78
End: 2023-11-14
Payer: MEDICARE

## 2023-11-16 ENCOUNTER — TELEPHONE (OUTPATIENT)
Dept: NEUROLOGY | Facility: CLINIC | Age: 78
End: 2023-11-16

## 2023-11-16 NOTE — TELEPHONE ENCOUNTER
Patient has shown up for an appt that was scheduled with Tyrese Hebert today 11/16/2023, but appt was cancelled by clinic due to illness. Patient was called and left a  about cancellation but message wasn't received and he was unaware. This has happened to patient 2 times now. I have verified patients phone numbers and family contacts, everything in demographics is correct. Patient's daughter stated that she was concerned about some of the test results and had sent a message through [employer] but had not heard anything yet. I spoke to [facility] about this and she did talk to patient about any concerns they were having and we were able to schedule an appt on Monday for him. Patient and family were understanding.   CC

## 2023-11-20 ENCOUNTER — OFFICE VISIT (OUTPATIENT)
Dept: NEUROLOGY | Facility: CLINIC | Age: 78
End: 2023-11-20
Payer: MEDICARE

## 2023-11-20 VITALS
OXYGEN SATURATION: 97 % | WEIGHT: 273 LBS | BODY MASS INDEX: 40.43 KG/M2 | SYSTOLIC BLOOD PRESSURE: 138 MMHG | HEIGHT: 69 IN | HEART RATE: 85 BPM | DIASTOLIC BLOOD PRESSURE: 80 MMHG

## 2023-11-20 DIAGNOSIS — R29.6 FREQUENT FALLS: ICD-10-CM

## 2023-11-20 DIAGNOSIS — G20.C ATYPICAL PARKINSONISM: Primary | ICD-10-CM

## 2023-11-20 DIAGNOSIS — F22 DELUSIONS: ICD-10-CM

## 2023-11-20 DIAGNOSIS — G47.9 SLEEP DISTURBANCE: ICD-10-CM

## 2023-11-20 DIAGNOSIS — R41.89 COGNITIVE DECLINE: ICD-10-CM

## 2023-11-20 RX ORDER — DIVALPROEX SODIUM 125 MG/1
125 TABLET, DELAYED RELEASE ORAL
COMMUNITY

## 2023-11-21 NOTE — PROGRESS NOTES
Subjective   Speedy Villalba is a 77 y.o. male.     History of Present Illness     Mr. Speedy Villalba is a 77-year-old male who presents with his daughter-in-law today. He has been having increasing issues and has been admitted to the hospital twice since last seen and has had issues with frequent falls as well as progressive issues with dementia.       Dementia with Lewy body dementia     The patient reports he is doing well. He states he has been staying off the floor. He denies any worsening of his ambulatory gait.  He notes that he can walk anywhere in the room and touch anything he wants. He denies any visual disturbances. The patient's daughter-in-law states he has not been taking Sinemet that long. She notes that he uses his wheelchair if he is going anywhere. She reports his daughter brought him to the 07/2023 visit. She notes that they went over his records with his wife and there were several things that he self-reported that he was doing that he has not done in a long time. The patient's daughter-in-law reports that he was recommended to do acute rehabilitation, but he did not qualify for it because there was no new diagnosis of Parkinson's disease. She states he is getting a little bit of rehab, and he is working his arms. She notes that the patient thinks that his feet are moving, but they are not. She reports that the patient gets off balance and falls. She expresses concern about the patient falling several times. She states when they came in 07/2023, he said that he had fallen a couple of times. The patient's daughter-in-law reports that he went to [facility], and he was reporting having instances with nurses and things that no one had any record of. She notes that he was sure that there was tape on his backside at one point.  She states his wife got there, and she rolled him over and she looked everywhere.      His daughter-in-law states he is having some trouble sleeping at night.  She notes the patient stays awake at night and is fatigued during the day. She expresses concern because the patient is getting up and he has fallen. She states he will call them at 1:00 AM or 2:00 AM.   She reports he is also taking 30 mg of melatonin. The patient expresses concern about being motivated to continue independent ADL's.       Left shoulder pain     The patient reports he is having left shoulder pain. He denies hurting his shoulder when he fell. His daughter states that he has had trouble with it. She notes that he also relies on people to pull him. She reports that he has to readjust himself.     The following portions of the patient's history were reviewed and updated as appropriate: allergies, current medications, past family history, past medical history, past social history, past surgical history, and problem list.    Review of Systems    Objective   Physical Exam      Assessment & Plan   Diagnoses and all orders for this visit:    1. Atypical parkinsonism (Primary)  -     carbidopa-levodopa (SINEMET)  MG per tablet; Take 2 tablets by mouth 3 (Three) Times a Day.  Dispense: 180 tablet; Refill: 3    2. Cognitive decline    3. Frequent falls    4. Sleep disturbance    5. Delusions          I have concerns that this does not represent Parkinson's disease. We have thought all along that this represented atypical Parkinson's. Unfortunately, the DaTscan was non-diagnostic. At this point with sleep disturbance, delusions, dementia, frequent falls, and bradykinesic features including facial masking without tremor and clearly bradykinesic gait. I feel that we are likely dealing with dementia with Lewy bodies. This is discussed with the patient's daughter-in-law as well as the patient. He expresses some understanding of this. He does still somewhat meaningfully participate in conversation, but definitely has fluctuating mental status consistent with dementia with Lewy bodies as well. We will plan on  seeing him back at the end of the year. We have increased his Sinemet to 25/100 mg 2 tablets by mouth three times per day. If this does not yield any evident benefit, we will likely discontinue this.       Transcribed from ambient dictation for OTIS Thomas by João Andrews.  11/21/23   11:48 CST    Patient or patient representative verbalized consent to the visit recording.  I have personally performed the services described in this document as transcribed by the above individual, and it is both accurate and complete.

## 2023-12-13 ENCOUNTER — TELEPHONE (OUTPATIENT)
Dept: CARDIOLOGY CLINIC | Age: 78
End: 2023-12-13

## 2023-12-13 PROCEDURE — 93242 EXT ECG>48HR<7D RECORDING: CPT | Performed by: NURSE PRACTITIONER

## 2023-12-14 ENCOUNTER — OFFICE VISIT (OUTPATIENT)
Dept: CARDIOLOGY CLINIC | Age: 78
End: 2023-12-14
Payer: MEDICARE

## 2023-12-14 ENCOUNTER — OFFICE VISIT (OUTPATIENT)
Dept: UROLOGY | Age: 78
End: 2023-12-14
Payer: MEDICARE

## 2023-12-14 VITALS
HEIGHT: 69 IN | BODY MASS INDEX: 40.14 KG/M2 | SYSTOLIC BLOOD PRESSURE: 132 MMHG | DIASTOLIC BLOOD PRESSURE: 70 MMHG | WEIGHT: 271 LBS | HEART RATE: 93 BPM

## 2023-12-14 VITALS — TEMPERATURE: 97.4 F | BODY MASS INDEX: 41.35 KG/M2 | HEIGHT: 69 IN

## 2023-12-14 DIAGNOSIS — R53.81 PHYSICAL DECONDITIONING: ICD-10-CM

## 2023-12-14 DIAGNOSIS — R33.8 BENIGN PROSTATIC HYPERPLASIA WITH URINARY RETENTION: Primary | ICD-10-CM

## 2023-12-14 DIAGNOSIS — R29.6 FREQUENT FALLS: ICD-10-CM

## 2023-12-14 DIAGNOSIS — G47.33 OSA (OBSTRUCTIVE SLEEP APNEA): ICD-10-CM

## 2023-12-14 DIAGNOSIS — I10 ESSENTIAL HYPERTENSION: ICD-10-CM

## 2023-12-14 DIAGNOSIS — E78.5 DYSLIPIDEMIA: ICD-10-CM

## 2023-12-14 DIAGNOSIS — I10 PRIMARY HYPERTENSION: ICD-10-CM

## 2023-12-14 DIAGNOSIS — F03.90 DEMENTIA, UNSPECIFIED DEMENTIA SEVERITY, UNSPECIFIED DEMENTIA TYPE, UNSPECIFIED WHETHER BEHAVIORAL, PSYCHOTIC, OR MOOD DISTURBANCE OR ANXIETY (HCC): ICD-10-CM

## 2023-12-14 DIAGNOSIS — E11.69 TYPE 2 DIABETES MELLITUS WITH OTHER SPECIFIED COMPLICATION, WITHOUT LONG-TERM CURRENT USE OF INSULIN (HCC): ICD-10-CM

## 2023-12-14 DIAGNOSIS — I25.10 CORONARY ARTERY DISEASE INVOLVING NATIVE CORONARY ARTERY OF NATIVE HEART WITHOUT ANGINA PECTORIS: Primary | ICD-10-CM

## 2023-12-14 DIAGNOSIS — E66.01 MORBID OBESITY WITH BMI OF 40.0-44.9, ADULT (HCC): ICD-10-CM

## 2023-12-14 DIAGNOSIS — N40.1 BENIGN PROSTATIC HYPERPLASIA WITH URINARY RETENTION: Primary | ICD-10-CM

## 2023-12-14 LAB — POST VOID RESIDUAL (PVR): 54 ML

## 2023-12-14 PROCEDURE — G8427 DOCREV CUR MEDS BY ELIG CLIN: HCPCS | Performed by: NURSE PRACTITIONER

## 2023-12-14 PROCEDURE — 1036F TOBACCO NON-USER: CPT | Performed by: CLINICAL NURSE SPECIALIST

## 2023-12-14 PROCEDURE — 99214 OFFICE O/P EST MOD 30 MIN: CPT | Performed by: CLINICAL NURSE SPECIALIST

## 2023-12-14 PROCEDURE — G8417 CALC BMI ABV UP PARAM F/U: HCPCS | Performed by: NURSE PRACTITIONER

## 2023-12-14 PROCEDURE — 1123F ACP DISCUSS/DSCN MKR DOCD: CPT | Performed by: NURSE PRACTITIONER

## 2023-12-14 PROCEDURE — 93000 ELECTROCARDIOGRAM COMPLETE: CPT | Performed by: CLINICAL NURSE SPECIALIST

## 2023-12-14 PROCEDURE — 1123F ACP DISCUSS/DSCN MKR DOCD: CPT | Performed by: CLINICAL NURSE SPECIALIST

## 2023-12-14 PROCEDURE — G8427 DOCREV CUR MEDS BY ELIG CLIN: HCPCS | Performed by: CLINICAL NURSE SPECIALIST

## 2023-12-14 PROCEDURE — G8484 FLU IMMUNIZE NO ADMIN: HCPCS | Performed by: NURSE PRACTITIONER

## 2023-12-14 PROCEDURE — 51798 US URINE CAPACITY MEASURE: CPT | Performed by: NURSE PRACTITIONER

## 2023-12-14 PROCEDURE — 3078F DIAST BP <80 MM HG: CPT | Performed by: CLINICAL NURSE SPECIALIST

## 2023-12-14 PROCEDURE — G8417 CALC BMI ABV UP PARAM F/U: HCPCS | Performed by: CLINICAL NURSE SPECIALIST

## 2023-12-14 PROCEDURE — 99212 OFFICE O/P EST SF 10 MIN: CPT | Performed by: NURSE PRACTITIONER

## 2023-12-14 PROCEDURE — 3075F SYST BP GE 130 - 139MM HG: CPT | Performed by: CLINICAL NURSE SPECIALIST

## 2023-12-14 PROCEDURE — 1036F TOBACCO NON-USER: CPT | Performed by: NURSE PRACTITIONER

## 2023-12-14 PROCEDURE — G8484 FLU IMMUNIZE NO ADMIN: HCPCS | Performed by: CLINICAL NURSE SPECIALIST

## 2023-12-14 RX ORDER — DIVALPROEX SODIUM 125 MG/1
1 TABLET, DELAYED RELEASE ORAL NIGHTLY
COMMUNITY

## 2023-12-14 RX ORDER — ZINC SULFATE 50(220)MG
220 CAPSULE ORAL DAILY
COMMUNITY

## 2023-12-14 NOTE — PROGRESS NOTES
mouth daily Lean belly juice      losartan (COZAAR) 50 MG tablet TAKE 1 TABLET BY MOUTH DAILY 90 tablet 2    spironolactone (ALDACTONE) 25 MG tablet Take 1 tablet by mouth daily 90 tablet 3    cyclobenzaprine (FLEXERIL) 10 MG tablet Take 1 tablet by mouth 3 times daily as needed for Muscle spasms      Melatonin 10 MG TABS Take 10 mg by mouth at bedtime      pravastatin (PRAVACHOL) 20 MG tablet Take 1 tablet by mouth daily 90 tablet 3    Specialty Vitamins Products (BRAIN) TABS Take by mouth daily       Cholecalciferol (VITAMIN D3) 5000 units TABS Take 1 tablet by mouth daily      allopurinol (ZYLOPRIM) 100 MG tablet Take 1 tablet by mouth daily      Magnesium Oxide (MAG-OX PO) Take 400 mg by mouth 4 times daily Patient takes 2 in the am and 2 in the pm      aspirin 81 MG tablet Take 1 tablet by mouth daily      TRAZODONE HCL PO Take 50 mg by mouth every evening      glipiZIDE (GLUCOTROL) 5 MG tablet Take 1 tablet by mouth daily (Patient not taking: Reported on 12/14/2023)       No current facility-administered medications for this visit. Allergies: Bextra [valdecoxib], Rivera-2 inhibitors, and Crestor [rosuvastatin calcium]    Review of Systems  Review of Systems   Constitutional:  Negative for activity change, diaphoresis, fatigue, fever and unexpected weight change. HENT:  Negative for facial swelling and nosebleeds. Eyes:  Negative for redness and visual disturbance. Respiratory:  Positive for shortness of breath. Negative for cough, chest tightness and wheezing. Cardiovascular:  Negative for chest pain, palpitations and leg swelling. Gastrointestinal:  Negative for abdominal pain, nausea and vomiting. Endocrine: Negative for cold intolerance and heat intolerance. Genitourinary:  Negative for dysuria and hematuria. Musculoskeletal:  Negative for arthralgias and myalgias. C/o mobility issues and falls   Skin:  Negative for pallor and rash.    Neurological:  Negative for dizziness,

## 2023-12-25 ENCOUNTER — HOSPITAL ENCOUNTER (EMERGENCY)
Facility: HOSPITAL | Age: 78
Discharge: NURSING, REHAB OR LONG-TERM CARE | End: 2023-12-25
Attending: EMERGENCY MEDICINE | Admitting: EMERGENCY MEDICINE
Payer: MEDICARE

## 2023-12-25 VITALS
TEMPERATURE: 98.3 F | OXYGEN SATURATION: 100 % | RESPIRATION RATE: 16 BRPM | HEIGHT: 69 IN | DIASTOLIC BLOOD PRESSURE: 75 MMHG | HEART RATE: 84 BPM | SYSTOLIC BLOOD PRESSURE: 129 MMHG | WEIGHT: 246 LBS | BODY MASS INDEX: 36.43 KG/M2

## 2023-12-25 DIAGNOSIS — F03.911 AGITATION DUE TO DEMENTIA: Primary | ICD-10-CM

## 2023-12-25 LAB
ANION GAP SERPL CALCULATED.3IONS-SCNC: 11 MMOL/L (ref 5–15)
B PARAPERT DNA SPEC QL NAA+PROBE: NOT DETECTED
B PERT DNA SPEC QL NAA+PROBE: NOT DETECTED
BACTERIA UR QL AUTO: NORMAL /HPF
BILIRUB UR QL STRIP: NEGATIVE
BUN SERPL-MCNC: 18 MG/DL (ref 8–23)
BUN/CREAT SERPL: 14.2 (ref 7–25)
C PNEUM DNA NPH QL NAA+NON-PROBE: NOT DETECTED
CALCIUM SPEC-SCNC: 9.1 MG/DL (ref 8.6–10.5)
CHLORIDE SERPL-SCNC: 101 MMOL/L (ref 98–107)
CLARITY UR: CLEAR
CO2 SERPL-SCNC: 26 MMOL/L (ref 22–29)
COLOR UR: YELLOW
CREAT SERPL-MCNC: 1.27 MG/DL (ref 0.76–1.27)
EGFRCR SERPLBLD CKD-EPI 2021: 58.2 ML/MIN/1.73
FLUAV SUBTYP SPEC NAA+PROBE: NOT DETECTED
FLUBV RNA ISLT QL NAA+PROBE: NOT DETECTED
GLUCOSE SERPL-MCNC: 136 MG/DL (ref 65–99)
GLUCOSE UR STRIP-MCNC: NEGATIVE MG/DL
HADV DNA SPEC NAA+PROBE: NOT DETECTED
HCOV 229E RNA SPEC QL NAA+PROBE: NOT DETECTED
HCOV HKU1 RNA SPEC QL NAA+PROBE: NOT DETECTED
HCOV NL63 RNA SPEC QL NAA+PROBE: NOT DETECTED
HCOV OC43 RNA SPEC QL NAA+PROBE: NOT DETECTED
HGB UR QL STRIP.AUTO: NEGATIVE
HMPV RNA NPH QL NAA+NON-PROBE: NOT DETECTED
HPIV1 RNA ISLT QL NAA+PROBE: NOT DETECTED
HPIV2 RNA SPEC QL NAA+PROBE: NOT DETECTED
HPIV3 RNA NPH QL NAA+PROBE: NOT DETECTED
HPIV4 P GENE NPH QL NAA+PROBE: NOT DETECTED
HYALINE CASTS UR QL AUTO: NORMAL /LPF
KETONES UR QL STRIP: ABNORMAL
LEUKOCYTE ESTERASE UR QL STRIP.AUTO: ABNORMAL
M PNEUMO IGG SER IA-ACNC: NOT DETECTED
NITRITE UR QL STRIP: NEGATIVE
PH UR STRIP.AUTO: 6.5 [PH] (ref 5–8)
POTASSIUM SERPL-SCNC: 4.3 MMOL/L (ref 3.5–5.2)
PROT UR QL STRIP: NEGATIVE
RBC # UR STRIP: NORMAL /HPF
REF LAB TEST METHOD: NORMAL
RHINOVIRUS RNA SPEC NAA+PROBE: NOT DETECTED
RSV RNA NPH QL NAA+NON-PROBE: NOT DETECTED
SARS-COV-2 RNA NPH QL NAA+NON-PROBE: NOT DETECTED
SODIUM SERPL-SCNC: 138 MMOL/L (ref 136–145)
SP GR UR STRIP: 1.02 (ref 1–1.03)
SQUAMOUS #/AREA URNS HPF: NORMAL /HPF
UROBILINOGEN UR QL STRIP: ABNORMAL
WBC # UR STRIP: NORMAL /HPF

## 2023-12-25 PROCEDURE — 99283 EMERGENCY DEPT VISIT LOW MDM: CPT

## 2023-12-25 PROCEDURE — 81001 URINALYSIS AUTO W/SCOPE: CPT | Performed by: EMERGENCY MEDICINE

## 2023-12-25 PROCEDURE — 36415 COLL VENOUS BLD VENIPUNCTURE: CPT

## 2023-12-25 PROCEDURE — 0202U NFCT DS 22 TRGT SARS-COV-2: CPT | Performed by: EMERGENCY MEDICINE

## 2023-12-25 PROCEDURE — 80048 BASIC METABOLIC PNL TOTAL CA: CPT | Performed by: EMERGENCY MEDICINE

## 2023-12-25 RX ORDER — SODIUM CHLORIDE 0.9 % (FLUSH) 0.9 %
10 SYRINGE (ML) INJECTION AS NEEDED
Status: DISCONTINUED | OUTPATIENT
Start: 2023-12-25 | End: 2023-12-26 | Stop reason: HOSPADM

## 2023-12-26 ENCOUNTER — TELEPHONE (OUTPATIENT)
Dept: NEUROLOGY | Facility: CLINIC | Age: 78
End: 2023-12-26
Payer: MEDICARE

## 2023-12-26 NOTE — TELEPHONE ENCOUNTER
Caller: Maciel Villalba    Relationship: Emergency Contact    Best call back number: [phone]     What is the best time to reach you: ANY    Who are you requesting to speak with (clinical staff, provider,  specific staff member): PROVIDER/STAFF    What was the call regarding: PATIENTS SON TELEPHONED TO REQUEST TO CONVERT VISIT SCHEDULED MATT/ JASSON ON TOMORROW 12/27/23 @ 10AM, BE CONVERTED TO TELEHEALTH.    PLEASE CALL & ADVISE-THANK YOU

## 2023-12-26 NOTE — TELEPHONE ENCOUNTER
----- Message from OTIS Thomas sent at 12/26/2023  4:04 PM CST -----  OK      ----- Message -----  From: Joanne Lugo LPN  Sent: 12/26/2023  10:43 AM CST  To: OTIS Thomas    The hub said Don's son wants to know if he can do a video visit tomorrow.

## 2023-12-27 ENCOUNTER — TELEMEDICINE (OUTPATIENT)
Dept: NEUROLOGY | Facility: CLINIC | Age: 78
End: 2023-12-27
Payer: MEDICARE

## 2023-12-27 DIAGNOSIS — G47.9 SLEEP DISTURBANCE: ICD-10-CM

## 2023-12-27 DIAGNOSIS — G31.83 SEVERE LEWY BODY DEMENTIA WITH AGITATION: Primary | ICD-10-CM

## 2023-12-27 DIAGNOSIS — F02.C11 SEVERE LEWY BODY DEMENTIA WITH AGITATION: Primary | ICD-10-CM

## 2023-12-27 DIAGNOSIS — R29.6 FREQUENT FALLS: ICD-10-CM

## 2023-12-27 DIAGNOSIS — G20.C ATYPICAL PARKINSONISM: ICD-10-CM

## 2023-12-27 PROCEDURE — 1159F MED LIST DOCD IN RCRD: CPT | Performed by: PHYSICIAN ASSISTANT

## 2023-12-27 PROCEDURE — 99423 OL DIG E/M SVC 21+ MIN: CPT | Performed by: PHYSICIAN ASSISTANT

## 2023-12-27 PROCEDURE — 1160F RVW MEDS BY RX/DR IN RCRD: CPT | Performed by: PHYSICIAN ASSISTANT

## 2023-12-28 RX ORDER — QUETIAPINE FUMARATE 25 MG/1
25 TABLET, FILM COATED ORAL NIGHTLY
Qty: 30 TABLET | Refills: 3 | Status: ON HOLD | OUTPATIENT
Start: 2023-12-28

## 2024-01-03 ENCOUNTER — TELEPHONE (OUTPATIENT)
Dept: CARDIOLOGY CLINIC | Age: 79
End: 2024-01-03

## 2024-01-03 PROCEDURE — 93244 EXT ECG>48HR<7D REV&INTERPJ: CPT | Performed by: NURSE PRACTITIONER

## 2024-01-03 NOTE — TELEPHONE ENCOUNTER
Bossman report reviewed.  Analysis time 7 days.  Dates 12/14/23 -12/21/23  Underlying rhythm - NSR  1st degree AVB  Average heart rate 88  Minimum heart rate 67  Max 200 bpm with 14 beats of PSVT  32 SVT runs longest 15 beats.  No VT, pauses, heart block or AF.  Rare PAC and PVC.  No symptoms marked.   Lisa Cao, APRN

## 2024-01-04 DIAGNOSIS — E78.5 DYSLIPIDEMIA: ICD-10-CM

## 2024-01-04 DIAGNOSIS — R29.6 FREQUENT FALLS: Primary | ICD-10-CM

## 2024-01-04 DIAGNOSIS — I10 PRIMARY HYPERTENSION: ICD-10-CM

## 2024-01-04 NOTE — TELEPHONE ENCOUNTER
Called the patient's wife and gave her the Zio report, she understood all I said. She stated that she was going to talk to his nursing home nurses and get the BP readings and call me back Monday, may need to add the Toprol XL as Lisa stated. She will call

## 2024-01-15 NOTE — PROGRESS NOTES
This visit was conducted via telehealth/[employer] (patient portal).     Subjective   Speedy Villalba is a 78 y.o. male is here today for follow-up.    History of Present Illness  Persistent issues with sleep disturbance, parasomnia, dementia.  He has some mood lability.  His decline has led to substantial impairment of daily activities.       The following portions of the patient's history were reviewed and updated as appropriate: allergies, current medications, past family history, past medical history, past social history, past surgical history and problem list.        Review of Systems   Constitutional:  Positive for activity change and fever.   Eyes: Negative.    Respiratory: Negative.     Cardiovascular: Negative.    Gastrointestinal: Negative.    Neurological:  Positive for tremors, speech difficulty, weakness, memory problem and confusion.   Psychiatric/Behavioral:  Positive for agitation, decreased concentration, dysphoric mood, hallucinations and sleep disturbance. The patient is nervous/anxious.          Current Outpatient Medications:     allopurinol (ZYLOPRIM) 100 MG tablet, Take 1 tablet by mouth Daily., Disp: , Rfl:     aspirin 81 MG EC tablet, Take 1 tablet by mouth Daily., Disp: , Rfl:     carbidopa-levodopa (SINEMET)  MG per tablet, Take 2 tablets by mouth 3 (Three) Times a Day., Disp: 180 tablet, Rfl: 3    Cholecalciferol (VITAMIN D3) 125 MCG (5000 UT) tablet tablet, Take 1 tablet by mouth Daily., Disp: , Rfl:     cyclobenzaprine (FLEXERIL) 10 MG tablet, Take 1 tablet by mouth every night at bedtime., Disp: , Rfl:     divalproex (DEPAKOTE) 125 MG DR tablet, Take 1 tablet by mouth every night at bedtime., Disp: , Rfl:     losartan (COZAAR) 50 MG tablet, Take 1 tablet by mouth Daily., Disp: , Rfl:     Magnesium Oxide -Mg Supplement 400 (240 Mg) MG tablet, Take 1 tablet by mouth Daily., Disp: , Rfl:     Melatonin 10 MG tablet, Take 3 tablets by mouth Every Night., Disp: , Rfl:      multivitamin with minerals (Centrum Silver Adult 50+) tablet tablet, Take 1 tablet by mouth Daily., Disp: , Rfl:     pravastatin (PRAVACHOL) 20 MG tablet, Take 1 tablet by mouth Every Night., Disp: , Rfl:     QUEtiapine (SEROquel) 25 MG tablet, Take 1 tablet by mouth Every Night., Disp: 30 tablet, Rfl: 3    spironolactone (ALDACTONE) 25 MG tablet, Take 1 tablet by mouth Daily., Disp: , Rfl:     traZODone (DESYREL) 100 MG tablet, Take 1 tablet by mouth Every Night., Disp: , Rfl:     zinc sulfate (ZINCATE) 220 (50 Zn) MG capsule, Take 1 capsule by mouth Daily., Disp: , Rfl:      Objective   Physical Exam  Nursing note reviewed.   Neurological:      Mental Status: He is alert. He is confused.   Psychiatric:         Attention and Perception: He is inattentive.         Mood and Affect: Affect is labile.         Speech: Speech is delayed and tangential.         Behavior: Behavior is slowed and withdrawn.         Cognition and Memory: Cognition is impaired.           Assessment & Plan   Diagnoses and all orders for this visit:    1. Severe Lewy body dementia with agitation (Primary)  -     QUEtiapine (SEROquel) 25 MG tablet; Take 1 tablet by mouth Every Night.  Dispense: 30 tablet; Refill: 3    2. Atypical parkinsonism    3. Frequent falls    4. Sleep disturbance                     Patient voices understanding and agreement with plan of care. Advised to notify the office of any additional concerns or questions in the interim.     This was a 25 minute video teleconference.      Dictated utilizing Dragon dictation.

## 2024-01-22 ENCOUNTER — APPOINTMENT (OUTPATIENT)
Dept: CT IMAGING | Facility: HOSPITAL | Age: 79
End: 2024-01-22
Payer: MEDICARE

## 2024-01-22 ENCOUNTER — APPOINTMENT (OUTPATIENT)
Dept: CARDIOLOGY | Facility: HOSPITAL | Age: 79
End: 2024-01-22
Payer: MEDICARE

## 2024-01-22 ENCOUNTER — APPOINTMENT (OUTPATIENT)
Dept: MRI IMAGING | Facility: HOSPITAL | Age: 79
End: 2024-01-22
Payer: MEDICARE

## 2024-01-22 ENCOUNTER — HOSPITAL ENCOUNTER (OUTPATIENT)
Facility: HOSPITAL | Age: 79
Setting detail: OBSERVATION
Discharge: ANOTHER HOSPITAL | End: 2024-01-24
Attending: INTERNAL MEDICINE | Admitting: HOSPITALIST
Payer: MEDICARE

## 2024-01-22 ENCOUNTER — APPOINTMENT (OUTPATIENT)
Dept: GENERAL RADIOLOGY | Facility: HOSPITAL | Age: 79
End: 2024-01-22
Payer: MEDICARE

## 2024-01-22 DIAGNOSIS — G45.9 TIA (TRANSIENT ISCHEMIC ATTACK): Primary | ICD-10-CM

## 2024-01-22 DIAGNOSIS — Z74.09 IMPAIRED FUNCTIONAL MOBILITY AND ACTIVITY TOLERANCE: ICD-10-CM

## 2024-01-22 DIAGNOSIS — Z78.9 DECREASED ACTIVITIES OF DAILY LIVING (ADL): ICD-10-CM

## 2024-01-22 DIAGNOSIS — I69.328 SPEECH AND LANGUAGE DEFICIT AS LATE EFFECT OF STROKE: ICD-10-CM

## 2024-01-22 LAB
ABO GROUP BLD: NORMAL
ALBUMIN SERPL-MCNC: 3.5 G/DL (ref 3.5–5.2)
ALBUMIN/GLOB SERPL: 1.8 G/DL
ALP SERPL-CCNC: 100 U/L (ref 39–117)
ALT SERPL W P-5'-P-CCNC: 12 U/L (ref 1–41)
ANION GAP SERPL CALCULATED.3IONS-SCNC: 10 MMOL/L (ref 5–15)
ANION GAP SERPL CALCULATED.3IONS-SCNC: 13 MMOL/L (ref 5–15)
APTT PPP: 27.2 SECONDS (ref 24.5–36)
AST SERPL-CCNC: 19 U/L (ref 1–40)
BASOPHILS # BLD AUTO: 0.04 10*3/MM3 (ref 0–0.2)
BASOPHILS # BLD AUTO: 0.04 10*3/MM3 (ref 0–0.2)
BASOPHILS NFR BLD AUTO: 0.5 % (ref 0–1.5)
BASOPHILS NFR BLD AUTO: 0.6 % (ref 0–1.5)
BH CV ECHO MEAS - AO MAX PG: 7.3 MMHG
BH CV ECHO MEAS - AO MEAN PG: 4 MMHG
BH CV ECHO MEAS - AO ROOT DIAM: 3.6 CM
BH CV ECHO MEAS - AO V2 MAX: 135 CM/SEC
BH CV ECHO MEAS - AO V2 VTI: 25 CM
BH CV ECHO MEAS - AVA(I,D): 3.1 CM2
BH CV ECHO MEAS - EDV(CUBED): 88.1 ML
BH CV ECHO MEAS - EDV(MOD-SP2): 82 ML
BH CV ECHO MEAS - EDV(MOD-SP4): 115 ML
BH CV ECHO MEAS - EF(MOD-BP): 73.1 %
BH CV ECHO MEAS - EF(MOD-SP2): 67 %
BH CV ECHO MEAS - EF(MOD-SP4): 77.8 %
BH CV ECHO MEAS - ESV(CUBED): 10.2 ML
BH CV ECHO MEAS - ESV(MOD-SP2): 27.1 ML
BH CV ECHO MEAS - ESV(MOD-SP4): 25.5 ML
BH CV ECHO MEAS - FS: 51.2 %
BH CV ECHO MEAS - IVS/LVPW: 1.19 CM
BH CV ECHO MEAS - IVSD: 1.19 CM
BH CV ECHO MEAS - LA DIMENSION: 3.9 CM
BH CV ECHO MEAS - LAT PEAK E' VEL: 7.6 CM/SEC
BH CV ECHO MEAS - LV DIASTOLIC VOL/BSA (35-75): 48.8 CM2
BH CV ECHO MEAS - LV MASS(C)D: 170.6 GRAMS
BH CV ECHO MEAS - LV MAX PG: 4.7 MMHG
BH CV ECHO MEAS - LV MEAN PG: 2 MMHG
BH CV ECHO MEAS - LV SYSTOLIC VOL/BSA (12-30): 10.8 CM2
BH CV ECHO MEAS - LV V1 MAX: 108 CM/SEC
BH CV ECHO MEAS - LV V1 VTI: 18.5 CM
BH CV ECHO MEAS - LVIDD: 4.5 CM
BH CV ECHO MEAS - LVIDS: 2.17 CM
BH CV ECHO MEAS - LVOT AREA: 4.2 CM2
BH CV ECHO MEAS - LVOT DIAM: 2.3 CM
BH CV ECHO MEAS - LVPWD: 1 CM
BH CV ECHO MEAS - MED PEAK E' VEL: 6.5 CM/SEC
BH CV ECHO MEAS - MV A MAX VEL: 71.3 CM/SEC
BH CV ECHO MEAS - MV DEC TIME: 0.23 SEC
BH CV ECHO MEAS - MV E MAX VEL: 59.7 CM/SEC
BH CV ECHO MEAS - MV E/A: 0.84
BH CV ECHO MEAS - RAP SYSTOLE: 5 MMHG
BH CV ECHO MEAS - RVSP: 20.7 MMHG
BH CV ECHO MEAS - SI(MOD-SP2): 23.3 ML/M2
BH CV ECHO MEAS - SI(MOD-SP4): 38 ML/M2
BH CV ECHO MEAS - SV(LVOT): 76.9 ML
BH CV ECHO MEAS - SV(MOD-SP2): 54.9 ML
BH CV ECHO MEAS - SV(MOD-SP4): 89.5 ML
BH CV ECHO MEAS - TR MAX PG: 15.7 MMHG
BH CV ECHO MEAS - TR MAX VEL: 198 CM/SEC
BH CV ECHO MEASUREMENTS AVERAGE E/E' RATIO: 8.47
BH CV ECHO SHUNT ASSESSMENT PERFORMED (HIDDEN SCRIPTING): 1
BILIRUB SERPL-MCNC: 0.3 MG/DL (ref 0–1.2)
BILIRUB UR QL STRIP: NEGATIVE
BLD GP AB SCN SERPL QL: NEGATIVE
BUN SERPL-MCNC: 14 MG/DL (ref 8–23)
BUN SERPL-MCNC: 14 MG/DL (ref 8–23)
BUN/CREAT SERPL: 11.7 (ref 7–25)
BUN/CREAT SERPL: 11.9 (ref 7–25)
CALCIUM SPEC-SCNC: 8.6 MG/DL (ref 8.6–10.5)
CALCIUM SPEC-SCNC: 9.2 MG/DL (ref 8.6–10.5)
CHLORIDE SERPL-SCNC: 102 MMOL/L (ref 98–107)
CHLORIDE SERPL-SCNC: 99 MMOL/L (ref 98–107)
CHOLEST SERPL-MCNC: 129 MG/DL (ref 0–200)
CLARITY UR: CLEAR
CO2 SERPL-SCNC: 25 MMOL/L (ref 22–29)
CO2 SERPL-SCNC: 25 MMOL/L (ref 22–29)
COLOR UR: YELLOW
CREAT SERPL-MCNC: 1.18 MG/DL (ref 0.76–1.27)
CREAT SERPL-MCNC: 1.2 MG/DL (ref 0.76–1.27)
DEPRECATED RDW RBC AUTO: 46.4 FL (ref 37–54)
DEPRECATED RDW RBC AUTO: 46.6 FL (ref 37–54)
EGFRCR SERPLBLD CKD-EPI 2021: 61.9 ML/MIN/1.73
EGFRCR SERPLBLD CKD-EPI 2021: 63.2 ML/MIN/1.73
EOSINOPHIL # BLD AUTO: 0.29 10*3/MM3 (ref 0–0.4)
EOSINOPHIL # BLD AUTO: 0.31 10*3/MM3 (ref 0–0.4)
EOSINOPHIL NFR BLD AUTO: 4.1 % (ref 0.3–6.2)
EOSINOPHIL NFR BLD AUTO: 4.2 % (ref 0.3–6.2)
ERYTHROCYTE [DISTWIDTH] IN BLOOD BY AUTOMATED COUNT: 13.6 % (ref 12.3–15.4)
ERYTHROCYTE [DISTWIDTH] IN BLOOD BY AUTOMATED COUNT: 13.6 % (ref 12.3–15.4)
GEN 5 2HR TROPONIN T REFLEX: 51 NG/L
GLOBULIN UR ELPH-MCNC: 1.9 GM/DL
GLUCOSE BLDC GLUCOMTR-MCNC: 144 MG/DL (ref 70–130)
GLUCOSE SERPL-MCNC: 157 MG/DL (ref 65–99)
GLUCOSE SERPL-MCNC: 163 MG/DL (ref 65–99)
GLUCOSE UR STRIP-MCNC: NEGATIVE MG/DL
HCT VFR BLD AUTO: 33.5 % (ref 37.5–51)
HCT VFR BLD AUTO: 37 % (ref 37.5–51)
HDLC SERPL-MCNC: 40 MG/DL (ref 40–60)
HGB BLD-MCNC: 11.4 G/DL (ref 13–17.7)
HGB BLD-MCNC: 12.3 G/DL (ref 13–17.7)
HGB UR QL STRIP.AUTO: NEGATIVE
HOLD SPECIMEN: NORMAL
HOLD SPECIMEN: NORMAL
IMM GRANULOCYTES # BLD AUTO: 0.04 10*3/MM3 (ref 0–0.05)
IMM GRANULOCYTES # BLD AUTO: 0.05 10*3/MM3 (ref 0–0.05)
IMM GRANULOCYTES NFR BLD AUTO: 0.6 % (ref 0–0.5)
IMM GRANULOCYTES NFR BLD AUTO: 0.7 % (ref 0–0.5)
INR PPP: 1.04 (ref 0.91–1.09)
KETONES UR QL STRIP: NEGATIVE
LDLC SERPL CALC-MCNC: 60 MG/DL (ref 0–100)
LDLC/HDLC SERPL: 1.36 {RATIO}
LEFT ATRIUM VOLUME INDEX: 20.8 ML/M2
LEFT ATRIUM VOLUME: 49.2 ML
LEUKOCYTE ESTERASE UR QL STRIP.AUTO: NEGATIVE
LYMPHOCYTES # BLD AUTO: 1.08 10*3/MM3 (ref 0.7–3.1)
LYMPHOCYTES # BLD AUTO: 1.14 10*3/MM3 (ref 0.7–3.1)
LYMPHOCYTES NFR BLD AUTO: 14.2 % (ref 19.6–45.3)
LYMPHOCYTES NFR BLD AUTO: 16.4 % (ref 19.6–45.3)
MCH RBC QN AUTO: 31.5 PG (ref 26.6–33)
MCH RBC QN AUTO: 31.8 PG (ref 26.6–33)
MCHC RBC AUTO-ENTMCNC: 33.2 G/DL (ref 31.5–35.7)
MCHC RBC AUTO-ENTMCNC: 34 G/DL (ref 31.5–35.7)
MCV RBC AUTO: 93.3 FL (ref 79–97)
MCV RBC AUTO: 94.6 FL (ref 79–97)
MONOCYTES # BLD AUTO: 0.54 10*3/MM3 (ref 0.1–0.9)
MONOCYTES # BLD AUTO: 0.55 10*3/MM3 (ref 0.1–0.9)
MONOCYTES NFR BLD AUTO: 7.2 % (ref 5–12)
MONOCYTES NFR BLD AUTO: 7.7 % (ref 5–12)
NEUTROPHILS NFR BLD AUTO: 4.92 10*3/MM3 (ref 1.7–7)
NEUTROPHILS NFR BLD AUTO: 5.57 10*3/MM3 (ref 1.7–7)
NEUTROPHILS NFR BLD AUTO: 70.5 % (ref 42.7–76)
NEUTROPHILS NFR BLD AUTO: 73.3 % (ref 42.7–76)
NITRITE UR QL STRIP: NEGATIVE
NRBC BLD AUTO-RTO: 0 /100 WBC (ref 0–0.2)
NRBC BLD AUTO-RTO: 0 /100 WBC (ref 0–0.2)
PH UR STRIP.AUTO: 7.5 [PH] (ref 5–8)
PLATELET # BLD AUTO: 209 10*3/MM3 (ref 140–450)
PLATELET # BLD AUTO: 218 10*3/MM3 (ref 140–450)
PMV BLD AUTO: 10.2 FL (ref 6–12)
PMV BLD AUTO: 9.6 FL (ref 6–12)
POTASSIUM SERPL-SCNC: 4.4 MMOL/L (ref 3.5–5.2)
POTASSIUM SERPL-SCNC: 4.4 MMOL/L (ref 3.5–5.2)
PROT SERPL-MCNC: 5.4 G/DL (ref 6–8.5)
PROT UR QL STRIP: NEGATIVE
PROTHROMBIN TIME: 13.7 SECONDS (ref 11.8–14.8)
RBC # BLD AUTO: 3.59 10*6/MM3 (ref 4.14–5.8)
RBC # BLD AUTO: 3.91 10*6/MM3 (ref 4.14–5.8)
RH BLD: POSITIVE
SODIUM SERPL-SCNC: 137 MMOL/L (ref 136–145)
SODIUM SERPL-SCNC: 137 MMOL/L (ref 136–145)
SP GR UR STRIP: 1.02 (ref 1–1.03)
T&S EXPIRATION DATE: NORMAL
TRIGL SERPL-MCNC: 173 MG/DL (ref 0–150)
TROPONIN T DELTA: -3 NG/L
TROPONIN T SERPL HS-MCNC: 54 NG/L
UROBILINOGEN UR QL STRIP: NORMAL
VLDLC SERPL-MCNC: 29 MG/DL (ref 5–40)
WBC NRBC COR # BLD AUTO: 6.97 10*3/MM3 (ref 3.4–10.8)
WBC NRBC COR # BLD AUTO: 7.6 10*3/MM3 (ref 3.4–10.8)
WHOLE BLOOD HOLD COAG: NORMAL
WHOLE BLOOD HOLD SPECIMEN: NORMAL

## 2024-01-22 PROCEDURE — G0378 HOSPITAL OBSERVATION PER HR: HCPCS

## 2024-01-22 PROCEDURE — 25810000003 SODIUM CHLORIDE 0.9 % SOLUTION: Performed by: HOSPITALIST

## 2024-01-22 PROCEDURE — 70496 CT ANGIOGRAPHY HEAD: CPT

## 2024-01-22 PROCEDURE — 93010 ELECTROCARDIOGRAM REPORT: CPT | Performed by: INTERNAL MEDICINE

## 2024-01-22 PROCEDURE — 82948 REAGENT STRIP/BLOOD GLUCOSE: CPT

## 2024-01-22 PROCEDURE — 85730 THROMBOPLASTIN TIME PARTIAL: CPT | Performed by: INTERNAL MEDICINE

## 2024-01-22 PROCEDURE — 96372 THER/PROPH/DIAG INJ SC/IM: CPT

## 2024-01-22 PROCEDURE — 93306 TTE W/DOPPLER COMPLETE: CPT | Performed by: INTERNAL MEDICINE

## 2024-01-22 PROCEDURE — 36415 COLL VENOUS BLD VENIPUNCTURE: CPT | Performed by: HOSPITALIST

## 2024-01-22 PROCEDURE — 93306 TTE W/DOPPLER COMPLETE: CPT

## 2024-01-22 PROCEDURE — 0042T HC CT CEREBRAL PERFUSION W/WO CONTRAST: CPT

## 2024-01-22 PROCEDURE — 71045 X-RAY EXAM CHEST 1 VIEW: CPT

## 2024-01-22 PROCEDURE — 85025 COMPLETE CBC W/AUTO DIFF WBC: CPT | Performed by: INTERNAL MEDICINE

## 2024-01-22 PROCEDURE — 84484 ASSAY OF TROPONIN QUANT: CPT | Performed by: INTERNAL MEDICINE

## 2024-01-22 PROCEDURE — 25510000001 PERFLUTREN 6.52 MG/ML SUSPENSION: Performed by: INTERNAL MEDICINE

## 2024-01-22 PROCEDURE — 86901 BLOOD TYPING SEROLOGIC RH(D): CPT | Performed by: INTERNAL MEDICINE

## 2024-01-22 PROCEDURE — 80053 COMPREHEN METABOLIC PANEL: CPT | Performed by: INTERNAL MEDICINE

## 2024-01-22 PROCEDURE — 70450 CT HEAD/BRAIN W/O DYE: CPT

## 2024-01-22 PROCEDURE — 70551 MRI BRAIN STEM W/O DYE: CPT

## 2024-01-22 PROCEDURE — 80061 LIPID PANEL: CPT | Performed by: HOSPITALIST

## 2024-01-22 PROCEDURE — 85610 PROTHROMBIN TIME: CPT | Performed by: INTERNAL MEDICINE

## 2024-01-22 PROCEDURE — 70498 CT ANGIOGRAPHY NECK: CPT

## 2024-01-22 PROCEDURE — 85025 COMPLETE CBC W/AUTO DIFF WBC: CPT | Performed by: HOSPITALIST

## 2024-01-22 PROCEDURE — 99214 OFFICE O/P EST MOD 30 MIN: CPT | Performed by: PSYCHIATRY & NEUROLOGY

## 2024-01-22 PROCEDURE — 81003 URINALYSIS AUTO W/O SCOPE: CPT | Performed by: INTERNAL MEDICINE

## 2024-01-22 PROCEDURE — 25510000001 IOPAMIDOL PER 1 ML: Performed by: INTERNAL MEDICINE

## 2024-01-22 PROCEDURE — 25010000002 ENOXAPARIN PER 10 MG: Performed by: HOSPITALIST

## 2024-01-22 PROCEDURE — 86850 RBC ANTIBODY SCREEN: CPT | Performed by: INTERNAL MEDICINE

## 2024-01-22 PROCEDURE — 93005 ELECTROCARDIOGRAM TRACING: CPT | Performed by: INTERNAL MEDICINE

## 2024-01-22 PROCEDURE — 86900 BLOOD TYPING SEROLOGIC ABO: CPT | Performed by: INTERNAL MEDICINE

## 2024-01-22 PROCEDURE — 99285 EMERGENCY DEPT VISIT HI MDM: CPT

## 2024-01-22 RX ORDER — ASPIRIN 325 MG
325 TABLET ORAL DAILY
Status: DISCONTINUED | OUTPATIENT
Start: 2024-01-22 | End: 2024-01-23

## 2024-01-22 RX ORDER — SODIUM CHLORIDE 0.9 % (FLUSH) 0.9 %
10 SYRINGE (ML) INJECTION EVERY 12 HOURS SCHEDULED
Status: DISCONTINUED | OUTPATIENT
Start: 2024-01-22 | End: 2024-01-24 | Stop reason: HOSPADM

## 2024-01-22 RX ORDER — SODIUM CHLORIDE 0.9 % (FLUSH) 0.9 %
10 SYRINGE (ML) INJECTION AS NEEDED
Status: DISCONTINUED | OUTPATIENT
Start: 2024-01-22 | End: 2024-01-24 | Stop reason: HOSPADM

## 2024-01-22 RX ORDER — ASPIRIN 300 MG/1
300 SUPPOSITORY RECTAL DAILY
Status: DISCONTINUED | OUTPATIENT
Start: 2024-01-22 | End: 2024-01-23

## 2024-01-22 RX ORDER — SODIUM CHLORIDE 9 MG/ML
40 INJECTION, SOLUTION INTRAVENOUS AS NEEDED
Status: DISCONTINUED | OUTPATIENT
Start: 2024-01-22 | End: 2024-01-22 | Stop reason: SDUPTHER

## 2024-01-22 RX ORDER — ENOXAPARIN SODIUM 100 MG/ML
40 INJECTION SUBCUTANEOUS EVERY 24 HOURS
Status: DISCONTINUED | OUTPATIENT
Start: 2024-01-22 | End: 2024-01-24 | Stop reason: HOSPADM

## 2024-01-22 RX ORDER — ACETAMINOPHEN 325 MG/1
650 TABLET ORAL EVERY 4 HOURS PRN
Status: DISCONTINUED | OUTPATIENT
Start: 2024-01-22 | End: 2024-01-24 | Stop reason: HOSPADM

## 2024-01-22 RX ORDER — POLYETHYLENE GLYCOL 3350 17 G/17G
17 POWDER, FOR SOLUTION ORAL DAILY PRN
Status: DISCONTINUED | OUTPATIENT
Start: 2024-01-22 | End: 2024-01-24 | Stop reason: HOSPADM

## 2024-01-22 RX ORDER — BISACODYL 10 MG
10 SUPPOSITORY, RECTAL RECTAL DAILY PRN
Status: DISCONTINUED | OUTPATIENT
Start: 2024-01-22 | End: 2024-01-24 | Stop reason: HOSPADM

## 2024-01-22 RX ORDER — NITROGLYCERIN 0.4 MG/1
0.4 TABLET SUBLINGUAL
Status: DISCONTINUED | OUTPATIENT
Start: 2024-01-22 | End: 2024-01-24 | Stop reason: HOSPADM

## 2024-01-22 RX ORDER — ATORVASTATIN CALCIUM 40 MG/1
80 TABLET, FILM COATED ORAL NIGHTLY
Status: DISCONTINUED | OUTPATIENT
Start: 2024-01-22 | End: 2024-01-24 | Stop reason: HOSPADM

## 2024-01-22 RX ORDER — SODIUM CHLORIDE 9 MG/ML
50 INJECTION, SOLUTION INTRAVENOUS CONTINUOUS
Status: DISCONTINUED | OUTPATIENT
Start: 2024-01-22 | End: 2024-01-24 | Stop reason: HOSPADM

## 2024-01-22 RX ORDER — BISACODYL 5 MG/1
5 TABLET, DELAYED RELEASE ORAL DAILY PRN
Status: DISCONTINUED | OUTPATIENT
Start: 2024-01-22 | End: 2024-01-24 | Stop reason: HOSPADM

## 2024-01-22 RX ORDER — AMOXICILLIN 250 MG
2 CAPSULE ORAL 2 TIMES DAILY
Status: DISCONTINUED | OUTPATIENT
Start: 2024-01-22 | End: 2024-01-24 | Stop reason: HOSPADM

## 2024-01-22 RX ORDER — SODIUM CHLORIDE 9 MG/ML
40 INJECTION, SOLUTION INTRAVENOUS AS NEEDED
Status: DISCONTINUED | OUTPATIENT
Start: 2024-01-22 | End: 2024-01-24 | Stop reason: HOSPADM

## 2024-01-22 RX ADMIN — ASPIRIN 325 MG: 325 TABLET, FILM COATED ORAL at 14:44

## 2024-01-22 RX ADMIN — DOCUSATE SODIUM 50 MG AND SENNOSIDES 8.6 MG 2 TABLET: 8.6; 5 TABLET, FILM COATED ORAL at 21:26

## 2024-01-22 RX ADMIN — SODIUM CHLORIDE 50 ML/HR: 9 INJECTION, SOLUTION INTRAVENOUS at 18:06

## 2024-01-22 RX ADMIN — ATORVASTATIN CALCIUM 80 MG: 40 TABLET, FILM COATED ORAL at 21:11

## 2024-01-22 RX ADMIN — ENOXAPARIN SODIUM 40 MG: 100 INJECTION SUBCUTANEOUS at 18:30

## 2024-01-22 RX ADMIN — IOPAMIDOL 125 ML: 755 INJECTION, SOLUTION INTRAVENOUS at 13:32

## 2024-01-22 RX ADMIN — PERFLUTREN 9.78 MG: 6.52 INJECTION, SUSPENSION INTRAVENOUS at 15:57

## 2024-01-22 RX ADMIN — Medication 10 ML: at 21:11

## 2024-01-22 RX ADMIN — Medication 10 ML: at 14:46

## 2024-01-22 NOTE — ED NOTES
Pt presents from [facility] with stroke alert. LKW 0930, witnessed by nursing staff. They report pt took am meds around 0930 and was 'normal'. Pt awoke from nap around 1030 and appeared lethargic and stuporous. Ems reports L sided upper ext weakness and aphasia, resolved upon arrival to ED. Pt nih 0. Slow speech, but no aphasia noted. Staff report fall on 1/20/24.

## 2024-01-22 NOTE — H&P
[facility]  HISTORY AND PHYSICAL    Date of Admission: 1/22/2024  Primary Care Physician: Luca Benson MD    Subjective   Primary Historian: patient     Chief Complaint: aphasia R side neglect     Stroke      78-year-old gentleman with PMH of CAD s/p stenting morbid obesity ? AIDEN, HTN, HLD parkinsonism who presents from [facility] via EMS.  The patient was a code stroke.  He has right-sided neglect.  He has left-sided weakness.  He is aphasic, and unable to communicate with me on my exam.  He is unable to follow simple directions.    By the time we saw him All that has resolved, was seen by neurology CT head -ve CTA head and neck -ve was on asa 81, per neurology will admit, full ASA, echo, check FLP, MRI , keep BP less than 220/110 , lovenix for DVT prophylxis, check A1c , SSI         Review of Systems   Otherwise complete ROS reviewed and negative except as mentioned in the HPI.    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    Cognitive decline 10/30/2023    Coronary artery disease 10 years    x2 stents    Diabetes mellitus < 2 years    non-insulin dependent    Difficulty walking < 1 year    multiple falls in the last 6-8 months    HL (hearing loss) 20+ years    Wears hearing aids occasionally    Hyperlipidemia 5 years    Memory loss 3 years+/-    gradual and subtle    Primary hypertension 10/30/2023    Sleep apnea 20+ year    Non-compliant with PEEP for tx    Stroke 11/2022    TIA (transient ischemic attack) 1/22/2024     Past Surgical History:  Past Surgical History:   Procedure Laterality Date    APPENDECTOMY      BACK SURGERY  2016    CAROTID STENT  2010?    KNEE SURGERY  1960    LAMINECTOMY  2015?    discectomy & fusion     Social History:  reports that he has never smoked. He has been exposed to tobacco smoke. He has never used smokeless tobacco. He reports current  alcohol use of about 3.0 standard drinks of alcohol per week. He reports that he does not use drugs.    Family History: family history includes Parkinsonism in his father.       Allergies:  Allergies   Allergen Reactions    Serna-2 Inhibitors Unknown - Low Severity       Medications:  Prior to Admission medications    Medication Sig Start Date End Date Taking? Authorizing Provider   allopurinol (ZYLOPRIM) 100 MG tablet Take 1 tablet by mouth Daily. 7/13/20   Michael George MD   aspirin 81 MG EC tablet Take 1 tablet by mouth Daily.    Michael George MD   carbidopa-levodopa (SINEMET)  MG per tablet Take 2 tablets by mouth 3 (Three) Times a Day. 11/20/23   Rodrigue Hebert PA   Cholecalciferol (VITAMIN D3) 125 MCG (5000 UT) tablet tablet Take 1 tablet by mouth Daily.    Michael George MD   cyclobenzaprine (FLEXERIL) 10 MG tablet Take 1 tablet by mouth every night at bedtime. 7/29/20   Michael George MD   divalproex (DEPAKOTE) 125 MG DR tablet Take 1 tablet by mouth every night at bedtime.    Michael George MD   losartan (COZAAR) 50 MG tablet Take 1 tablet by mouth Daily. 7/29/20   Michael George MD   Magnesium Oxide -Mg Supplement 400 (240 Mg) MG tablet Take 1 tablet by mouth Daily.    Michael George MD   Melatonin 10 MG tablet Take 3 tablets by mouth Every Night.    Michael George MD   multivitamin with minerals (Centrum Silver Adult 50+) tablet tablet Take 1 tablet by mouth Daily.    Michael George MD   pravastatin (PRAVACHOL) 20 MG tablet Take 1 tablet by mouth Every Night. 6/9/20   Michael George MD   QUEtiapine (SEROquel) 25 MG tablet Take 1 tablet by mouth Every Night. 12/28/23   Rodrigue Hebert PA   spironolactone (ALDACTONE) 25 MG tablet Take 1 tablet by mouth Daily.    Michael George MD   traZODone (DESYREL) 100 MG tablet Take 1 tablet by mouth Every Night.    Michael George MD   zinc sulfate (ZINCATE) 220  "(50 Zn) MG capsule Take 1 capsule by mouth Daily.    Provider, Michael, MD BEST have utilized all available immediate resources to obtain, update, or review the patient's current medications (including all prescriptions, over-the-counter products, herbals, cannabis/cannabidiol products, and vitamin/mineral/dietary (nutritional) supplements).    Objective     Vital Signs: BP 94/62   Pulse 85   Temp 98.5 °F (36.9 °C) (Oral)   Resp 16   Ht 175.3 cm (69\")   Wt 124 kg (273 lb 5.9 oz)   SpO2 100%   BMI 40.37 kg/m²   Physical Exam  Constitutional:       Appearance: Normal appearance.   HENT:      Head: Normocephalic and atraumatic.      Nose: Nose normal.      Mouth/Throat:      Mouth: Mucous membranes are moist. Mucous membranes are dry.   Eyes:      Extraocular Movements: Extraocular movements intact.      Pupils: Pupils are equal, round, and reactive to light.   Cardiovascular:      Rate and Rhythm: Normal rate and regular rhythm.   Pulmonary:      Effort: Pulmonary effort is normal.      Breath sounds: Stridor present.   Abdominal:      General: Abdomen is flat.      Palpations: Abdomen is soft.   Musculoskeletal:         General: Normal range of motion.      Cervical back: Normal range of motion.   Skin:     General: Skin is warm.      Capillary Refill: Capillary refill takes less than 2 seconds.   Neurological:      General: No focal deficit present.      Mental Status: He is alert.              Results Reviewed:  Lab Results (last 24 hours)       Procedure Component Value Units Date/Time    Single High Sensitivity Troponin T [[id number]] Collected: 01/22/24 1437    Specimen: Blood Updated: 01/22/24 1446    Clubb Draw [[id number]] Collected: 01/22/24 1331    Specimen: Blood Updated: 01/22/24 1432    Narrative:      The following orders were created for panel order Clubb Draw.  Procedure                               Abnormality         Status                     ---------                               " -----------         ------                     Green Top (Gel)[[id number]]                                  Final result               Lavender Top[[id number]]                                     Final result               Red Top[[id number]]                                          Final result               Light Blue Top[[id number]]                                   Final result                 Please view results for these tests on the individual orders.    Green Top (Gel) [[id number]] Collected: 01/22/24 1331    Specimen: Blood Updated: 01/22/24 1432     Extra Tube Hold for add-ons.     Comment: Auto resulted.       Red Top [[id number]] Collected: 01/22/24 1331    Specimen: Blood Updated: 01/22/24 1432     Extra Tube Hold for add-ons.     Comment: Auto resulted.       Light Blue Top [[id number]] Collected: 01/22/24 1331    Specimen: Blood Updated: 01/22/24 1432     Extra Tube Hold for add-ons.     Comment: Auto resulted       Lavender Top [[id number]] Collected: 01/22/24 1331    Specimen: Blood Updated: 01/22/24 1432     Extra Tube hold for add-on     Comment: Auto resulted       Comprehensive Metabolic Panel [[id number]]  (Abnormal) Collected: 01/22/24 1331    Specimen: Blood Updated: 01/22/24 1403     Glucose 157 mg/dL      BUN 14 mg/dL      Creatinine 1.20 mg/dL      Sodium 137 mmol/L      Potassium 4.4 mmol/L      Comment: Slight hemolysis detected by analyzer. Result may be falsely elevated.        Chloride 102 mmol/L      CO2 25.0 mmol/L      Calcium 8.6 mg/dL      Total Protein 5.4 g/dL      Albumin 3.5 g/dL      ALT (SGPT) 12 U/L      AST (SGOT) 19 U/L      Alkaline Phosphatase 100 U/L      Total Bilirubin 0.3 mg/dL      Globulin 1.9 gm/dL      A/G Ratio 1.8 g/dL      BUN/Creatinine Ratio 11.7     Anion Gap 10.0 mmol/L      eGFR 61.9 mL/min/1.73     Narrative:      GFR Normal >60  Chronic Kidney Disease <60  Kidney Failure <15    The GFR formula is only valid for adults with stable renal function between ages 18  and 70.    POC Glucose Once [[id number]]  (Abnormal) Collected: 01/22/24 1347    Specimen: Blood Updated: 01/22/24 1358     Glucose 144 mg/dL      Comment: : [id number] Amanda WalkerMeter ID: [id number]       Protime-INR [[id number]]  (Normal) Collected: 01/22/24 1331    Specimen: Blood Updated: 01/22/24 1352     Protime 13.7 Seconds      INR 1.04    aPTT [[id number]]  (Normal) Collected: 01/22/24 1331    Specimen: Blood Updated: 01/22/24 1352     PTT 27.2 seconds     CBC & Differential [[id number]]  (Abnormal) Collected: 01/22/24 1331    Specimen: Blood Updated: 01/22/24 1342    Narrative:      The following orders were created for panel order CBC & Differential.  Procedure                               Abnormality         Status                     ---------                               -----------         ------                     CBC Auto Differential[[id number]]        Abnormal            Final result                 Please view results for these tests on the individual orders.    CBC Auto Differential [[id number]]  (Abnormal) Collected: 01/22/24 1331    Specimen: Blood Updated: 01/22/24 1342     WBC 6.97 10*3/mm3      RBC 3.59 10*6/mm3      Hemoglobin 11.4 g/dL      Hematocrit 33.5 %      MCV 93.3 fL      MCH 31.8 pg      MCHC 34.0 g/dL      RDW 13.6 %      RDW-SD 46.4 fl      MPV 10.2 fL      Platelets 218 10*3/mm3      Neutrophil % 70.5 %      Lymphocyte % 16.4 %      Monocyte % 7.7 %      Eosinophil % 4.2 %      Basophil % 0.6 %      Immature Grans % 0.6 %      Neutrophils, Absolute 4.92 10*3/mm3      Lymphocytes, Absolute 1.14 10*3/mm3      Monocytes, Absolute 0.54 10*3/mm3      Eosinophils, Absolute 0.29 10*3/mm3      Basophils, Absolute 0.04 10*3/mm3      Immature Grans, Absolute 0.04 10*3/mm3      nRBC 0.0 /100 WBC           Imaging Results (Last 24 Hours)       Procedure Component Value Units Date/Time    CT Angiogram Head w AI Analysis of LVO [[id number]] Collected: 01/22/24 1425     Updated: 01/22/24  1432    Narrative:      EXAM: CT ANGIOGRAM HEAD W AI ANALYSIS OF LVO-, CT ANGIOGRAM NECK- -  1/22/2024 12:27 PM     HISTORY: Neuro deficit, acute stroke suspected       COMPARISON: None.     DOSE LENGTH PRODUCT: 449.8 mGy.cm. Automated exposure control was also  utilized to decrease patient radiation dose.     TECHNIQUE: CTA head and neck performed with intravenous contrast. 3D  postprocessing, including MIPs, performed and images saved to PACS. AI  ANALYSIS OF LVO WAS UTILIZED. Grading of carotid stenosis performed with  NASCET criteria.     FINDINGS:     There is a typical three-vessel branching pattern off the aortic arch  without significant ostial narrowing. Common carotid arteries are patent  and without flow-limiting stenosis. Mild calcified plaque in the carotid  bulbs without flow-limiting stenosis. ICAs are normal in caliber in the  neck. The vertebral arteries originate from the subclavian arteries  without significant ostial narrowing.  No evidence of vertebral artery  dissection or pseudoaneurysm. Multinodular thyroid goiter. Lung apices  are clear. No cervical adenopathy. Advanced cervical spine  osteoarthritis change.     Distal ICAs are patent and without flow-limiting stenosis. No  intracranial large vessel occlusion. Anterior and middle cerebral  arteries are patent and without flow-limiting stenosis. Intracranial  vertebral arteries and basilar artery are normal in caliber. Posterior  cerebral arteries are patent and normal in caliber. No visualized  aneurysm or vascular malformation. Intracranial hemorrhage or mass  effect.       Impression:         1. No arterial occlusion or flow-limiting stenosis in the neck.  2. No intracranial large vessel occlusion or flow-limiting stenosis.     This report was signed and finalized on 1/22/2024 2:29 PM by Dr Satinder Garcia.       CT Angiogram Neck [[id number]] Collected: 01/22/24 1425     Updated: 01/22/24 1432    Narrative:      EXAM: CT ANGIOGRAM HEAD W AI  ANALYSIS OF LVO-, CT ANGIOGRAM NECK- -  1/22/2024 12:27 PM     HISTORY: Neuro deficit, acute stroke suspected       COMPARISON: None.     DOSE LENGTH PRODUCT: 449.8 mGy.cm. Automated exposure control was also  utilized to decrease patient radiation dose.     TECHNIQUE: CTA head and neck performed with intravenous contrast. 3D  postprocessing, including MIPs, performed and images saved to PACS. AI  ANALYSIS OF LVO WAS UTILIZED. Grading of carotid stenosis performed with  NASCET criteria.     FINDINGS:     There is a typical three-vessel branching pattern off the aortic arch  without significant ostial narrowing. Common carotid arteries are patent  and without flow-limiting stenosis. Mild calcified plaque in the carotid  bulbs without flow-limiting stenosis. ICAs are normal in caliber in the  neck. The vertebral arteries originate from the subclavian arteries  without significant ostial narrowing.  No evidence of vertebral artery  dissection or pseudoaneurysm. Multinodular thyroid goiter. Lung apices  are clear. No cervical adenopathy. Advanced cervical spine  osteoarthritis change.     Distal ICAs are patent and without flow-limiting stenosis. No  intracranial large vessel occlusion. Anterior and middle cerebral  arteries are patent and without flow-limiting stenosis. Intracranial  vertebral arteries and basilar artery are normal in caliber. Posterior  cerebral arteries are patent and normal in caliber. No visualized  aneurysm or vascular malformation. Intracranial hemorrhage or mass  effect.       Impression:         1. No arterial occlusion or flow-limiting stenosis in the neck.  2. No intracranial large vessel occlusion or flow-limiting stenosis.     This report was signed and finalized on 1/22/2024 2:29 PM by Dr Satinder Garcia.       XR Chest 1 View [[id number]] Collected: 01/22/24 1401     Updated: 01/22/24 1406    Narrative:      EXAMINATION: XR CHEST 1 VW- 1/22/2024 2:01 PM     HISTORY: Acute Stroke Protocol  (Onset < 12 hrs).     REPORT: A frontal view of the chest was obtained.     COMPARISON: Chest x-ray 10/30/2023.     The lungs are hypoaerated with bibasilar atelectasis, central vascular  crowding is present. No lung consolidation is identified. There is  borderline cardiomegaly without overt CHF. No pneumothorax or pleural  effusion is identified. There are moderate degenerative changes of the  right glenohumeral joint, moderate to severe degenerative changes of the  left glenohumeral joint.       Impression:      Moderate pulmonary hypoventilation with central vascular  congestion and borderline cardiomegaly. No evidence of overt CHF. No  consolidating infiltrate.     This report was signed and finalized on 1/22/2024 2:02 PM by Dr. Cody Wagner MD.       CT CEREBRAL PERFUSION WITH & WITHOUT CONTRAST [[id number]] Collected: 01/22/24 1358     Updated: 01/22/24 1403    Narrative:      CT CEREBRAL PERFUSION W WO CONTRAST- 1/22/2024 12:30 PM     HISTORY: Neuro deficit, acute, stroke suspected     Technique:     1. Perfusion CT is performed to acquire images tracking the temporal  course of iodinated contrast material passing through the cerebral  circulation. Perfusion parameters, such as cerebral blood flow (CBF),  cerebral blood volume (CBV), mean transit time (MTT), etc. are  calculated by [employer] with additional provided perfusion maps and  estimated stroke volumes.  2. Automated exposure control (AEC) protocols are utilized on the  scanner to ensure dose lowered technique.     Comparison: Noncontrast CT brain and brain angiogram dated 1/22/2024  12:30 PM     CT dose: 1634.13 mGy.cm     Findings:     Normal, symmetric and MTT, TTP, CBV and CBF.      Tmax >6.0 seconds volume: 0 ml     CBF < 30% volume: 0 ml     Mismatch volume: 0 ml     Mismatch ratio: None       Impression:      Impression:  1. Normal, symmetric cerebral perfusion. No discrete infarct or at risk  territory detected by the perfusion software.      This report was signed and finalized on 1/22/2024 2:00 PM by Dr Satinder Garcia.       CT Head Without Contrast Stroke Protocol [[id number]] Collected: 01/22/24 1330     Updated: 01/22/24 1337    Narrative:      EXAMINATION: CT HEAD WO CONTRAST STROKE PROTOCOL-      1/22/2024 12:25 PM     HISTORY: Neuro deficit, acute, stroke suspected     In order to have a CT radiation dose as low as reasonably achievable  Automated Exposure Control was utilized for adjustment of the mA and/or  KV according to patient size.     Total DLP = 923.28 mGy.cm     The CT scan of the head is performed without intravenous contrast  enhancement.     Images are acquired in axial plane and subsequent reconstruction in  coronal and sagittal planes.     Comparison is made with the previous study dated 10/30/2023.     There is no evidence of a mass. There is no midline shift.     No evidence of intracranial hemorrhage or hematoma.     Moderately dilated ventricles, basal cisterns and the cortical sulci are  similar to the previous study representing chronic volume loss.     Scattered areas of chronic white matter ischemia bilaterally are noted.  The gray-white matter differentiation is maintained.     The images reviewed in bone window show no acute bony abnormality. There  is mucosal thickening involving the right anterior ethmoid and right  maxillary sinus with complete opacification of the limited visualized  right maxillary sinus. Mastoid air cells are clear.       Impression:      1. No acute intracranial abnormality.  2. Chronic ischemic and atrophic changes.  3. Chronic sinusitis.                                      This report was signed and finalized on 1/22/2024 1:34 PM by Dr. Salma Lujan MD.             I have personally reviewed and interpreted the radiology studies and ECG obtained at time of admission.     Assessment / Plan   Assessment:   Active Hospital Problems    Diagnosis     **TIA (transient ischemic attack)     HTN  AIDEN  Parkinson   DM     Treatment Plan  The patient will be admitted to my service here at [facility].   By the time we saw him All that has resolved, was seen by neurology CT head -ve CTA head and neck -ve was on asa 81, per neurology will admit, full ASA, echo, check FLP, MRI , keep BP less than 220/110 , lovenix for DVT prophylxis, check A1c , SSI   Medical Decision Making  Number and Complexity of problems: 1 acute   Differential Diagnosis: as above     Conditions and Status        Condition is unchanged.     [facility] Data  External documents reviewed: no   Cardiac tracing (EKG, telemetry) interpretation: yes  Radiology interpretation: yes  Labs reviewed: yes  Any tests that were considered but not ordered: no      Decision rules/scores evaluated (example [id number], Wells, etc): no      Discussed with: ER      Care Planning  Shared decision making: Patient apprised of current labs, vitals, imaging and treatment plan.  They are agreeable with proceeding with plans as discussed.   Code status and discussions: full     Disposition  Social Determinants of Health that impact treatment or disposition: no   Estimated length of stay is TBD.     I confirmed that the patient's advanced care plan is present, code status is documented, and a surrogate decision maker is listed in the patient's medical record.         The patient was seen and examined by me on 1511 at [facility] .    Electronically signed by Randee Boyd MD, 01/22/24, 15:04 CST.

## 2024-01-22 NOTE — CASE MANAGEMENT/SOCIAL WORK
Discharge Planning Assessment  [facility]     Patient Name: Speedy Villalba  MRN: [id number]  Today's Date: 1/22/2024    Admit Date: 1/22/2024        Discharge Needs Assessment       Row Name 01/22/24 1547       Living Environment    People in Home facility resident    Current Living Arrangements extended care facility    Potentially Unsafe Housing Conditions none    Primary Care Provided by other (see comments)    Provides Primary Care For no one, unable/limited ability to care for self    Family Caregiver if Needed spouse;child(mitchell), adult    Quality of Family Relationships involved;helpful;supportive    Able to Return to Prior Arrangements yes       Resource/Environmental Concerns    Resource/Environmental Concerns none    Transportation Concerns none       Transition Planning    Patient/Family Anticipates Transition to long-term care facility    Patient/Family Anticipated Services at Transition skilled nursing    Transportation Anticipated health plan transportation;family or friend will provide       Discharge Needs Assessment    Readmission Within the Last 30 Days no previous admission in last 30 days    Current Outpatient/Agency/Support Group skilled nursing facility    Outpatient/Agency/Support Group Needs skilled nursing facility    Discharge Facility/Level of Care Needs nursing facility, skilled    Discharge Coordination/Progress PT is a current resident of [facility]. SW awaiting return call to [facility] bed hold status. It is anticipated PT will return to SNF upon dc. SW will follow and assist as needed.                   Discharge Plan    No documentation.                 Continued Care and Services - Admitted Since 1/22/2024    Coordination has not been started for this encounter.       Selected Continued Care - Prior Encounters Includes continued care and service providers with selected services from prior encounters from 10/24/2023 to 1/22/2024      Discharged on 11/4/2023 Admission date:  10/30/2023 - Discharge disposition: Skilled Nursing Facility (DC - External)      Destination       Service Provider Selected Services Address Phone Fax Patient Preferred    [facility] [address], [facility] [address] [phone] --                             Demographic Summary    No documentation.                  Functional Status    No documentation.                  Psychosocial    No documentation.                  Abuse/Neglect    No documentation.                  Legal    No documentation.                  Substance Abuse    No documentation.                  Patient Forms    No documentation.                     ETHEL June

## 2024-01-22 NOTE — CONSULTS
NEUROLOGY CONSULT     DOS: 2024  NAME: Speedy Villalba   : 1945  PCP: Luca Benson MD  CC: Stroke  Referring MD: No ref. provider found      Neurological Problem and Interval History:  78 y.o. 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 Patient was LKW 0930 (spoke with his nurse Paxton at his facility) when he ws given his meds and spoke to hs nurse and took meds. Then at 1030 he wqs noted to be lethargic and then at 1240 was found aphasic. Patient was seen in stroke alert and resolved sx during CT scans. HCT/CTPO/CTAs were unremarkable for perfusion deficit or LVO. He complained of LUE/L shoulder pain.      Past Medical/Surgical Hx:  Past Medical History:   Diagnosis Date    Atypical parkinsonism 2023    Cognitive decline 10/30/2023    Coronary artery disease 10 years    x2 stents    Diabetes mellitus < 2 years    non-insulin dependent    Difficulty walking < 1 year    multiple falls in the last 6-8 months    HL (hearing loss) 20+ years    Wears hearing aids occasionally    Hyperlipidemia 5 years    Memory loss 3 years+/-    gradual and subtle    Primary hypertension 10/30/2023    Sleep apnea 20+ year    Non-compliant with PEEP for tx    Stroke 2022     Past Surgical History:   Procedure Laterality Date    APPENDECTOMY      BACK SURGERY  2016    CAROTID STENT  ?    KNEE SURGERY  1960    LAMINECTOMY  2015?    discectomy & fusion       Review of Systems:       No fever, sweats or chills,  No neck pain, back pain or joint pain  No loss of hearing or tinnitus  No blurry or double vision  No rashes or edema  No chest pain or palpitations  No shortness of breath or wheezing  No diarrhea or constipation  No urinary incontinence or dysuria  No seizures or syncope  No depression or anxiety    Medications On Admission  (Not in a hospital admission)      Allergies:  Allergies   Allergen Reactions    Serna-2  "Inhibitors Unknown - Low Severity       Social Hx:  Social History     Socioeconomic History    Marital status:    Tobacco Use    Smoking status: Never     Passive exposure: Past    Smokeless tobacco: Never   Vaping Use    Vaping Use: Never used   Substance and Sexual Activity    Alcohol use: Yes     Alcohol/week: 3.0 standard drinks of alcohol     Types: 3 Drinks containing 0.5 oz of alcohol per week    Drug use: Never    Sexual activity: Not Currently     Partners: Female     Birth control/protection: Vasectomy       Family Hx:  Family History   Problem Relation Age of Onset    Parkinsonism Father        Review of Imaging (Interpretation of images not reports):  -HCT/CTP/CTAs:  1. No arterial occlusion or flow-limiting stenosis in the neck.  2. No intracranial large vessel occlusion or flow-limiting stenosis.  1. No arterial occlusion or flow-limiting stenosis in the neck.  2. No intracranial large vessel occlusion or flow-limiting stenosis.  1. Normal, symmetric cerebral perfusion. No discrete infarct or at risk  territory detected by the perfusion software.    Laboratory Results:   Lab Results   Component Value Date    GLUCOSE 157 (H) 01/22/2024    CALCIUM 8.6 01/22/2024     01/22/2024    K 4.4 01/22/2024    CO2 25.0 01/22/2024     01/22/2024    BUN 14 01/22/2024    CREATININE 1.20 01/22/2024    BCR 11.7 01/22/2024    ANIONGAP 10.0 01/22/2024     Lab Results   Component Value Date    WBC 6.97 01/22/2024    HGB 11.4 (L) 01/22/2024    HCT 33.5 (L) 01/22/2024    MCV 93.3 01/22/2024     01/22/2024     No results found for: \"CHOL\"  No results found for: \"HDL\"  No results found for: \"LDL\"  No results found for: \"TRIG\"  No results found for: \"HGBA1C\"  Lab Results   Component Value Date    INR 1.04 01/22/2024    INR 1.17 (H) 11/11/2023    PROTIME 13.7 01/22/2024    PROTIME 15.1 (H) 11/11/2023         Physical Examination:   BP 94/62   Pulse 85   Temp 98.5 °F (36.9 °C) (Oral)   Resp 16   Ht " "175.3 cm (69\")   Wt 124 kg (273 lb 5.9 oz)   SpO2 100%   BMI 40.37 kg/m²       NIHSS:     Diagnoses / Discussion:  78 y.o. male with LBD and cv risk factors presents with transient L gaze preference and RUE weakness/numbness and aphasia. This likley represnts TIA.      Plan:  Aspirin 325mg  Hydrate  Neurochecks  Check Lipid panel, Hgb A1C  Lipitor 80mg  Non-pharmacological DVT prophylaxis  TTE  Zio on D/C  F/u in neurology clinic in 3-4 weeks  CT/CTA/CTP unremarkable for perfusion deficit or LVO  MRI  EKG Tele  PT/OT/ST  Stroke Education  Blood pressure control, permissive HTN, tx SBP >220, or DBP >110     I have discussed the above with the patient and family.  Time spent with patient: 60min    MDM  Reviewed: previous chart, nursing note and vitals  Reviewed previous: labs and CT scan  Interpretation: labs and CT scan  Total time providing critical care: 30-74 minutes. This excludes time spent performing separately reportable procedures and services.  Consults: neurology         Anabell Staples MD   Neurology           "

## 2024-01-22 NOTE — ED PROVIDER NOTES
Subjective   History of Present Illness  78-year-old gentleman who presents from [facility] via EMS.  The patient was a code stroke.  He has right-sided neglect.  He has left-sided weakness.  He is aphasic, and unable to communicate with me on my exam.  He is unable to follow simple directions.  He appears chronically ill.    Code stroke was called.    Review of Systems   Neurological:  Positive for speech difficulty. Negative for facial asymmetry.        Right-sided neglect.  Left-sided weakness.  Difficulty with coordination.  Unable to follow directions.       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    Cognitive decline 10/30/2023    Coronary artery disease 10 years    x2 stents    Diabetes mellitus < 2 years    non-insulin dependent    Difficulty walking < 1 year    multiple falls in the last 6-8 months    HL (hearing loss) 20+ years    Wears hearing aids occasionally    Hyperlipidemia 5 years    Memory loss 3 years+/-    gradual and subtle    Primary hypertension 10/30/2023    Sleep apnea 20+ year    Non-compliant with PEEP for tx    Stroke 11/2022    TIA (transient ischemic attack) 1/22/2024       Allergies   Allergen Reactions    Serna-2 Inhibitors Unknown - Low Severity       Past Surgical History:   Procedure Laterality Date    APPENDECTOMY      BACK SURGERY  2016    CAROTID STENT  2010?    KNEE SURGERY  1960    LAMINECTOMY  2015?    discectomy & fusion       Family History   Problem Relation Age of Onset    Parkinsonism Father        Social History     Socioeconomic History    Marital status:    Tobacco Use    Smoking status: Never     Passive exposure: Past    Smokeless tobacco: Never   Vaping Use    Vaping Use: Never used   Substance and Sexual Activity    Alcohol use: Yes     Alcohol/week: 3.0 standard drinks of alcohol     Types: 3 Drinks containing 0.5 oz of alcohol per week    Drug use: Never    Sexual activity: Not Currently      Partners: Female     Birth control/protection: Vasectomy           Objective   Physical Exam  Vitals reviewed.   Constitutional:       Appearance: He is ill-appearing.   HENT:      Head: Normocephalic and atraumatic.      Right Ear: External ear normal.      Left Ear: External ear normal.      Nose: Nose normal.   Eyes:      General: No scleral icterus.     Conjunctiva/sclera: Conjunctivae normal.   Cardiovascular:      Rate and Rhythm: Normal rate and regular rhythm.      Heart sounds: Normal heart sounds.   Pulmonary:      Effort: Pulmonary effort is normal.      Breath sounds: Normal breath sounds.   Abdominal:      General: There is no distension.      Palpations: Abdomen is soft.      Tenderness: There is no abdominal tenderness.   Musculoskeletal:         General: No tenderness.      Cervical back: Normal range of motion and neck supple.   Skin:     General: Skin is warm and dry.   Neurological:      Mental Status: He is alert.      Cranial Nerves: No cranial nerve deficit.      Motor: Weakness present.      Coordination: Coordination abnormal.      Comments: Right-sided neglect.  Unable to follow directions.  Aphasic.  Poor coordination.  Generalized weakness.         Procedures           ED Course  ED Course as of 01/24/24 2213   Mon Jan 22, 2024   1431 I went to evaluate the patient after his CT scans.  He appears back to normal.  I have communicated with neurology, she notes TIA stroke workup is ordered.  Will ask hospitalist to admit. [AJ]      ED Course User Index  [AJ] Pantera Carnes,       Code stroke 8                    Total (NIH Stroke Scale): 0        Lab Results (last 24 hours)       Procedure Component Value Units Date/Time    Basic Metabolic Panel [[id number]]  (Abnormal) Collected: 01/24/24 0457    Specimen: Blood Updated: 01/24/24 0618     Glucose 110 mg/dL      BUN 16 mg/dL      Creatinine 1.10 mg/dL      Sodium 138 mmol/L      Potassium 4.2 mmol/L      Chloride 103 mmol/L      CO2 25.0  mmol/L      Calcium 9.3 mg/dL      BUN/Creatinine Ratio 14.5     Anion Gap 10.0 mmol/L      eGFR 68.7 mL/min/1.73     Narrative:      GFR Normal >60  Chronic Kidney Disease <60  Kidney Failure <15    The GFR formula is only valid for adults with stable renal function between ages 18 and 70.    CBC & Differential [[id number]]  (Abnormal) Collected: 01/24/24 0457    Specimen: Blood Updated: 01/24/24 0551    Narrative:      The following orders were created for panel order CBC & Differential.  Procedure                               Abnormality         Status                     ---------                               -----------         ------                     CBC Auto Differential[[id number]]        Abnormal            Final result                 Please view results for these tests on the individual orders.    CBC Auto Differential [[id number]]  (Abnormal) Collected: 01/24/24 0457    Specimen: Blood Updated: 01/24/24 0551     WBC 6.87 10*3/mm3      RBC 3.93 10*6/mm3      Hemoglobin 12.1 g/dL      Hematocrit 36.3 %      MCV 92.4 fL      MCH 30.8 pg      MCHC 33.3 g/dL      RDW 13.3 %      RDW-SD 45.4 fl      MPV 10.0 fL      Platelets 220 10*3/mm3      Neutrophil % 66.9 %      Lymphocyte % 16.7 %      Monocyte % 10.2 %      Eosinophil % 5.1 %      Basophil % 0.4 %      Immature Grans % 0.7 %      Neutrophils, Absolute 4.59 10*3/mm3      Lymphocytes, Absolute 1.15 10*3/mm3      Monocytes, Absolute 0.70 10*3/mm3      Eosinophils, Absolute 0.35 10*3/mm3      Basophils, Absolute 0.03 10*3/mm3      Immature Grans, Absolute 0.05 10*3/mm3      nRBC 0.0 /100 WBC           MRI Brain Without Contrast   Final Result   1.  No acute findings. No evidence of acute infarct.   2.  Mild presumed chronic microvascular ischemic white matter change.   3.  Bilateral maxillary sinus mucosal thickening, greatest on the right.       This report was signed and finalized on 1/22/2024 7:43 PM by Dr. Jovan Serna MD.          XR Chest 1  View   Final Result   Moderate pulmonary hypoventilation with central vascular   congestion and borderline cardiomegaly. No evidence of overt CHF. No   consolidating infiltrate.       This report was signed and finalized on 1/22/2024 2:02 PM by Dr. Cody Wagner MD.          CT CEREBRAL PERFUSION WITH & WITHOUT CONTRAST   Final Result   Impression:   1. Normal, symmetric cerebral perfusion. No discrete infarct or at risk   territory detected by the perfusion software.       This report was signed and finalized on 1/22/2024 2:00 PM by Dr Satinder Garcia.          CT Angiogram Head w AI Analysis of LVO   Final Result       1. No arterial occlusion or flow-limiting stenosis in the neck.   2. No intracranial large vessel occlusion or flow-limiting stenosis.       This report was signed and finalized on 1/22/2024 2:29 PM by Dr Satinder Garcia.          CT Angiogram Neck   Final Result       1. No arterial occlusion or flow-limiting stenosis in the neck.   2. No intracranial large vessel occlusion or flow-limiting stenosis.       This report was signed and finalized on 1/22/2024 2:29 PM by Dr Satinder Garcia.          CT Head Without Contrast Stroke Protocol   Final Result   1. No acute intracranial abnormality.   2. Chronic ischemic and atrophic changes.   3. Chronic sinusitis.                                                   This report was signed and finalized on 1/22/2024 1:34 PM by Dr. Salma Lujan MD.                      Medical Decision Making  Problems Addressed:  TIA (transient ischemic attack): complicated acute illness or injury    Amount and/or Complexity of Data Reviewed  Labs: ordered.  Radiology: ordered.  ECG/medicine tests: ordered.    Risk  Prescription drug management.  Decision regarding hospitalization.        Final diagnoses:   TIA (transient ischemic attack)       ED Disposition  ED Disposition       ED Disposition   Decision to Admit    Condition   --    Comment   Level of Care:  Med/Surg [1]   Diagnosis: TIA (transient ischemic attack) [972257]   Admitting Physician: USAMA MARX [[id number]]                 Rodrigue Hebert PA  [address]  [facility] [id number] [phone]    Follow up in 4 week(s)      Luca Benson MD  [address] [place] [id number] [phone]    Follow up in 1 week(s)      [facility]  [address] [id number] [phone]             Medication List        New Prescriptions      amoxicillin-clavulanate 875-125 MG per tablet  Commonly known as: AUGMENTIN  Take 1 tablet by mouth Every 12 (Twelve) Hours for 9 doses. Indications: Upper Respiratory Tract Infection     atorvastatin 80 MG tablet  Commonly known as: LIPITOR  Take 1 tablet by mouth Every Night.     clopidogrel 75 MG tablet  Commonly known as: PLAVIX  Take 1 tablet by mouth Daily.            Stop      pravastatin 20 MG tablet  Commonly known as: PRAVACHOL               Where to Get Your Medications        These medications were sent to  PHARMACY - [place], [place] - [address] [facility] - [phone]  - [phone]   [address] [address]      Phone: [phone]   amoxicillin-clavulanate 875-125 MG per tablet  atorvastatin 80 MG tablet  clopidogrel 75 MG tablet            Pantera Carnes, DO  01/22/24 1355       Pantera Carnes, DO  01/22/24 1431       Pantera Carnes, DO  01/24/24 4958

## 2024-01-23 ENCOUNTER — APPOINTMENT (OUTPATIENT)
Dept: NEUROLOGY | Facility: HOSPITAL | Age: 79
End: 2024-01-23
Payer: MEDICARE

## 2024-01-23 LAB
ANION GAP SERPL CALCULATED.3IONS-SCNC: 9 MMOL/L (ref 5–15)
BASOPHILS # BLD AUTO: 0.04 10*3/MM3 (ref 0–0.2)
BASOPHILS NFR BLD AUTO: 0.5 % (ref 0–1.5)
BUN SERPL-MCNC: 16 MG/DL (ref 8–23)
BUN/CREAT SERPL: 13.6 (ref 7–25)
CALCIUM SPEC-SCNC: 8.7 MG/DL (ref 8.6–10.5)
CHLORIDE SERPL-SCNC: 103 MMOL/L (ref 98–107)
CHOLEST SERPL-MCNC: 113 MG/DL (ref 0–200)
CO2 SERPL-SCNC: 25 MMOL/L (ref 22–29)
CREAT SERPL-MCNC: 1.18 MG/DL (ref 0.76–1.27)
DEPRECATED RDW RBC AUTO: 45.8 FL (ref 37–54)
EGFRCR SERPLBLD CKD-EPI 2021: 63.2 ML/MIN/1.73
EOSINOPHIL # BLD AUTO: 0.39 10*3/MM3 (ref 0–0.4)
EOSINOPHIL NFR BLD AUTO: 5.1 % (ref 0.3–6.2)
ERYTHROCYTE [DISTWIDTH] IN BLOOD BY AUTOMATED COUNT: 13.5 % (ref 12.3–15.4)
GLUCOSE BLDC GLUCOMTR-MCNC: 168 MG/DL (ref 70–130)
GLUCOSE SERPL-MCNC: 125 MG/DL (ref 65–99)
HBA1C MFR BLD: 6.2 % (ref 4.8–5.6)
HCT VFR BLD AUTO: 32.6 % (ref 37.5–51)
HDLC SERPL-MCNC: 36 MG/DL (ref 40–60)
HGB BLD-MCNC: 11 G/DL (ref 13–17.7)
IMM GRANULOCYTES # BLD AUTO: 0.04 10*3/MM3 (ref 0–0.05)
IMM GRANULOCYTES NFR BLD AUTO: 0.5 % (ref 0–0.5)
LDLC SERPL CALC-MCNC: 56 MG/DL (ref 0–100)
LDLC/HDLC SERPL: 1.48 {RATIO}
LYMPHOCYTES # BLD AUTO: 1.07 10*3/MM3 (ref 0.7–3.1)
LYMPHOCYTES NFR BLD AUTO: 14 % (ref 19.6–45.3)
MCH RBC QN AUTO: 31.1 PG (ref 26.6–33)
MCHC RBC AUTO-ENTMCNC: 33.7 G/DL (ref 31.5–35.7)
MCV RBC AUTO: 92.1 FL (ref 79–97)
MONOCYTES # BLD AUTO: 0.7 10*3/MM3 (ref 0.1–0.9)
MONOCYTES NFR BLD AUTO: 9.1 % (ref 5–12)
NEUTROPHILS NFR BLD AUTO: 5.42 10*3/MM3 (ref 1.7–7)
NEUTROPHILS NFR BLD AUTO: 70.8 % (ref 42.7–76)
NRBC BLD AUTO-RTO: 0 /100 WBC (ref 0–0.2)
PLATELET # BLD AUTO: 215 10*3/MM3 (ref 140–450)
PMV BLD AUTO: 9.9 FL (ref 6–12)
POTASSIUM SERPL-SCNC: 4.3 MMOL/L (ref 3.5–5.2)
QT INTERVAL: 386 MS
QTC INTERVAL: 442 MS
RBC # BLD AUTO: 3.54 10*6/MM3 (ref 4.14–5.8)
SODIUM SERPL-SCNC: 137 MMOL/L (ref 136–145)
TRIGL SERPL-MCNC: 118 MG/DL (ref 0–150)
VLDLC SERPL-MCNC: 21 MG/DL (ref 5–40)
WBC NRBC COR # BLD AUTO: 7.66 10*3/MM3 (ref 3.4–10.8)

## 2024-01-23 PROCEDURE — 83036 HEMOGLOBIN GLYCOSYLATED A1C: CPT | Performed by: PSYCHIATRY & NEUROLOGY

## 2024-01-23 PROCEDURE — 96372 THER/PROPH/DIAG INJ SC/IM: CPT

## 2024-01-23 PROCEDURE — 25010000002 ENOXAPARIN PER 10 MG: Performed by: HOSPITALIST

## 2024-01-23 PROCEDURE — 97162 PT EVAL MOD COMPLEX 30 MIN: CPT

## 2024-01-23 PROCEDURE — 80061 LIPID PANEL: CPT | Performed by: PSYCHIATRY & NEUROLOGY

## 2024-01-23 PROCEDURE — 95819 EEG AWAKE AND ASLEEP: CPT

## 2024-01-23 PROCEDURE — G0378 HOSPITAL OBSERVATION PER HR: HCPCS

## 2024-01-23 PROCEDURE — 95819 EEG AWAKE AND ASLEEP: CPT | Performed by: PSYCHIATRY & NEUROLOGY

## 2024-01-23 PROCEDURE — 80048 BASIC METABOLIC PNL TOTAL CA: CPT | Performed by: HOSPITALIST

## 2024-01-23 PROCEDURE — 82948 REAGENT STRIP/BLOOD GLUCOSE: CPT

## 2024-01-23 PROCEDURE — 92523 SPEECH SOUND LANG COMPREHEN: CPT | Performed by: SPEECH-LANGUAGE PATHOLOGIST

## 2024-01-23 PROCEDURE — 85025 COMPLETE CBC W/AUTO DIFF WBC: CPT | Performed by: HOSPITALIST

## 2024-01-23 PROCEDURE — 99214 OFFICE O/P EST MOD 30 MIN: CPT | Performed by: CLINICAL NURSE SPECIALIST

## 2024-01-23 PROCEDURE — 97166 OT EVAL MOD COMPLEX 45 MIN: CPT | Performed by: OCCUPATIONAL THERAPIST

## 2024-01-23 PROCEDURE — 97530 THERAPEUTIC ACTIVITIES: CPT | Performed by: OCCUPATIONAL THERAPIST

## 2024-01-23 RX ORDER — ATORVASTATIN CALCIUM 80 MG/1
80 TABLET, FILM COATED ORAL NIGHTLY
Qty: 90 TABLET | Refills: 0 | Status: SHIPPED | OUTPATIENT
Start: 2024-01-23

## 2024-01-23 RX ORDER — FAMOTIDINE 20 MG/1
20 TABLET, FILM COATED ORAL DAILY
COMMUNITY

## 2024-01-23 RX ORDER — ASPIRIN 81 MG/1
81 TABLET, CHEWABLE ORAL DAILY
Status: DISCONTINUED | OUTPATIENT
Start: 2024-01-24 | End: 2024-01-24 | Stop reason: HOSPADM

## 2024-01-23 RX ORDER — OXYBUTYNIN CHLORIDE 5 MG/1
2.5 TABLET ORAL 2 TIMES DAILY
COMMUNITY

## 2024-01-23 RX ORDER — NYSTATIN 100000 [USP'U]/G
1 POWDER TOPICAL 2 TIMES DAILY
COMMUNITY

## 2024-01-23 RX ORDER — AMOXICILLIN AND CLAVULANATE POTASSIUM 875; 125 MG/1; MG/1
1 TABLET, FILM COATED ORAL EVERY 12 HOURS SCHEDULED
Qty: 9 TABLET | Refills: 0 | Status: SHIPPED | OUTPATIENT
Start: 2024-01-23 | End: 2024-01-28

## 2024-01-23 RX ORDER — AMOXICILLIN AND CLAVULANATE POTASSIUM 875; 125 MG/1; MG/1
1 TABLET, FILM COATED ORAL EVERY 12 HOURS SCHEDULED
Status: DISCONTINUED | OUTPATIENT
Start: 2024-01-23 | End: 2024-01-24 | Stop reason: HOSPADM

## 2024-01-23 RX ORDER — BENZOCAINE/MENTHOL 6 MG-10 MG
1 LOZENGE MUCOUS MEMBRANE EVERY 6 HOURS PRN
COMMUNITY

## 2024-01-23 RX ORDER — CLOPIDOGREL BISULFATE 75 MG/1
75 TABLET ORAL DAILY
Qty: 30 TABLET | Refills: 0 | Status: SHIPPED | OUTPATIENT
Start: 2024-01-24

## 2024-01-23 RX ORDER — AMINO ACIDS/PROTEIN HYDROLYS 15G-100/30
30 LIQUID (ML) ORAL 2 TIMES DAILY
COMMUNITY

## 2024-01-23 RX ORDER — ACETAMINOPHEN 325 MG/1
650 TABLET ORAL EVERY 6 HOURS PRN
COMMUNITY

## 2024-01-23 RX ORDER — ONDANSETRON 4 MG/1
4 TABLET, ORALLY DISINTEGRATING ORAL EVERY 6 HOURS PRN
COMMUNITY

## 2024-01-23 RX ORDER — CLOPIDOGREL BISULFATE 75 MG/1
75 TABLET ORAL DAILY
Status: DISCONTINUED | OUTPATIENT
Start: 2024-01-23 | End: 2024-01-24 | Stop reason: HOSPADM

## 2024-01-23 RX ORDER — TRAZODONE HYDROCHLORIDE 50 MG/1
25 TABLET ORAL DAILY
Status: ON HOLD | COMMUNITY
End: 2024-01-23

## 2024-01-23 RX ORDER — POLYETHYLENE GLYCOL 3350 17 G/17G
17 POWDER, FOR SOLUTION ORAL 2 TIMES DAILY PRN
COMMUNITY

## 2024-01-23 RX ADMIN — ASPIRIN 325 MG: 325 TABLET, FILM COATED ORAL at 08:39

## 2024-01-23 RX ADMIN — Medication 10 ML: at 08:39

## 2024-01-23 RX ADMIN — ENOXAPARIN SODIUM 40 MG: 100 INJECTION SUBCUTANEOUS at 16:16

## 2024-01-23 RX ADMIN — CLOPIDOGREL BISULFATE 75 MG: 75 TABLET, FILM COATED ORAL at 10:05

## 2024-01-23 RX ADMIN — AMOXICILLIN AND CLAVULANATE POTASSIUM 1 TABLET: 875; 125 TABLET, FILM COATED ORAL at 20:39

## 2024-01-23 RX ADMIN — DOCUSATE SODIUM 50 MG AND SENNOSIDES 8.6 MG 2 TABLET: 8.6; 5 TABLET, FILM COATED ORAL at 08:39

## 2024-01-23 RX ADMIN — AMOXICILLIN AND CLAVULANATE POTASSIUM 1 TABLET: 875; 125 TABLET, FILM COATED ORAL at 08:44

## 2024-01-23 RX ADMIN — ATORVASTATIN CALCIUM 80 MG: 40 TABLET, FILM COATED ORAL at 20:39

## 2024-01-23 NOTE — PLAN OF CARE
Goal Outcome Evaluation:              Outcome Evaluation: Pt denies pain so far this shift; up to BSC with 2 assist, attempted to have BM, none so far; voiding per urinal; confused to situation at times; loss of IV access, d/c plans in place for tomorrow, pt drinking and eating well; safety maintained.

## 2024-01-23 NOTE — CASE MANAGEMENT/SOCIAL WORK
Continued Stay Note  [facility]     Patient Name: Speedy Villalba  MRN: [id number]  Today's Date: 1/23/2024    Admit Date: 1/22/2024    Plan: [facility]   Discharge Plan       Row Name 01/23/24 1547       Plan    Plan [facility]    Patient/Family in Agreement with Plan yes    Final Discharge Disposition Code 03 - skilled nursing facility (SNF)    Final Note Pt is returning to [facility] [phone]. They will pull the d/c summary from [facility]. Family will need to transport.                   Discharge Codes    No documentation.                 Expected Discharge Date and Time       Expected Discharge Date Expected Discharge Time    Jan 24, 2024               JOHN Jauregui

## 2024-01-23 NOTE — THERAPY EVALUATION
Acute Care - Speech Language Pathology Initial Evaluation/Discharge Note  [facility]     Patient Name: Speedy Villalba  : 1945  MRN: [id number]  Today's Date: 2024               Admit Date: 2024  Speech-Language Pathology Evaluation: Communication    The patient was seen on 2024 for a communication evaluation. Patient was alert and cooperative. Significant history: Patient presented aphasia, and right-side neglect with a history of COPD, HTN, HLD Parkinism, and AIDEN. MRI with no acute findings, neurology suspects TIA.  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 At that time, Patient scored extremely low on the RBANS.    An informal communication evaluation was completed. Patient presented with mild memory and word-finding difficulties during eval components and conversation, and he presented with a slight droop to the left when smiling. Patient self-corrected when he named the wrong medical facility that he was in. He was able to say his name and the year when prompted. He was able to repeat number sets of 3 and 4 when prompted without difficulty. He was able to repeat a set of 4 words for immediate recall, but he exhibited difficulty repeating a set of 4 words for delayed recall. He required phonemic and semantic cues for 2 words.Swallowing was informally assessed for thin liquids. Patient exhibited no significant difficulties, but some throat clearing was noted. CXR with no noted concerns upon admit no acute findings on MRI; therefore, current deficits likely chronic in nature. If concerns arise with swallowing please re-consult.     Patient is being discharged from SLP services as current deficits are baseline.     Demetria Medina, SLP Student    Visit Dx:    ICD-10-CM ICD-9-CM   1. TIA (transient ischemic attack)  G45.9 435.9   2. Speech and language deficit as late effect of stroke  I69.328 438.10   3. Impaired functional mobility and  activity tolerance [Z74.09]  Z74.09 V49.89     Patient Active Problem List   Diagnosis    Gastroenteritis    Coronary artery disease involving native coronary artery of native heart without angina pectoris    Cognitive decline    Frequent falls    Type 2 diabetes mellitus without complication, without long-term current use of insulin    Primary hypertension    Atypical parkinsonism    Sleep disturbance    Delusions    TIA (transient ischemic attack)     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    Cognitive decline 10/30/2023    Coronary artery disease 10 years    x2 stents    Diabetes mellitus < 2 years    non-insulin dependent    Difficulty walking < 1 year    multiple falls in the last 6-8 months    HL (hearing loss) 20+ years    Wears hearing aids occasionally    Hyperlipidemia 5 years    Memory loss 3 years+/-    gradual and subtle    Primary hypertension 10/30/2023    Sleep apnea 20+ year    Non-compliant with PEEP for tx    Stroke 11/2022    TIA (transient ischemic attack) 1/22/2024     Past Surgical History:   Procedure Laterality Date    APPENDECTOMY      BACK SURGERY  2016    CAROTID STENT  2010?    KNEE SURGERY  1960    LAMINECTOMY  2015?    discectomy & fusion       SLP Recommendation and Plan  SLP Diagnosis:  (mild expressive aphasia and cognitve changes but at baseline) (01/23/24 0953)     Reason for Discharge: all goals and outcomes met, no further needs identified (back to baseline) (01/23/24 1110)     SLC Criteria for Skilled Therapy Interventions Met: no problems identified which require skilled intervention, baseline status (01/23/24 0953)  Anticipated Discharge Disposition (SLP): skilled nursing facility (01/23/24 1110)                                                SLP EVALUATION (last 72 hours)       SLP SLC Evaluation       Row Name 01/23/24 0953                   Communication Assessment/Intervention    Document Type evaluation  Evaluation completed at 8:38;  entered flowsheet in error at wrong time.  -MG        Subjective Information no complaints  -MG (r) AK (t) MG (c)        Patient Observations alert;cooperative  -MG (r) AK (t) MG (c)        Patient Effort good  -MG (r) AK (t) MG (c)        Comment Patient was alert and attentive to evaluation tasks.  -MG (r) AK (t) MG (c)        Symptoms Noted During/After Treatment none  -MG (r) AK (t) MG (c)           General Information    Patient Profile Reviewed yes  -MG (r) AK (t) MG (c)        Pertinent History Of Current Problem Patient presented with TIA, aphasia, and right-side neglect with a history of COPD, HTN, HLD Parkinism, and AIDEN. 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 At that time. Patient scored extremely low on the RBANS.  -MG        Precautions/Limitations, Vision WFL  -MG (r) AK (t) MG (c)        Precautions/Limitations, Hearing WFL  -MG (r) AK (t) MG (c)        Prior Level of Function-Communication expressive language impairment  -MG        Plans/Goals Discussed with patient  -MG (r) AK (t) MG (c)        Barriers to Rehab none identified  -MG (r) AK (t) MG (c)        Standardized Assessment Used --  Informal evaluation provided  -MG (r) AK (t) MG (c)           Pain    Additional Documentation Pain Scale: FACES Pre/Post-Treatment (Group)  -MG (r) AK (t) MG (c)           Pain Scale: FACES Pre/Post-Treatment    Pain: FACES Scale, Pretreatment 0-->no hurt  -MG (r) AK (t) MG (c)        Posttreatment Pain Rating 0-->no hurt  -MG (r) AK (t) MG (c)           Comprehension Assessment/Intervention    Comprehension Assessment/Intervention Auditory Comprehension  -MG (r) AK (t) MG (c)           Auditory Comprehension Assessment/Intervention    Auditory Comprehension (Communication) WFL  -MG (r) AK (t) MG (c)        Answers Questions (Communication) personal;WFL  -MG (r) AK (t) MG (c)        Narrative Discourse conversational level;WFL  -MG (r) AK (t) MG (c)            Expression Assessment/Intervention    Expression Assessment/Intervention verbal expression  -MG (r) AK (t) MG (c)           Verbal Expression Assessment/Intervention    Verbal Expression WFL  -MG (r) AK (t) MG (c)        Automatic Speech (Communication) response to greeting;WNL  -MG (r) AK (t) MG (c)        Repetition words;mild impairment  Patient was able to repeat 4 words successfully when asked to repeat them immediately. He exhibited difficulty repeating words when for delayed recall.  -MG (r) AK (t) MG (c)        Conversational Discourse/Fluency mild impairment  Patient experienced mild difficulties in word finding.  -MG (r) AK (t) MG (c)           Oral Motor Structure and Function    Oral Motor Structure and Function WFL  Slight droop on left side when asked to smile  -MG (r) AK (t) MG (c)           Oral Musculature and Cranial Nerve Assessment    Oral Labial or Buccal Impairment, Detail, Cranial Nerve VII (Facial): left labial droop  slight droop to the left when asked to smile  -MG (r) AK (t) MG (c)           Motor Speech Assessment/Intervention    Motor Speech Function WFL  -MG           SLP Evaluation Clinical Impressions    SLP Diagnosis --  mild expressive aphasia and cognitve changes but at baseline  -MG        SLC Criteria for Skilled Therapy Interventions Met no problems identified which require skilled intervention;baseline status  -MG        Functional Impact no impact on function  -MG (r) AK (t) MG (c)           Recommendations    Therapy Frequency (SLP SLC) evaluation only  -MG (r) AK (t) MG (c)        Anticipated Discharge Disposition (SLP) skilled nursing facility  -MG (r) AK (t) MG (c)           SLP Discharge Summary    Discharge Destination SNF  -MG (r) AK (t) MG (c)                  User Key  (r) = Recorded By, (t) = Taken By, (c) = Cosigned By      Initials Name Effective Dates    Cleo Leiva, MS [facility]-SLP 07/11/23 -     Demetria Nguyen, Speech Therapy Student 12/14/23 -                         EDUCATION  The patient has been educated in the following areas:     Communication Impairment.                    Time Calculation:      Time Calculation- SLP       Row Name 01/23/24 1031             Time Calculation- SLP    SLP Start Time 0725  -MG (r) AK (t) MG (c)      SLP Stop Time 0825  -MG (r) AK (t) MG (c)      SLP Time Calculation (min) 60 min  -MG (r) AK (t)      SLP Received On 01/23/24  -MG         Untimed Charges    SLP Eval/Re-eval  ST Eval Speech and Production w/ Language - 22159  -MG      [id number] Eval Speech and Production w/ Language Minutes 60  -MG         Total Minutes    Untimed Charges Total Minutes 60  -MG       Total Minutes 60  -MG                User Key  (r) = Recorded By, (t) = Taken By, (c) = Cosigned By      Initials Name Provider Type    MG Cleo Dobbins, MS CCC-SLP Speech and Language Pathologist    Demetria Nguyen, Speech Therapy Student SLP Student                    Therapy Charges for Today       Code Description Service Date Service Provider Modifiers Qty    [id number] HC ST EVAL SPEECH AND PROD W LANG  4 1/23/2024 Cleo Dobbins, MS CCC-SLP GN 1                       Cleo Dobbins MS CCC-SLP  1/23/2024

## 2024-01-23 NOTE — PLAN OF CARE
Goal Outcome Evaluation:      A&OX3, disoriented to situation at this time, patients family at bedside assisting with history/care. VSS, no c/o pain. NIH score of 2, Slight left facial droop noticeable-family states this was from previous stroke, bedside swallow screen passed, MD notified and diet ordered. On room air, IVF, SCD's,Lovenox. No neuro changes noted. Assist x1/2, patient is impulsive per family, bed alarm set and call light in reach. Safety maintained and continue to monitor.

## 2024-01-23 NOTE — PROGRESS NOTES
[facility]  INPATIENT PROGRESS NOTE    Patient Name: Speedy Villalba  Date of Admission: 1/22/2024  Today's Date: 01/23/24  Length of Stay: 0  Primary Care Physician: Luca Benson MD    Subjective   Chief Complaint: Aphasia and weakness  Stroke       78-year-old gentleman with PMH of CAD s/p stenting morbid obesity ? AIDEN, HTN, HLD parkinsonism who presents [facility] via EMS.  The patient was a code stroke.  He has right-sided neglect.  He has left-sided weakness.  He is aphasic, and unable to communicate with me on my exam.  He is unable to follow simple directions.    By the time we saw him All that has resolved, was seen by neurology CT head -ve CTA head and neck -ve was on asa 81, per neurology will admit, full ASA, echo, check FLP, MRI , keep BP less than 220/110 , lovenix for DVT prophylxis, check A1c , SSI   1/23  As above admitted for TIA symptoms have resolved patient A1c is 6.25 recommend 1800 ADA diet and check with PCP in 1 week LDL is 56 recommend continue statin his baby aspirin was increased to 325 per neurology MRI of the head is unremarkable CTA head and neck unremarkable echo of the heart was unremarkable we will go ahead and discharge if okay with neurology neurology has signed off DUAP with ASA 81 mg daily and Plavix 75 mg daily for 21 days then Plavix monotherapy.   Pravachol 20  mg daily  as LDL is at goal at 56 and also given patient advanced age.  OT/PT/ST  Continue home sinemet.  Recommend lights and TV on and blinds open during the day to help with orientation.   Recommend limiting hospitalization days as much as able with patient history of dementia.  OK to d/c from neuro stand point.   F/u KD Hebert PA-C in 4 weeks.   9. 14 day ZIO at discharge.   10. EEG today.      Review of Systems   All pertinent negatives and positives are as above. All other systems have been reviewed and are negative  "unless otherwise stated.     Objective    Temp:  [97.7 °F (36.5 °C)-98.5 °F (36.9 °C)] 98 °F (36.7 °C)  Heart Rate:  [73-99] 81  Resp:  [16-18] 18  BP: ()/(53-67) 109/67  Physical Exam  Constitutional:       Appearance: Normal appearance.   Neurological:      Mental Status: He is alert.             Results Review:  I have reviewed the labs, radiology results, and diagnostic studies.    Laboratory Data:   Results from last 7 days   Lab Units 01/23/24  0331 01/22/24  1828 01/22/24  1331   WBC 10*3/mm3 7.66 7.60 6.97   HEMOGLOBIN g/dL 11.0* 12.3* 11.4*   HEMATOCRIT % 32.6* 37.0* 33.5*   PLATELETS 10*3/mm3 215 209 218        Results from last 7 days   Lab Units 01/23/24  0331 01/22/24  1805 01/22/24  1331   SODIUM mmol/L 137 137 137   POTASSIUM mmol/L 4.3 4.4 4.4   CHLORIDE mmol/L 103 99 102   CO2 mmol/L 25.0 25.0 25.0   BUN mg/dL 16 14 14   CREATININE mg/dL 1.18 1.18 1.20   CALCIUM mg/dL 8.7 9.2 8.6   BILIRUBIN mg/dL  --   --  0.3   ALK PHOS U/L  --   --  100   ALT (SGPT) U/L  --   --  12   AST (SGOT) U/L  --   --  19   GLUCOSE mg/dL 125* 163* 157*       Culture Data:   No results found for: \"BLOODCX\", \"URINECX\", \"WOUNDCX\", \"MRSACX\", \"RESPCX\", \"STOOLCX\"    Radiology Data:   Imaging Results (Last 24 Hours)       Procedure Component Value Units Date/Time    MRI Brain Without Contrast [[id number]] Collected: 01/22/24 1939     Updated: 01/22/24 1946    Narrative:      EXAM/TECHNIQUE: MRI brain without contrast     INDICATION: Stroke, follow up; G45.9-Transient cerebral ischemic attack,  unspecified     COMPARISON: 3/15/2023, 1/22/2024     FINDINGS:     No diffusion restriction to suggest acute infarction.      Mild periventricular and subcortical T2 FLAIR hyperintense white matter  lesions, likely related to chronic microvascular ischemia. No other  significant abnormality of brain parenchyma signal intensity. No midline  shift or mass effect. Lateral ventricles are nondilated. Basilar  cisterns are patent. No " increased susceptibility to suggest hemorrhage.  Major physiologic flow voids are maintained.      Trace right mastoid effusion. Bilateral maxillary sinus mucosal  thickening most pronounced on the right.       Impression:      1.  No acute findings. No evidence of acute infarct.  2.  Mild presumed chronic microvascular ischemic white matter change.  3.  Bilateral maxillary sinus mucosal thickening, greatest on the right.     This report was signed and finalized on 1/22/2024 7:43 PM by Dr. Jovan Serna MD.       CT Angiogram Head w AI Analysis of LVO [[id number]] Collected: 01/22/24 1425     Updated: 01/22/24 1432    Narrative:      EXAM: CT ANGIOGRAM HEAD W AI ANALYSIS OF LVO-, CT ANGIOGRAM NECK- -  1/22/2024 12:27 PM     HISTORY: Neuro deficit, acute stroke suspected       COMPARISON: None.     DOSE LENGTH PRODUCT: 449.8 mGy.cm. Automated exposure control was also  utilized to decrease patient radiation dose.     TECHNIQUE: CTA head and neck performed with intravenous contrast. 3D  postprocessing, including MIPs, performed and images saved to PACS. AI  ANALYSIS OF LVO WAS UTILIZED. Grading of carotid stenosis performed with  NASCET criteria.     FINDINGS:     There is a typical three-vessel branching pattern off the aortic arch  without significant ostial narrowing. Common carotid arteries are patent  and without flow-limiting stenosis. Mild calcified plaque in the carotid  bulbs without flow-limiting stenosis. ICAs are normal in caliber in the  neck. The vertebral arteries originate from the subclavian arteries  without significant ostial narrowing.  No evidence of vertebral artery  dissection or pseudoaneurysm. Multinodular thyroid goiter. Lung apices  are clear. No cervical adenopathy. Advanced cervical spine  osteoarthritis change.     Distal ICAs are patent and without flow-limiting stenosis. No  intracranial large vessel occlusion. Anterior and middle cerebral  arteries are patent and without  flow-limiting stenosis. Intracranial  vertebral arteries and basilar artery are normal in caliber. Posterior  cerebral arteries are patent and normal in caliber. No visualized  aneurysm or vascular malformation. Intracranial hemorrhage or mass  effect.       Impression:         1. No arterial occlusion or flow-limiting stenosis in the neck.  2. No intracranial large vessel occlusion or flow-limiting stenosis.     This report was signed and finalized on 1/22/2024 2:29 PM by Dr Satinder Garcia.       CT Angiogram Neck [[id number]] Collected: 01/22/24 1425     Updated: 01/22/24 1432    Narrative:      EXAM: CT ANGIOGRAM HEAD W AI ANALYSIS OF LVO-, CT ANGIOGRAM NECK- -  1/22/2024 12:27 PM     HISTORY: Neuro deficit, acute stroke suspected       COMPARISON: None.     DOSE LENGTH PRODUCT: 449.8 mGy.cm. Automated exposure control was also  utilized to decrease patient radiation dose.     TECHNIQUE: CTA head and neck performed with intravenous contrast. 3D  postprocessing, including MIPs, performed and images saved to PACS. AI  ANALYSIS OF LVO WAS UTILIZED. Grading of carotid stenosis performed with  NASCET criteria.     FINDINGS:     There is a typical three-vessel branching pattern off the aortic arch  without significant ostial narrowing. Common carotid arteries are patent  and without flow-limiting stenosis. Mild calcified plaque in the carotid  bulbs without flow-limiting stenosis. ICAs are normal in caliber in the  neck. The vertebral arteries originate from the subclavian arteries  without significant ostial narrowing.  No evidence of vertebral artery  dissection or pseudoaneurysm. Multinodular thyroid goiter. Lung apices  are clear. No cervical adenopathy. Advanced cervical spine  osteoarthritis change.     Distal ICAs are patent and without flow-limiting stenosis. No  intracranial large vessel occlusion. Anterior and middle cerebral  arteries are patent and without flow-limiting stenosis. Intracranial  vertebral  arteries and basilar artery are normal in caliber. Posterior  cerebral arteries are patent and normal in caliber. No visualized  aneurysm or vascular malformation. Intracranial hemorrhage or mass  effect.       Impression:         1. No arterial occlusion or flow-limiting stenosis in the neck.  2. No intracranial large vessel occlusion or flow-limiting stenosis.     This report was signed and finalized on 1/22/2024 2:29 PM by Dr Satinder Garcia.       XR Chest 1 View [[id number]] Collected: 01/22/24 1401     Updated: 01/22/24 1406    Narrative:      EXAMINATION: XR CHEST 1 VW- 1/22/2024 2:01 PM     HISTORY: Acute Stroke Protocol (Onset < 12 hrs).     REPORT: A frontal view of the chest was obtained.     COMPARISON: Chest x-ray 10/30/2023.     The lungs are hypoaerated with bibasilar atelectasis, central vascular  crowding is present. No lung consolidation is identified. There is  borderline cardiomegaly without overt CHF. No pneumothorax or pleural  effusion is identified. There are moderate degenerative changes of the  right glenohumeral joint, moderate to severe degenerative changes of the  left glenohumeral joint.       Impression:      Moderate pulmonary hypoventilation with central vascular  congestion and borderline cardiomegaly. No evidence of overt CHF. No  consolidating infiltrate.     This report was signed and finalized on 1/22/2024 2:02 PM by Dr. Cody Wagner MD.       CT CEREBRAL PERFUSION WITH & WITHOUT CONTRAST [[id number]] Collected: 01/22/24 1358     Updated: 01/22/24 1403    Narrative:      CT CEREBRAL PERFUSION W WO CONTRAST- 1/22/2024 12:30 PM     HISTORY: Neuro deficit, acute, stroke suspected     Technique:     1. Perfusion CT is performed to acquire images tracking the temporal  course of iodinated contrast material passing through the cerebral  circulation. Perfusion parameters, such as cerebral blood flow (CBF),  cerebral blood volume (CBV), mean transit time (MTT), etc. are  calculated by  Rapid with additional provided perfusion maps and  estimated stroke volumes.  2. Automated exposure control (AEC) protocols are utilized on the  scanner to ensure dose lowered technique.     Comparison: Noncontrast CT brain and brain angiogram dated 1/22/2024  12:30 PM     CT dose: 1634.13 mGy.cm     Findings:     Normal, symmetric and MTT, TTP, CBV and CBF.      Tmax >6.0 seconds volume: 0 ml     CBF < 30% volume: 0 ml     Mismatch volume: 0 ml     Mismatch ratio: None       Impression:      Impression:  1. Normal, symmetric cerebral perfusion. No discrete infarct or at risk  territory detected by the perfusion software.     This report was signed and finalized on 1/22/2024 2:00 PM by Dr Satinder Garcia.       CT Head Without Contrast Stroke Protocol [[id number]] Collected: 01/22/24 1330     Updated: 01/22/24 1337    Narrative:      EXAMINATION: CT HEAD WO CONTRAST STROKE PROTOCOL-      1/22/2024 12:25 PM     HISTORY: Neuro deficit, acute, stroke suspected     In order to have a CT radiation dose as low as reasonably achievable  Automated Exposure Control was utilized for adjustment of the mA and/or  KV according to patient size.     Total DLP = 923.28 mGy.cm     The CT scan of the head is performed without intravenous contrast  enhancement.     Images are acquired in axial plane and subsequent reconstruction in  coronal and sagittal planes.     Comparison is made with the previous study dated 10/30/2023.     There is no evidence of a mass. There is no midline shift.     No evidence of intracranial hemorrhage or hematoma.     Moderately dilated ventricles, basal cisterns and the cortical sulci are  similar to the previous study representing chronic volume loss.     Scattered areas of chronic white matter ischemia bilaterally are noted.  The gray-white matter differentiation is maintained.     The images reviewed in bone window show no acute bony abnormality. There  is mucosal thickening involving the right anterior  ethmoid and right  maxillary sinus with complete opacification of the limited visualized  right maxillary sinus. Mastoid air cells are clear.       Impression:      1. No acute intracranial abnormality.  2. Chronic ischemic and atrophic changes.  3. Chronic sinusitis.                                      This report was signed and finalized on 1/22/2024 1:34 PM by Dr. Salma Lujan MD.               I have reviewed the patient's current medications.     Assessment/Plan   Assessment  Active Hospital Problems    Diagnosis     **TIA (transient ischemic attack)        Treatment Plan  8-year-old gentleman with PMH of CAD s/p stenting morbid obesity ? AIDEN, HTN, HLD parkinsonism who presents from [facility] via EMS.  The patient was a code stroke.  He has right-sided neglect.  He has left-sided weakness.  He is aphasic, and unable to communicate with me on my exam.  He is unable to follow simple directions.    By the time we saw him All that has resolved, was seen by neurology CT head -ve CTA head and neck -ve was on asa 81, per neurology will admit, full ASA, echo, check FLP, MRI , keep BP less than 220/110 , lovenix for DVT prophylxis, check A1c , SSI   1/23  As above admitted for TIA symptoms have resolved patient A1c is 6.25 recommend 1800 ADA diet and check with PCP in 1 week LDL is 56 recommend continue statin his baby aspirin was increased to 325 per neurology MRI of the head is unremarkable CTA head and neck unremarkable echo of the heart was unremarkable we will go ahead and discharge if okay with neurology    Electronically signed by Randee Boyd MD, 01/23/24, 08:11 CST.

## 2024-01-23 NOTE — PLAN OF CARE
Goal Outcome Evaluation:  Plan of Care Reviewed With: patient        Progress: improving  Outcome Evaluation: PT eval completed.  Pt pleasant and agreeable to therapy.  Pt oriented to self, knew he was in a hospital and that it was 2024.  However did not know the month or the name of the facility.  Pt reports no c/os N/T, able to track object L/R, intact to finger opposition and FTN w/ noted increase effort for FTN on L.  Pt initiated bed mobility, however required max assist to complete.  Tfers w/ CGA, however upon standing noted heavy posterior lean requiring UE support to improve.  Pt performed gait w/ CGA and HHA w/ noted increase ANDRZEJ, decrease step length and steppage type gait on R.  Tolerated ~ 80 ft.   Pt w/ noted distraction and mild LOB requiring min assist to recover while walking.  Pt did have posterior lean requiring assist to recover in standing upon returning to room while assisting to straighten linens on bed.  Pt will benefit from use of rwx and continued therapy to increase strength, balance, safety awareness and to progress functional mobility reducing fall risk.  Recommend continued skilled care in SNF at discharge will follow for progress and needs.      Anticipated Discharge Disposition (PT): skilled nursing facility

## 2024-01-23 NOTE — PLAN OF CARE
Goal Outcome Evaluation:  Plan of Care Reviewed With: patient        Progress: no change  Outcome Evaluation: OT evaluation completed. Pt is alert to self and year. Pt disorientation to location and situation. Pt is pleasantly confused and agreeable to therapy. Pt reports pain 0/10 at rest. Pt reports chronic L shoulder pain with activity 10/10. Pt was max A x2 for bed mobility. Pt initiates bed mobility task but decreased motor planning noted. Pt was CGA for sit to stand t/f. Pt was CGA for functional mobility with HHA x1-2, min A to correct one LOB. Pt intermittently with posterior lean in standing requiring mod A to correct. pt was max A for donning/doffing socks. Pt reports that he utilizes RW to independently move within room at SNF. Pt reports he utilizes w/c to mobilize to cafeteria. Pt reports min A for dressing and bathing at baseline. No noted visual or sensation deficits. ROM and strength WFL, except for L shoulder is 50% impaired. Pt reports this is a chronic impairement. BUE FM and GM coordination WFL overall, increased effort of task noted for L GM coordination. Skilled OT recommended to address ADLs, functional mobility and endurance. Recommended d/c SNF.      Anticipated Discharge Disposition (OT): skilled nursing facility

## 2024-01-23 NOTE — THERAPY EVALUATION
Patient Name: Speedy Villalba  : 1945    MRN: [id number]                              Today's Date: 2024       Admit Date: 2024    Visit Dx:     ICD-10-CM ICD-9-CM   1. TIA (transient ischemic attack)  G45.9 435.9   2. Speech and language deficit as late effect of stroke  I69.328 438.10   3. Impaired functional mobility and activity tolerance [Z74.09]  Z74.09 V49.89     Patient Active Problem List   Diagnosis    Gastroenteritis    Coronary artery disease involving native coronary artery of native heart without angina pectoris    Cognitive decline    Frequent falls    Type 2 diabetes mellitus without complication, without long-term current use of insulin    Primary hypertension    Atypical parkinsonism    Sleep disturbance    Delusions    TIA (transient ischemic attack)     Past Medical History:   Diagnosis Date    Atypical parkinsonism 2023    Cognitive decline 10/30/2023    Coronary artery disease 10 years    x2 stents    Diabetes mellitus < 2 years    non-insulin dependent    Difficulty walking < 1 year    multiple falls in the last 6-8 months    HL (hearing loss) 20+ years    Wears hearing aids occasionally    Hyperlipidemia 5 years    Memory loss 3 years+/-    gradual and subtle    Primary hypertension 10/30/2023    Sleep apnea 20+ year    Non-compliant with PEEP for tx    Stroke 2022    TIA (transient ischemic attack) 2024     Past Surgical History:   Procedure Laterality Date    APPENDECTOMY      BACK SURGERY  2016    CAROTID STENT  ?    KNEE SURGERY  1960    LAMINECTOMY  2015?    discectomy & fusion      General Information       Row Name 24 0949          Physical Therapy Time and Intention    Document Type evaluation;other (see comments)  see MAR  -JULIA     Mode of Treatment physical therapy  -JE       Row Name 24 0949          General Information    Patient Profile Reviewed yes  -JE     Prior Level of Function min  assist:;dressing;bathing;dependent:;cooking;cleaning;driving;shopping;independent:;gait;transfer   reports they wheel him down to the dining room in a w/c, but he is able to get up and move around in his room w/ a wx  -JULIA     Existing Precautions/Restrictions fall  -     Barriers to Rehab previous functional deficit;cognitive status  -       Row Name 01/23/24 0949          Living Environment    People in Home facility resident  -     Name(s) of People in Home resides at [facility]  -       Row Name 01/23/24 0949          Cognition    Orientation Status (Cognition) oriented to;person;disoriented to;place;other (see comments)  knew it was 2024, but initially stated in was December; [place], but then said he was in the hospital, but thought he was in [place]  -       Row Name 01/23/24 0949          Safety Issues, Functional Mobility    Safety Issues Affecting Function (Mobility) friction/shear risk;insight into deficits/self-awareness;safety precaution awareness  -     Impairments Affecting Function (Mobility) cognition;balance;endurance/activity tolerance;postural/trunk control  -     Cognitive Impairments, Mobility Safety/Performance insight into deficits/self-awareness;safety precaution awareness  -               User Key  (r) = Recorded By, (t) = Taken By, (c) = Cosigned By      Initials Name Provider Type    Unique Sommer, PT Physical Therapist                   Mobility       Row Name 01/23/24 0949          Bed Mobility    Bed Mobility supine-sit;sit-supine  -     Supine-Sit [place] (Bed Mobility) maximum assist (25% patient effort);verbal cues  -JULIA     Sit-Supine [place] (Bed Mobility) maximum assist (25% patient effort);verbal cues  -     Assistive Device (Bed Mobility) bed rails;head of bed elevated  -       Row Name 01/23/24 0949          Sit-Stand Transfer    Sit-Stand [place] (Transfers) contact guard  -     Comment,  (Sit-Stand Transfer) however upon standing pt w/ heavy posterior lean  -       Row Name 01/23/24 0949          Gait/Stairs (Locomotion)    [place] Level (Gait) contact guard;verbal cues  -     Assistive Device (Gait) other (see comments)  HHA  -     Distance in Feet (Gait) 80 ft  -     Deviations/Abnormal Patterns (Gait) gait speed decreased;base of support, wide  -     Bilateral Gait Deviations heel strike decreased  -     Right Sided Gait Deviations --   steppage gait on R compared to L  -               User Key  (r) = Recorded By, (t) = Taken By, (c) = Cosigned By      Initials Name Provider Type    JE Unique Giron, PT Physical Therapist                   Obj/Interventions       Row Name 01/23/24 0949          Range of Motion Comprehensive    Comment, General Range of Motion AROM all 4 extemities WFLs except L shld limited to less than 50%  -       Row Name 01/23/24 0949          Strength Comprehensive (MMT)    Comment, General Manual Muscle Testing (MMT) Assessment defer to OT for formal UE strength assessment, however able to move B UEs against gravity w/out difficulty; B LEs grossly 4+/5  -       Row Name 01/23/24 0949          Motor Skills    Motor Skills other (see comments)  intact to finger opposition B, FTN intact B, however noted increase effort on L  -       Row Name 01/23/24 0949          Balance    Balance Assessment sitting static balance;sitting dynamic balance;sit to stand dynamic balance;standing static balance;standing dynamic balance  -     Static Sitting Balance standby assist  -     Dynamic Sitting Balance standby assist  -     Position, Sitting Balance supported;unsupported;sitting edge of bed  -     Sit to Stand Dynamic Balance contact guard;other (see comments)  however upon standing noted heavy posterior lean  -     Static Standing Balance moderate assist;contact guard  due to posterior lean at times  -     Dynamic Standing Balance contact  guard;2-person assist;verbal cues;minimal assist  due to balance loss requiring assist to be increased  -     Position/Device Used, Standing Balance supported;other (see comments)  HHA  -       Row Name 01/23/24 0949          Sensory Assessment (Somatosensory)    Sensory Assessment (Somatosensory) other (see comments)  reports no c/os N/T  -               User Key  (r) = Recorded By, (t) = Taken By, (c) = Cosigned By      Initials Name Provider Type    Unique Sommer, PT Physical Therapist                   Goals/Plan       Row Name 01/23/24 0949          Bed Mobility Goal 1 (PT)    Activity/Assistive Device (Bed Mobility Goal 1, PT) rolling to left;rolling to right;scooting;sit to supine/supine to sit;sidelying to sit/sit to sidelying  -JE     [place] Level/Cues Needed (Bed Mobility Goal 1, PT) contact guard required  -JE     Time Frame (Bed Mobility Goal 1, PT) long term goal (LTG);10 days  -JE     Progress/Outcomes (Bed Mobility Goal 1, PT) goal ongoing  -       Row Name 01/23/24 0949          Transfer Goal 1 (PT)    Activity/Assistive Device (Transfer Goal 1, PT) sit-to-stand/stand-to-sit;bed-to-chair/chair-to-bed  -JE     [place] Level/Cues Needed (Transfer Goal 1, PT) contact guard required  -JE     Time Frame (Transfer Goal 1, PT) long term goal (LTG);10 days  -JE     Progress/Outcome (Transfer Goal 1, PT) goal ongoing  -       Row Name 01/23/24 0949          Gait Training Goal 1 (PT)    Activity/Assistive Device (Gait Training Goal 1, PT) gait (walking locomotion);assistive device use;decrease fall risk;diminish gait deviation;improve balance and speed;increase endurance/gait distance;increase energy conservation;walker, rolling  -JE     [place] Level (Gait Training Goal 1, PT) contact guard required  -JE     Distance (Gait Training Goal 1, PT) 100 ft  -JE     Time Frame (Gait Training Goal 1, PT) long term goal (LTG);10 days  -JE     Progress/Outcome (Gait Training Goal 1, PT)  goal ongoing  -       Row Name 01/23/24 0949          Therapy Assessment/Plan (PT)    Planned Therapy Interventions (PT) balance training;bed mobility training;gait training;home exercise program;patient/family education;strengthening;transfer training;other (see comments)  safety/falls prevention  -               User Key  (r) = Recorded By, (t) = Taken By, (c) = Cosigned By      Initials Name Provider Type    Unique Sommer, PT Physical Therapist                   Clinical Impression       Row Name 01/23/24 0949          Pain    Pretreatment Pain Rating 0/10 - no pain   at rest  -     Posttreatment Pain Rating 0/10 - no pain  -     Pain Location - Side/Orientation Left  -     Pain Location - shoulder  -     Pre/Posttreatment Pain Comment 10/10 L shld pain w/ mvmt - chronic  -     Pain Intervention(s) Ambulation/increased activity;Repositioned;Rest  -       Row Name 01/23/24 0949          Plan of Care Review    Plan of Care Reviewed With patient  -     Progress improving  -     Outcome Evaluation PT eval completed.  Pt pleasant and agreeable to therapy.  Pt oriented to self, knew he was in a hospital and that it was 2024.  However did not know the month or the name of the facility.  Pt reports no c/os N/T, able to track object L/R, intact to finger opposition and FTN w/ noted increase effort for FTN on L.  Pt initiated bed mobility, however required max assist to complete.  Tfers w/ CGA, however upon standing noted heavy posterior lean requiring UE support to improve.  Pt performed gait w/ CGA and HHA w/ noted increase ANDRZEJ, decrease step length and steppage type gait on R.  Tolerated ~ 80 ft.   Pt w/ noted distraction and mild LOB requiring min assist to recover while walking.  Pt did have posterior lean requiring assist to recover in standing upon returning to room while assisting to straighten linens on bed.  Pt will benefit from use of rwx and continued therapy to increase strength,  balance, safety awareness and to progress functional mobility reducing fall risk.  Recommend continued skilled care in SNF at discharge will follow for progress and needs.  -       Row Name 01/23/24 0949          Therapy Assessment/Plan (PT)    Patient/Family Therapy Goals Statement (PT) improve mobility  -     Rehab Potential (PT) good, to achieve stated therapy goals  -     Criteria for Skilled Interventions Met (PT) yes;meets criteria;skilled treatment is necessary  -     Therapy Frequency (PT) 2 times/day  -     Predicted Duration of Therapy Intervention (PT) until discharge or goals achieved  -       Row Name 01/23/24 0949          Vital Signs    O2 Delivery Pre Treatment room air  -JE     O2 Delivery Intra Treatment room air  -JE     O2 Delivery Post Treatment room air  -JE     Pre Patient Position Supine  -JE     Intra Patient Position Standing  -JE     Post Patient Position Supine  -       Row Name 01/23/24 0949          Positioning and Restraints    Pre-Treatment Position in bed  -JE     Post Treatment Position bed  -JE     In Bed fowlers;call light within reach;encouraged to call for assist;exit alarm on;side rails up x3  -               User Key  (r) = Recorded By, (t) = Taken By, (c) = Cosigned By      Initials Name Provider Type    Unique Sommer, PT Physical Therapist                   Outcome Measures       Row Name 01/23/24 0949          How much help from another person do you currently need...    Turning from your back to your side while in flat bed without using bedrails? 2  -JE     Moving from lying on back to sitting on the side of a flat bed without bedrails? 2  -JE     Moving to and from a bed to a chair (including a wheelchair)? 3  -JE     Standing up from a chair using your arms (e.g., wheelchair, bedside chair)? 3  -JE     Climbing 3-5 steps with a railing? 2  -JE     To walk in hospital room? 3  -JE     AM-PAC 6 Clicks Score (PT) 15  -     Highest Level of Mobility  Goal 4 --> Transfer to chair/commode  -JULIA       Row Name 01/23/24 0949 01/23/24 0947       Functional Assessment    Outcome Measure Options AM-PAC 6 Clicks Basic Mobility (PT)  - AM-PAC 6 Clicks Daily Activity (OT)  -JR              User Key  (r) = Recorded By, (t) = Taken By, (c) = Cosigned By      Initials Name Provider Type    MM Luis Antonio Leblanc, OTR/L Occupational Therapist    Unique Sommer, PT Physical Therapist                                   PT Recommendation and Plan  Planned Therapy Interventions (PT): balance training, bed mobility training, gait training, home exercise program, patient/family education, strengthening, transfer training, other (see comments) (safety/falls prevention)  Plan of Care Reviewed With: patient  Progress: improving  Outcome Evaluation: PT eval completed.  Pt pleasant and agreeable to therapy.  Pt oriented to self, knew he was in a hospital and that it was 2024.  However did not know the month or the name of the facility.  Pt reports no c/os N/T, able to track object L/R, intact to finger opposition and FTN w/ noted increase effort for FTN on L.  Pt initiated bed mobility, however required max assist to complete.  Tfers w/ CGA, however upon standing noted heavy posterior lean requiring UE support to improve.  Pt performed gait w/ CGA and HHA w/ noted increase ANDRZEJ, decrease step length and steppage type gait on R.  Tolerated ~ 80 ft.   Pt w/ noted distraction and mild LOB requiring min assist to recover while walking.  Pt did have posterior lean requiring assist to recover in standing upon returning to room while assisting to straighten linens on bed.  Pt will benefit from use of rwx and continued therapy to increase strength, balance, safety awareness and to progress functional mobility reducing fall risk.  Recommend continued skilled care in SNF at discharge will follow for progress and needs.     Time Calculation:         PT Charges       Row Name 01/23/24 4905              Time Calculation    Start Time 0949  -      Stop Time 1044  -      Time Calculation (min) 55 min  -      PT Received On 01/23/24  -      PT Goal Re-Cert Due Date 02/02/24  -                User Key  (r) = Recorded By, (t) = Taken By, (c) = Cosigned By      Initials Name Provider Type    Unique Sommer, PT Physical Therapist                  Therapy Charges for Today       Code Description Service Date Service Provider Modifiers Qty    11214082634 HC PT EVAL MOD COMPLEXITY 4 1/23/2024 Unique Giron, PT GP 1            PT G-Codes  Outcome Measure Options: AM-PAC 6 Clicks Basic Mobility (PT)  AM-PAC 6 Clicks Score (PT): 15  AM-PAC 6 Clicks Score (OT): 15  PT Discharge Summary  Anticipated Discharge Disposition (PT): skilled nursing facility    Unique Giron, PT  1/23/2024

## 2024-01-23 NOTE — PLAN OF CARE
Goal Outcome Evaluation:                     Anticipated Discharge Disposition (SLP): skilled nursing facility    SLP Diagnosis:  (mild expressive aphasia and cognitve changes but at baseline) (01/23/24 7218)        Speech-Language Pathology Evaluation: Communication    The patient was seen on 01/23/2024 for a communication evaluation. Patient was alert and cooperative. Significant history: Patient presented aphasia, and right-side neglect with a history of COPD, HTN, HLD Parkinism, and AIDEN. MRI with no acute findings, neurology suspects TIA.  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 At that time, Patient scored extremely low on the RBANS.    An informal communication evaluation was completed. Patient presented with mild memory and word-finding difficulties during eval components and conversation, and he presented with a slight droop to the left when smiling. Patient self-corrected when he named the wrong medical facility that he was in. He was able to say his name and the year when prompted. He was able to repeat number sets of 3 and 4 when prompted without difficulty. He was able to repeat a set of 4 words for immediate recall, but he exhibited difficulty repeating a set of 4 words for delayed recall. He required phonemic and semantic cues for 2 words.Swallowing was informally assessed for thin liquids. Patient exhibited no significant difficulties, but some throat clearing was noted. CXR with no noted concerns upon admit no acute findings on MRI; therefore, current deficits likely chronic in nature. If concerns arise with swallowing please re-consult.     Patient is being discharged from SLP services as current deficits are baseline.     Demetria Medina, SLP Student

## 2024-01-23 NOTE — PLAN OF CARE
Goal Outcome Evaluation:  Plan of Care Reviewed With: patient        Progress: no change  Outcome Evaluation: No c/o pain so far this shift, IID, neuro checks unchanged, pt has situation confusion at times redirects easily, voiding per urinal, arvind diet

## 2024-01-23 NOTE — THERAPY EVALUATION
Patient Name: Speedy Villalba  : 1945    MRN: [id number]                              Today's Date: 2024       Admit Date: 2024    Visit Dx:     ICD-10-CM ICD-9-CM   1. TIA (transient ischemic attack)  G45.9 435.9   2. Speech and language deficit as late effect of stroke  I69.328 438.10   3. Impaired functional mobility and activity tolerance [Z74.09]  Z74.09 V49.89   4. Decreased activities of daily living (ADL) [Z78.9]  Z78.9 V49.89     Patient Active Problem List   Diagnosis    Gastroenteritis    Coronary artery disease involving native coronary artery of native heart without angina pectoris    Cognitive decline    Frequent falls    Type 2 diabetes mellitus without complication, without long-term current use of insulin    Primary hypertension    Atypical parkinsonism    Sleep disturbance    Delusions    TIA (transient ischemic attack)     Past Medical History:   Diagnosis Date    Atypical parkinsonism 2023    Cognitive decline 10/30/2023    Coronary artery disease 10 years    x2 stents    Diabetes mellitus < 2 years    non-insulin dependent    Difficulty walking < 1 year    multiple falls in the last 6-8 months    HL (hearing loss) 20+ years    Wears hearing aids occasionally    Hyperlipidemia 5 years    Memory loss 3 years+/-    gradual and subtle    Primary hypertension 10/30/2023    Sleep apnea 20+ year    Non-compliant with PEEP for tx    Stroke 2022    TIA (transient ischemic attack) 2024     Past Surgical History:   Procedure Laterality Date    APPENDECTOMY      BACK SURGERY  2016    CAROTID STENT  ?    KNEE SURGERY  1960    LAMINECTOMY  2015?    discectomy & fusion      General Information       Row Name 24 0947          OT Time and Intention    Document Type evaluation  Pt admitted with LUE weakness, aphasia, and right sided neglect. H/o CVA. Dx: TIA.  -MM     Mode of Treatment occupational therapy  -MM       Row Name 24 0947          General Information  "   Patient Profile Reviewed yes  -MM     Prior Level of Function min assist:;dressing;bathing;independent:;all household mobility;feeding;grooming  -MM     Existing Precautions/Restrictions fall  -MM     Barriers to Rehab medically complex;previous functional deficit;cognitive status  -MM       Row Name 01/23/24 0947          Living Environment    [facility] in [facility] facility resident  RW for short distance, w/c for longer distances,  -MM       Row Name 01/23/24 0947          Cognition    Orientation Status (Cognition) oriented to;person;disoriented to;place;verbal cues/prompts needed for orientation;other (see comments)  knew it was 2024, thought he was at \"[place]\" then he said he was in [place]. Pt reports he doesn't have any recollection of why he was here.  -MM       Row Name 01/23/24 0947          Safety Issues, Functional Mobility    Safety Issues Affecting Function (Mobility) ability to follow commands;at risk behavior observed;awareness of need for assistance;friction/shear risk;insight into deficits/self-awareness;judgment;problem-solving;safety precaution awareness;safety precautions follow-through/compliance;sequencing abilities  -MM     Impairments Affecting Function (Mobility) balance;cognition;endurance/activity tolerance;pain;range of motion (ROM);strength;motor planning;motor control  -MM     Cognitive Impairments, Mobility Safety/Performance attention;awareness, need for assistance;insight into deficits/self-awareness;judgment;problem-solving/reasoning;safety precaution awareness;safety precaution follow-through;sequencing abilities  -MM               User Key  (r) = Recorded By, (t) = Taken By, (c) = Cosigned By      Initials Name Provider Type    MM Luis Antonio Leblanc, OTR/L Occupational Therapist                     Mobility/ADL's       Row Name 01/23/24 0947          Bed Mobility    Bed Mobility supine-sit;sit-supine  -MM     Supine-Sit [place] (Bed Mobility) maximum assist (25% " patient effort);verbal cues  -MM     Sit-Supine [place] (Bed Mobility) maximum assist (25% patient effort);verbal cues  -MM     Assistive Device (Bed Mobility) bed rails;head of bed elevated  -MM       Row Name 01/23/24 0947          Transfers    Transfers sit-stand transfer;stand-sit transfer  -MM     Comment, (Transfers) posterior lean initially in standing  -MM       Row Name 01/23/24 0947          Sit-Stand Transfer    Sit-Stand [place] (Transfers) contact guard;verbal cues  -MM       Row Name 01/23/24 0947          Stand-Sit Transfer    Stand-Sit [place] (Transfers) contact guard;verbal cues  -MM       Row Name 01/23/24 0947          Functional Mobility    Functional Mobility- Ind. Level contact guard assist;minimum assist (75% patient effort);verbal cues required  -MM     Functional Mobility- Device other (see comments)  HHA x1  -MM     Functional Mobility- Comment bed to paiz to bed. CGA, except min A for one LOB  -MM       Row Name 01/23/24 0947          Activities of Daily Living    BADL Assessment/Intervention lower body dressing  -MM       Row Name 01/23/24 0947          Lower Body Dressing Assessment/Training    [place] Level (Lower Body Dressing) don;doff;socks;dependent (less than 25% patient effort)  -MM     Position (Lower Body Dressing) supine  -MM               User Key  (r) = Recorded By, (t) = Taken By, (c) = Cosigned By      Initials Name Provider Type    Luis Antonio Marquez, OTR/L Occupational Therapist                   Obj/Interventions       Row Name 01/23/24 0947          Sensory Assessment (Somatosensory)    Sensory Assessment (Somatosensory) UE sensation intact  -MM       Row Name 01/23/24 0947          Vision Assessment/Intervention    Visual Impairment/Limitations WFL  -MM       Row Name 01/23/24 0947          Range of Motion Comprehensive    General Range of Motion bilateral upper extremity ROM WNL  -MM     Comment, General Range of Motion except L shoulder 50%  impaired  -MM       Row Name 01/23/24 0947          Strength Comprehensive (MMT)    Comment, General Manual Muscle Testing (MMT) Assessment BUE 4+/5 within available ROM, L shoulder deferred d/t chronic pain  -MM       Row Name 01/23/24 0947          Motor Skills    Motor Skills coordination  -MM     Coordination WFL;bilateral;upper extremity;finger to nose;other (see comments)  opposition WFL BUE, LUE finger to nose with increased effort noted  -MM       Row Name 01/23/24 0947          Balance    Balance Assessment sitting static balance;sitting dynamic balance;standing static balance;standing dynamic balance  -MM     Static Sitting Balance standby assist  -MM     Dynamic Sitting Balance standby assist  -MM     Position, Sitting Balance supported;unsupported;sitting edge of bed  -MM     Static Standing Balance contact guard;moderate assist;verbal cues;non-verbal cues (demo/gesture)  -MM     Dynamic Standing Balance contact guard;minimal assist;verbal cues;2-person assist  -MM     Position/Device Used, Standing Balance supported;other (see comments)  HHA x1-2  -MM               User Key  (r) = Recorded By, (t) = Taken By, (c) = Cosigned By      Initials Name Provider Type    MM Luis Antonio Leblanc, OTR/L Occupational Therapist                   Goals/Plan       Row Name 01/23/24 0947          Bathing Goal 1 (OT)    Activity/Device (Bathing Goal 1, OT) bathing skills, all  -MM     [place] Level/Cues Needed (Bathing Goal 1, OT) minimum assist (75% or more patient effort);verbal cues required  -MM     Time Frame (Bathing Goal 1, OT) long term goal (LTG);by discharge  -MM     Progress/Outcomes (Bathing Goal 1, OT) new goal  -MM       Row Name 01/23/24 0947          Dressing Goal 1 (OT)    Activity/Device (Dressing Goal 1, OT) dressing skills, all  -MM     [place]/Cues Needed (Dressing Goal 1, OT) minimum assist (75% or more patient effort);verbal cues required;set-up required  -MM     Time Frame (Dressing Goal  1, OT) long term goal (LTG);by discharge  -MM     Progress/Outcome (Dressing Goal 1, OT) new goal  -MM       Row Name 01/23/24 0947          Toileting Goal 1 (OT)    Activity/Device (Toileting Goal 1, OT) toileting skills, all  -MM     [place] Level/Cues Needed (Toileting Goal 1, OT) standby assist  -MM     Time Frame (Toileting Goal 1, OT) long term goal (LTG);by discharge  -MM     Progress/Outcome (Toileting Goal 1, OT) new goal  -MM       Row Name 01/23/24 0947          Therapy Assessment/Plan (OT)    Planned Therapy Interventions (OT) activity tolerance training;adaptive equipment training;BADL retraining;cognitive/visual perception retraining;functional balance retraining;neuromuscular control/coordination retraining;occupation/activity based interventions;passive ROM/stretching;patient/caregiver education/training;ROM/therapeutic exercise;strengthening exercise;transfer/mobility retraining;wheelchair assessment/training  -MM               User Key  (r) = Recorded By, (t) = Taken By, (c) = Cosigned By      Initials Name Provider Type    MM Luis Antonio Leblanc, OTR/L Occupational Therapist                   Clinical Impression       Row Name 01/23/24 0947          Pain Assessment    Pretreatment Pain Rating 0/10 - no pain   10/10 at L shoulder pain with movement, chronic pain  -MM     Posttreatment Pain Rating 0/10 - no pain  -MM     Pain Location - Side/Orientation Left  -MM     Pain Location - shoulder  -MM     Pain Intervention(s) Medication (See MAR);Repositioned;Ambulation/increased activity  -MM       Row Name 01/23/24 0947          Plan of Care Review    Plan of Care Reviewed With patient  -MM     Progress no change  -MM     Outcome Evaluation OT evaluation completed. Pt is alert to self and year. Pt disorientation to location and situation. Pt is pleasantly confused and agreeable to therapy. Pt reports pain 0/10 at rest. Pt reports chronic L shoulder pain with activity 10/10. Pt was max A x2 for bed  mobility. Pt initiates bed mobility task but decreased motor planning noted. Pt was CGA for sit to stand t/f. Pt was CGA for functional mobility with HHA x1-2, min A to correct one LOB. Pt intermittently with posterior lean in standing requiring mod A to correct. pt was max A for donning/doffing socks. Pt reports that he utilizes RW to independently move within room at SNF. Pt reports he utilizes w/c to mobilize to cafeteria. Pt reports min A for dressing and bathing at baseline. No noted visual or sensation deficits. ROM and strength WFL, except for L shoulder is 50% impaired. Pt reports this is a chronic impairement. BUE FM and GM coordination WFL overall, increased effort of task noted for L GM coordination. Skilled OT recommended to address ADLs, functional mobility and endurance. Recommended d/c SNF.  -MM       Row Name 01/23/24 0947          Therapy Assessment/Plan (OT)    Patient/Family Therapy Goal Statement (OT) return home  -MM     Rehab Potential (OT) good, to achieve stated therapy goals  -MM     Criteria for Skilled Therapeutic Interventions Met (OT) yes;meets criteria;skilled treatment is necessary  -MM     Therapy Frequency (OT) 5 times/wk  -MM     Predicted Duration of Therapy Intervention (OT) until d/c  -MM       Row Name 01/23/24 0947          Therapy Plan Review/Discharge Plan (OT)    Anticipated Discharge Disposition (OT) skilled nursing facility  -MM       Row Name 01/23/24 0947          Vital Signs    O2 Delivery Pre Treatment room air  -MM     O2 Delivery Intra Treatment room air  -MM     O2 Delivery Post Treatment room air  -MM     Pre Patient Position Supine  -MM     Intra Patient Position Sitting  -MM     Post Patient Position Supine  -MM       Row Name 01/23/24 0947          Positioning and Restraints    Pre-Treatment Position in bed  -MM     Post Treatment Position bed  -MM     In Bed notified nsg;fowlers;call light within reach;encouraged to call for assist;exit alarm on;side rails up  x3  -MM               User Key  (r) = Recorded By, (t) = Taken By, (c) = Cosigned By      Initials Name Provider Type    Luis Antonio Marquez, OTR/L Occupational Therapist                   Outcome Measures       Row Name 01/23/24 0947          How much help from another is currently needed...    Putting on and taking off regular lower body clothing? 1  -MM     Bathing (including washing, rinsing, and drying) 2  -MM     Toileting (which includes using toilet bed pan or urinal) 2  -MM     Putting on and taking off regular upper body clothing 3  -MM     Taking care of personal grooming (such as brushing teeth) 3  -MM     Eating meals 4  -MM     AM-PAC 6 Clicks Score (OT) 15  -MM       Row Name 01/23/24 0949 01/23/24 0840       How much help from another person do you currently need...    Turning from your back to your side while in flat bed without using bedrails? 2  -JE 2  -DF    Moving from lying on back to sitting on the side of a flat bed without bedrails? 2  -JE 2  -DF    Moving to and from a bed to a chair (including a wheelchair)? 3  -JE 3  -DF    Standing up from a chair using your arms (e.g., wheelchair, bedside chair)? 3  -JE 3  -DF    Climbing 3-5 steps with a railing? 2  -JE 3  -DF    To walk in hospital room? 3  -JE 2  -DF    AM-PAC 6 Clicks Score (PT) 15  -JE 15  -DF    Highest Level of Mobility Goal 4 --> Transfer to chair/commode  -JE 4 --> Transfer to chair/commode  -DF      Row Name 01/23/24 0949 01/23/24 0947       Functional Assessment    Outcome Measure Options AM-PAC 6 Clicks Basic Mobility (PT)  -JE AM-PAC 6 Clicks Daily Activity (OT)  -MM              User Key  (r) = Recorded By, (t) = Taken By, (c) = Cosigned By      Initials Name Provider Type    Iram Fraire, RN Registered Nurse    Luis Antonio Marquez, OTR/L Occupational Therapist    Unique Sommer, PT Physical Therapist                    Occupational Therapy Education       Title: PT OT SLP Therapies (In Progress)       Topic:  Occupational Therapy (In Progress)       Point: ADL training (In Progress)       Description:   Instruct learner(s) on proper safety adaptation and remediation techniques during self care or transfers.   Instruct in proper use of assistive devices.                  Learning Progress Summary             Patient Acceptance, E, NR by MM at 1/23/2024 1043                         Point: Home exercise program (Not Started)       Description:   Instruct learner(s) on appropriate technique for monitoring, assisting and/or progressing therapeutic exercises/activities.                  Learner Progress:  Not documented in this visit.              Point: Precautions (In Progress)       Description:   Instruct learner(s) on prescribed precautions during self-care and functional transfers.                  Learning Progress Summary             Patient Acceptance, E, NR by MM at 1/23/2024 1043                         Point: Body mechanics (In Progress)       Description:   Instruct learner(s) on proper positioning and spine alignment during self-care, functional mobility activities and/or exercises.                  Learning Progress Summary             Patient Acceptance, E, NR by MM at 1/23/2024 1043                                         User Key       Initials Effective Dates Name Provider Type Discipline     07/11/23 -  Luis Antonio Leblanc, OTR/L Occupational Therapist OT                  OT Recommendation and Plan  Planned Therapy Interventions (OT): activity tolerance training, adaptive equipment training, BADL retraining, cognitive/visual perception retraining, functional balance retraining, neuromuscular control/coordination retraining, occupation/activity based interventions, passive ROM/stretching, patient/caregiver education/training, ROM/therapeutic exercise, strengthening exercise, transfer/mobility retraining, wheelchair assessment/training  Therapy Frequency (OT): 5 times/wk  Plan of Care Review  Plan of Care  Reviewed With: patient  Progress: no change  Outcome Evaluation: OT evaluation completed. Pt is alert to self and year. Pt disorientation to location and situation. Pt is pleasantly confused and agreeable to therapy. Pt reports pain 0/10 at rest. Pt reports chronic L shoulder pain with activity 10/10. Pt was max A x2 for bed mobility. Pt initiates bed mobility task but decreased motor planning noted. Pt was CGA for sit to stand t/f. Pt was CGA for functional mobility with HHA x1-2, min A to correct one LOB. Pt intermittently with posterior lean in standing requiring mod A to correct. pt was max A for donning/doffing socks. Pt reports that he utilizes RW to independently move within room at SNF. Pt reports he utilizes w/c to mobilize to cafeteria. Pt reports min A for dressing and bathing at baseline. No noted visual or sensation deficits. ROM and strength WFL, except for L shoulder is 50% impaired. Pt reports this is a chronic impairement. BUE FM and GM coordination WFL overall, increased effort of task noted for L GM coordination. Skilled OT recommended to address ADLs, functional mobility and endurance. Recommended d/c SNF.     Time Calculation:         Time Calculation- OT       Row Name 01/23/24 0947             Time Calculation- OT    OT Start Time 0947  +10 minutes chart review  -MM      OT Stop Time 1051  -MM      OT Time Calculation (min) 64 min  -MM      Total Timed Code Minutes- OT 14 minute(s)  -MM      OT Received On 01/23/24  -MM      OT Goal Re-Cert Due Date 02/02/24  -MM         Timed Charges    60394 - OT Therapeutic Activity Minutes 14  -MM      45604 - OT Self Care/Mgmt Minutes --  -MM         Total Minutes    Timed Charges Total Minutes 14  -MM       Total Minutes 14  -MM                User Key  (r) = Recorded By, (t) = Taken By, (c) = Cosigned By      Initials Name Provider Type    Luis Antonio Marquez, OTR/L Occupational Therapist                  Therapy Charges for Today       Code  Description Service Date Service Provider Modifiers Qty    [id number]  OT EVAL MOD COMPLEXITY 4 1/23/2024 Luis Antonio Leblanc, OTR/L GO 1    [id number]  OT THERAPEUTIC ACT EA 15 MIN 1/23/2024 Luis Antonio Leblanc, OTR/L GO 1                 ALONDRA Meza/VALENTINE  1/23/2024

## 2024-01-23 NOTE — DISCHARGE SUMMARY
[facility]  DISCHARGE SUMMARY       Date of Admission: 1/22/2024  Date of Discharge:  1/23/2024  Primary Care Physician: Luca Benson MD    Presenting Problem/History of Present Illness:  78-year-old gentleman with PMH of CAD s/p stenting morbid obesity ? AIDEN, HTN, HLD parkinsonism who presents from [facility] via EMS.  The patient was a code stroke.  He has right-sided neglect.  He has left-sided weakness.  He is aphasic, and unable to communicate with me on my exam.  He is unable to follow simple directions.    By the time we saw him All that has resolved, was seen by neurology CT head -ve CTA head and neck -ve was on asa 81, per neurology will admit, full ASA, echo, check FLP, MRI , keep BP less than 220/110 , lovenix for DVT prophylxis, check A1c , SSI   1/23  As above admitted for TIA symptoms have resolved patient A1c is 6.25 recommend 1800 ADA diet and check with PCP in 1 week LDL is 56 recommend continue statin his baby aspirin was increased to 325 per neurology MRI of the head is unremarkable CTA head and neck unremarkable echo of the heart was unremarkable we will go ahead and discharge if okay with neurology neurology has signed off DUAP with ASA 81 mg daily and Plavix 75 mg daily for 21 days then Plavix monotherapy.   Pravachol 20  mg daily  as LDL is at goal at 56 and also given patient advanced age.  OT/PT/ST  Continue home sinemet.  Recommend lights and TV on and blinds open during the day to help with orientation.   Recommend limiting hospitalization days as much as able with patient history of dementia.  OK to d/c from neuro stand point.   F/u KD Hebert PA-C in 4 weeks.   9. 14 day ZIO at discharge.   10. EEG today.        Final Discharge Diagnoses:  Active Hospital Problems    Diagnosis     **TIA (transient ischemic attack)        Consults: Neurology    Procedures Performed: CTA head and neck echo of the  heart MRI of the head    Pertinent Test Results:   Results for orders placed during the hospital encounter of 01/22/24    Adult Transthoracic Echo Complete W/ Cont if Necessary Per Protocol (With Agitated Saline)    Interpretation Summary    Left ventricular systolic function is normal. Left ventricular ejection fraction appears to be 61 - 65%.    Left ventricular diastolic function is consistent with (grade I) impaired relaxation.    Saline test results are negative.    Estimated right ventricular systolic pressure from tricuspid regurgitation is normal (<35 mmHg).    There is a trivial pericardial effusion adjacent to the right ventricle.      Imaging Results (All)       Procedure Component Value Units Date/Time    MRI Brain Without Contrast [[id number]] Collected: 01/22/24 1939     Updated: 01/22/24 1946    Narrative:      EXAM/TECHNIQUE: MRI brain without contrast     INDICATION: Stroke, follow up; G45.9-Transient cerebral ischemic attack,  unspecified     COMPARISON: 3/15/2023, 1/22/2024     FINDINGS:     No diffusion restriction to suggest acute infarction.      Mild periventricular and subcortical T2 FLAIR hyperintense white matter  lesions, likely related to chronic microvascular ischemia. No other  significant abnormality of brain parenchyma signal intensity. No midline  shift or mass effect. Lateral ventricles are nondilated. Basilar  cisterns are patent. No increased susceptibility to suggest hemorrhage.  Major physiologic flow voids are maintained.      Trace right mastoid effusion. Bilateral maxillary sinus mucosal  thickening most pronounced on the right.       Impression:      1.  No acute findings. No evidence of acute infarct.  2.  Mild presumed chronic microvascular ischemic white matter change.  3.  Bilateral maxillary sinus mucosal thickening, greatest on the right.     This report was signed and finalized on 1/22/2024 7:43 PM by Dr. Jovan Serna MD.       CT Angiogram Head w AI Analysis of  LVO [[id number]] Collected: 01/22/24 1425     Updated: 01/22/24 1432    Narrative:      EXAM: CT ANGIOGRAM HEAD W AI ANALYSIS OF LVO-, CT ANGIOGRAM NECK- -  1/22/2024 12:27 PM     HISTORY: Neuro deficit, acute stroke suspected       COMPARISON: None.     DOSE LENGTH PRODUCT: 449.8 mGy.cm. Automated exposure control was also  utilized to decrease patient radiation dose.     TECHNIQUE: CTA head and neck performed with intravenous contrast. 3D  postprocessing, including MIPs, performed and images saved to PACS. AI  ANALYSIS OF LVO WAS UTILIZED. Grading of carotid stenosis performed with  NASCET criteria.     FINDINGS:     There is a typical three-vessel branching pattern off the aortic arch  without significant ostial narrowing. Common carotid arteries are patent  and without flow-limiting stenosis. Mild calcified plaque in the carotid  bulbs without flow-limiting stenosis. ICAs are normal in caliber in the  neck. The vertebral arteries originate from the subclavian arteries  without significant ostial narrowing.  No evidence of vertebral artery  dissection or pseudoaneurysm. Multinodular thyroid goiter. Lung apices  are clear. No cervical adenopathy. Advanced cervical spine  osteoarthritis change.     Distal ICAs are patent and without flow-limiting stenosis. No  intracranial large vessel occlusion. Anterior and middle cerebral  arteries are patent and without flow-limiting stenosis. Intracranial  vertebral arteries and basilar artery are normal in caliber. Posterior  cerebral arteries are patent and normal in caliber. No visualized  aneurysm or vascular malformation. Intracranial hemorrhage or mass  effect.       Impression:         1. No arterial occlusion or flow-limiting stenosis in the neck.  2. No intracranial large vessel occlusion or flow-limiting stenosis.     This report was signed and finalized on 1/22/2024 2:29 PM by Dr Satinder Garcia.       CT Angiogram Neck [[id number]] Collected: 01/22/24 1420      Updated: 01/22/24 1432    Narrative:      EXAM: CT ANGIOGRAM HEAD W AI ANALYSIS OF LVO-, CT ANGIOGRAM NECK- -  1/22/2024 12:27 PM     HISTORY: Neuro deficit, acute stroke suspected       COMPARISON: None.     DOSE LENGTH PRODUCT: 449.8 mGy.cm. Automated exposure control was also  utilized to decrease patient radiation dose.     TECHNIQUE: CTA head and neck performed with intravenous contrast. 3D  postprocessing, including MIPs, performed and images saved to PACS. AI  ANALYSIS OF LVO WAS UTILIZED. Grading of carotid stenosis performed with  NASCET criteria.     FINDINGS:     There is a typical three-vessel branching pattern off the aortic arch  without significant ostial narrowing. Common carotid arteries are patent  and without flow-limiting stenosis. Mild calcified plaque in the carotid  bulbs without flow-limiting stenosis. ICAs are normal in caliber in the  neck. The vertebral arteries originate from the subclavian arteries  without significant ostial narrowing.  No evidence of vertebral artery  dissection or pseudoaneurysm. Multinodular thyroid goiter. Lung apices  are clear. No cervical adenopathy. Advanced cervical spine  osteoarthritis change.     Distal ICAs are patent and without flow-limiting stenosis. No  intracranial large vessel occlusion. Anterior and middle cerebral  arteries are patent and without flow-limiting stenosis. Intracranial  vertebral arteries and basilar artery are normal in caliber. Posterior  cerebral arteries are patent and normal in caliber. No visualized  aneurysm or vascular malformation. Intracranial hemorrhage or mass  effect.       Impression:         1. No arterial occlusion or flow-limiting stenosis in the neck.  2. No intracranial large vessel occlusion or flow-limiting stenosis.     This report was signed and finalized on 1/22/2024 2:29 PM by Dr Satinder Garcia.       XR Chest 1 View [[id number]] Collected: 01/22/24 1401     Updated: 01/22/24 1406    Narrative:      EXAMINATION:  XR CHEST 1 VW- 1/22/2024 2:01 PM     HISTORY: Acute Stroke Protocol (Onset < 12 hrs).     REPORT: A frontal view of the chest was obtained.     COMPARISON: Chest x-ray 10/30/2023.     The lungs are hypoaerated with bibasilar atelectasis, central vascular  crowding is present. No lung consolidation is identified. There is  borderline cardiomegaly without overt CHF. No pneumothorax or pleural  effusion is identified. There are moderate degenerative changes of the  right glenohumeral joint, moderate to severe degenerative changes of the  left glenohumeral joint.       Impression:      Moderate pulmonary hypoventilation with central vascular  congestion and borderline cardiomegaly. No evidence of overt CHF. No  consolidating infiltrate.     This report was signed and finalized on 1/22/2024 2:02 PM by Dr. Cody Wagner MD.       CT CEREBRAL PERFUSION WITH & WITHOUT CONTRAST [[id number]] Collected: 01/22/24 1358     Updated: 01/22/24 1403    Narrative:      CT CEREBRAL PERFUSION W WO CONTRAST- 1/22/2024 12:30 PM     HISTORY: Neuro deficit, acute, stroke suspected     Technique:     1. Perfusion CT is performed to acquire images tracking the temporal  course of iodinated contrast material passing through the cerebral  circulation. Perfusion parameters, such as cerebral blood flow (CBF),  cerebral blood volume (CBV), mean transit time (MTT), etc. are  calculated by [employer] with additional provided perfusion maps and  estimated stroke volumes.  2. Automated exposure control (AEC) protocols are utilized on the  scanner to ensure dose lowered technique.     Comparison: Noncontrast CT brain and brain angiogram dated 1/22/2024  12:30 PM     CT dose: 1634.13 mGy.cm     Findings:     Normal, symmetric and MTT, TTP, CBV and CBF.      Tmax >6.0 seconds volume: 0 ml     CBF < 30% volume: 0 ml     Mismatch volume: 0 ml     Mismatch ratio: None       Impression:      Impression:  1. Normal, symmetric cerebral perfusion. No discrete  infarct or at risk  territory detected by the perfusion software.     This report was signed and finalized on 1/22/2024 2:00 PM by Dr Satinder Garcia.       CT Head Without Contrast Stroke Protocol [[id number]] Collected: 01/22/24 1330     Updated: 01/22/24 1337    Narrative:      EXAMINATION: CT HEAD WO CONTRAST STROKE PROTOCOL-      1/22/2024 12:25 PM     HISTORY: Neuro deficit, acute, stroke suspected     In order to have a CT radiation dose as low as reasonably achievable  Automated Exposure Control was utilized for adjustment of the mA and/or  KV according to patient size.     Total DLP = 923.28 mGy.cm     The CT scan of the head is performed without intravenous contrast  enhancement.     Images are acquired in axial plane and subsequent reconstruction in  coronal and sagittal planes.     Comparison is made with the previous study dated 10/30/2023.     There is no evidence of a mass. There is no midline shift.     No evidence of intracranial hemorrhage or hematoma.     Moderately dilated ventricles, basal cisterns and the cortical sulci are  similar to the previous study representing chronic volume loss.     Scattered areas of chronic white matter ischemia bilaterally are noted.  The gray-white matter differentiation is maintained.     The images reviewed in bone window show no acute bony abnormality. There  is mucosal thickening involving the right anterior ethmoid and right  maxillary sinus with complete opacification of the limited visualized  right maxillary sinus. Mastoid air cells are clear.       Impression:      1. No acute intracranial abnormality.  2. Chronic ischemic and atrophic changes.  3. Chronic sinusitis.                                      This report was signed and finalized on 1/22/2024 1:34 PM by Dr. Salma Lujan MD.             LAB RESULTS:      Lab 01/23/24  0331 01/22/24  1828 01/22/24  1331   WBC 7.66 7.60 6.97   HEMOGLOBIN 11.0* 12.3* 11.4*   HEMATOCRIT 32.6* 37.0* 33.5*    PLATELETS 215 209 218   NEUTROS ABS 5.42 5.57 4.92   IMMATURE GRANS (ABS) 0.04 0.05 0.04   LYMPHS ABS 1.07 1.08 1.14   MONOS ABS 0.70 0.55 0.54   EOS ABS 0.39 0.31 0.29   MCV 92.1 94.6 93.3   PROTIME  --   --  13.7   APTT  --   --  27.2         Lab 01/23/24  0331 01/22/24  1805 01/22/24  1331   SODIUM 137 137 137   POTASSIUM 4.3 4.4 4.4   CHLORIDE 103 99 102   CO2 25.0 25.0 25.0   ANION GAP 9.0 13.0 10.0   BUN 16 14 14   CREATININE 1.18 1.18 1.20   EGFR 63.2 63.2 61.9   GLUCOSE 125* 163* 157*   CALCIUM 8.7 9.2 8.6   HEMOGLOBIN A1C 6.20*  --   --          Lab 01/22/24  1331   TOTAL PROTEIN 5.4*   ALBUMIN 3.5   GLOBULIN 1.9   ALT (SGPT) 12   AST (SGOT) 19   BILIRUBIN 0.3   ALK PHOS 100         Lab 01/22/24  1805 01/22/24  1437 01/22/24  1331   HSTROP T 51* 54*  --    PROTIME  --   --  13.7   INR  --   --  1.04         Lab 01/23/24  0331 01/22/24  1437   CHOLESTEROL 113 129   LDL CHOL 56 60   HDL CHOL 36* 40   TRIGLYCERIDES 118 173*         Lab 01/22/24  1437   ABO TYPING A   RH TYPING Positive   ANTIBODY SCREEN Negative         Brief Urine Lab Results  (Last result in the past 365 days)        Color   Clarity   Blood   Leuk Est   Nitrite   Protein   CREAT   Urine HCG        01/22/24 1510 Yellow   Clear   Negative   Negative   Negative   Negative                 Microbiology Results (last 10 days)       ** No results found for the last 240 hours. **            Hospital Course:     78-year-old gentleman with PMH of CAD s/p stenting morbid obesity ? AIDEN, HTN, HLD parkinsonism who presents from [facility] via EMS.  The patient was a code stroke.  He has right-sided neglect.  He has left-sided weakness.  He is aphasic, and unable to communicate with me on my exam.  He is unable to follow simple directions.    By the time we saw him All that has resolved, was seen by neurology CT head -ve CTA head and neck -ve was on asa 81, per neurology will admit, full ASA, echo, check FLP, MRI , keep BP less  "than 220/110 , lovenix for DVT prophylxis, check A1c , SSI   1/23  As above admitted for TIA symptoms have resolved patient A1c is 6.25 recommend 1800 ADA diet and check with PCP in 1 week LDL is 56 recommend continue statin his baby aspirin was increased to 325 per neurology MRI of the head is unremarkable CTA head and neck unremarkable echo of the heart was unremarkable we will go ahead and discharge if okay with neurology neurology has signed off DUAP with ASA 81 mg daily and Plavix 75 mg daily for 21 days then Plavix monotherapy.   Pravachol 20  mg daily  as LDL is at goal at 56 and also given patient advanced age.  OT/PT/ST  Continue home sinemet.  Recommend lights and TV on and blinds open during the day to help with orientation.   Recommend limiting hospitalization days as much as able with patient history of dementia.  OK to d/c from neuro stand point.   F/u KD Hebert PA-C in 4 weeks.   9. 14 day ZIO at discharge.   10. EEG today.        Physical Exam on Discharge:  /61 (BP Location: Right arm, Patient Position: Lying)   Pulse 78   Temp 98 °F (36.7 °C)   Resp 18   Ht 175.3 cm (69\")   Wt 125 kg (275 lb)   SpO2 97%   BMI 40.61 kg/m²   Physical Exam  HENT:      Head: Normocephalic and atraumatic.      Nose: Nose normal.   Eyes:      Extraocular Movements: Extraocular movements intact.      Pupils: Pupils are equal, round, and reactive to light.   Cardiovascular:      Rate and Rhythm: Normal rate and regular rhythm.   Pulmonary:      Effort: Pulmonary effort is normal.   Abdominal:      General: Abdomen is flat.   Genitourinary:     Penis: Normal.    Musculoskeletal:         General: Normal range of motion.      Cervical back: Normal range of motion.   Skin:     General: Skin is warm.   Neurological:      General: No focal deficit present.      Mental Status: He is alert.           Condition on Discharge: Stable    Discharge Disposition: Nursing home      Discharge Medications:     Discharge " Medications        New Medications        Instructions Start Date   amoxicillin-clavulanate 875-125 MG per tablet  Commonly known as: AUGMENTIN   1 tablet, Oral, Every 12 Hours Scheduled      atorvastatin 80 MG tablet  Commonly known as: LIPITOR   80 mg, Oral, Nightly      clopidogrel 75 MG tablet  Commonly known as: PLAVIX   75 mg, Oral, Daily   Start Date: January 24, 2024            Continue These Medications        Instructions Start Date   acetaminophen 325 MG tablet  Commonly known as: TYLENOL   650 mg, Oral, Every 6 Hours PRN      allopurinol 100 MG tablet  Commonly known as: ZYLOPRIM   100 mg, Oral, Daily      aspirin 81 MG EC tablet   81 mg, Oral, Daily      carbidopa-levodopa  MG per tablet  Commonly known as: SINEMET   2 tablets, Oral, 3 Times Daily      Centrum Silver Adult 50+ tablet tablet   1 tablet, Oral, Daily      cyclobenzaprine 10 MG tablet  Commonly known as: FLEXERIL   10 mg, Oral, Every Night at Bedtime      divalproex 125 MG DR tablet  Commonly known as: DEPAKOTE   250 mg, Oral, 2 Times Daily      famotidine 20 MG tablet  Commonly known as: PEPCID   20 mg, Oral, Daily      losartan 50 MG tablet  Commonly known as: COZAAR   50 mg, Oral, Daily      Magnesium Oxide -Mg Supplement 400 (240 Mg) MG tablet   1 tablet, Oral, Daily      Melatonin 10 MG tablet   10 mg, Oral, Nightly      nystatin 513878 UNIT/GM powder  Commonly known as: MYCOSTATIN   1 application , Topical, 2 Times Daily, Apply to abdominal fold/groin      ondansetron ODT 4 MG disintegrating tablet  Commonly known as: ZOFRAN-ODT   4 mg, Translingual, Every 6 Hours PRN      oxybutynin 2.5 MG half tablet  Commonly known as: DITROPAN   2.5 mg, Oral, 2 Times Daily      polyethylene glycol 17 g packet  Commonly known as: MIRALAX   17 g, Oral, 2 Times Daily PRN      Preparation H 1 % cream  Generic drug: hydrocortisone   1 application , Topical, Every 6 Hours PRN      spironolactone 25 MG tablet  Commonly known as: ALDACTONE   25 mg,  Oral, Daily      traZODone 50 MG tablet  Commonly known as: DESYREL   75 mg, Oral, Nightly      UTI-Stat liquid   30 mL, Oral, 2 Times Daily, Mix with 120mL of water or juice      vitamin D3 125 MCG (5000 UT) tablet tablet   5,000 Units, Oral, Daily      zinc sulfate 220 (50 Zn) MG capsule  Commonly known as: ZINCATE   220 mg, Oral, Daily             Stop These Medications      pravastatin 20 MG tablet  Commonly known as: PRAVACHOL                Discharge Diet: Regular heart association    Activity at Discharge: As tolerated    Follow-up Appointments:   Future Appointments   Date Time Provider Department Center   3/25/2024 11:30 AM Rodrigue Hebert PA [facility]       Test Results Pending at Discharge: EEG    Electronically signed by Randee Boyd MD, 01/23/24, 12:37 CST.    Time: 35 minutes.

## 2024-01-23 NOTE — PROGRESS NOTES
Neurology Progress Note      Chief Complaint:  f/u code stroke  Length of Stay:  0   Subjective     Subjective:  Patient sitting up in bed. No family/visitors at bedside. Nursing reported some confusion last PM. This AM he is alert and oriented to person, place, stated month as February and then corrected to January. Correctly stated the year. MRI brain personally reviewed by me negative for acute stroke. Patient has no recollection of yesterday events. He has extremely subtle right lower facial lag and altered sensation RLE. Otherwise back to baseline. TTE no significant abnormalities.         Medications:  Current Facility-Administered Medications   Medication Dose Route Frequency Provider Last Rate Last Admin    acetaminophen (TYLENOL) tablet 650 mg  650 mg Oral Q4H PRN Randee Boyd MD        amoxicillin-clavulanate (AUGMENTIN) 875-125 MG per tablet 1 tablet  1 tablet Oral Q12H Randee Boyd MD   1 tablet at 01/23/24 0844    aspirin tablet 325 mg  325 mg Oral Daily Anabell Staples MD   325 mg at 01/23/24 0839    Or    aspirin suppository 300 mg  300 mg Rectal Daily Anabell Staples MD        atorvastatin (LIPITOR) tablet 80 mg  80 mg Oral Nightly Anabell Staples MD   80 mg at 01/22/24 2111    sennosides-docusate (PERICOLACE) 8.6-50 MG per tablet 2 tablet  2 tablet Oral BID Randee Boyd MD   2 tablet at 01/23/24 0839    And    polyethylene glycol (MIRALAX) packet 17 g  17 g Oral Daily PRN Randee Boyd MD        And    bisacodyl (DULCOLAX) EC tablet 5 mg  5 mg Oral Daily PRN Randee Boyd MD        And    bisacodyl (DULCOLAX) suppository 10 mg  10 mg Rectal Daily PRN Randee Boyd MD        clopidogrel (PLAVIX) tablet 75 mg  75 mg Oral Daily Anabell Staples MD        Enoxaparin Sodium (LOVENOX) syringe 40 mg  40 mg Subcutaneous Q24H Randee Boyd MD   40 mg at 01/22/24 1830    nitroglycerin (NITROSTAT) SL tablet 0.4 mg  0.4 mg Sublingual Q5 Min PRN Randee Boyd MD         sodium chloride 0.9 % flush 10 mL  10 mL Intravenous PRN Pantera Carnes,         sodium chloride 0.9 % flush 10 mL  10 mL Intravenous Q12H Anabell Staples MD   10 mL at 01/23/24 0839    sodium chloride 0.9 % flush 10 mL  10 mL Intravenous PRN Anabell Staples MD        sodium chloride 0.9 % flush 10 mL  10 mL Intravenous Q12H Randee Boyd MD   10 mL at 01/23/24 0839    sodium chloride 0.9 % flush 10 mL  10 mL Intravenous PRN Randee Boyd MD        sodium chloride 0.9 % infusion 40 mL  40 mL Intravenous PRN Anabell Staples MD        sodium chloride 0.9 % infusion  50 mL/hr Intravenous Continuous Randee Boyd MD 50 mL/hr at 01/22/24 1806 50 mL/hr at 01/22/24 1806             Objective      Vital Signs  Temp:  [97.7 °F (36.5 °C)-98.5 °F (36.9 °C)] 97.8 °F (36.6 °C)  Heart Rate:  [73-99] 82  Resp:  [16-18] 18  BP: ()/(53-67) 113/59    Physical Exam:  General Exam:  Head:  Normocephalic, atraumatic  HEENT:  Neck supple  Fundoscopic Exam:  No signs of disc edema  CVS:  Regular rate and rhythm.  No murmurs  Carotid Examination:  No bruits  Lungs:  Clear to auscultation  Abdomen:  Nontender, nondistended  Extremities:  slight edema  Skin:  slight redness to bilateral LE and small wound to left great toe.     Neurologic Exam:    Mental Status:    -Alert, Oriented X 3  -No word-finding difficulties  -No aphasia  -No dysarthria  -Follows simple and complex commands    CN II:  Visual fields full.  Pupils equally reactive to light  CN III, IV, VI:  Extraocular Muscles full with no signs of nystagmus  CN V:  Facial sensory is symmetric with no asymmetries.  CN VII:  Facial motor asymmetric with extremely subtle lag right lower face  CN VIII:  Gross hearing intact bilaterally  CN IX:  Palate elevates symmetrically  CN X:  Palate elevates symmetrically  CN XI:  Shoulder shrug symmetric  CN XII:  Tongue protrudes to midline  Motor: (strength out of 5:  1= minimal movement, 2 = movement in plane of  gravity, 3 = movement against gravity, 4 = movement against some resistance, 5 = full strength)    -Right Upper Ext: Proximal: 5 Distal: 5  -Left Upper Ext: Proximal: 5 Distal: 5    -Right Lower Ext: Proximal: 5 Distal: 5  -Left Lower Ext: Proximal: 5 Distal: 5    DTR:  -Right   Bicep: 2+ Tricep: 2+ Brachoradialis: 2+   Patella: 2+ Ankle: 2+ Neg Babinski  -Left   Bicep: 2+ Tricep: 2+ Brachoradialis: 2+   Patella: 2+ Ankle: 2+ Neg Babinski    Sensory:  -Intact to light touch, pinprick, temperature, pain, and proprioception    Coordination:  -Finger to nose intact  -Heel to shin intact      Gait  -deferred     Pertinent Neuro Exam:    Last nurse assessment:  Interval:  (Handoff without RN present, patient from ER. Transport brought patient to room.)  1a. Level of Consciousness: 0-->Alert, keenly responsive  1b. LOC Questions: 0-->Answers both questions correctly  1c. LOC Commands: 1-->Performs one task correctly  2. Best Gaze: 0-->Normal  3. Visual: 0-->No visual loss  4. Facial Palsy: 1-->Minor paralysis (flattened nasolabial fold, asymmetry on smiling)  5a. Motor Arm, Left: 0-->No drift, limb holds 90 (or 45) degrees for full 10 secs  5b. Motor Arm, Right: 0-->No drift, limb holds 90 (or 45) degrees for full 10 secs  6a. Motor Leg, Left: 0-->No drift, leg holds 30 degree position for full 5 secs  6b. Motor Leg, Right: 0-->No drift, leg holds 30 degree position for full 5 secs  7. Limb Ataxia: 0-->Absent  8. Sensory: 0-->Normal, no sensory loss  9. Best Language: 0-->No aphasia, normal  10. Dysarthria: 0-->Normal  11. Extinction and Inattention (formerly Neglect): 0-->No abnormality    Total (NIH Stroke Scale): 2       Results Review:      Labs:  Result Review:  I have personally reviewed the results from the time of this admission to 1/23/2024 09:20 CST and agree with these findings:  []  Laboratory list / accordion  []  Microbiology  []  Radiology  []  EKG/Telemetry   []  Cardiology/Vascular   []  Pathology  []   Old records  []  Other:  Most notable findings include: LDL 56  A1c 6.2       Imaging:  MRI Brain Without Contrast    Result Date: 1/22/2024  1.  No acute findings. No evidence of acute infarct. 2.  Mild presumed chronic microvascular ischemic white matter change. 3.  Bilateral maxillary sinus mucosal thickening, greatest on the right.  This report was signed and finalized on 1/22/2024 7:43 PM by Dr. Jovan Serna MD.      CT Angiogram Head w AI Analysis of LVO    Result Date: 1/22/2024   1. No arterial occlusion or flow-limiting stenosis in the neck. 2. No intracranial large vessel occlusion or flow-limiting stenosis.  This report was signed and finalized on 1/22/2024 2:29 PM by Dr Satinder Garcia.      CT Angiogram Neck    Result Date: 1/22/2024   1. No arterial occlusion or flow-limiting stenosis in the neck. 2. No intracranial large vessel occlusion or flow-limiting stenosis.  This report was signed and finalized on 1/22/2024 2:29 PM by Dr Satinder Garcia.      XR Chest 1 View    Result Date: 1/22/2024  Moderate pulmonary hypoventilation with central vascular congestion and borderline cardiomegaly. No evidence of overt CHF. No consolidating infiltrate.  This report was signed and finalized on 1/22/2024 2:02 PM by Dr. Cody Wagner MD.      CT CEREBRAL PERFUSION WITH & WITHOUT CONTRAST    Result Date: 1/22/2024  Impression: 1. Normal, symmetric cerebral perfusion. No discrete infarct or at risk territory detected by the perfusion software.  This report was signed and finalized on 1/22/2024 2:00 PM by Dr Satinder Garcia.      CT Head Without Contrast Stroke Protocol    Result Date: 1/22/2024  1. No acute intracranial abnormality. 2. Chronic ischemic and atrophic changes. 3. Chronic sinusitis.             This report was signed and finalized on 1/22/2024 1:34 PM by Dr. Salma Lujan MD.          Results for orders placed during the hospital encounter of 01/22/24    Adult Transthoracic Echo Complete W/ Cont if  Necessary Per Protocol (With Agitated Saline)    Interpretation Summary    Left ventricular systolic function is normal. Left ventricular ejection fraction appears to be 61 - 65%.    Left ventricular diastolic function is consistent with (grade I) impaired relaxation.    Saline test results are negative.    Estimated right ventricular systolic pressure from tricuspid regurgitation is normal (<35 mmHg).    There is a trivial pericardial effusion adjacent to the right ventricle.     Assessment/Plan    Speedy Villalba is a 78 y.o. 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 Patient was LKW 0930 (spoke with his nurse Paxton at his facility) when he ws given his meds and spoke to hs nurse and took meds. Then at 1030 he wqs noted to be lethargic and then at 1240 was found aphasic. Patient was seen in stroke alert and resolved sx during CT scans. HCT/CTPO/CTAs were unremarkable for perfusion deficit or LVO. He complained of LUE/L shoulder pain.   MRI brain negative for acute stroke.       Hospital Problem List      TIA (transient ischemic attack)    Impression:  TIA.  PD with lewy body dementia  Discoloration bilateral LE concern for cellulitis, defer to attending.       Plan:  DUAP with ASA 81 mg daily and Plavix 75 mg daily for 21 days then Plavix monotherapy.   Pravachol 20  mg daily  as LDL is at goal at 56 and also given patient advanced age.  OT/PT/ST  Continue home sinemet.  Recommend lights and TV on and blinds open during the day to help with orientation.   Recommend limiting hospitalization days as much as able with patient history of dementia.  OK to d/c from neuro stand point.   F/u KD Hebert PA-C in 4 weeks.   9. 14 day ZIO at discharge.   10. EEG today.       Medical Decision Making    Number/Complexity of Problems  Moderate  1 undiagnosed new problem with uncertain prognosis -   1 acute illness with systemic symptoms -   High  1  acute or chronic illness that pose a threat to life/body function -   high     MDM Data  Moderate - 1/3 categories  Extensive - 2/3 categories    Category 1: 3 of the following  Review of external notes  Review of results  Ordering of each unique test  Independent historian  Category 2:  Independent interpretation of test (ex: imaging)  Category 3:  Discussion of management with another provider    extensive     Treatment Plan  Moderate - Prescription Drug management  High  Drug therapy requiring intensive monitoring for toxicity  Decision regarding hospitalization or escalation of care  De-escalate care/DNR decisions  high Zhane Wallace, APRN  01/23/24  09:14 CST

## 2024-01-24 ENCOUNTER — APPOINTMENT (OUTPATIENT)
Dept: CARDIOLOGY | Facility: HOSPITAL | Age: 79
End: 2024-01-24
Payer: MEDICARE

## 2024-01-24 VITALS
RESPIRATION RATE: 20 BRPM | BODY MASS INDEX: 40.73 KG/M2 | HEIGHT: 69 IN | SYSTOLIC BLOOD PRESSURE: 160 MMHG | HEART RATE: 64 BPM | WEIGHT: 275 LBS | TEMPERATURE: 97.6 F | DIASTOLIC BLOOD PRESSURE: 64 MMHG | OXYGEN SATURATION: 92 %

## 2024-01-24 LAB
ANION GAP SERPL CALCULATED.3IONS-SCNC: 10 MMOL/L (ref 5–15)
BASOPHILS # BLD AUTO: 0.03 10*3/MM3 (ref 0–0.2)
BASOPHILS NFR BLD AUTO: 0.4 % (ref 0–1.5)
BUN SERPL-MCNC: 16 MG/DL (ref 8–23)
BUN/CREAT SERPL: 14.5 (ref 7–25)
CALCIUM SPEC-SCNC: 9.3 MG/DL (ref 8.6–10.5)
CHLORIDE SERPL-SCNC: 103 MMOL/L (ref 98–107)
CO2 SERPL-SCNC: 25 MMOL/L (ref 22–29)
CREAT SERPL-MCNC: 1.1 MG/DL (ref 0.76–1.27)
DEPRECATED RDW RBC AUTO: 45.4 FL (ref 37–54)
EGFRCR SERPLBLD CKD-EPI 2021: 68.7 ML/MIN/1.73
EOSINOPHIL # BLD AUTO: 0.35 10*3/MM3 (ref 0–0.4)
EOSINOPHIL NFR BLD AUTO: 5.1 % (ref 0.3–6.2)
ERYTHROCYTE [DISTWIDTH] IN BLOOD BY AUTOMATED COUNT: 13.3 % (ref 12.3–15.4)
GLUCOSE SERPL-MCNC: 110 MG/DL (ref 65–99)
HCT VFR BLD AUTO: 36.3 % (ref 37.5–51)
HGB BLD-MCNC: 12.1 G/DL (ref 13–17.7)
IMM GRANULOCYTES # BLD AUTO: 0.05 10*3/MM3 (ref 0–0.05)
IMM GRANULOCYTES NFR BLD AUTO: 0.7 % (ref 0–0.5)
LYMPHOCYTES # BLD AUTO: 1.15 10*3/MM3 (ref 0.7–3.1)
LYMPHOCYTES NFR BLD AUTO: 16.7 % (ref 19.6–45.3)
MCH RBC QN AUTO: 30.8 PG (ref 26.6–33)
MCHC RBC AUTO-ENTMCNC: 33.3 G/DL (ref 31.5–35.7)
MCV RBC AUTO: 92.4 FL (ref 79–97)
MONOCYTES # BLD AUTO: 0.7 10*3/MM3 (ref 0.1–0.9)
MONOCYTES NFR BLD AUTO: 10.2 % (ref 5–12)
NEUTROPHILS NFR BLD AUTO: 4.59 10*3/MM3 (ref 1.7–7)
NEUTROPHILS NFR BLD AUTO: 66.9 % (ref 42.7–76)
NRBC BLD AUTO-RTO: 0 /100 WBC (ref 0–0.2)
PLATELET # BLD AUTO: 220 10*3/MM3 (ref 140–450)
PMV BLD AUTO: 10 FL (ref 6–12)
POTASSIUM SERPL-SCNC: 4.2 MMOL/L (ref 3.5–5.2)
RBC # BLD AUTO: 3.93 10*6/MM3 (ref 4.14–5.8)
SODIUM SERPL-SCNC: 138 MMOL/L (ref 136–145)
WBC NRBC COR # BLD AUTO: 6.87 10*3/MM3 (ref 3.4–10.8)

## 2024-01-24 PROCEDURE — 93246 EXT ECG>7D<15D RECORDING: CPT

## 2024-01-24 PROCEDURE — G0378 HOSPITAL OBSERVATION PER HR: HCPCS

## 2024-01-24 PROCEDURE — 97530 THERAPEUTIC ACTIVITIES: CPT

## 2024-01-24 PROCEDURE — 85025 COMPLETE CBC W/AUTO DIFF WBC: CPT | Performed by: HOSPITALIST

## 2024-01-24 PROCEDURE — 97116 GAIT TRAINING THERAPY: CPT

## 2024-01-24 PROCEDURE — 80048 BASIC METABOLIC PNL TOTAL CA: CPT | Performed by: HOSPITALIST

## 2024-01-24 PROCEDURE — 36415 COLL VENOUS BLD VENIPUNCTURE: CPT | Performed by: HOSPITALIST

## 2024-01-24 PROCEDURE — 99214 OFFICE O/P EST MOD 30 MIN: CPT | Performed by: PSYCHIATRY & NEUROLOGY

## 2024-01-24 RX ADMIN — AMOXICILLIN AND CLAVULANATE POTASSIUM 1 TABLET: 875; 125 TABLET, FILM COATED ORAL at 08:46

## 2024-01-24 RX ADMIN — CLOPIDOGREL BISULFATE 75 MG: 75 TABLET, FILM COATED ORAL at 08:46

## 2024-01-24 RX ADMIN — ASPIRIN 81 MG: 81 TABLET, CHEWABLE ORAL at 08:46

## 2024-01-24 NOTE — THERAPY TREATMENT NOTE
Acute Care - Occupational Therapy Treatment Note  [facility]     Patient Name: Speedy Villalba  : 1945  MRN: [id number]  Today's Date: 2024             Admit Date: 2024       ICD-10-CM ICD-9-CM   1. TIA (transient ischemic attack)  G45.9 435.9   2. Speech and language deficit as late effect of stroke  I69.328 438.10   3. Impaired functional mobility and activity tolerance [Z74.09]  Z74.09 V49.89   4. Decreased activities of daily living (ADL) [Z78.9]  Z78.9 V49.89     Patient Active Problem List   Diagnosis    Gastroenteritis    Coronary artery disease involving native coronary artery of native heart without angina pectoris    Cognitive decline    Frequent falls    Type 2 diabetes mellitus without complication, without long-term current use of insulin    Primary hypertension    Atypical parkinsonism    Sleep disturbance    Delusions    TIA (transient ischemic attack)     Past Medical History:   Diagnosis Date    Atypical parkinsonism 2023    Cognitive decline 10/30/2023    Coronary artery disease 10 years    x2 stents    Diabetes mellitus < 2 years    non-insulin dependent    Difficulty walking < 1 year    multiple falls in the last 6-8 months    HL (hearing loss) 20+ years    Wears hearing aids occasionally    Hyperlipidemia 5 years    Memory loss 3 years+/-    gradual and subtle    Primary hypertension 10/30/2023    Sleep apnea 20+ year    Non-compliant with PEEP for tx    Stroke 2022    TIA (transient ischemic attack) 2024     Past Surgical History:   Procedure Laterality Date    APPENDECTOMY      BACK SURGERY  2016    CAROTID STENT  ?    KNEE SURGERY  1960    LAMINECTOMY  2015?    discectomy & fusion         OT ASSESSMENT FLOWSHEET (last 12 hours)       OT Evaluation and Treatment       Row Name 24 1455                   OT Time and Intention    Subjective Information no complaints  -LS        Document Type therapy note (daily note)  -LS        Mode of Treatment  occupational therapy  -LS        Patient Effort good  -LS           General Information    Patient Profile Reviewed yes  -LS        Existing Precautions/Restrictions fall  -LS        Barriers to Rehab previous functional deficit  -LS           Wound 01/23/24 1440 Left anterior great toe    Wound - Properties Group Placement Date: 01/23/24  -DF Placement Time: 1440  -DF Side: Left  -DF Orientation: anterior  -DF Location: great toe  -DF    Retired Wound - Properties Group Placement Date: 01/23/24  -DF Placement Time: 1440  -DF Side: Left  -DF Orientation: anterior  -DF Location: great toe  -DF    Retired Wound - Properties Group Date first assessed: 01/23/24  -DF Time first assessed: 1440  -DF Side: Left  -DF Location: great toe  -DF       Plan of Care Review    Plan of Care Reviewed With patient  -LS        Progress improving  -LS        Outcome Evaluation OT tx completed on this date. Pt up in recliner A&O x4 with no complaints. Pt participated in sit to stands from recliner requiring mod A for 1st stand and min A for 2 second stand tolerating 2-3 minute intervals with implementation of weight shifting to increase stability and balance for standing aspects of ADLs. Pt participated in BUE and core exercises to increase performance with sit to stands and dressing bathing tasks. Pt rigid in posture with uncontrolled descention from standing to sitting. OT POC to continue.  -LS           Positioning and Restraints    Pre-Treatment Position sitting in chair/recliner  -LS        Post Treatment Position chair  -LS           Therapy Plan Review/Discharge Plan (OT)    Anticipated Discharge Disposition (OT) skilled nursing facility  -                  User Key  (r) = Recorded By, (t) = Taken By, (c) = Cosigned By      Initials Name Effective Dates    Iram Fraire RN 06/16/21 -     LS Cici Miranda COTA 09/22/22 -                      Occupational Therapy Education       Title: PT OT SLP Therapies (In Progress)        Topic: Occupational Therapy (In Progress)       Point: ADL training (In Progress)       Description:   Instruct learner(s) on proper safety adaptation and remediation techniques during self care or transfers.   Instruct in proper use of assistive devices.                  Learning Progress Summary             Patient Acceptance, E, NR by MM at 1/23/2024 1043                         Point: Home exercise program (Not Started)       Description:   Instruct learner(s) on appropriate technique for monitoring, assisting and/or progressing therapeutic exercises/activities.                  Learner Progress:  Not documented in this visit.              Point: Precautions (In Progress)       Description:   Instruct learner(s) on prescribed precautions during self-care and functional transfers.                  Learning Progress Summary             Patient Acceptance, E, NR by MM at 1/23/2024 1043                         Point: Body mechanics (In Progress)       Description:   Instruct learner(s) on proper positioning and spine alignment during self-care, functional mobility activities and/or exercises.                  Learning Progress Summary             Patient Acceptance, E, NR by MM at 1/23/2024 1043                                         User Key       Initials Effective Dates Name Provider Type Discipline     07/11/23 -  Luis Antonio Leblanc, OTR/L Occupational Therapist OT                      OT Recommendation and Plan     Plan of Care Review  Plan of Care Reviewed With: patient  Progress: improving  Outcome Evaluation: OT tx completed on this date. Pt up in recliner A&O x4 with no complaints. Pt participated in sit to stands from recliner requiring mod A for 1st stand and min A for 2 second stand tolerating 2-3 minute intervals with implementation of weight shifting to increase stability and balance for standing aspects of ADLs. Pt participated in BUE and core exercises to increase performance with sit to  stands and dressing bathing tasks. Pt rigid in posture with uncontrolled descention from standing to sitting. OT POC to continue.  Plan of Care Reviewed With: patient  Outcome Evaluation: OT tx completed on this date. Pt up in recliner A&O x4 with no complaints. Pt participated in sit to stands from recliner requiring mod A for 1st stand and min A for 2 second stand tolerating 2-3 minute intervals with implementation of weight shifting to increase stability and balance for standing aspects of ADLs. Pt participated in BUE and core exercises to increase performance with sit to stands and dressing bathing tasks. Pt rigid in posture with uncontrolled descention from standing to sitting. OT POC to continue.     Outcome Measures       Row Name 01/24/24 1100             How much help from another person do you currently need...    Turning from your back to your side while in flat bed without using bedrails? 2  -KJ      Moving from lying on back to sitting on the side of a flat bed without bedrails? 2  -KJ      Moving to and from a bed to a chair (including a wheelchair)? 3  -KJ      Standing up from a chair using your arms (e.g., wheelchair, bedside chair)? 2  -KJ      Climbing 3-5 steps with a railing? 2  -KJ      To walk in hospital room? 3  -KJ      AM-PAC 6 Clicks Score (PT) 14  -KJ      Highest Level of Mobility Goal 4 --> Transfer to chair/commode  -KJ         Functional Assessment    Outcome Measure Options AM-PAC 6 Clicks Basic Mobility (PT)  -KJ                User Key  (r) = Recorded By, (t) = Taken By, (c) = Cosigned By      Initials Name Provider Type    Deborah Barnett, PTA Physical Therapist Assistant                    Time Calculation:    Time Calculation- OT       Row Name 01/24/24 2770             Time Calculation- OT    OT Start Time 1455  -LS      OT Stop Time 1533  -      OT Time Calculation (min) 38 min  -      Total Timed Code Minutes- OT 38 minute(s)  -      OT Received On 01/24/24  -                 User Key  (r) = Recorded By, (t) = Taken By, (c) = Cosigned By      Initials Name Provider Type     Cici Miranda COTA Occupational Therapist Assistant                  Therapy Charges for Today       Code Description Service Date Service Provider Modifiers Qty    [id number] HC OT THERAPEUTIC ACT EA 15 MIN 1/24/2024 Cici Miranda COTA GO 3                 RODRIGO Rogers  1/24/2024

## 2024-01-24 NOTE — PLAN OF CARE
Goal Outcome Evaluation:           Progress: no change  Outcome Evaluation: Patient disoriented to place. Has had several incontinence episodes during the shift. Patient attempting to get out of bed without calling for assistance, bed alarm remains on. No complaints from patient at this time. Rest has been fair for patient.

## 2024-01-24 NOTE — THERAPY TREATMENT NOTE
Acute Care - Physical Therapy Treatment Note  [facility]     Patient Name: Speedy Villalba  : 1945  MRN: [id number]  Today's Date: 2024      Visit Dx:     ICD-10-CM ICD-9-CM   1. TIA (transient ischemic attack)  G45.9 435.9   2. Speech and language deficit as late effect of stroke  I69.328 438.10   3. Impaired functional mobility and activity tolerance [Z74.09]  Z74.09 V49.89   4. Decreased activities of daily living (ADL) [Z78.9]  Z78.9 V49.89     Patient Active Problem List   Diagnosis    Gastroenteritis    Coronary artery disease involving native coronary artery of native heart without angina pectoris    Cognitive decline    Frequent falls    Type 2 diabetes mellitus without complication, without long-term current use of insulin    Primary hypertension    Atypical parkinsonism    Sleep disturbance    Delusions    TIA (transient ischemic attack)     Past Medical History:   Diagnosis Date    Atypical parkinsonism 2023    Cognitive decline 10/30/2023    Coronary artery disease 10 years    x2 stents    Diabetes mellitus < 2 years    non-insulin dependent    Difficulty walking < 1 year    multiple falls in the last 6-8 months    HL (hearing loss) 20+ years    Wears hearing aids occasionally    Hyperlipidemia 5 years    Memory loss 3 years+/-    gradual and subtle    Primary hypertension 10/30/2023    Sleep apnea 20+ year    Non-compliant with PEEP for tx    Stroke 2022    TIA (transient ischemic attack) 2024     Past Surgical History:   Procedure Laterality Date    APPENDECTOMY      BACK SURGERY  2016    CAROTID STENT  ?    KNEE SURGERY  1960    LAMINECTOMY  2015?    discectomy & fusion     PT Assessment (last 12 hours)       PT Evaluation and Treatment       Row Name 24 1058          Physical Therapy Time and Intention    Subjective Information no complaints  -KJ     Document Type therapy note (daily note)  -KJ     Mode of Treatment physical therapy  -KJ     Patient Effort good   -KJ       Row Name 01/24/24 1058          General Information    Existing Precautions/Restrictions fall  -KJ     Limitations/Impairments other (see comments)  pt does not bend to sit down, he just falls back. Reverse is bad too!!  -KJ       Row Name 01/24/24 1058          Pain    Pretreatment Pain Rating 0/10 - no pain  -KJ     Posttreatment Pain Rating 0/10 - no pain  -KJ       Row Name 01/24/24 1058          Bed Mobility    Supine-Sit [place] (Bed Mobility) minimum assist (75% patient effort)  -KJ       Row Name 01/24/24 1058          Sit-Stand Transfer    Sit-Stand [place] (Transfers) verbal cues;minimum assist (75% patient effort)  -KJ     Comment, (Sit-Stand Transfer) --  moderate posterior lean, once he got going walking improved with lean  -KJ       Row Name 01/24/24 1058          Stand-Sit Transfer    Stand-Sit [place] (Transfers) moderate assist (50% patient effort)  -KJ       Row Name 01/24/24 1058          Gait/Stairs (Locomotion)    [place] Level (Gait) verbal cues;contact guard;minimum assist (75% patient effort)  -KJ     Assistive Device (Gait) walker, front-wheeled  -KJ     Distance in Feet (Gait) 100' x 2  -KJ     Comment, (Gait/Stairs) mod assist to stand from low surface, moderate assistance to lower down into chair. He does not bed just flops down  -KJ       Row Name             Wound 01/23/24 1440 Left anterior great toe    Wound - Properties Group Placement Date: 01/23/24  -DF Placement Time: 1440  -DF Side: Left  -DF Orientation: anterior  -DF Location: great toe  -DF    Retired Wound - Properties Group Placement Date: 01/23/24  -DF Placement Time: 1440  -DF Side: Left  -DF Orientation: anterior  -DF Location: great toe  -DF    Retired Wound - Properties Group Date first assessed: 01/23/24  -DF Time first assessed: 1440  -DF Side: Left  -DF Location: great toe  -DF      Row Name 01/24/24 1058          Positioning and Restraints    Pre-Treatment Position in bed  -KJ     Post  Treatment Position chair  -KJ     In Bed call light within reach;exit alarm on  -KJ               User Key  (r) = Recorded By, (t) = Taken By, (c) = Cosigned By      Initials Name Provider Type    Deborah Barnett PTA Physical Therapist Assistant    Iram Fraire RN Registered Nurse                      PT Recommendation and Plan         Outcome Measures       Row Name 01/24/24 1100             How much help from another person do you currently need...    Turning from your back to your side while in flat bed without using bedrails? 2  -KJ      Moving from lying on back to sitting on the side of a flat bed without bedrails? 2  -KJ      Moving to and from a bed to a chair (including a wheelchair)? 3  -KJ      Standing up from a chair using your arms (e.g., wheelchair, bedside chair)? 2  -KJ      Climbing 3-5 steps with a railing? 2  -KJ      To walk in hospital room? 3  -KJ      AM-PAC 6 Clicks Score (PT) 14  -KJ      Highest Level of Mobility Goal 4 --> Transfer to chair/commode  -KJ         Functional Assessment    Outcome Measure Options AM-PAC 6 Clicks Basic Mobility (PT)  -KJ                User Key  (r) = Recorded By, (t) = Taken By, (c) = Cosigned By      Initials Name Provider Type    Deborah Barnett PTA Physical Therapist Assistant                     Time Calculation:    PT Charges       Row Name 01/24/24 1127             Time Calculation    Start Time 1058  -KJ      Stop Time 1127  -KJ      Time Calculation (min) 29 min  -KJ      PT Received On 01/24/24  -KJ      PT Goal Re-Cert Due Date 02/02/24  -KJ         Time Calculation- PT    Total Timed Code Minutes- PT 29 minute(s)  -KJ                User Key  (r) = Recorded By, (t) = Taken By, (c) = Cosigned By      Initials Name Provider Type    Deborah Barnett PTA Physical Therapist Assistant                  Therapy Charges for Today       Code Description Service Date Service Provider Modifiers Qty    [id number] HC GAIT TRAINING EA 15 MIN  1/24/2024 Deborah Choudhury, PTA GP 2            PT G-Codes  Outcome Measure Options: AM-PAC 6 Clicks Basic Mobility (PT)  AM-PAC 6 Clicks Score (PT): 14  AM-PAC 6 Clicks Score (OT): 15    Deborah Choudhury, PTA  1/24/2024

## 2024-01-24 NOTE — CASE MANAGEMENT/SOCIAL WORK
Continued Stay Note  [facility]     Patient Name: Speedy Villalba  MRN: [id number]  Today's Date: 1/24/2024    Admit Date: 1/22/2024    Plan: [facility]   Discharge Plan       Row Name 01/24/24 1132       Plan    Plan [facility]    Patient/Family in Agreement with Plan yes    Final Discharge Disposition Code 03 - skilled nursing facility (SNF)    Final Note Pt is returning to [facility] [phone]. They will pull the d/c summary from [facility]. He does not need a covid swab. Family will need to transport.                   Discharge Codes    No documentation.                 Expected Discharge Date and Time       Expected Discharge Date Expected Discharge Time    Jan 24, 2024               JOHN Jauregui

## 2024-01-24 NOTE — PLAN OF CARE
"Goal Outcome Evaluation:  Plan of Care Reviewed With: patient           Outcome Evaluation: PT tx completed. Pt has no c/o morning. ModA sup>sit, ModA for scooting, [place] sit>stand, ModA stand>sit. Pt does not bend and \"flops\" down. Has festinating gait in close perimeters, but able to amb with good stride length in hallway. Amb 100' x 2 with rwx CG. Sitting rest x 1.                               "

## 2024-01-24 NOTE — PROGRESS NOTES
NEUROLOGY FOLLOW-UP     DOS: 2024  NAME: Speedy Villalba   : 1945  PCP: Luca Benson MD  CC: Stroke  Referring MD: No ref. provider found    Neurological Problem and Interval History:  78 y.o. 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 Patient was LKW 0930 (spoke with his nurse Paxton at his facility) when he ws given his meds and spoke to hs nurse and took meds. Then at 1030 he wqs noted to be lethargic and then at 1240 was found aphasic. Patient was seen in stroke alert and resolved sx during CT scans. HCT/CTPO/CTAs were unremarkable for perfusion deficit or LVO. He complained of LUE/L shoulder pain.    Medications On Admission  Medications Prior to Admission   Medication Sig Dispense Refill Last Dose    allopurinol (ZYLOPRIM) 100 MG tablet Take 1 tablet by mouth Daily.   2024    aspirin 81 MG EC tablet Take 1 tablet by mouth Daily.   2024    carbidopa-levodopa (SINEMET)  MG per tablet Take 2 tablets by mouth 3 (Three) Times a Day. 180 tablet 3 2024    Cholecalciferol (VITAMIN D3) 125 MCG (5000 UT) tablet tablet Take 1 tablet by mouth Daily.   2024    cyclobenzaprine (FLEXERIL) 10 MG tablet Take 1 tablet by mouth every night at bedtime.   2024    divalproex (DEPAKOTE) 125 MG DR tablet Take 2 tablets by mouth 2 (Two) Times a Day.   2024    losartan (COZAAR) 50 MG tablet Take 1 tablet by mouth Daily.   2024    multivitamin with minerals (Centrum Silver Adult 50+) tablet tablet Take 1 tablet by mouth Daily.   2024    pravastatin (PRAVACHOL) 20 MG tablet Take 1 tablet by mouth Every Night.   2024    spironolactone (ALDACTONE) 25 MG tablet Take 1 tablet by mouth Daily.   2024    acetaminophen (TYLENOL) 325 MG tablet Take 2 tablets by mouth Every 6 (Six) Hours As Needed for Mild Pain.       Cranberry-Vitamin C-Inulin (UTI-Stat) liquid Take 30 mL by mouth 2  (Two) Times a Day. Mix with 120mL of water or juice       famotidine (PEPCID) 20 MG tablet Take 1 tablet by mouth Daily.       hydrocortisone (Preparation H) 1 % cream Apply 1 application  topically to the appropriate area as directed Every 6 (Six) Hours As Needed (hemorrhoids).       Magnesium Oxide -Mg Supplement 400 (240 Mg) MG tablet Take 1 tablet by mouth Daily.       Melatonin 10 MG tablet Take 1 tablet by mouth Every Night.       nystatin (MYCOSTATIN) 097792 UNIT/GM powder Apply 1 application  topically to the appropriate area as directed 2 (Two) Times a Day. Apply to abdominal fold/groin       ondansetron ODT (ZOFRAN-ODT) 4 MG disintegrating tablet Place 1 tablet on the tongue Every 6 (Six) Hours As Needed for Nausea or Vomiting.       oxybutynin (DITROPAN) 2.5 MG half tablet Take 1 half tablet by mouth 2 (Two) Times a Day.       polyethylene glycol (MIRALAX) 17 g packet Take 17 g by mouth 2 (Two) Times a Day As Needed (constipation).       traZODone (DESYREL) 50 MG tablet Take 1.5 tablets by mouth Every Night.       zinc sulfate (ZINCATE) 220 (50 Zn) MG capsule Take 1 capsule by mouth Daily.            Laboratory Results:   Lab Results   Component Value Date    GLUCOSE 110 (H) 01/24/2024    CALCIUM 9.3 01/24/2024     01/24/2024    K 4.2 01/24/2024    CO2 25.0 01/24/2024     01/24/2024    BUN 16 01/24/2024    CREATININE 1.10 01/24/2024    BCR 14.5 01/24/2024    ANIONGAP 10.0 01/24/2024     Lab Results   Component Value Date    WBC 6.87 01/24/2024    HGB 12.1 (L) 01/24/2024    HCT 36.3 (L) 01/24/2024    MCV 92.4 01/24/2024     01/24/2024     Lab Results   Component Value Date    CHOL 113 01/23/2024    CHOL 129 01/22/2024     Lab Results   Component Value Date    HDL 36 (L) 01/23/2024    HDL 40 01/22/2024     Lab Results   Component Value Date    LDL 56 01/23/2024    LDL 60 01/22/2024     Lab Results   Component Value Date    TRIG 118 01/23/2024    TRIG 173 (H) 01/22/2024     Lab Results  "  Component Value Date    HGBA1C 6.20 (H) 01/23/2024     Lab Results   Component Value Date    INR 1.04 01/22/2024    INR 1.17 (H) 11/11/2023    PROTIME 13.7 01/22/2024    PROTIME 15.1 (H) 11/11/2023         Physical Examination:   /64 (BP Location: Right arm, Patient Position: Lying)   Pulse 64   Temp 97.6 °F (36.4 °C)   Resp 20   Ht 175.3 cm (69\")   Wt 125 kg (275 lb)   SpO2 92%   BMI 40.61 kg/m²     NIHSS:    0-->Alert: keenly responsive  0-->Answers both questions correctly  0-->Performs both tasks correctly  0=normal  0=No visual loss  0=Normal symmetric movement  0-->No drift: limb holds 90 (or 45) degrees for full 10 secs  0-->No drift: limb holds 90 (or 45) degrees for full 10 secs  0-->No drift: limb holds 90 (or 45) degrees for full 10 secs  0-->No drift: limb holds 90 (or 45) degrees for full 10 secs  0=Absent  0=Normal; no sensory loss  0=No aphasia, normal  0=Normal  0=No abnormality    Total score: 0      Diagnoses / Discussion:  78 y.o. male with LBD and cv risk factors presents with transient L gaze preference and RUE weakness/numbness and aphasia. This likley represnts TIA.        Plan:  ASA and Plavix x21 days followed by Plavix monotherapy thereafter  Neurochecks  LDL 56, resume home statin on D/C  A1c 6.2  Lipitor 80mg  Non-pharmacological DVT prophylaxis  TTE unremarakble  Zio on D/C  F/u in neurology clinic in 3-4 weeks  CT/CTA/CTP unremarkable for perfusion deficit or LVO  MRI unremarkable  EEG unremarkable  At this time, neurology will sign-off. Please feel free to call us back if we can be of any further assistance.      MDM  Reviewed: previous chart, nursing note and vitals  Reviewed previous: labs, MRI and CT scan  Interpretation: labs, MRI and CT scan  Total time providing critical care: 30-74 minutes. This excludes time spent performing separately reportable procedures and services.  Consults: neurology         Anabell Staples MD   Neurology            "

## 2024-01-24 NOTE — PLAN OF CARE
Goal Outcome Evaluation:  Plan of Care Reviewed With: patient        Progress: improving  Outcome Evaluation: OT tx completed on this date. Pt up in recliner A&O x4 with no complaints. Pt participated in sit to stands from recliner requiring mod A for 1st stand and min A for 2 second stand tolerating 2-3 minute intervals with implementation of weight shifting to increase stability and balance for standing aspects of ADLs. Pt participated in BUE and core exercises to increase performance with sit to stands and dressing bathing tasks. Pt rigid in posture with uncontrolled descention from standing to sitting. OT POC to continue.      Anticipated Discharge Disposition (OT): skilled nursing facility

## 2024-01-25 NOTE — THERAPY DISCHARGE NOTE
Acute Care - Occupational Therapy Discharge Summary  [facility]     Patient Name: Speedy Villalba  : 1945  MRN: [id number]    Today's Date: 2024                 Admit Date: 2024        OT Recommendation and Plan    Visit Dx:    ICD-10-CM ICD-9-CM   1. TIA (transient ischemic attack)  G45.9 435.9   2. Speech and language deficit as late effect of stroke  I69.328 438.10   3. Impaired functional mobility and activity tolerance [Z74.09]  Z74.09 V49.89   4. Decreased activities of daily living (ADL) [Z78.9]  Z78.9 V49.89                OT Rehab Goals       Row Name 24 0800             Bathing Goal 1 (OT)    Activity/Device (Bathing Goal 1, OT) bathing skills, all  -LS      [place] Level/Cues Needed (Bathing Goal 1, OT) minimum assist (75% or more patient effort);verbal cues required  -LS      Time Frame (Bathing Goal 1, OT) long term goal (LTG);by discharge  -LS      Progress/Outcomes (Bathing Goal 1, OT) goal not met  -LS         Dressing Goal 1 (OT)    Activity/Device (Dressing Goal 1, OT) dressing skills, all  -LS      [place]/Cues Needed (Dressing Goal 1, OT) minimum assist (75% or more patient effort);verbal cues required;set-up required  -LS      Time Frame (Dressing Goal 1, OT) long term goal (LTG);by discharge  -LS      Progress/Outcome (Dressing Goal 1, OT) goal not met  -LS         Toileting Goal 1 (OT)    Activity/Device (Toileting Goal 1, OT) toileting skills, all  -LS      [place] Level/Cues Needed (Toileting Goal 1, OT) standby assist  -LS      Time Frame (Toileting Goal 1, OT) long term goal (LTG);by discharge  -LS      Progress/Outcome (Toileting Goal 1, OT) goal not met  -LS                User Key  (r) = Recorded By, (t) = Taken By, (c) = Cosigned By      Initials Name Provider Type Discipline    LS Cici Miranda COTA Occupational Therapist Assistant THERAPIES                     Outcome Measures       Row Name 24 1100             How much help from another  person do you currently need...    Turning from your back to your side while in flat bed without using bedrails? 2  -KJ      Moving from lying on back to sitting on the side of a flat bed without bedrails? 2  -KJ      Moving to and from a bed to a chair (including a wheelchair)? 3  -KJ      Standing up from a chair using your arms (e.g., wheelchair, bedside chair)? 2  -KJ      Climbing 3-5 steps with a railing? 2  -KJ      To walk in hospital room? 3  -KJ      AM-PAC 6 Clicks Score (PT) 14  -KJ      Highest Level of Mobility Goal 4 --> Transfer to chair/commode  -KJ         Functional Assessment    Outcome Measure Options AM-PAC 6 Clicks Basic Mobility (PT)  -KJ                User Key  (r) = Recorded By, (t) = Taken By, (c) = Cosigned By      Initials Name Provider Type    Deborah Barnett, PTA Physical Therapist Assistant                        Therapy Charges for Today       Code Description Service Date Service Provider Modifiers Qty    [id number]  OT THERAPEUTIC ACT EA 15 MIN 1/24/2024 Cici Miranda COTA GO 3            OT Discharge Summary  Anticipated Discharge Disposition (OT): skilled nursing facility  Reason for Discharge: Discharge from facility  Outcomes Achieved: Refer to plan of care for updates on goals achieved  Discharge Destination: SNF      RODRIGO Rogers  1/25/2024

## 2024-01-25 NOTE — THERAPY DISCHARGE NOTE
Acute Care - Physical Therapy Discharge Summary  [facility]       Patient Name: Speedy Villalba  : 1945  MRN: [id number]    Today's Date: 2024                 Admit Date: 2024      PT Recommendation and Plan    Visit Dx:    ICD-10-CM ICD-9-CM   1. TIA (transient ischemic attack)  G45.9 435.9   2. Speech and language deficit as late effect of stroke  I69.328 438.10   3. Impaired functional mobility and activity tolerance [Z74.09]  Z74.09 V49.89   4. Decreased activities of daily living (ADL) [Z78.9]  Z78.9 V49.89        Outcome Measures       Row Name 24 1100             How much help from another person do you currently need...    Turning from your back to your side while in flat bed without using bedrails? 2  -KJ      Moving from lying on back to sitting on the side of a flat bed without bedrails? 2  -KJ      Moving to and from a bed to a chair (including a wheelchair)? 3  -KJ      Standing up from a chair using your arms (e.g., wheelchair, bedside chair)? 2  -KJ      Climbing 3-5 steps with a railing? 2  -KJ      To walk in hospital room? 3  -KJ      AM-PAC 6 Clicks Score (PT) 14  -KJ      Highest Level of Mobility Goal 4 --> Transfer to chair/commode  -KJ         Functional Assessment    Outcome Measure Options AM-PAC 6 Clicks Basic Mobility (PT)  -KJ                User Key  (r) = Recorded By, (t) = Taken By, (c) = Cosigned By      Initials Name Provider Type    Deborah Barnett, PTA Physical Therapist Assistant                         PT Rehab Goals       Row Name 24 0700             Bed Mobility Goal 1 (PT)    Activity/Assistive Device (Bed Mobility Goal 1, PT) rolling to left;rolling to right;scooting;sit to supine/supine to sit;sidelying to sit/sit to sidelying  -AB      [place] Level/Cues Needed (Bed Mobility Goal 1, PT) contact guard required  -AB      Time Frame (Bed Mobility Goal 1, PT) long term goal (LTG);10 days  -AB      Progress/Outcomes (Bed Mobility Goal 1,  PT) goal not met  -AB         Transfer Goal 1 (PT)    Activity/Assistive Device (Transfer Goal 1, PT) sit-to-stand/stand-to-sit;bed-to-chair/chair-to-bed  -AB      [place] Level/Cues Needed (Transfer Goal 1, PT) contact guard required  -AB      Time Frame (Transfer Goal 1, PT) long term goal (LTG);10 days  -AB      Progress/Outcome (Transfer Goal 1, PT) goal not met  -AB         Gait Training Goal 1 (PT)    Activity/Assistive Device (Gait Training Goal 1, PT) gait (walking locomotion);assistive device use;decrease fall risk;diminish gait deviation;improve balance and speed;increase endurance/gait distance;increase energy conservation;walker, rolling  -AB      [place] Level (Gait Training Goal 1, PT) contact guard required  -AB      Distance (Gait Training Goal 1, PT) 100 ft  -AB      Time Frame (Gait Training Goal 1, PT) long term goal (LTG);10 days  -AB      Progress/Outcome (Gait Training Goal 1, PT) goal met  -AB                User Key  (r) = Recorded By, (t) = Taken By, (c) = Cosigned By      Initials Name Provider Type Discipline    Nancy Senior, PTA Physical Therapist Assistant PT                        PT Discharge Summary  Anticipated Discharge Disposition (PT): skilled nursing facility  Reason for Discharge: Discharge from facility  Outcomes Achieved: Refer to plan of care for updates on goals achieved  Discharge Destination: SNF      Nancy Krause PTA   1/25/2024

## 2024-01-26 ENCOUNTER — LAB REQUISITION (OUTPATIENT)
Dept: LAB | Facility: HOSPITAL | Age: 79
End: 2024-01-26
Payer: MEDICARE

## 2024-01-26 DIAGNOSIS — I11.9 HYPERTENSIVE HEART DISEASE WITHOUT HEART FAILURE: ICD-10-CM

## 2024-01-26 DIAGNOSIS — I25.10 ATHEROSCLEROTIC HEART DISEASE OF NATIVE CORONARY ARTERY WITHOUT ANGINA PECTORIS: ICD-10-CM

## 2024-01-26 DIAGNOSIS — M10.00 IDIOPATHIC GOUT, UNSPECIFIED SITE: ICD-10-CM

## 2024-01-26 DIAGNOSIS — E11.9 TYPE 2 DIABETES MELLITUS WITHOUT COMPLICATIONS: ICD-10-CM

## 2024-01-26 DIAGNOSIS — E78.5 HYPERLIPIDEMIA, UNSPECIFIED: ICD-10-CM

## 2024-01-26 LAB
ANION GAP SERPL CALCULATED.3IONS-SCNC: 12 MMOL/L (ref 5–15)
BASOPHILS # BLD AUTO: 0.05 10*3/MM3 (ref 0–0.2)
BASOPHILS NFR BLD AUTO: 0.7 % (ref 0–1.5)
BUN SERPL-MCNC: 19 MG/DL (ref 8–23)
BUN/CREAT SERPL: 15.6 (ref 7–25)
CALCIUM SPEC-SCNC: 8.8 MG/DL (ref 8.6–10.5)
CHLORIDE SERPL-SCNC: 102 MMOL/L (ref 98–107)
CO2 SERPL-SCNC: 25 MMOL/L (ref 22–29)
CREAT SERPL-MCNC: 1.22 MG/DL (ref 0.76–1.27)
DEPRECATED RDW RBC AUTO: 46.8 FL (ref 37–54)
EGFRCR SERPLBLD CKD-EPI 2021: 60.7 ML/MIN/1.73
EOSINOPHIL # BLD AUTO: 0.41 10*3/MM3 (ref 0–0.4)
EOSINOPHIL NFR BLD AUTO: 5.6 % (ref 0.3–6.2)
ERYTHROCYTE [DISTWIDTH] IN BLOOD BY AUTOMATED COUNT: 13.4 % (ref 12.3–15.4)
GLUCOSE SERPL-MCNC: 151 MG/DL (ref 65–99)
HCT VFR BLD AUTO: 37.1 % (ref 37.5–51)
HGB BLD-MCNC: 12.2 G/DL (ref 13–17.7)
IMM GRANULOCYTES # BLD AUTO: 0.06 10*3/MM3 (ref 0–0.05)
IMM GRANULOCYTES NFR BLD AUTO: 0.8 % (ref 0–0.5)
LYMPHOCYTES # BLD AUTO: 1.11 10*3/MM3 (ref 0.7–3.1)
LYMPHOCYTES NFR BLD AUTO: 15.2 % (ref 19.6–45.3)
MCH RBC QN AUTO: 30.9 PG (ref 26.6–33)
MCHC RBC AUTO-ENTMCNC: 32.9 G/DL (ref 31.5–35.7)
MCV RBC AUTO: 93.9 FL (ref 79–97)
MONOCYTES # BLD AUTO: 0.62 10*3/MM3 (ref 0.1–0.9)
MONOCYTES NFR BLD AUTO: 8.5 % (ref 5–12)
NEUTROPHILS NFR BLD AUTO: 5.04 10*3/MM3 (ref 1.7–7)
NEUTROPHILS NFR BLD AUTO: 69.2 % (ref 42.7–76)
NRBC BLD AUTO-RTO: 0 /100 WBC (ref 0–0.2)
PLATELET # BLD AUTO: 224 10*3/MM3 (ref 140–450)
PMV BLD AUTO: 10.4 FL (ref 6–12)
POTASSIUM SERPL-SCNC: 4.4 MMOL/L (ref 3.5–5.2)
RBC # BLD AUTO: 3.95 10*6/MM3 (ref 4.14–5.8)
SODIUM SERPL-SCNC: 139 MMOL/L (ref 136–145)
WBC NRBC COR # BLD AUTO: 7.29 10*3/MM3 (ref 3.4–10.8)

## 2024-01-26 PROCEDURE — 36415 COLL VENOUS BLD VENIPUNCTURE: CPT | Performed by: NURSE PRACTITIONER

## 2024-01-26 PROCEDURE — 80048 BASIC METABOLIC PNL TOTAL CA: CPT | Performed by: NURSE PRACTITIONER

## 2024-01-26 PROCEDURE — 85025 COMPLETE CBC W/AUTO DIFF WBC: CPT | Performed by: NURSE PRACTITIONER

## 2024-03-25 ENCOUNTER — TELEMEDICINE (OUTPATIENT)
Dept: NEUROLOGY | Facility: CLINIC | Age: 79
End: 2024-03-25
Payer: MEDICARE

## 2024-03-25 VITALS — WEIGHT: 275 LBS | BODY MASS INDEX: 40.73 KG/M2 | HEIGHT: 69 IN

## 2024-03-25 DIAGNOSIS — G47.9 SLEEP DISTURBANCE: ICD-10-CM

## 2024-03-25 DIAGNOSIS — G20.C ATYPICAL PARKINSONISM: ICD-10-CM

## 2024-03-25 DIAGNOSIS — R29.6 FREQUENT FALLS: ICD-10-CM

## 2024-03-25 DIAGNOSIS — F02.B11 MODERATE LEWY BODY DEMENTIA WITH AGITATION: Primary | ICD-10-CM

## 2024-03-25 DIAGNOSIS — G31.83 MODERATE LEWY BODY DEMENTIA WITH AGITATION: Primary | ICD-10-CM

## 2024-03-25 PROCEDURE — 99422 OL DIG E/M SVC 11-20 MIN: CPT | Performed by: PHYSICIAN ASSISTANT

## 2024-03-25 RX ORDER — TAMSULOSIN HYDROCHLORIDE 0.4 MG/1
1 CAPSULE ORAL NIGHTLY
COMMUNITY
Start: 2024-03-12

## 2024-03-25 NOTE — PROGRESS NOTES
This visit was conducted via telehealth/PFSweb (patient portal).     Subjective   Speedy Villalba is a 78 y.o. male is here today for follow-up.    History of Present Illness  Lewy body dementia.  Video visit requested because of difficulty traveling at this point.       The following portions of the patient's history were reviewed and updated as appropriate: allergies, current medications, past family history, past medical history, past social history, past surgical history and problem list.        Review of Systems   Constitutional:  Positive for activity change and fever.   Eyes: Negative.    Respiratory: Negative.     Cardiovascular: Negative.    Gastrointestinal: Negative.    Musculoskeletal:  Positive for gait problem.   Neurological:  Positive for dizziness, tremors, speech difficulty, weakness, memory problem and confusion.   Psychiatric/Behavioral:  Positive for agitation, behavioral problems, decreased concentration, dysphoric mood, hallucinations and sleep disturbance. The patient is nervous/anxious.          Current Outpatient Medications:     divalproex (DEPAKOTE) 125 MG DR tablet, Take 2 tablets by mouth 2 (Two) Times a Day., Disp: , Rfl:     tamsulosin (FLOMAX) 0.4 MG capsule 24 hr capsule, Take 1 capsule by mouth Daily., Disp: , Rfl:     acetaminophen (TYLENOL) 325 MG tablet, Take 2 tablets by mouth Every 6 (Six) Hours As Needed for Mild Pain., Disp: , Rfl:     allopurinol (ZYLOPRIM) 100 MG tablet, Take 1 tablet by mouth Daily., Disp: , Rfl:     aspirin 81 MG EC tablet, Take 1 tablet by mouth Daily., Disp: , Rfl:     atorvastatin (LIPITOR) 80 MG tablet, Take 1 tablet by mouth Every Night., Disp: 90 tablet, Rfl: 0    carbidopa-levodopa (SINEMET)  MG per tablet, Take 2 tablets by mouth 3 (Three) Times a Day., Disp: 180 tablet, Rfl: 3    Cholecalciferol (VITAMIN D3) 125 MCG (5000 UT) tablet tablet, Take 1 tablet by mouth Daily., Disp: , Rfl:     clopidogrel (PLAVIX) 75 MG tablet, Take 1  tablet by mouth Daily., Disp: 30 tablet, Rfl: 0    Cranberry-Vitamin C-Inulin (UTI-Stat) liquid, Take 30 mL by mouth 2 (Two) Times a Day. Mix with 120mL of water or juice, Disp: , Rfl:     cyclobenzaprine (FLEXERIL) 10 MG tablet, Take 1 tablet by mouth every night at bedtime., Disp: , Rfl:     famotidine (PEPCID) 20 MG tablet, Take 1 tablet by mouth Daily., Disp: , Rfl:     hydrocortisone (Preparation H) 1 % cream, Apply 1 application  topically to the appropriate area as directed Every 6 (Six) Hours As Needed (hemorrhoids)., Disp: , Rfl:     losartan (COZAAR) 50 MG tablet, Take 1 tablet by mouth Daily., Disp: , Rfl:     Magnesium Oxide -Mg Supplement 400 (240 Mg) MG tablet, Take 1 tablet by mouth Daily., Disp: , Rfl:     Melatonin 10 MG tablet, Take 1 tablet by mouth Every Night., Disp: , Rfl:     multivitamin with minerals (Centrum Silver Adult 50+) tablet tablet, Take 1 tablet by mouth Daily., Disp: , Rfl:     nystatin (MYCOSTATIN) 043402 UNIT/GM powder, Apply 1 application  topically to the appropriate area as directed 2 (Two) Times a Day. Apply to abdominal fold/groin, Disp: , Rfl:     ondansetron ODT (ZOFRAN-ODT) 4 MG disintegrating tablet, Place 1 tablet on the tongue Every 6 (Six) Hours As Needed for Nausea or Vomiting., Disp: , Rfl:     polyethylene glycol (MIRALAX) 17 g packet, Take 17 g by mouth 2 (Two) Times a Day As Needed (constipation)., Disp: , Rfl:     spironolactone (ALDACTONE) 25 MG tablet, Take 1 tablet by mouth Daily., Disp: , Rfl:     traZODone (DESYREL) 50 MG tablet, Take 1.5 tablets by mouth Every Night., Disp: , Rfl:     zinc sulfate (ZINCATE) 220 (50 Zn) MG capsule, Take 1 capsule by mouth Daily., Disp: , Rfl:      Objective   Physical Exam  Nursing note reviewed.   Neurological:      Mental Status: He is alert. He is confused.   Psychiatric:         Attention and Perception: He is inattentive.         Speech: Speech is delayed and tangential.         Behavior: Behavior is slowed and  withdrawn.         Cognition and Memory: Cognition is impaired.           Assessment & Plan   Diagnoses and all orders for this visit:    1. Moderate Lewy body dementia with agitation (Primary)    2. Atypical parkinsonism    3. Frequent falls    4. Sleep disturbance        No changes are recommended in his present supportive care return in follow-up as directed.             Patient voices understanding and agreement with plan of care. Advised to notify the office of any additional concerns or questions in the interim.     This was a 20 minute video teleconference.      Dictated utilizing Dragon dictation.

## 2024-04-12 ENCOUNTER — HOSPITAL ENCOUNTER (INPATIENT)
Facility: HOSPITAL | Age: 79
LOS: 1 days | Discharge: NURSING, REHAB OR LONG-TERM CARE | End: 2024-04-12
Attending: EMERGENCY MEDICINE | Admitting: NEUROLOGICAL SURGERY
Payer: MEDICARE

## 2024-04-12 ENCOUNTER — APPOINTMENT (OUTPATIENT)
Dept: CT IMAGING | Facility: HOSPITAL | Age: 79
End: 2024-04-12
Payer: MEDICARE

## 2024-04-12 VITALS
TEMPERATURE: 98.1 F | DIASTOLIC BLOOD PRESSURE: 98 MMHG | OXYGEN SATURATION: 100 % | HEIGHT: 69 IN | WEIGHT: 264.6 LBS | SYSTOLIC BLOOD PRESSURE: 155 MMHG | HEART RATE: 79 BPM | BODY MASS INDEX: 39.19 KG/M2 | RESPIRATION RATE: 18 BRPM

## 2024-04-12 DIAGNOSIS — S06.5XAA SUBDURAL HEMATOMA: ICD-10-CM
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
## 2024-04-12 DIAGNOSIS — S00.93XA CONTUSION OF HEAD, UNSPECIFIED PART OF HEAD, INITIAL ENCOUNTER: ICD-10-CM

## 2024-04-12 PROCEDURE — 70450 CT HEAD/BRAIN W/O DYE: CPT

## 2024-04-12 PROCEDURE — 99234 HOSP IP/OBS SM DT SF/LOW 45: CPT | Performed by: NEUROLOGICAL SURGERY

## 2024-04-12 PROCEDURE — 25810000003 SODIUM CHLORIDE 0.9 % SOLUTION: Performed by: NEUROLOGICAL SURGERY

## 2024-04-12 PROCEDURE — 99285 EMERGENCY DEPT VISIT HI MDM: CPT

## 2024-04-12 RX ORDER — ACETAMINOPHEN 160 MG/5ML
650 SOLUTION ORAL EVERY 4 HOURS PRN
Status: DISCONTINUED | OUTPATIENT
Start: 2024-04-12 | End: 2024-04-12 | Stop reason: HOSPADM

## 2024-04-12 RX ORDER — SPIRONOLACTONE 25 MG/1
25 TABLET ORAL DAILY
Status: DISCONTINUED | OUTPATIENT
Start: 2024-04-12 | End: 2024-04-12 | Stop reason: HOSPADM

## 2024-04-12 RX ORDER — NYSTATIN 100000 [USP'U]/G
1 POWDER TOPICAL 2 TIMES DAILY
Status: DISCONTINUED | OUTPATIENT
Start: 2024-04-12 | End: 2024-04-12 | Stop reason: HOSPADM

## 2024-04-12 RX ORDER — ALLOPURINOL 100 MG/1
100 TABLET ORAL DAILY
Status: DISCONTINUED | OUTPATIENT
Start: 2024-04-12 | End: 2024-04-12 | Stop reason: HOSPADM

## 2024-04-12 RX ORDER — SODIUM CHLORIDE 9 MG/ML
40 INJECTION, SOLUTION INTRAVENOUS AS NEEDED
Status: DISCONTINUED | OUTPATIENT
Start: 2024-04-12 | End: 2024-04-12 | Stop reason: HOSPADM

## 2024-04-12 RX ORDER — LOSARTAN POTASSIUM 50 MG/1
50 TABLET ORAL DAILY
Status: DISCONTINUED | OUTPATIENT
Start: 2024-04-12 | End: 2024-04-12 | Stop reason: HOSPADM

## 2024-04-12 RX ORDER — ACETAMINOPHEN 325 MG/1
650 TABLET ORAL EVERY 4 HOURS PRN
Status: DISCONTINUED | OUTPATIENT
Start: 2024-04-12 | End: 2024-04-12 | Stop reason: HOSPADM

## 2024-04-12 RX ORDER — BENZOCAINE/MENTHOL 6 MG-10 MG
1 LOZENGE MUCOUS MEMBRANE EVERY 6 HOURS PRN
Status: DISCONTINUED | OUTPATIENT
Start: 2024-04-12 | End: 2024-04-12 | Stop reason: SDUPTHER

## 2024-04-12 RX ORDER — TRAZODONE HYDROCHLORIDE 50 MG/1
75 TABLET ORAL NIGHTLY
Status: DISCONTINUED | OUTPATIENT
Start: 2024-04-12 | End: 2024-04-12 | Stop reason: HOSPADM

## 2024-04-12 RX ORDER — ONDANSETRON 4 MG/1
4 TABLET, ORALLY DISINTEGRATING ORAL EVERY 6 HOURS PRN
Status: DISCONTINUED | OUTPATIENT
Start: 2024-04-12 | End: 2024-04-12 | Stop reason: HOSPADM

## 2024-04-12 RX ORDER — MULTIPLE VITAMINS W/ MINERALS TAB 9MG-400MCG
1 TAB ORAL DAILY
Status: DISCONTINUED | OUTPATIENT
Start: 2024-04-12 | End: 2024-04-12 | Stop reason: HOSPADM

## 2024-04-12 RX ORDER — SODIUM CHLORIDE 0.9 % (FLUSH) 0.9 %
10 SYRINGE (ML) INJECTION AS NEEDED
Status: DISCONTINUED | OUTPATIENT
Start: 2024-04-12 | End: 2024-04-12 | Stop reason: HOSPADM

## 2024-04-12 RX ORDER — ZINC SULFATE 50(220)MG
220 CAPSULE ORAL DAILY
Status: DISCONTINUED | OUTPATIENT
Start: 2024-04-12 | End: 2024-04-12 | Stop reason: HOSPADM

## 2024-04-12 RX ORDER — POLYETHYLENE GLYCOL 3350 17 G/17G
17 POWDER, FOR SOLUTION ORAL 2 TIMES DAILY PRN
Status: DISCONTINUED | OUTPATIENT
Start: 2024-04-12 | End: 2024-04-12 | Stop reason: HOSPADM

## 2024-04-12 RX ORDER — CYCLOBENZAPRINE HCL 10 MG
10 TABLET ORAL 3 TIMES DAILY PRN
Status: DISCONTINUED | OUTPATIENT
Start: 2024-04-12 | End: 2024-04-12 | Stop reason: HOSPADM

## 2024-04-12 RX ORDER — HYDROCORTISONE 25 MG/G
CREAM TOPICAL EVERY 6 HOURS PRN
Status: DISCONTINUED | OUTPATIENT
Start: 2024-04-12 | End: 2024-04-12 | Stop reason: HOSPADM

## 2024-04-12 RX ORDER — ONDANSETRON 2 MG/ML
4 INJECTION INTRAMUSCULAR; INTRAVENOUS EVERY 6 HOURS PRN
Status: DISCONTINUED | OUTPATIENT
Start: 2024-04-12 | End: 2024-04-12 | Stop reason: HOSPADM

## 2024-04-12 RX ORDER — ACETAMINOPHEN 500 MG
1000 TABLET ORAL ONCE
Status: COMPLETED | OUTPATIENT
Start: 2024-04-12 | End: 2024-04-12

## 2024-04-12 RX ORDER — ACETAMINOPHEN 650 MG/1
650 SUPPOSITORY RECTAL EVERY 4 HOURS PRN
Status: DISCONTINUED | OUTPATIENT
Start: 2024-04-12 | End: 2024-04-12 | Stop reason: HOSPADM

## 2024-04-12 RX ORDER — SODIUM CHLORIDE 0.9 % (FLUSH) 0.9 %
10 SYRINGE (ML) INJECTION EVERY 12 HOURS SCHEDULED
Status: DISCONTINUED | OUTPATIENT
Start: 2024-04-12 | End: 2024-04-12 | Stop reason: HOSPADM

## 2024-04-12 RX ORDER — DIVALPROEX SODIUM 250 MG/1
250 TABLET, DELAYED RELEASE ORAL 2 TIMES DAILY
Status: DISCONTINUED | OUTPATIENT
Start: 2024-04-12 | End: 2024-04-12 | Stop reason: HOSPADM

## 2024-04-12 RX ORDER — ACETAMINOPHEN 325 MG/1
650 TABLET ORAL EVERY 6 HOURS PRN
Status: DISCONTINUED | OUTPATIENT
Start: 2024-04-12 | End: 2024-04-12 | Stop reason: SDUPTHER

## 2024-04-12 RX ORDER — ATORVASTATIN CALCIUM 40 MG/1
80 TABLET, FILM COATED ORAL NIGHTLY
Status: DISCONTINUED | OUTPATIENT
Start: 2024-04-12 | End: 2024-04-12 | Stop reason: HOSPADM

## 2024-04-12 RX ORDER — FAMOTIDINE 20 MG/1
20 TABLET, FILM COATED ORAL DAILY
Status: DISCONTINUED | OUTPATIENT
Start: 2024-04-12 | End: 2024-04-12

## 2024-04-12 RX ORDER — SODIUM CHLORIDE 9 MG/ML
100 INJECTION, SOLUTION INTRAVENOUS CONTINUOUS
Status: DISCONTINUED | OUTPATIENT
Start: 2024-04-12 | End: 2024-04-12 | Stop reason: HOSPADM

## 2024-04-12 RX ORDER — TAMSULOSIN HYDROCHLORIDE 0.4 MG/1
0.4 CAPSULE ORAL DAILY
Status: DISCONTINUED | OUTPATIENT
Start: 2024-04-12 | End: 2024-04-12 | Stop reason: HOSPADM

## 2024-04-12 RX ORDER — CHOLECALCIFEROL (VITAMIN D3) 125 MCG
10 CAPSULE ORAL NIGHTLY
Status: DISCONTINUED | OUTPATIENT
Start: 2024-04-12 | End: 2024-04-12 | Stop reason: HOSPADM

## 2024-04-12 RX ADMIN — SPIRONOLACTONE 25 MG: 25 TABLET ORAL at 08:34

## 2024-04-12 RX ADMIN — Medication 1 TABLET: at 08:34

## 2024-04-12 RX ADMIN — Medication 10 ML: at 08:35

## 2024-04-12 RX ADMIN — ACETAMINOPHEN 1000 MG: 500 TABLET, FILM COATED ORAL at 02:33

## 2024-04-12 RX ADMIN — NYSTATIN 1 APPLICATION: 100000 POWDER TOPICAL at 08:35

## 2024-04-12 RX ADMIN — TAMSULOSIN HYDROCHLORIDE 0.4 MG: 0.4 CAPSULE ORAL at 08:34

## 2024-04-12 RX ADMIN — MAGNESIUM GLUCONATE 500 MG ORAL TABLET 400 MG: 500 TABLET ORAL at 08:34

## 2024-04-12 RX ADMIN — FAMOTIDINE 20 MG: 20 TABLET, FILM COATED ORAL at 11:35

## 2024-04-12 RX ADMIN — SODIUM CHLORIDE 100 ML/HR: 9 INJECTION, SOLUTION INTRAVENOUS at 08:35

## 2024-04-12 RX ADMIN — LOSARTAN POTASSIUM 50 MG: 50 TABLET, FILM COATED ORAL at 08:34

## 2024-04-12 RX ADMIN — DIVALPROEX SODIUM 250 MG: 250 TABLET, DELAYED RELEASE ORAL at 08:34

## 2024-04-12 RX ADMIN — ZINC SULFATE 220 MG (50 MG) CAPSULE 220 MG: CAPSULE at 11:35

## 2024-04-12 RX ADMIN — CARBIDOPA AND LEVODOPA 2 TABLET: 25; 100 TABLET ORAL at 08:34

## 2024-04-12 RX ADMIN — ALLOPURINOL 100 MG: 100 TABLET ORAL at 08:34

## 2024-04-12 NOTE — DISCHARGE SUMMARY
Date of Discharge:  4/12/2024    Discharge Diagnosis: Subdural hematoma    Presenting Problem/History of Present Illness  Subdural hematoma [S06.5XAA]  Subdural hematoma [S06.5XAA]       Hospital Course  Patient is a 78 y.o. male presented with midline subdural hematoma after a fall at the nursing home.  He remained asymptomatic and at his neurologic baseline.  Repeat CT scan was stable.  He was stopped on his Plavix.  At this point is felt that he can be discharged back to the nursing facility on aspirin only with this Plavix being stopped..      Procedures Performed         Consults:   Consults       No orders found from 3/14/2024 to 4/13/2024.            Pertinent Test Results: radiology: CT scan: Brain    Condition on Discharge: Stable    Vital Signs  Temp:  [97.3 °F (36.3 °C)-98.1 °F (36.7 °C)] 98.1 °F (36.7 °C)  Heart Rate:  [79-86] 79  Resp:  [15-18] 18  BP: (112-155)/() 155/98    Physical Exam:   Physical Exam  Eyes:      Extraocular Movements: EOM normal.      Pupils: Pupils are equal, round, and reactive to light.   Neurological:      Mental Status: He is oriented to person, place, and time.      Motor: Motor strength is normal.     Coordination: Finger-Nose-Finger Test normal.   Psychiatric:         Speech: Speech normal.          Neurologic Exam     Mental Status   Oriented to person, place, and time.   Attention: normal.   Speech: speech is normal   Level of consciousness: alert  Knowledge: good.     Cranial Nerves     CN II   Visual fields full to confrontation.     CN III, IV, VI   Pupils are equal, round, and reactive to light.  Extraocular motions are normal.     CN V   Facial sensation intact.     CN VII   Facial expression full, symmetric.     CN VIII   CN VIII normal.     CN IX, X   CN IX normal.   CN X normal.     CN XI   CN XI normal.     CN XII   CN XII normal.     Motor Exam   Muscle bulk: normal  Overall muscle tone: normal  Right arm pronator drift: absent  Left arm pronator drift:  absent    Strength   Strength 5/5 throughout.     Sensory Exam   Light touch normal.   Pinprick normal.     Gait, Coordination, and Reflexes     Coordination   Finger to nose coordination: normal    Tremor   Resting tremor: absent  Intention tremor: absent  Action tremor: absent    Reflexes   Reflexes 2+ except as noted.          Discharge Disposition  Skilled Nursing Facility ([place] - External)    Discharge Medications     Discharge Medications        Continue These Medications        Instructions Start Date   acetaminophen 325 MG tablet  Commonly known as: TYLENOL   650 mg, Oral, 2 Times Daily      allopurinol 100 MG tablet  Commonly known as: ZYLOPRIM   100 mg, Oral, Daily      aspirin 81 MG EC tablet   81 mg, Oral, Daily      atorvastatin 80 MG tablet  Commonly known as: LIPITOR   80 mg, Oral, Nightly      carbidopa-levodopa  MG per tablet  Commonly known as: SINEMET   2 tablets, Oral, 3 Times Daily      Centrum Silver Adult 50+ tablet tablet   1 tablet, Oral, Daily      cyclobenzaprine 10 MG tablet  Commonly known as: FLEXERIL   10 mg, Oral, Every Night at Bedtime      divalproex 125 MG DR tablet  Commonly known as: DEPAKOTE   250 mg, Oral, 2 Times Daily      losartan 50 MG tablet  Commonly known as: COZAAR   50 mg, Oral, Daily      Magnesium Oxide -Mg Supplement 400 (240 Mg) MG tablet   1 tablet, Oral, Daily      Melatonin 10 MG tablet   10 mg, Oral, Nightly      nystatin 906165 UNIT/GM powder  Commonly known as: MYCOSTATIN   1 application , Topical, 2 Times Daily, Apply to abdominal fold/groin      ondansetron ODT 4 MG disintegrating tablet  Commonly known as: ZOFRAN-ODT   4 mg, Translingual, Every 6 Hours PRN      polyethylene glycol 17 g packet  Commonly known as: MIRALAX   17 g, Oral, 2 Times Daily PRN      Preparation H 1 % cream  Generic drug: hydrocortisone   1 application , Topical, Every 6 Hours PRN      spironolactone 25 MG tablet  Commonly known as: ALDACTONE   25 mg, Oral, Daily      tamsulosin  0.4 MG capsule 24 hr capsule  Commonly known as: FLOMAX   1 capsule, Oral, Nightly      traZODone 50 MG tablet  Commonly known as: DESYREL   75 mg, Oral, Nightly      UTI-Stat liquid   30 mL, Oral, 2 Times Daily, Mix with 120mL of water or juice      vitamin D3 125 MCG (5000 UT) tablet tablet  Generic drug: Cholecalciferol   5,000 Units, Oral, Daily      zinc sulfate 220 (50 Zn) MG capsule  Commonly known as: ZINCATE   220 mg, Oral, Daily             Stop These Medications      clopidogrel 75 MG tablet  Commonly known as: PLAVIX     Menthol (Topical Analgesic) 4 % gel              Discharge Diet:     Activity at Discharge:   Activity Instructions       Activity as Tolerated              Follow-up Appointments  No future appointments.  Additional Instructions for the Follow-ups that You Need to Schedule       Discharge Follow-up with Specified Provider: Dr Olmos; 3 Weeks   As directed      To: Dr Olmos   Follow Up: 3 Weeks   Follow Up Details: CT head day of office appointment        CT Head Without Contrast   May 03, 2024      STEREOTACTIC GUIDANCE PROTOCOL?: No   Release to patient: Routine Release                Test Results Pending at Discharge       Az Olmos MD  04/12/24  13:34 CDT    Time: Discharge 45 min

## 2024-04-12 NOTE — ED PROVIDER NOTES
Subjective   History of Present Illness  Patient brought emergency room by ems from the nursing home with a report of a fall.  On not sure of the exact details of the fall but he apparently fell and hit the back of his head.  There is some questionable loss of consciousness.  The patient does complain of a slight headache.  They want to get him checked out.    History provided by:  Patient and nursing home   used: No    Fall  Mechanism of injury: fall    Injury location:  Head/neck  Incident location:  detention  Time since incident:  1 hour  Arrived directly from scene: no    Fall:     Fall occurred:  Unable to specify    Impact surface:  Hard floor    Point of impact:  Unable to specify    Entrapped after fall: no    Protective equipment: none    Suspicion of alcohol use: no    Suspicion of drug use: no    Tetanus status:  Unknown  Prior to arrival data:     Bystander interventions:  None    Patient ambulatory at scene: yes      Blood loss:  None    Responsiveness at scene:  Alert    Orientation at scene:  Person, place, situation and time    Loss of consciousness: possible.      Amnesic to event: no      Airway interventions:  None    Breathing interventions:  None    IV access status:  None    IO access:  None    Fluids administered:  None    Cardiac interventions:  None    Medications administered:  None    Immobilization:  None    Airway condition since incident:  Stable    Breathing condition since incident:  Stable    Circulation condition since incident:  Stable    Mental status condition since incident:  Stable    Disability condition since incident:  Stable  Associated symptoms: headaches    Associated symptoms: no abdominal pain, no back pain, no blindness, no chest pain, no difficulty breathing, no hearing loss, no loss of consciousness, no nausea, no neck pain, no seizures and no vomiting    Risk factors: no AICD, no anticoagulation therapy, no asthma, no beta blocker therapy, no  CABG, no CAD, no CHF, no COPD, no diabetes, no dialysis, no hemophilia, no kidney disease, no pacemaker, no past MI, not pregnant and no steroid use        Review of Systems   Constitutional: Negative.    HENT:  Negative for hearing loss.    Eyes:  Negative for blindness.   Respiratory: Negative.     Cardiovascular: Negative.  Negative for chest pain.   Gastrointestinal: Negative.  Negative for abdominal pain, nausea and vomiting.   Genitourinary: Negative.    Musculoskeletal: Negative.  Negative for back pain and neck pain.   Skin: Negative.    Neurological:  Positive for headaches. Negative for seizures and loss of consciousness.   Psychiatric/Behavioral: Negative.     All other systems reviewed and are negative.      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    Cognitive decline 10/30/2023    Coronary artery disease 10 years    x2 stents    Diabetes mellitus < 2 years    non-insulin dependent    Difficulty walking < 1 year    multiple falls in the last 6-8 months    HL (hearing loss) 20+ years    Wears hearing aids occasionally    Hyperlipidemia 5 years    Memory loss 3 years+/-    gradual and subtle    Primary hypertension 10/30/2023    Sleep apnea 20+ year    Non-compliant with PEEP for tx    Stroke 11/2022    TIA (transient ischemic attack) 1/22/2024       Allergies   Allergen Reactions    Serna-2 Inhibitors Unknown - Low Severity       Past Surgical History:   Procedure Laterality Date    APPENDECTOMY      BACK SURGERY  2016    CAROTID STENT  2010?    KNEE SURGERY  1960    LAMINECTOMY  2015?    discectomy & fusion       Family History   Problem Relation Age of Onset    Parkinsonism Father        Social History     Socioeconomic History    Marital status:    Tobacco Use    Smoking status: Never     Passive exposure: Past    Smokeless tobacco: Never   Vaping Use    Vaping status: Never Used   Substance and Sexual Activity    Alcohol use: Yes     Alcohol/week: 3.0 standard  drinks of alcohol     Types: 3 Drinks containing 0.5 oz of alcohol per week    Drug use: Never    Sexual activity: Not Currently     Partners: Female     Birth control/protection: Vasectomy       Prior to Admission medications    Medication Sig Start Date End Date Taking? Authorizing Provider   aspirin 81 MG EC tablet Take 1 tablet by mouth Daily.   Yes Michael George MD   atorvastatin (LIPITOR) 80 MG tablet Take 1 tablet by mouth Every Night. 1/23/24  Yes Randee Boyd MD   carbidopa-levodopa (SINEMET)  MG per tablet Take 2 tablets by mouth 3 (Three) Times a Day. 11/20/23  Yes Rodrigue Hebert PA   Cholecalciferol (VITAMIN D3) 125 MCG (5000 UT) tablet tablet Take 1 tablet by mouth Daily.   Yes Michael George MD   clopidogrel (PLAVIX) 75 MG tablet Take 1 tablet by mouth Daily. 1/24/24  Yes Randee Boyd MD   Cranberry-Vitamin C-Inulin (UTI-Stat) liquid Take 30 mL by mouth 2 (Two) Times a Day. Mix with 120mL of water or juice   Yes Michael George MD   cyclobenzaprine (FLEXERIL) 10 MG tablet Take 1 tablet by mouth every night at bedtime. 7/29/20  Yes Michael George MD   divalproex (DEPAKOTE) 125 MG DR tablet Take 2 tablets by mouth 2 (Two) Times a Day.   Yes Michael George MD   famotidine (PEPCID) 20 MG tablet Take 1 tablet by mouth Daily.   Yes Michael George MD   hydrocortisone (Preparation H) 1 % cream Apply 1 Application topically to the appropriate area as directed Every 6 (Six) Hours As Needed (hemorrhoids).   Yes Michael George MD   losartan (COZAAR) 50 MG tablet Take 1 tablet by mouth Daily. 7/29/20  Yes Michael George MD   Magnesium Oxide -Mg Supplement 400 (240 Mg) MG tablet Take 1 tablet by mouth Daily.   Yes Michael George MD   Melatonin 10 MG tablet Take 1 tablet by mouth Every Night.   Yes Michael George MD   multivitamin with minerals (Centrum Silver Adult 50+) tablet tablet Take 1 tablet by mouth Daily.   Yes  Michael George MD   nystatin (MYCOSTATIN) 750906 UNIT/GM powder Apply 1 Application topically to the appropriate area as directed 2 (Two) Times a Day. Apply to abdominal fold/groin   Yes Michael George MD   ondansetron ODT (ZOFRAN-ODT) 4 MG disintegrating tablet Place 1 tablet on the tongue Every 6 (Six) Hours As Needed for Nausea or Vomiting.   Yes Michael George MD   polyethylene glycol (MIRALAX) 17 g packet Take 17 g by mouth 2 (Two) Times a Day As Needed (constipation).   Yes Michael George MD   spironolactone (ALDACTONE) 25 MG tablet Take 1 tablet by mouth Daily.   Yes Michael George MD   tamsulosin (FLOMAX) 0.4 MG capsule 24 hr capsule Take 1 capsule by mouth Daily. 3/12/24  Yes Michael George MD   zinc sulfate (ZINCATE) 220 (50 Zn) MG capsule Take 1 capsule by mouth Daily.   Yes Michael George MD   acetaminophen (TYLENOL) 325 MG tablet Take 2 tablets by mouth Every 6 (Six) Hours As Needed for Mild Pain.    Michael George MD   allopurinol (ZYLOPRIM) 100 MG tablet Take 1 tablet by mouth Daily. 7/13/20   Michael George MD   traZODone (DESYREL) 50 MG tablet Take 1.5 tablets by mouth Every Night.    Michael George MD       Medications   acetaminophen (TYLENOL) tablet 1,000 mg (1,000 mg Oral Given 4/12/24 0233)       Vitals:    04/12/24 0143   BP: 135/88   Pulse: 79   Resp: 15   Temp: 97.7 °F (36.5 °C)   SpO2: 97%         Objective   Physical Exam  Vitals and nursing note reviewed.   Constitutional:       Appearance: Normal appearance.   HENT:      Head: Normocephalic.      Comments: Patient does have an abrasion of the occipital scalp.  There is no laceration noted or active bleeding noted.  There is no bony deformity palpated.  Eyes:      Extraocular Movements: Extraocular movements intact.      Pupils: Pupils are equal, round, and reactive to light.   Cardiovascular:      Rate and Rhythm: Normal rate and regular rhythm.   Pulmonary:       Effort: Pulmonary effort is normal.      Breath sounds: Normal breath sounds.   Musculoskeletal:         General: Normal range of motion.      Cervical back: Normal range of motion and neck supple. No tenderness.   Neurological:      General: No focal deficit present.      Mental Status: He is alert and oriented to person, place, and time. Mental status is at baseline.   Psychiatric:         Mood and Affect: Mood normal.         Behavior: Behavior normal.         Procedures         Lab Results (last 24 hours)       ** No results found for the last 24 hours. **            CT Head Without Contrast    (Results Pending)       ED Course          MDM  Number of Diagnoses or Management Options  Contusion of head, unspecified part of head, initial encounter: new and requires workup  Fall, initial encounter: new and requires workup  Diagnosis management comments: I initially thought the patient CT looked okay but stat rad read as an acute parafalcine subdural hematoma up to 5 mm.  I spoke with Dr. Olmos and we will put in the hospital overnight and watch him.       Amount and/or Complexity of Data Reviewed  Tests in the radiology section of CPT®: ordered and reviewed    Risk of Complications, Morbidity, and/or Mortality  Presenting problems: moderate  Diagnostic procedures: moderate  Management options: moderate    Patient Progress  Patient progress: stable        Final diagnoses:   Fall, initial encounter   Contusion of head, unspecified part of head, initial encounter   Subdural hematoma          Az Valle Jr., MD  04/12/24 0232       Az Valle Jr., MD  04/12/24 0252

## 2024-04-12 NOTE — PLAN OF CARE
Goal Outcome Evaluation:  Plan of Care Reviewed With: patient        Progress: no change  Outcome Evaluation: Patient A/O x 3. VSS. On room air. Patient forgets that he fell and hit the back of his head at SNF. Speech clear and logical. Per ER, patient passed the swallow study and NIH = 2. No complaints. Safety maintained.

## 2024-04-12 NOTE — CASE MANAGEMENT/SOCIAL WORK
Discharge Planning Assessment   [place]     Patient Name: Speedy Villalba  MRN: [id number]  Today's Date: 4/12/2024    Admit Date: 4/12/2024        Discharge Needs Assessment       Row Name 04/12/24 1628       Living Environment    People in Home facility resident    Potentially Unsafe Housing Conditions none    In the past 12 months has the electric, gas, oil, or water company threatened to shut off services in your home? No    Primary Care Provided by other (see comments)    Provides Primary Care For no one    Family Caregiver if Needed other (see comments)    Quality of Family Relationships helpful;involved    Able to Return to Prior Arrangements yes       Resource/Environmental Concerns    Resource/Environmental Concerns none    Transportation Concerns none       Transportation Needs    In the past 12 months, has lack of transportation kept you from medical appointments or from getting medications? no    In the past 12 months, has lack of transportation kept you from meetings, work, or from getting things needed for daily living? No       Food Insecurity    Within the past 12 months, you worried that your food would run out before you got the money to buy more. Never true    Within the past 12 months, the food you bought just didn't last and you didn't have money to get more. Never true       Transition Planning    Patient/Family Anticipates Transition to long-term care facility    Patient/Family Anticipated Services at Transition skilled nursing;rehabilitation services    Transportation Anticipated family or friend will provide;health plan transportation       Discharge Needs Assessment    Readmission Within the Last 30 Days no previous admission in last 30 days    Current Outpatient/Agency/Support Group skilled nursing facility    Equipment Currently Used at Home other (see comments)    Concerns to be Addressed adjustment to diagnosis/illness    Anticipated Changes Related to Illness inability to care for  self    Equipment Needed After Discharge other (see comments)    Outpatient/Agency/Support Group Needs skilled nursing facility    Discharge Facility/Level of Care Needs nursing facility, skilled                   Discharge Plan       Row Name 04/12/24 6986       Plan    Plan Comments Pt is from [facility], skilled level. SW has left a message with admissions to inform that pt has been dc'd back today. Pharmacy updated for [facility].    Final Discharge Disposition Code 03 - skilled nursing facility (SNF)    Final Note Call report number for [facility] ([phone]). Fax number for [facility] is [phone]                  Continued Care and Services - Admitted Since 4/12/2024       Destination Coordination complete.      Service Provider Request Status Selected Services Address Phone Fax Patient Preferred    [facility]  Selected Skilled Nursing [address] [address] [phone] --                  Selected Continued Care - Prior Encounters Includes continued care and service providers with selected services from prior encounters from 1/13/2024 to 4/12/2024      Discharged on 1/24/2024 Admission date: 1/22/2024 - Discharge disposition: Skilled Nursing Facility ([place] - External)      Destination       Service Provider Selected Services Address Phone Fax Patient Preferred    [facility] [address] [address] [phone] --                          Expected Discharge Date and Time       Expected Discharge Date Expected Discharge Time    Apr 12, 2024            Demographic Summary    No documentation.                  Functional Status    No documentation.                  Psychosocial    No documentation.                  Abuse/Neglect    No documentation.                  Legal    No documentation.                  Substance Abuse    No documentation.                  Patient Forms    No documentation.                      Nicky Still MSW

## 2024-04-12 NOTE — ED NOTES
"Nursing report ED to floor  Speedy Villalba  78 y.o.  male    HPI:   Chief Complaint   Patient presents with    Fall    Laceration       Admitting doctor:   Az Olmos MD    Consulting provider(s):  Consults       No orders found from 3/14/2024 to 4/13/2024.             Admitting diagnosis:   The primary encounter diagnosis was Fall, initial encounter. Diagnoses of Contusion of head, unspecified part of head, initial encounter and Subdural hematoma were also pertinent to this visit.    Code status:   Current Code Status       Date Active Code Status Order ID Comments User Context       Prior            Allergies:   Serna-2 inhibitors    Intake and Output  No intake or output data in the 24 hours ending 04/12/24 0341    Weight:       04/12/24 0143   Weight: 130 kg (286 lb)       Most recent vitals:   Vitals:    04/12/24 0143 04/12/24 0319 04/12/24 0340   BP: 135/88 121/64 112/52   BP Location: Right arm Right arm Right arm   Patient Position: Sitting Sitting Sitting   Pulse: 79 79 79   Resp: 15 16 16   Temp: 97.7 °F (36.5 °C) 97.7 °F (36.5 °C) 97.7 °F (36.5 °C)   TempSrc: Oral Oral Oral   SpO2: 97% 95% 98%   Weight: 130 kg (286 lb)     Height: 175.3 cm (69\")       Oxygen Therapy: .    Active LDAs/IV Access:   Lines, Drains & Airways       Active LDAs       None                    Labs (abnormal labs have a star):   Labs Reviewed - No data to display    Meds given in ED:   Medications   acetaminophen (TYLENOL) tablet 1,000 mg (1,000 mg Oral Given 4/12/24 0233)     No current facility-administered medications for this encounter.       NIH Stroke Scale:  Interval: baseline    Isolation/Infection(s):  No active isolations   No active infections     COVID Testing  Collected .  Resulted .    Nursing report ED to floor:  Mental status: .  Ambulatory status: .  Precautions: .    ED nurse phone extentsion- ..    "

## 2024-04-12 NOTE — H&P
Date of Admission: 4/12/2024  Primary Care Physician: Luca Benson MD        Subjective:    Chief complaint subdural hematoma    HPI: 78-year-old gentleman brought in from the nursing home to the emergency room was found on the ground.  Reports of hitting the back of his head.  Does not sound there was any sort of loss of consciousness.  He feels he just got up on his own and then just lost his balance.  This morning he does complain of a mild headache.  He denies any nausea or vomiting.  He is in the nursing facility for about 4 months.  It mostly because he cannot take care of himself neither can his wife.  He also uses a cane or a walker.  He does have trouble with falling at times.    Review of Systems   Musculoskeletal:  Positive for gait problem.   Neurological:  Positive for headaches.   All other systems reviewed and are negative.       Pertinent positives/negatives documented in HPI.  All other systems reviewed and negative.    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    Cognitive decline 10/30/2023    Coronary artery disease 10 years    x2 stents    Diabetes mellitus < 2 years    non-insulin dependent    Difficulty walking < 1 year    multiple falls in the last 6-8 months    HL (hearing loss) 20+ years    Wears hearing aids occasionally    Hyperlipidemia 5 years    Memory loss 3 years+/-    gradual and subtle    Primary hypertension 10/30/2023    Sleep apnea 20+ year    Non-compliant with PEEP for tx    Stroke 11/2022    TIA (transient ischemic attack) 1/22/2024       Past Surgical History:   Past Surgical History:   Procedure Laterality Date    APPENDECTOMY      BACK SURGERY  2016    CAROTID STENT  2010?    KNEE SURGERY  1960    LAMINECTOMY  2015?    discectomy & fusion       Family History: family history includes Parkinsonism in his father.    Social History:  reports that he has never smoked. He has been exposed to tobacco smoke. He  has never used smokeless tobacco. He reports current alcohol use of about 3.0 standard drinks of alcohol per week. He reports that he does not use drugs.    Code Status: DNR/DNI    Medications:  Medications Prior to Admission   Medication Sig Dispense Refill Last Dose    aspirin 81 MG EC tablet Take 1 tablet by mouth Daily.   4/11/2024    atorvastatin (LIPITOR) 80 MG tablet Take 1 tablet by mouth Every Night. 90 tablet 0 4/11/2024    carbidopa-levodopa (SINEMET)  MG per tablet Take 2 tablets by mouth 3 (Three) Times a Day. 180 tablet 3 4/11/2024    Cholecalciferol (VITAMIN D3) 125 MCG (5000 UT) tablet tablet Take 1 tablet by mouth Daily.   4/11/2024    clopidogrel (PLAVIX) 75 MG tablet Take 1 tablet by mouth Daily. 30 tablet 0 4/11/2024    Cranberry-Vitamin C-Inulin (UTI-Stat) liquid Take 30 mL by mouth 2 (Two) Times a Day. Mix with 120mL of water or juice   4/11/2024    cyclobenzaprine (FLEXERIL) 10 MG tablet Take 1 tablet by mouth every night at bedtime.   Past Week    divalproex (DEPAKOTE) 125 MG DR tablet Take 2 tablets by mouth 2 (Two) Times a Day.   4/11/2024    famotidine (PEPCID) 20 MG tablet Take 1 tablet by mouth Daily.   4/11/2024    hydrocortisone (Preparation H) 1 % cream Apply 1 Application topically to the appropriate area as directed Every 6 (Six) Hours As Needed (hemorrhoids).   Past Month    losartan (COZAAR) 50 MG tablet Take 1 tablet by mouth Daily.   4/11/2024    Magnesium Oxide -Mg Supplement 400 (240 Mg) MG tablet Take 1 tablet by mouth Daily.   4/11/2024    Melatonin 10 MG tablet Take 1 tablet by mouth Every Night.   4/11/2024    multivitamin with minerals (Centrum Silver Adult 50+) tablet tablet Take 1 tablet by mouth Daily.   4/11/2024    nystatin (MYCOSTATIN) 548898 UNIT/GM powder Apply 1 Application topically to the appropriate area as directed 2 (Two) Times a Day. Apply to abdominal fold/groin   4/11/2024    ondansetron ODT (ZOFRAN-ODT) 4 MG disintegrating tablet Place 1 tablet on  the tongue Every 6 (Six) Hours As Needed for Nausea or Vomiting.   Past Month    polyethylene glycol (MIRALAX) 17 g packet Take 17 g by mouth 2 (Two) Times a Day As Needed (constipation).   Past Month    spironolactone (ALDACTONE) 25 MG tablet Take 1 tablet by mouth Daily.   4/11/2024    tamsulosin (FLOMAX) 0.4 MG capsule 24 hr capsule Take 1 capsule by mouth Daily.   4/11/2024    zinc sulfate (ZINCATE) 220 (50 Zn) MG capsule Take 1 capsule by mouth Daily.   4/11/2024    acetaminophen (TYLENOL) 325 MG tablet Take 2 tablets by mouth Every 6 (Six) Hours As Needed for Mild Pain.   More than a month    allopurinol (ZYLOPRIM) 100 MG tablet Take 1 tablet by mouth Daily.       traZODone (DESYREL) 50 MG tablet Take 1.5 tablets by mouth Every Night.          Allergies:  Allergies   Allergen Reactions    Serna-2 Inhibitors Unknown - Low Severity       Objective:  Physical Exam  Eyes:      Extraocular Movements: EOM normal.      Pupils: Pupils are equal, round, and reactive to light.   Cardiovascular:      Rate and Rhythm: Normal rate and regular rhythm.   Pulmonary:      Effort: Pulmonary effort is normal.      Breath sounds: Normal breath sounds.   Abdominal:      General: There is no distension.      Palpations: Abdomen is soft.   Neurological:      Mental Status: He is oriented to person, place, and time.      Motor: Motor strength is normal.     Coordination: Finger-Nose-Finger Test normal.   Psychiatric:         Speech: Speech normal.         Neurologic Exam     Mental Status   Oriented to person, place, and time.   Attention: normal.   Speech: speech is normal   Level of consciousness: alert  Knowledge: good.   Parkinsonian masked face.  Bradyphrenia.  Speech is clear fluent and appropriate though     Cranial Nerves     CN II   Visual fields full to confrontation.     CN III, IV, VI   Pupils are equal, round, and reactive to light.  Extraocular motions are normal.     CN V   Facial sensation intact.     CN VII   Facial  expression full, symmetric.     CN VIII   CN VIII normal.     CN IX, X   CN IX normal.   CN X normal.     CN XI   CN XI normal.     CN XII   CN XII normal.     Motor Exam   Muscle bulk: normal  Overall muscle tone: normal  Right arm pronator drift: absent  Left arm pronator drift: absent    Strength   Strength 5/5 throughout.     Sensory Exam   Light touch normal.   Pinprick normal.     Gait, Coordination, and Reflexes     Coordination   Finger to nose coordination: normal    Tremor   Resting tremor: absent  Intention tremor: absent  Action tremor: absent    Reflexes   Reflexes 2+ except as noted.       Vital Signs  Temp:  [97.3 °F (36.3 °C)-97.7 °F (36.5 °C)] 97.3 °F (36.3 °C)  Heart Rate:  [79-83] 83  Resp:  [15-18] 18  BP: (112-135)/(52-88) 133/70        Results Review:  I reviewed the patient's new clinical results.  I reviewed the patient's new imaging results and agree with the interpretation.  I reviewed the patient's other test results and agree with the interpretation         Lab Results (last 24 hours)       ** No results found for the last 24 hours. **            Imaging Results (Last 24 Hours)       Procedure Component Value Units Date/Time    CT Head Without Contrast [[id number]] Collected: 04/12/24 0655     Updated: 04/12/24 0710    Narrative:      Exam: CT HEAD WO CONTRAST- 4/12/2024 1:02 AM     HISTORY: Head trauma, minor (Age >= 65y); W19.XXXA-Unspecified fall,  initial encounter; S00.93XA-Contusion of unspecified part of head,  initial encounter; S06.5XAA-Traumatic subdural hemorrhage with loss of  consciousness status unknown, initial encounter       DOSE LENGTH PRODUCT: 797.41 mGy.cm mGy cm. Automated exposure control  was also utilized to decrease patient radiation dose.     Technique:  Helically acquired CT of the brain without IV contrast was performed.  Sagittal and coronal reformations are also provided for review. Soft  tissue and bone kernels are available for interpretation.      Comparison: 10/30/2023.     Findings:     Ventricles and extra-axial CSF spaces are normal in size for age.     Acute parafalcine subdural hematoma measuring up to 5 mm in thickness.     Gray-white matter differentiation is preserved.     Status post cataracts. Chronic right maxillary sinus disease. Mild right  anterior ethmoid air cell mucosal thickening. Mastoid air cells are  grossly clear.     No suspicious calvarial or extracranial soft tissue abnormality.     Other:None.       Impression:      Impression:       Acute parafalcine subdural hematoma. No significant mass effect or  midline shift.     Chronic right maxillary sinus disease.     These findings are in agreement with the critical and emergent findings  from the [facility] preliminary report.     This report was signed and finalized on 4/12/2024 7:07 AM by Victor Hugo Chance.                      Assessment/Plan:   CT scan with a very small interhemispheric subdural hematoma not seen on previous imaging.  Patient is on Plavix.  He is can be admitted to the hospital overnight.  Will repeat the CT scan in the morning.  Mobilize him with physical therapy.  Likely discharge later today if he does well.    Active Hospital Problems    Diagnosis     **Subdural hematoma        I discussed the patient's findings and my recommendations with patient    Az Olmos MD  04/12/24  07:36 CDT    I spent 45 minutes caring for Speedy on this date of service. This time includes time spent by me in the following activities: preparing for the visit, reviewing tests, obtaining and/or reviewing a separately obtained history, performing a medically appropriate examination and/or evaluation, counseling and educating the patient/family/caregiver, ordering medications, tests, or procedures, referring and communicating with other health care professionals, documenting information in the medical record, and independently interpreting results and communicating that information  with the patient/family/caregiver

## 2024-04-12 NOTE — Clinical Note
Level of Care: Med/Surg [1]   Diagnosis: Subdural hematoma [[id number]]   Admitting Physician: ROSIE TORRES [[id number]]   Attending Physician: ROSIE TORRES [[id number]]

## 2024-04-14 ENCOUNTER — HOSPITAL ENCOUNTER (EMERGENCY)
Facility: HOSPITAL | Age: 79
Discharge: HOME | End: 2024-04-14
Attending: FAMILY MEDICINE | Admitting: FAMILY MEDICINE
Payer: MEDICARE

## 2024-04-14 ENCOUNTER — APPOINTMENT (OUTPATIENT)
Dept: GENERAL RADIOLOGY | Facility: HOSPITAL | Age: 79
End: 2024-04-14
Payer: MEDICARE

## 2024-04-14 ENCOUNTER — APPOINTMENT (OUTPATIENT)
Dept: CT IMAGING | Facility: HOSPITAL | Age: 79
End: 2024-04-14
Payer: MEDICARE

## 2024-04-14 VITALS
DIASTOLIC BLOOD PRESSURE: 71 MMHG | WEIGHT: 265.4 LBS | OXYGEN SATURATION: 99 % | SYSTOLIC BLOOD PRESSURE: 120 MMHG | HEART RATE: 78 BPM | TEMPERATURE: 98.3 F | BODY MASS INDEX: 39.31 KG/M2 | RESPIRATION RATE: 18 BRPM | HEIGHT: 69 IN

## 2024-04-14 DIAGNOSIS — I62.03 CHRONIC SUBDURAL HEMATOMA: ICD-10-CM

## 2024-04-14 DIAGNOSIS — S13.9XXA NECK SPRAIN, INITIAL ENCOUNTER: ICD-10-CM

## 2024-04-14 DIAGNOSIS — M19.012 ARTHRITIS OF LEFT GLENOHUMERAL JOINT: ICD-10-CM

## 2024-04-14 DIAGNOSIS — S09.90XA CLOSED HEAD INJURY, INITIAL ENCOUNTER: ICD-10-CM

## 2024-04-14 DIAGNOSIS — M19.012 ARTHRITIS OF LEFT ACROMIOCLAVICULAR JOINT: ICD-10-CM
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
## 2024-04-14 PROCEDURE — 73030 X-RAY EXAM OF SHOULDER: CPT

## 2024-04-14 PROCEDURE — 73060 X-RAY EXAM OF HUMERUS: CPT

## 2024-04-14 PROCEDURE — 99284 EMERGENCY DEPT VISIT MOD MDM: CPT

## 2024-04-14 PROCEDURE — 72125 CT NECK SPINE W/O DYE: CPT

## 2024-04-14 PROCEDURE — 70450 CT HEAD/BRAIN W/O DYE: CPT

## 2024-04-14 NOTE — ED PROVIDER NOTES
HPI:    Patient is a 78-year-old white male who presents to the emergency room status post slipping and falling backwards and hitting his head and neck on the bedpost.  Patient rates his pain 7 out of 10.  And he has pain in his head, neck and left shoulder and upper arm.  Patient does have a history of subdural bleed and was removed from his Plavix due to the subdural bleed.  Patient had a recent fall also and hurting his left shoulder.  Patient denies any pain in his upper or lower back, pelvis, or lower extremities.  Patient denies any abdominal pain chest pain or shortness of breath.    REVIEW OF SYSTEMS  CONSTITUTIONAL:  No complaints of fever, chills,or weakness  EYES:  No complaints of discharge   ENT: No complaints of sore throat or ear pain  CARDIOVASCULAR:  No complaints of chest pain, palpitations, or swelling  RESPIRATORY:  No complaints of cough or shortness of breath  GI:  No complaints of abdominal pain, nausea, vomiting, or diarrhea  MUSCULOSKELETAL: Positive for left shoulder and left humerus   SKIN:  No complaints of rash  NEUROLOGIC:  No complaints of headache, focal weakness, or sensory changes  ENDOCRINE:  No complaints of polyuria or polydipsia  LYMPHATIC:  No complaints of swollen glands  GENITOURINARY: No complaints of urinary frequency or hematuria        PAST MEDICAL HISTORY  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    Cognitive decline 10/30/2023    Coronary artery disease 10 years    x2 stents    Diabetes mellitus < 2 years    non-insulin dependent    Difficulty walking < 1 year    multiple falls in the last 6-8 months    HL (hearing loss) 20+ years    Wears hearing aids occasionally    Hyperlipidemia 5 years    Memory loss 3 years+/-    gradual and subtle    Primary hypertension 10/30/2023    Sleep apnea 20+ year    Non-compliant with PEEP for tx    Stroke 11/2022    TIA (transient ischemic attack) 1/22/2024       FAMILY HISTORY  Family History   Problem  Relation Age of Onset    Parkinsonism Father        SOCIAL HISTORY  Social History     Socioeconomic History    Marital status:    Tobacco Use    Smoking status: Never     Passive exposure: Past    Smokeless tobacco: Never   Vaping Use    Vaping status: Never Used   Substance and Sexual Activity    Alcohol use: Yes     Alcohol/week: 3.0 standard drinks of alcohol     Types: 3 Drinks containing 0.5 oz of alcohol per week    Drug use: Never    Sexual activity: Not Currently     Partners: Female     Birth control/protection: Vasectomy       IMMUNIZATION HISTORY  Deferred to primary care physician.    SURGICAL HISTORY  Past Surgical History:   Procedure Laterality Date    APPENDECTOMY      BACK SURGERY  2016    CAROTID STENT  2010?    KNEE SURGERY  1960    LAMINECTOMY  2015?    discectomy & fusion       CURRENT MEDICATIONS  No current facility-administered medications for this encounter.    Current Outpatient Medications:     acetaminophen (TYLENOL) 325 MG tablet, Take 2 tablets by mouth 2 (Two) Times a Day., Disp: , Rfl:     allopurinol (ZYLOPRIM) 100 MG tablet, Take 1 tablet by mouth Daily., Disp: , Rfl:     aspirin 81 MG EC tablet, Take 1 tablet by mouth Daily., Disp: , Rfl:     atorvastatin (LIPITOR) 80 MG tablet, Take 1 tablet by mouth Every Night., Disp: 90 tablet, Rfl: 0    carbidopa-levodopa (SINEMET)  MG per tablet, Take 2 tablets by mouth 3 (Three) Times a Day., Disp: 180 tablet, Rfl: 3    Cholecalciferol (VITAMIN D3) 125 MCG (5000 UT) tablet tablet, Take 1 tablet by mouth Daily., Disp: , Rfl:     Cranberry-Vitamin C-Inulin (UTI-Stat) liquid, Take 30 mL by mouth 2 (Two) Times a Day. Mix with 120mL of water or juice, Disp: , Rfl:     cyclobenzaprine (FLEXERIL) 10 MG tablet, Take 1 tablet by mouth every night at bedtime., Disp: , Rfl:     divalproex (DEPAKOTE) 125 MG DR tablet, Take 2 tablets by mouth 2 (Two) Times a Day., Disp: , Rfl:     hydrocortisone (Preparation H) 1 % cream, Apply 1  "Application topically to the appropriate area as directed Every 6 (Six) Hours As Needed (hemorrhoids)., Disp: , Rfl:     losartan (COZAAR) 50 MG tablet, Take 1 tablet by mouth Daily., Disp: , Rfl:     Magnesium Oxide -Mg Supplement 400 (240 Mg) MG tablet, Take 1 tablet by mouth Daily., Disp: , Rfl:     Melatonin 10 MG tablet, Take 1 tablet by mouth Every Night., Disp: , Rfl:     multivitamin with minerals (Centrum Silver Adult 50+) tablet tablet, Take 1 tablet by mouth Daily., Disp: , Rfl:     nystatin (MYCOSTATIN) 060481 UNIT/GM powder, Apply 1 Application topically to the appropriate area as directed 2 (Two) Times a Day. Apply to abdominal fold/groin, Disp: , Rfl:     ondansetron ODT (ZOFRAN-ODT) 4 MG disintegrating tablet, Place 1 tablet on the tongue Every 6 (Six) Hours As Needed for Nausea or Vomiting., Disp: , Rfl:     polyethylene glycol (MIRALAX) 17 g packet, Take 17 g by mouth 2 (Two) Times a Day As Needed (constipation)., Disp: , Rfl:     spironolactone (ALDACTONE) 25 MG tablet, Take 1 tablet by mouth Daily., Disp: , Rfl:     tamsulosin (FLOMAX) 0.4 MG capsule 24 hr capsule, Take 1 capsule by mouth Every Night., Disp: , Rfl:     traZODone (DESYREL) 50 MG tablet, Take 1.5 tablets by mouth Every Night., Disp: , Rfl:     zinc sulfate (ZINCATE) 220 (50 Zn) MG capsule, Take 1 capsule by mouth Daily., Disp: , Rfl:     ALLERGIES  Allergies   Allergen Reactions    Serna-2 Inhibitors Unknown - Low Severity       Musculoskeletal exam    VITAL SIGNS:   /65 (BP Location: Right arm)   Pulse 88   Temp 97.8 °F (36.6 °C) (Oral)   Resp 20   Ht 175.3 cm (69\")   Wt 120 kg (265 lb 6.4 oz)   SpO2 99%   BMI 39.19 kg/m²     Constitutional: Patient is alert and in no distress.  Patient with moderate left shoulder neck and head discomfort.    ENT: There is a normal pharynx with no acute erythema or exudate and oral mucosa is moist.  Nose is clear with no drainage.  Tympanic membranes intact and " nonerythemic    Cardiovascular: S1-S2 regular rate and rhythm. No murmurs rubs or gallops are noted.    Respiratory: Patient is clear to auscultation bilaterally with no wheezing or rhonchi.  Chest wall is nontender.  There are no external lesions on the chest.  There is no crepitance    Abdomen: Soft, nontender. Bowel sounds are normal in all 4 quadrants. There is no rebound or guarding noted.  There is no abdominal distention or hepatosplenomegaly.    Neck: Mild posterior tenderness to palpation but no step-off.      Back:No tenderness to palpation of the thoracic or lumbar spines or step-off noted.    Musculoskeletal:   Left shoulder/humerus: There is a contusion noted on the upper posterior portion of the left shoulder.  There is tenderness in this area but no gross deformity.    No tenderness of the hip/pelvis or lower extremities.    Integumentary: Positive contusion left upper arm.    Genitourinary: Patient is voiding appropriately.    Psychiatric: Normal affect and mood      RADIOLOGY/PROCEDURES        CT Head Without Contrast   Final Result   Decreased trace left parafalcine subdural hematoma. No new intracranial   hemorrhage. No acute findings.       This report was signed and finalized on 4/14/2024 4:57 PM by Dr. Jovan Serna MD.          CT Cervical Spine Without Contrast   Final Result   1.  No acute fracture or subluxation.   2.  Advanced multilevel cervical spine degenerative change.           This report was signed and finalized on 4/14/2024 5:01 PM by Dr. Jovan Serna MD.          XR Humerus Left   Final Result   No acute osseous findings.       This report was signed and finalized on 4/14/2024 4:46 PM by Dr. Jovan Serna MD.          XR Shoulder 2+ View Left   Final Result   1. No acute osseous findings.   2. 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       This report was signed and finalized on 4/14/2024 4:45 PM by Dr. Jovan Serna MD.                 FUTURE  APPOINTMENTS     Future Appointments   Date Time Provider Department Center   4/23/2024 10:30 AM PAD CT 1 BH PAD CAT PAD   4/23/2024 12:30 PM Garrett [place], ANTHONY [facility] PAD PAD          COURSE & MEDICAL DECISION MAKING    Patient's partial differential diagnosis can include:    Cervical strain, cervical fracture, closed head injury, skull fracture, subdural bleed, shoulder fracture, humeral fracture, and others    Patient has been informed that he has no acute fractures and he would like to go back to his nurse.      Patient's level of risk: Low      CRITICAL CARE    CRITICAL CARE: No    CRITICAL CARE TIME: None      No results found for this or any previous visit (from the past 24 hour(s)).       Also  Old charts were reviewed per [facility] EMR.  Pertinent details are summarized above.  All laboratory, radiologic, and EKG studies that were performed in the Emergency Department were a necessary part of the evaluation needed to exclude unstable or  emergent medical conditions.     Patient was hemodynamically and neurologically stable in the ED.   Pertinent studies were reviewed as above.     The patient received:  Medications - No data to display         ED Disposition       ED Disposition   Discharge    Condition   Stable    Comment   --                 Dragon disclaimer:  Part of this note may be an electronic transcription/translation of spoken language to printed text using the Dragon Dictation System.     I have reviewed the patient’s prescription history via a prescription monitoring program.  This information is consistent with my knowledge of the patient’s controlled substance use history.    Patient evaluated during Coronavirus Pandemic. Isolation practices followed according to [facility] policy.    FINAL IMPRESSION   Diagnosis Plan   1. Fall on same level, initial encounter        2. Arthritis of left acromioclavicular joint        3. Arthritis of left glenohumeral joint        4. Neck sprain, initial  encounter        5. Closed head injury, initial encounter        6. Chronic subdural hematoma              MD Dany Slade Jr, Thomas Mark Jr., MD  04/14/24 0966

## 2024-04-23 ENCOUNTER — OFFICE VISIT (OUTPATIENT)
Dept: NEUROSURGERY | Facility: CLINIC | Age: 79
End: 2024-04-23
Payer: MEDICARE

## 2024-04-23 ENCOUNTER — HOSPITAL ENCOUNTER (OUTPATIENT)
Dept: CT IMAGING | Facility: HOSPITAL | Age: 79
Discharge: HOME | End: 2024-04-23
Admitting: NEUROLOGICAL SURGERY
Payer: MEDICARE

## 2024-04-23 VITALS — HEIGHT: 69 IN | WEIGHT: 265 LBS | BODY MASS INDEX: 39.25 KG/M2

## 2024-04-23 DIAGNOSIS — S06.5XAA SUBDURAL HEMATOMA: ICD-10-CM

## 2024-04-23 DIAGNOSIS — Z78.9 NONSMOKER: ICD-10-CM

## 2024-04-23 DIAGNOSIS — S06.5XAA SUBDURAL HEMATOMA: Primary | ICD-10-CM

## 2024-04-23 DIAGNOSIS — E66.09 CLASS 2 OBESITY DUE TO EXCESS CALORIES WITHOUT SERIOUS COMORBIDITY WITH BODY MASS INDEX (BMI) OF 39.0 TO 39.9 IN ADULT: ICD-10-CM

## 2024-04-23 PROCEDURE — 70450 CT HEAD/BRAIN W/O DYE: CPT

## 2024-04-23 PROCEDURE — 99213 OFFICE O/P EST LOW 20 MIN: CPT | Performed by: NURSE PRACTITIONER

## 2024-04-23 RX ORDER — CLOPIDOGREL BISULFATE 75 MG/1
TABLET ORAL
COMMUNITY
Start: 2024-04-12

## 2024-04-23 NOTE — PATIENT INSTRUCTIONS
"BMI for Adults  What is BMI?  Body mass index (BMI) is a number that is calculated from a person's weight and height. BMI can help estimate how much of a person's weight is composed of fat. BMI does not measure body fat directly. Rather, it is an alternative to procedures that directly measure body fat, which can be difficult and expensive.  BMI can help identify people who may be at higher risk for certain medical problems.  What are BMI measurements used for?  BMI is used as a screening tool to identify possible weight problems. It helps determine whether a person is obese, overweight, a healthy weight, or underweight.  BMI is useful for:  Identifying a weight problem that may be related to a medical condition or may increase the risk for medical problems.  Promoting changes, such as changes in diet and exercise, to help reach a healthy weight. BMI screening can be repeated to see if these changes are working.  How is BMI calculated?  BMI involves measuring your weight in relation to your height. Both height and weight are measured, and the BMI is calculated from those numbers. This can be done either in English ([place] or metric measurements. Note that charts and online BMI calculators are available to help you find your BMI quickly and easily without having to do these calculations yourself.  To calculate your BMI in English ([place] measurements:    Measure your weight in pounds (lb).  Multiply the number of pounds by 703.  For example, for a person who weighs 180 lb, multiply that number by 703, which equals 126,540.  Measure your height in inches. Then multiply that number by itself to get a measurement called \"inches squared.\"  For example, for a person who is 70 inches tall, the \"inches squared\" measurement is 70 inches x 70 inches, which equals 4,900 inches squared.  Divide the total from step 2 (number of lb x 703) by the total from step 3 (inches squared): 126,540 ÷ 4,900 = 25.8. This is your BMI.    To " "calculate your BMI in metric measurements:  Measure your weight in kilograms (kg).  Measure your height in meters (m). Then multiply that number by itself to get a measurement called \"meters squared.\"  For example, for a person who is 1.75 m tall, the \"meters squared\" measurement is 1.75 m x 1.75 m, which is equal to 3.1 meters squared.  Divide the number of kilograms (your weight) by the meters squared number. In this example: 70 ÷ 3.1 = 22.6. This is your BMI.  What do the results mean?  BMI charts are used to identify whether you are underweight, normal weight, overweight, or obese. The following guidelines will be used:  Underweight: BMI less than 18.5.  Normal weight: BMI between 18.5 and 24.9.  Overweight: BMI between 25 and 29.9.  Obese: BMI of 30 or above.  Keep these notes in mind:  Weight includes both fat and muscle, so someone with a muscular build, such as an athlete, may have a BMI that is higher than 24.9. In cases like these, BMI is not an accurate measure of body fat.  To determine if excess body fat is the cause of a BMI of 25 or higher, further assessments may need to be done by a health care provider.  BMI is usually interpreted in the same way for men and women.  Where to find more information  For more information about BMI, including tools to quickly calculate your BMI, go to these websites:  Centers for Disease Control and Prevention: www.cdc.gov  American Heart Association: www.heart.org  National Heart, Lung, and Blood [place]: www.nhlbi.nih.gov  Summary  Body mass index (BMI) is a number that is calculated from a person's weight and height.  BMI may help estimate how much of a person's weight is composed of fat. BMI can help identify those who may be at higher risk for certain medical problems.  BMI can be measured using English measurements or metric measurements.  BMI charts are used to identify whether you are underweight, normal weight, overweight, or obese.  This information is not " "intended to replace advice given to you by your health care provider. Make sure you discuss any questions you have with your health care provider.  Document Revised: 09/09/2020 Document Reviewed: 07/17/2020  [employer] Patient Education © 2021 "[employer] Inc[employer] https://www.nhlbi.nih.gov/files/docs/public/heart/dash_brief.pdf\">   DASH Eating Plan  DASH stands for Dietary Approaches to Stop Hypertension. The DASH eating plan is a healthy eating plan that has been shown to:  Reduce high blood pressure (hypertension).  Reduce your risk for type 2 diabetes, heart disease, and stroke.  Help with weight loss.  What are tips for following this plan?  Reading food labels  Check food labels for the amount of salt (sodium) per serving. Choose foods with less than 5 percent of the Daily Value of sodium. Generally, foods with less than 300 milligrams (mg) of sodium per serving fit into this eating plan.  To find whole grains, look for the word \"whole\" as the first word in the ingredient list.  Shopping  Buy products labeled as \"low-sodium\" or \"no salt added.\"  Buy fresh foods. Avoid canned foods and pre-made or frozen meals.  Cooking  Avoid adding salt when cooking. Use salt-free seasonings or herbs instead of table salt or sea salt. Check with your health care provider or pharmacist before using salt substitutes.  Do not wayne foods. Cook foods using healthy methods such as baking, boiling, grilling, roasting, and broiling instead.  Cook with heart-healthy oils, such as olive, canola, avocado, soybean, or sunflower oil.  Meal planning    Eat a balanced diet that includes:  4 or more servings of fruits and 4 or more servings of vegetables each day. Try to fill one-half of your plate with fruits and vegetables.  6-8 servings of whole grains each day.  Less than 6 oz (170 g) of lean meat, poultry, or fish each day. A 3-oz (85-g) serving of meat is about the same size as a deck of cards. One egg equals 1 oz (28 g).  2-3 servings of low-fat " dairy each day. One serving is 1 cup (237 mL).  1 serving of nuts, seeds, or beans 5 times each week.  2-3 servings of heart-healthy fats. Healthy fats called omega-3 fatty acids are found in foods such as walnuts, flaxseeds, fortified milks, and eggs. These fats are also found in cold-water fish, such as sardines, salmon, and mackerel.  Limit how much you eat of:  Canned or prepackaged foods.  Food that is high in trans fat, such as some fried foods.  Food that is high in saturated fat, such as fatty meat.  Desserts and other sweets, sugary drinks, and other foods with added sugar.  Full-fat dairy products.  Do not salt foods before eating.  Do not eat more than 4 egg yolks a week.  Try to eat at least 2 vegetarian meals a week.  Eat more home-cooked food and less restaurant, buffet, and fast food.    Lifestyle  When eating at a restaurant, ask that your food be prepared with less salt or no salt, if possible.  If you drink alcohol:  Limit how much you use to:  0-1 drink a day for women who are not pregnant.  0-2 drinks a day for men.  Be aware of how much alcohol is in your drink. In the [place] one drink equals one 12 oz bottle of beer (355 mL), one 5 oz glass of wine (148 mL), or one 1½ oz glass of hard liquor (44 mL).  General information  Avoid eating more than 2,300 mg of salt a day. If you have hypertension, you may need to reduce your sodium intake to 1,500 mg a day.  Work with your health care provider to maintain a healthy body weight or to lose weight. Ask what an ideal weight is for you.  Get at least 30 minutes of exercise that causes your heart to beat faster (aerobic exercise) most days of the week. Activities may include walking, swimming, or biking.  Work with your health care provider or dietitian to adjust your eating plan to your individual calorie needs.  What foods should I eat?  Fruits  All fresh, dried, or frozen fruit. Canned fruit in natural juice (without added sugar).  Vegetables  Fresh or  frozen vegetables (raw, steamed, roasted, or grilled). Low-sodium or reduced-sodium tomato and vegetable juice. Low-sodium or reduced-sodium tomato sauce and tomato paste. Low-sodium or reduced-sodium canned vegetables.  Grains  Whole-grain or whole-wheat bread. Whole-grain or whole-wheat pasta. Brown rice. Oatmeal. Quinoa. Bulgur. Whole-grain and low-sodium cereals. Kathie bread. Low-fat, low-sodium crackers. Whole-wheat flour tortillas.  Meats and other proteins  Skinless chicken or turkey. Ground chicken or turkey. Pork with fat trimmed off. Fish and seafood. Egg whites. Dried beans, peas, or lentils. Unsalted nuts, nut butters, and seeds. Unsalted canned beans. Lean cuts of beef with fat trimmed off. Low-sodium, lean precooked or cured meat, such as sausages or meat loaves.  Dairy  Low-fat (1%) or fat-free (skim) milk. Reduced-fat, low-fat, or fat-free cheeses. Nonfat, low-sodium ricotta or cottage cheese. Low-fat or nonfat yogurt. Low-fat, low-sodium cheese.  Fats and oils  Soft margarine without trans fats. Vegetable oil. Reduced-fat, low-fat, or light mayonnaise and salad dressings (reduced-sodium). Canola, safflower, olive, avocado, soybean, and sunflower oils. Avocado.  Seasonings and condiments  Herbs. Spices. Seasoning mixes without salt.  Other foods  Unsalted popcorn and pretzels. Fat-free sweets.  The items listed above may not be a complete list of foods and beverages you can eat. Contact a dietitian for more information.  What foods should I avoid?  Fruits  Canned fruit in a light or heavy syrup. Fried fruit. Fruit in cream or butter sauce.  Vegetables  Creamed or fried vegetables. Vegetables in a cheese sauce. Regular canned vegetables (not low-sodium or reduced-sodium). Regular canned tomato sauce and paste (not low-sodium or reduced-sodium). Regular tomato and vegetable juice (not low-sodium or reduced-sodium). Pickles. Olives.  Grains  Baked goods made with fat, such as croissants, muffins, or some  breads. Dry pasta or rice meal packs.  Meats and other proteins  Fatty cuts of meat. Ribs. Fried meat. Smyth. Bologna, salami, and other precooked or cured meats, such as sausages or meat loaves. Fat from the back of a pig (fatback). Bratwurst. Salted nuts and seeds. Canned beans with added salt. Canned or smoked fish. Whole eggs or egg yolks. Chicken or turkey with skin.  Dairy  Whole or 2% milk, cream, and half-and-half. Whole or full-fat cream cheese. Whole-fat or sweetened yogurt. Full-fat cheese. Nondairy creamers. Whipped toppings. Processed cheese and cheese spreads.  Fats and oils  Butter. Stick margarine. Lard. Shortening. Ghee. Smyth fat. Tropical oils, such as coconut, palm kernel, or palm oil.  Seasonings and condiments  Onion salt, garlic salt, seasoned salt, table salt, and sea salt. Worcestershire sauce. Tartar sauce. Barbecue sauce. Teriyaki sauce. Soy sauce, including reduced-sodium. Steak sauce. Canned and packaged gravies. Fish sauce. Oyster sauce. Cocktail sauce. Store-bought horseradish. Ketchup. Mustard. Meat flavorings and tenderizers. Bouillon cubes. Hot sauces. Pre-made or packaged marinades. Pre-made or packaged taco seasonings. Relishes. Regular salad dressings.  Other foods  Salted popcorn and pretzels.  The items listed above may not be a complete list of foods and beverages you should avoid. Contact a dietitian for more information.  Where to find more information  National Heart, Lung, and Blood [place]: www.nhlbi.nih.gov  American Heart Association: www.heart.org  Academy of Nutrition and Dietetics: www.eatright.org  National Kidney Foundation: www.kidney.org  Summary  The DASH eating plan is a healthy eating plan that has been shown to reduce high blood pressure (hypertension). It may also reduce your risk for type 2 diabetes, heart disease, and stroke.  When on the DASH eating plan, aim to eat more fresh fruits and vegetables, whole grains, lean proteins, low-fat dairy, and  heart-healthy fats.  With the DASH eating plan, you should limit salt (sodium) intake to 2,300 mg a day. If you have hypertension, you may need to reduce your sodium intake to 1,500 mg a day.  Work with your health care provider or dietitian to adjust your eating plan to your individual calorie needs.  This information is not intended to replace advice given to you by your health care provider. Make sure you discuss any questions you have with your health care provider.  Document Revised: 11/20/2020 Document Reviewed: 11/20/2020  [employer] Patient Education © 2021 [employer] High-Protein and High-Calorie Diet  Eating high-protein and high-calorie foods can help you to gain weight, heal after an injury, and recover after an illness or surgery. The specific amount of daily protein and calories you need depends on:  Your body weight.  The reason this diet is recommended for you.  What is my plan?  Generally, a high-protein, high-calorie diet involves:  Eating 250-500 extra calories each day.  Making sure that you get enough of your daily calories from protein. Ask your health care provider how many of your calories should come from protein.  Talk with a health care provider, such as a diet and nutrition specialist (dietitian), about how much protein and how many calories you need each day. Follow the diet as directed by your health care provider.  What are tips for following this plan?    Preparing meals  Add whole milk, half-and-half, or heavy cream to cereal, pudding, soup, or hot cocoa.  Add whole milk to instant breakfast drinks.  Add peanut butter to oatmeal or smoothies.  Add powdered milk to baked goods, smoothies, or milkshakes.  Add powdered milk, cream, or butter to mashed potatoes.  Add cheese to cooked vegetables.  Make whole-milk yogurt parfaits. Top them with granola, fruit, or nuts.  Add cottage cheese to your fruit.  Add avocado, cheese, or both to sandwiches or salads.  Add meat, poultry, or seafood  to rice, pasta, casseroles, salads, and soups.  Use mayonnaise when making egg salad, chicken salad, or tuna salad.  Use peanut butter as a dip for vegetables or as a topping for pretzels, celery, or crackers.  Add beans to casseroles, dips, and spreads.  Add pureed beans to sauces and soups.  Replace calorie-free drinks with calorie-containing drinks, such as milk and fruit juice.  Replace water with milk or heavy cream when making foods such as oatmeal, pudding, or cocoa.  General instructions  Ask your health care provider if you should take a nutritional supplement.  Try to eat six small meals each day instead of three large meals.  Eat a balanced diet. In each meal, include one food that is high in protein.  Keep nutritious snacks available, such as nuts, trail mixes, dried fruit, and yogurt.  If you have kidney disease or diabetes, talk with your health care provider about how much protein is safe for you. Too much protein may put extra stress on your kidneys.  Drink your calories. Choose high-calorie drinks and have them after your meals.  What high-protein foods should I eat?    Vegetables  Soybeans. Peas.  Grains  Quinoa. Bulgur wheat.  Meats and other proteins  Beef, pork, and poultry. Fish and seafood. Eggs. Tofu. Textured vegetable protein (TVP). Peanut butter. Nuts and seeds. Dried beans. Protein powders.  Dairy  Whole milk. Whole-milk yogurt. Powdered milk. Cheese. Cottage Cheese. Eggnog.  Beverages  High-protein supplement drinks. Soy milk.  Other foods  Protein bars.  The items listed above may not be a complete list of high-protein foods and beverages. Contact a dietitian for more options.  What high-calorie foods should I eat?  Fruits  Dried fruit. Fruit leather. Canned fruit in syrup. Fruit juice. Avocado.  Vegetables  Vegetables cooked in oil or butter. Fried potatoes.  Grains  Pasta. Quick breads. Muffins. Pancakes. Ready-to-eat cereal.  Meats and other proteins  Peanut butter. Nuts and  seeds.  Dairy  Heavy cream. Whipped cream. Cream cheese. Sour cream. Ice cream. Custard. Pudding.  Beverages  Meal-replacement beverages. Nutrition shakes. Fruit juice. Sugar-sweetened soft drinks.  Seasonings and condiments  Salad dressing. Mayonnaise. [place] sauce. Fruit preserves or jelly. Honey. Syrup.  Sweets and desserts  Cake. Cookies. Pie. Pastries. Candy bars. Chocolate.  Fats and oils  Butter or margarine. Oil. Gravy.  Other foods  Meal-replacement bars.  The items listed above may not be a complete list of high-calorie foods and beverages. Contact a dietitian for more options.  Summary  A high-protein, high-calorie diet can help you gain weight or heal faster after an injury, illness, or surgery.  To increase your protein and calories, add ingredients such as whole milk, peanut butter, cheese, beans, meat, or seafood to meal items.  To get enough extra calories each day, include high-calorie foods and beverages at each meal.  Adding a high-calorie drink or shake can be an easy way to help you get enough calories each day. Talk with your healthcare provider or dietitian about the best options for you.  This information is not intended to replace advice given to you by your health care provider. Make sure you discuss any questions you have with your health care provider.  Document Revised: 11/30/2018 Document Reviewed: 10/30/2018  [employer] Patient Education © 2021 [employer]

## 2024-04-23 NOTE — PROGRESS NOTES
"    Chief complaint:   Chief Complaint   Patient presents with    Subdural hematoma     Pt here for 2wk Hosp f/u. Pt states since he's been discharged from hospital he has been doing well.         Subjective     HPI: This is a 78-year-old gentleman who was seen in the hospital on April 12, 2024 for subdural hematoma.  The patient does reside at a nursing home and was found to be on the floor and had hit his head.  Imaging at that time did show a small parafalcine subdural hematoma.  Repeat imaging at that time was stable.  He comes in today for routine follow-up and imaging.  Patient says that he has been doing well.  Not had any additional falls.  He does complain of headache but denies any nausea or vomiting.  Is tolerating p.o.  He presents today in a wheelchair.    Review of Systems   Musculoskeletal: Negative.    Neurological:  Positive for headaches.         Objective      Vital Signs  Ht 175.3 cm (69\")   Wt 120 kg (265 lb)   BMI 39.13 kg/m²     Physical Exam  Constitutional:       Appearance: He is well-developed.   HENT:      Head: Normocephalic.   Eyes:      Extraocular Movements: EOM normal.      Pupils: Pupils are equal, round, and reactive to light.   Pulmonary:      Effort: Pulmonary effort is normal.   Musculoskeletal:      Cervical back: Normal range of motion.   Skin:     General: Skin is warm.   Neurological:      Mental Status: He is alert and oriented to person, place, and time.      GCS: GCS eye subscore is 4. GCS verbal subscore is 5. GCS motor subscore is 6.      Cranial Nerves: No cranial nerve deficit.      Sensory: No sensory deficit.      Motor: Motor strength is normal.     Gait: Gait normal.      Deep Tendon Reflexes: Reflexes are normal and symmetric.   Psychiatric:         Speech: Speech normal.         Behavior: Behavior normal.         Thought Content: Thought content normal.         Neurologic Exam     Mental Status   Oriented to person, place, and time.   Attention: normal. " Concentration: normal.   Speech: speech is normal   Level of consciousness: alert  Normal comprehension.     Cranial Nerves     CN II   Visual fields full to confrontation.     CN III, IV, VI   Pupils are equal, round, and reactive to light.  Extraocular motions are normal.     CN V   Facial sensation intact.     CN VII   Facial expression full, symmetric.     CN VIII   CN VIII normal.     CN IX, X   CN IX normal.   CN X normal.     CN XI   CN XI normal.     CN XII   CN XII normal.     Motor Exam   Muscle bulk: normal    Strength   Strength 5/5 throughout.     Sensory Exam   Light touch normal.       Imaging review: CT scan of the head shows a small parafalcine subdural hematoma on the left posterior midline.  No mass effect or midline shift.        Assessment/Plan: Patient is seen to be doing well.  We are going to see him back in 1 month with a repeat CT scan of his head to make sure that the hematoma does resolve.  He was told to call us if any further problems or concerns.    Patient is a nonsmoker  The patient's Body mass index is 39.13 kg/m².. BMI is above normal parameters. Recommendations include: educational material and nutrition counseling  Advance Care Planning   ACP discussion was held with the patient during this visit. Patient does not have an advance directive, information provided.   [facility] Fall Risk Assessment was completed, and patient is at HIGH risk for falls. Assessment completed on:3/25/2024     Diagnoses and all orders for this visit:    1. Subdural hematoma (Primary)    2. Class 2 obesity due to excess calories without serious comorbidity with body mass index (BMI) of 39.0 to 39.9 in adult    3. Nonsmoker        I discussed the patients findings and my recommendations with patient  Garrett [place], ANTHONY  04/23/24  11:17 CDT

## 2024-04-27 ENCOUNTER — HOSPITAL ENCOUNTER (EMERGENCY)
Facility: HOSPITAL | Age: 79
Discharge: HOME | End: 2024-04-27
Attending: EMERGENCY MEDICINE
Payer: MEDICARE

## 2024-04-27 ENCOUNTER — APPOINTMENT (OUTPATIENT)
Dept: CT IMAGING | Facility: HOSPITAL | Age: 79
End: 2024-04-27
Payer: MEDICARE

## 2024-04-27 VITALS
WEIGHT: 279 LBS | HEART RATE: 84 BPM | SYSTOLIC BLOOD PRESSURE: 136 MMHG | HEIGHT: 68 IN | RESPIRATION RATE: 20 BRPM | DIASTOLIC BLOOD PRESSURE: 78 MMHG | TEMPERATURE: 98 F | OXYGEN SATURATION: 98 % | BODY MASS INDEX: 42.28 KG/M2

## 2024-04-27 DIAGNOSIS — S00.93XA CONTUSION OF HEAD, UNSPECIFIED PART OF HEAD, INITIAL ENCOUNTER: ICD-10-CM

## 2024-04-27 DIAGNOSIS — S20.221A CONTUSION OF RIGHT SIDE OF BACK, INITIAL ENCOUNTER: ICD-10-CM
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
## 2024-04-27 PROCEDURE — 99284 EMERGENCY DEPT VISIT MOD MDM: CPT

## 2024-04-27 PROCEDURE — 72131 CT LUMBAR SPINE W/O DYE: CPT

## 2024-04-27 PROCEDURE — 70450 CT HEAD/BRAIN W/O DYE: CPT

## 2024-04-27 NOTE — ED PROVIDER NOTES
Subjective   History of Present Illness  Patient is sent to the emergency room by ambulance from the nursing home with a complaint that he had an unwitnessed fall.  He was found on the floor.  He does have an abrasion of his forehead.  He also complains of pain in his lower back most along the right side.  There is no report of any loss of consciousness or nausea or vomiting.  Patient does have dementia so he is unable to give us clear information a lot of times.  However he is at his baseline neurologically according to nursing home.    History provided by:  Patient   used: No    Fall  Mechanism of injury: fall    Injury location:  Head/neck and torso  Head/neck injury location:  Head  Torso injury location:  Back  Incident location:  snf  Time since incident:  1 hour  Arrived directly from scene: yes    Fall:     Fall occurred:  Unable to specify    Impact surface:  Hard floor    Point of impact:  Unable to specify    Entrapped after fall: no    Protective equipment: none    Suspicion of alcohol use: no    Suspicion of drug use: no    Tetanus status:  Unknown  Prior to arrival data:     Bystander interventions:  None    Patient ambulatory at scene: no      Blood loss:  None    Responsiveness at scene:  Alert    Orientation at scene:  Person    Loss of consciousness: no      Amnesic to event: no      Airway interventions:  None    Breathing interventions:  None    IV access status:  None    IO access:  None    Fluids administered:  None    Cardiac interventions:  None    Medications administered:  None    Immobilization:  None    Airway condition since incident:  Stable    Breathing condition since incident:  Stable    Circulation condition since incident:  Stable    Mental status condition since incident:  Stable    Disability condition since incident:  Stable  Associated symptoms: back pain    Associated symptoms: no abdominal pain, no blindness, no chest pain, no difficulty  breathing, no headaches, no hearing loss, no loss of consciousness, no nausea, no neck pain, no seizures and no vomiting    Risk factors: no AICD, no anticoagulation therapy, no asthma, no beta blocker therapy, no CABG, no CAD, no CHF, no COPD, no diabetes, no dialysis, no hemophilia, no kidney disease, no pacemaker, no past MI, not pregnant and no steroid use        Review of Systems   Constitutional: Negative.    HENT: Negative.  Negative for hearing loss.    Eyes:  Negative for blindness.   Respiratory: Negative.     Cardiovascular: Negative.  Negative for chest pain.   Gastrointestinal: Negative.  Negative for abdominal pain, nausea and vomiting.   Genitourinary: Negative.    Musculoskeletal:  Positive for back pain. Negative for neck pain.   Skin: Negative.    Neurological: Negative.  Negative for seizures, loss of consciousness and headaches.   Psychiatric/Behavioral: Negative.     All other systems reviewed and are negative.      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    Cognitive decline 10/30/2023    Coronary artery disease 10 years    x2 stents    Diabetes mellitus < 2 years    non-insulin dependent    Difficulty walking < 1 year    multiple falls in the last 6-8 months    DNR (do not resuscitate)     Gout     HL (hearing loss) 20+ years    Wears hearing aids occasionally    Hyperlipidemia 5 years    Insomnia     Lewy body dementia     Memory loss 3 years+/-    gradual and subtle    Muscle weakness     Primary hypertension 10/30/2023    Sleep apnea 20+ year    Non-compliant with PEEP for tx    Stroke 11/2022    Subdural hematoma     TIA (transient ischemic attack) 01/22/2024       Allergies   Allergen Reactions    Serna-2 Inhibitors Unknown - Low Severity       Past Surgical History:   Procedure Laterality Date    APPENDECTOMY      BACK SURGERY  2016    CAROTID STENT  2010?    KNEE SURGERY  1960    LAMINECTOMY  2015?    discectomy & fusion       Family  History   Problem Relation Age of Onset    Parkinsonism Father        Social History     Socioeconomic History    Marital status:    Tobacco Use    Smoking status: Never     Passive exposure: Past    Smokeless tobacco: Never   Vaping Use    Vaping status: Never Used   Substance and Sexual Activity    Alcohol use: Yes     Alcohol/week: 3.0 standard drinks of alcohol     Types: 3 Drinks containing 0.5 oz of alcohol per week    Drug use: Never    Sexual activity: Not Currently     Partners: Female     Birth control/protection: Vasectomy       Prior to Admission medications    Medication Sig Start Date End Date Taking? Authorizing Provider   acetaminophen (TYLENOL) 325 MG tablet Take 2 tablets by mouth 2 (Two) Times a Day.    Michael George MD   allopurinol (ZYLOPRIM) 100 MG tablet Take 1 tablet by mouth Daily. 7/13/20   Michael George MD   aspirin 81 MG EC tablet Take 1 tablet by mouth Daily.    Michael George MD   atorvastatin (LIPITOR) 80 MG tablet Take 1 tablet by mouth Every Night. 1/23/24   Randee Boyd MD   carbidopa-levodopa (SINEMET)  MG per tablet Take 2 tablets by mouth 3 (Three) Times a Day. 11/20/23   Rodrigue Hebert PA   Cholecalciferol (VITAMIN D3) 125 MCG (5000 UT) tablet tablet Take 1 tablet by mouth Daily.    Michael George MD   clopidogrel (PLAVIX) 75 MG tablet  4/12/24   Michael George MD   Cranberry-Vitamin C-Inulin (UTI-Stat) liquid Take 30 mL by mouth 2 (Two) Times a Day. Mix with 120mL of water or juice    Michael George MD   cyclobenzaprine (FLEXERIL) 10 MG tablet Take 1 tablet by mouth every night at bedtime. 7/29/20   Michael George MD   divalproex (DEPAKOTE) 125 MG DR tablet Take 2 tablets by mouth 2 (Two) Times a Day.    Michael George MD   hydrocortisone (Preparation H) 1 % cream Apply 1 Application topically to the appropriate area as directed Every 6 (Six) Hours As Needed (hemorrhoids).    Doris  MD Michael   losartan (COZAAR) 50 MG tablet Take 1 tablet by mouth Daily. 7/29/20   Michael George MD   Magnesium Oxide -Mg Supplement 400 (240 Mg) MG tablet Take 1 tablet by mouth Daily.    Michael George MD   Melatonin 10 MG tablet Take 1 tablet by mouth Every Night.    Michael George MD   multivitamin with minerals (Centrum Silver Adult 50+) tablet tablet Take 1 tablet by mouth Daily.    Michael George MD   nystatin (MYCOSTATIN) 243719 UNIT/GM powder Apply 1 Application topically to the appropriate area as directed 2 (Two) Times a Day. Apply to abdominal fold/groin    Michael George MD   ondansetron ODT (ZOFRAN-ODT) 4 MG disintegrating tablet Place 1 tablet on the tongue Every 6 (Six) Hours As Needed for Nausea or Vomiting.    Michael George MD   polyethylene glycol (MIRALAX) 17 g packet Take 17 g by mouth 2 (Two) Times a Day As Needed (constipation).    Michael George MD   spironolactone (ALDACTONE) 25 MG tablet Take 1 tablet by mouth Daily.    Michael George MD   tamsulosin (FLOMAX) 0.4 MG capsule 24 hr capsule Take 1 capsule by mouth Every Night. 3/12/24   Michael George MD   traZODone (DESYREL) 50 MG tablet Take 1.5 tablets by mouth Every Night.    Michael George MD   zinc sulfate (ZINCATE) 220 (50 Zn) MG capsule Take 1 capsule by mouth Daily.    Michael George MD       Medications - No data to display    Vitals:    04/27/24 0211   BP: 138/81   Pulse: 85   Resp: 20   Temp: 97.9 °F (36.6 °C)   SpO2: 98%         Objective   Physical Exam  Vitals and nursing note reviewed.   Constitutional:       Appearance: Normal appearance.   HENT:      Head: Normocephalic.      Comments: Patient does have an abrasion in the forehead just to the right of center.  There is no bony deformity palpated.  Eyes:      Extraocular Movements: Extraocular movements intact.      Pupils: Pupils are equal, round, and reactive to light.   Cardiovascular:       Rate and Rhythm: Normal rate and regular rhythm.   Pulmonary:      Effort: Pulmonary effort is normal.      Breath sounds: Normal breath sounds.   Abdominal:      Palpations: Abdomen is soft.      Tenderness: There is no abdominal tenderness.   Musculoskeletal:         General: Normal range of motion.      Cervical back: Normal range of motion and neck supple.      Comments: Patient does have some tenderness palpation lumbosacral region of his back but no signs of deformity or step-off   Skin:     General: Skin is warm and dry.   Neurological:      General: No focal deficit present.      Mental Status: He is alert. Mental status is at baseline.   Psychiatric:         Mood and Affect: Mood normal.         Behavior: Behavior normal.         Procedures         Lab Results (last 24 hours)       ** No results found for the last 24 hours. **            CT Head Without Contrast    (Results Pending)   CT Lumbar Spine Without Contrast    (Results Pending)       ED Course          MDM  Number of Diagnoses or Management Options  Contusion of head, unspecified part of head, initial encounter: new and requires workup  Contusion of right side of back, initial encounter: new and requires workup  Fall, initial encounter: new and requires workup  Diagnosis management comments: Patient CT scans are all negative for any evidence of fracture or acute injury.  He is going to be sore but should be okay.  He is discharged in stable condition.       Amount and/or Complexity of Data Reviewed  Tests in the radiology section of CPT®: ordered and reviewed    Risk of Complications, Morbidity, and/or Mortality  Presenting problems: moderate  Diagnostic procedures: moderate  Management options: moderate    Patient Progress  Patient progress: stable        Final diagnoses:   Fall, initial encounter   Contusion of head, unspecified part of head, initial encounter   Contusion of right side of back, initial encounter          Az Valle Jr.,  MD  04/27/24 0301

## 2024-05-23 ENCOUNTER — HOSPITAL ENCOUNTER (OUTPATIENT)
Dept: CT IMAGING | Facility: HOSPITAL | Age: 79
Discharge: HOME | End: 2024-05-23
Admitting: NURSE PRACTITIONER
Payer: MEDICARE

## 2024-05-23 ENCOUNTER — OFFICE VISIT (OUTPATIENT)
Dept: NEUROSURGERY | Facility: CLINIC | Age: 79
End: 2024-05-23
Payer: MEDICARE

## 2024-05-23 VITALS — WEIGHT: 279 LBS | HEIGHT: 68 IN | BODY MASS INDEX: 42.28 KG/M2

## 2024-05-23 DIAGNOSIS — E66.01 CLASS 3 SEVERE OBESITY DUE TO EXCESS CALORIES WITHOUT SERIOUS COMORBIDITY WITH BODY MASS INDEX (BMI) OF 40.0 TO 44.9 IN ADULT: ICD-10-CM

## 2024-05-23 DIAGNOSIS — S06.5XAA SUBDURAL HEMATOMA: ICD-10-CM

## 2024-05-23 DIAGNOSIS — S06.5XAA SUBDURAL HEMATOMA: Primary | ICD-10-CM

## 2024-05-23 DIAGNOSIS — Z78.9 NONSMOKER: ICD-10-CM

## 2024-05-23 PROCEDURE — 70450 CT HEAD/BRAIN W/O DYE: CPT

## 2024-05-23 RX ORDER — MOXIFLOXACIN 5 MG/ML
SOLUTION/ DROPS OPHTHALMIC
COMMUNITY
Start: 2024-05-03

## 2024-05-23 NOTE — PROGRESS NOTES
"    Chief complaint:   Chief Complaint   Patient presents with    Subdural hematoma     Pt here for 1mo f/u. Pt states since last office visit his symptoms have gotten better since here last.         Subjective     HPI: This is a 78-year-old gentleman who was seen in the hospital on April 12, 2024 for subdural hematoma. The patient does reside at a nursing home and was found to be on the floor and had hit his head. Imaging at that time did show a small parafalcine subdural hematoma. Repeat imaging at that time was stable. He comes in today for routine follow-up and imaging.  The patient does feel he is doing well.  He is at his baseline.  There are some confusion at baseline.  He had repeat imaging done today which did show complete resolution of the falcine subdural hematoma.    Review of Systems   Musculoskeletal:  Positive for gait problem.   Psychiatric/Behavioral:  Positive for confusion.          Objective      Vital Signs  Ht 173 cm (68.11\")   Wt 127 kg (279 lb)   BMI 42.28 kg/m²     Physical Exam  Constitutional:       Appearance: He is well-developed.   HENT:      Head: Normocephalic.   Eyes:      Extraocular Movements: EOM normal.      Pupils: Pupils are equal, round, and reactive to light.   Pulmonary:      Effort: Pulmonary effort is normal.   Musculoskeletal:      Cervical back: Normal range of motion.   Skin:     General: Skin is warm.   Neurological:      Mental Status: He is alert and oriented to person, place, and time.      GCS: GCS eye subscore is 4. GCS verbal subscore is 5. GCS motor subscore is 6.      Cranial Nerves: No cranial nerve deficit.      Sensory: No sensory deficit.      Motor: Motor strength is normal.     Gait: Gait normal.      Deep Tendon Reflexes: Reflexes are normal and symmetric.   Psychiatric:         Speech: Speech normal.         Behavior: Behavior normal.         Thought Content: Thought content normal.         Neurologic Exam     Mental Status   Oriented to person, " place, and time.   Attention: normal. Concentration: normal.   Speech: speech is normal   Level of consciousness: alert  Normal comprehension.     Cranial Nerves     CN II   Visual fields full to confrontation.     CN III, IV, VI   Pupils are equal, round, and reactive to light.  Extraocular motions are normal.     CN V   Facial sensation intact.     CN VII   Facial expression full, symmetric.     CN VIII   CN VIII normal.     CN IX, X   CN IX normal.   CN X normal.     CN XI   CN XI normal.     CN XII   CN XII normal.     Motor Exam   Muscle bulk: normal    Strength   Strength 5/5 throughout.     Sensory Exam   Light touch normal.       Imaging review: CT scan of the head that was done on May 23, 2024 does not show any hemorrhage.  No acute abnormalities        Assessment/Plan: Patient is doing well.  He does appear to be back to his baseline.  At this point willing to see him on an as-needed basis.  They were told to call us with any further problems or concerns    Patient is a nonsmoker  The patient's Body mass index is 42.28 kg/m².. BMI is above normal parameters. Recommendations include: educational material and nutrition counseling  Advance Care Planning   ACP discussion was held with the patient during this visit. Patient has an advance directive in EMR which is still valid.    [facility] Fall Risk Assessment was completed, and patient is at HIGH risk for falls. Assessment completed on:3/25/2024     Diagnoses and all orders for this visit:    1. Subdural hematoma (Primary)    2. Nonsmoker    3. Class 3 severe obesity due to excess calories without serious comorbidity with body mass index (BMI) of 40.0 to 44.9 in adult        I discussed the patients findings and my recommendations with patient  Garrett Colorado, ANTHONY  05/23/24  13:49 CDT

## 2024-05-23 NOTE — PATIENT INSTRUCTIONS
"DASH Eating Plan  DASH stands for Dietary Approaches to Stop Hypertension. The DASH eating plan is a healthy eating plan that has been shown to:  Lower high blood pressure (hypertension).  Reduce your risk for type 2 diabetes, heart disease, and stroke.  Help with weight loss.  What are tips for following this plan?  Reading food labels  Check food labels for the amount of salt (sodium) per serving. Choose foods with less than 5 percent of the Daily Value (DV) of sodium. In general, foods with less than 300 milligrams (mg) of sodium per serving fit into this eating plan.  To find whole grains, look for the word \"whole\" as the first word in the ingredient list.  Shopping  Buy products labeled as \"low-sodium\" or \"no salt added.\"  Buy fresh foods. Avoid canned foods and pre-made or frozen meals.  Cooking  Try not to add salt when you cook. Use salt-free seasonings or herbs instead of table salt or sea salt. Check with your health care provider or pharmacist before using salt substitutes.  Do not wayne foods. Cook foods in healthy ways, such as baking, boiling, grilling, roasting, or broiling.  Cook using oils that are good for your heart. These include olive, canola, avocado, soybean, and sunflower oil.  Meal planning    Eat a balanced diet. This should include:  4 or more servings of fruits and 4 or more servings of vegetables each day. Try to fill half of your plate with fruits and vegetables.  6-8 servings of whole grains each day.  6 or less servings of lean meat, poultry, or fish each day. 1 oz is 1 serving. A 3 oz (85 g) serving of meat is about the same size as the palm of your hand. One egg is 1 oz (28 g).  2-3 servings of low-fat dairy each day. One serving is 1 cup (237 mL).  1 serving of nuts, seeds, or beans 5 times each week.  2-3 servings of heart-healthy fats. Healthy fats called omega-3 fatty acids are found in foods such as walnuts, flaxseeds, fortified milks, and eggs. These fats are also found in " cold-water fish, such as sardines, salmon, and mackerel.  Limit how much you eat of:  Canned or prepackaged foods.  Food that is high in trans fat, such as fried foods.  Food that is high in saturated fat, such as fatty meat.  Desserts and other sweets, sugary drinks, and other foods with added sugar.  Full-fat dairy products.  Do not salt foods before eating.  Do not eat more than 4 egg yolks a week.  Try to eat at least 2 vegetarian meals a week.  Eat more home-cooked food and less restaurant, buffet, and fast food.  Lifestyle  When eating at a restaurant, ask if your food can be made with less salt or no salt.  If you drink alcohol:  Limit how much you have to:  0-1 drink a day if you are female.  0-2 drinks a day if you are male.  Know how much alcohol is in your drink. In the [place] one drink is one 12 oz bottle of beer (355 mL), one 5 oz glass of wine (148 mL), or one 1½ oz glass of hard liquor (44 mL).  General information  Avoid eating more than 2,300 mg of salt a day. If you have hypertension, you may need to reduce your sodium intake to 1,500 mg a day.  Work with your provider to stay at a healthy body weight or lose weight. Ask what the best weight range is for you.  On most days of the week, get at least 30 minutes of exercise that causes your heart to beat faster. This may include walking, swimming, or biking.  Work with your provider or dietitian to adjust your eating plan to meet your specific calorie needs.  What foods should I eat?  Fruits  All fresh, dried, or frozen fruit. Canned fruits that are in their natural juice and do not have sugar added to them.  Vegetables  Fresh or frozen vegetables that are raw, steamed, roasted, or grilled. Low-sodium or reduced-sodium tomato and vegetable juice. Low-sodium or reduced-sodium tomato sauce and tomato paste. Low-sodium or reduced-sodium canned vegetables.  Grains  Whole-grain or whole-wheat bread. Whole-grain or whole-wheat pasta. Brown rice. Oatmeal.  Quinoa. Bulgur. Whole-grain and low-sodium cereals. Kathie bread. Low-fat, low-sodium crackers. Whole-wheat flour tortillas.  Meats and other proteins  Skinless chicken or turkey. Ground chicken or turkey. Pork with fat trimmed off. Fish and seafood. Egg whites. Dried beans, peas, or lentils. Unsalted nuts, nut butters, and seeds. Unsalted canned beans. Lean cuts of beef with fat trimmed off. Low-sodium, lean precooked or cured meat, such as sausages or meat loaves.  Dairy  Low-fat (1%) or fat-free (skim) milk. Reduced-fat, low-fat, or fat-free cheeses. Nonfat, low-sodium ricotta or cottage cheese. Low-fat or nonfat yogurt. Low-fat, low-sodium cheese.  Fats and oils  Soft margarine without trans fats. Vegetable oil. Reduced-fat, low-fat, or light mayonnaise and salad dressings (reduced-sodium). Canola, safflower, olive, avocado, soybean, and sunflower oils. Avocado.  Seasonings and condiments  Herbs. Spices. Seasoning mixes without salt.  Other foods  Unsalted popcorn and pretzels. Fat-free sweets.  The items listed above may not be all the foods and drinks you can have. Talk to a dietitian to learn more.  What foods should I avoid?  Fruits  Canned fruit in a light or heavy syrup. Fried fruit. Fruit in cream or butter sauce.  Vegetables  Creamed or fried vegetables. Vegetables in a cheese sauce. Regular canned vegetables that are not marked as low-sodium or reduced-sodium. Regular canned tomato sauce and paste that are not marked as low-sodium or reduced-sodium. Regular tomato and vegetable juices that are not marked as low-sodium or reduced-sodium. Pickles. Olives.  Grains  Baked goods made with fat, such as croissants, muffins, or some breads. Dry pasta or rice meal packs.  Meats and other proteins  Fatty cuts of meat. Ribs. Fried meat. Smyth. Bologna, salami, and other precooked or cured meats, such as sausages or meat loaves, that are not lean and low in sodium. Fat from the back of a pig (fatback). Wild.  Salted nuts and seeds. Canned beans with added salt. Canned or smoked fish. Whole eggs or egg yolks. Chicken or turkey with skin.  Dairy  Whole or 2% milk, cream, and half-and-half. Whole or full-fat cream cheese. Whole-fat or sweetened yogurt. Full-fat cheese. Nondairy creamers. Whipped toppings. Processed cheese and cheese spreads.  Fats and oils  Butter. Stick margarine. Lard. Shortening. Ghee. Smyth fat. Tropical oils, such as coconut, palm kernel, or palm oil.  Seasonings and condiments  Onion salt, garlic salt, seasoned salt, table salt, and sea salt. Worcestershire sauce. Tartar sauce. Barbecue sauce. Teriyaki sauce. Soy sauce, including reduced-sodium soy sauce. Steak sauce. Canned and packaged gravies. Fish sauce. Oyster sauce. Cocktail sauce. Store-bought horseradish. Ketchup. Mustard. Meat flavorings and tenderizers. Bouillon cubes. Hot sauces. Pre-made or packaged marinades. Pre-made or packaged taco seasonings. Relishes. Regular salad dressings.  Other foods  Salted popcorn and pretzels.  The items listed above may not be all the foods and drinks you should avoid. Talk to a dietitian to learn more.  Where to find more information  National Heart, Lung, and Blood [place] (NHLBI): nhlbi.nih.gov  American Heart Association (AHA): heart.org  Academy of Nutrition and Dietetics: eatright.org  National Kidney Foundation (NKF): kidney.org  This information is not intended to replace advice given to you by your health care provider. Make sure you discuss any questions you have with your health care provider.  Document Revised: 01/04/2024 Document Reviewed: 01/04/2024  Elsevier Patient Education © 2024 [employer]  BMI for Adults  Body mass index (BMI) is a number found using a person's weight and height. BMI can help tell how much of a person's weight is made up of fat. BMI does not measure body fat directly. It is used instead of tests that directly measure body fat, which can be difficult and  "expensive.  What are BMI measurements used for?  BMI is useful to:  Find out if your weight puts you at higher risk for medical problems.  Help recommend changes, such as in diet and exercise. This can help you reach a healthy weight. BMI screening can be done again to see if these changes are working.  How is BMI calculated?  Your height and weight are measured. The BMI is found from those numbers. This can be done with [place] or metric measurements. Note that charts and online BMI calculators are available to help you find your BMI quickly and easily without doing these calculations.  To calculate your BMI in [place] measurements:  Measure your weight in pounds (lb).  Multiply the number of pounds by 703.  So, for an adult who weighs 150 lb, multiply that number by 703: 150 x 703, which equals 105,450.  Measure your height in inches. Then multiply that number by itself to get a measurement called \"inches squared.\"  So, for an adult who is 70 inches tall, the \"inches squared\" measurement is 70 inches x 70 inches, which equals 4,900 inches squared.  Divide the total from step 2 (number of lb x 703) by the total from step 3 (inches squared): 105,450 ÷ 4,900 = 21.5. This is your BMI.  To calculate your BMI in metric measurements:    Measure your weight in kilograms (kg).  For this example, the weight is 70 kg.  Measure your height in meters (m). Then multiply that number by itself to get a measurement called \"meters squared.\"  So, for an adult who is 1.75 m tall, the \"meters squared\" measurement is 1.75 m x 1.75 m, which equals 3.1 meters squared.  Divide the number of kilograms (your weight) by the meters squared number. In this example: 70 ÷ 3.1 = 22.6. This is your BMI.  What do the results mean?  BMI charts are used to see if you are underweight, normal weight, overweight, or obese. The following guidelines will be used:  Underweight: BMI less than 18.5.  Normal weight: BMI between 18.5 and 24.9.  Overweight: BMI " between 25 and 29.9.  Obese: BMI of 30 or above.  BMI is a tool and cannot diagnose a condition. Talk with your health care provider about what your BMI means for you. Keep these notes in mind:  Weight includes fat and muscle. Someone with a muscular build, such as an athlete, may have a BMI that is higher than 24.9. In cases like these, BMI is not a correct measure of body fat.  If you have a BMI of 25 or higher, your provider may need to do more testing to find out if excess body fat is the cause.  BMI is measured the same way for males and females. Females usually have more body fat than males of the same height and weight.  Where to find more information  For more information about BMI, including tools to quickly find your BMI, go to:  Centers for Disease Control and Prevention: cdc.gov  American Heart Association: heart.org  National Heart, Lung, and Blood [place]: nhlbi.nih.gov  This information is not intended to replace advice given to you by your health care provider. Make sure you discuss any questions you have with your health care provider.  Document Revised: 09/07/2023 Document Reviewed: 08/31/2023  Elsevier Patient Education © 2024 Elsevier Inc.

## 2024-06-03 ENCOUNTER — LAB REQUISITION (OUTPATIENT)
Dept: LAB | Facility: HOSPITAL | Age: 79
End: 2024-06-03
Payer: MEDICARE

## 2024-06-03 ENCOUNTER — OFFICE VISIT (OUTPATIENT)
Dept: PALLATIVE CARE | Age: 79
End: 2024-06-03

## 2024-06-03 DIAGNOSIS — M25.511 CHRONIC RIGHT SHOULDER PAIN: ICD-10-CM

## 2024-06-03 DIAGNOSIS — G89.29 CHRONIC RIGHT SHOULDER PAIN: ICD-10-CM

## 2024-06-03 DIAGNOSIS — Z51.5 ENCOUNTER FOR PALLIATIVE CARE: ICD-10-CM

## 2024-06-03 DIAGNOSIS — G31.83 SEVERE LEWY BODY DEMENTIA WITH OTHER BEHAVIORAL DISTURBANCE (HCC): ICD-10-CM

## 2024-06-03 DIAGNOSIS — Z74.09 IMPAIRED MOBILITY AND ADLS: ICD-10-CM

## 2024-06-03 DIAGNOSIS — Z78.9 IMPAIRED MOBILITY AND ADLS: ICD-10-CM

## 2024-06-03 DIAGNOSIS — R29.6 FREQUENT FALLS: Primary | ICD-10-CM

## 2024-06-03 DIAGNOSIS — F02.C18 SEVERE LEWY BODY DEMENTIA WITH OTHER BEHAVIORAL DISTURBANCE (HCC): ICD-10-CM

## 2024-06-03 DIAGNOSIS — E11.9 TYPE 2 DIABETES MELLITUS WITHOUT COMPLICATIONS: ICD-10-CM

## 2024-06-03 LAB — HBA1C MFR BLD: 6.4 % (ref 4.8–5.6)

## 2024-06-03 PROCEDURE — 36415 COLL VENOUS BLD VENIPUNCTURE: CPT | Performed by: NURSE PRACTITIONER

## 2024-06-03 PROCEDURE — 83036 HEMOGLOBIN GLYCOSYLATED A1C: CPT | Performed by: NURSE PRACTITIONER

## 2024-06-03 NOTE — PROGRESS NOTES
Supportive Care/Community Based Palliative Care  Initial Consultation Note      Patient Name:  Edwin Gordon  Medical Record Number:  [id number]  YOB: 1945    Date of Visit: 6/3/2024  Location of Visit:  [facility] (admission date 11/6/2023)    Referring Provider: FRANCINE Chadwick - attending at [facility]  Patient Care Team:  Yon Smith PCP - General (Family Medicine)  Marc Lombardi MD - PCP at [facility]  FRANCINE Antoine -neurosurgery  Dr. Antonio Jasmine -neurology  FRANCINE Williamson -cardiology  FRANCINE Naik -urology    Reason for Consult:   Goals of care   Symptom Management    ACP  Family Support  Patient Support    History obtained from:  patient, spouse, family member - son/guardian , electronic medical record    PALLIATIVE DIAGNOSES AND ORDERS/RECOMMENDATIONS/PLAN:   1. Frequent falls  Facility implementing fall precautions -patient has bed alarm in the center of his bed alarm which should wait  Mattress in front of the patient's bed on the floor  Plan to reinitiate physical therapy to maximize strength and endurance    2. Severe Lewy body dementia with other behavioral disturbance (HCC)  Continue Depakote-facility must be very careful with medication use to treat behaviors, as the patient is a great fall risk    3. Chronic right shoulder pain  Continue Biofreeze  Recommended Voltaren or Aspercreme as well  Patient may have Tylenol as needed for pain  Patient unable to take NSAIDs due to CKD stage III    4. Impaired mobility and ADLs  Plan to reinitiate physical therapy to maximize strength and endurance    5. Encounter for palliative care  Current goals of care include:  Maximize safety awareness and reduce number of falls, Maintain or improve functional status, Maintain or improve quality of life, Would want hospitalization if needed    Reinitiate physical therapy to maximize strength and endurance  Will continue goals of care discussions

## 2024-07-12 ENCOUNTER — OFFICE VISIT (OUTPATIENT)
Dept: PALLATIVE CARE | Age: 79
End: 2024-07-12
Payer: MEDICARE

## 2024-07-12 DIAGNOSIS — Z51.5 ENCOUNTER FOR PALLIATIVE CARE: ICD-10-CM

## 2024-07-12 DIAGNOSIS — G31.83 SEVERE LEWY BODY DEMENTIA WITH OTHER BEHAVIORAL DISTURBANCE (HCC): ICD-10-CM

## 2024-07-12 DIAGNOSIS — R29.6 FREQUENT FALLS: ICD-10-CM

## 2024-07-12 DIAGNOSIS — F02.C18 SEVERE LEWY BODY DEMENTIA WITH OTHER BEHAVIORAL DISTURBANCE (HCC): ICD-10-CM

## 2024-07-12 DIAGNOSIS — R05.1 ACUTE COUGH: Primary | ICD-10-CM

## 2024-07-12 PROCEDURE — 1123F ACP DISCUSS/DSCN MKR DOCD: CPT

## 2024-07-12 PROCEDURE — 99309 SBSQ NF CARE MODERATE MDM 30: CPT

## 2024-07-15 NOTE — PROGRESS NOTES
Supportive Care/Community Based Palliative Care  Follow Up Note        Patient Name:  Edwin Gordon  Medical Record Number:  [id number]  YOB: 1945    Date of Visit: 7/12/2024  Location of Visit:  [facility] (admission date 11/6/2024)    Patient Care Team:  Yon Smith as PCP - General (Family Medicine)  Dejuan, José Miguel LÓPEZ MD as Consulting Physician (Cardiology)  Mick Marie APRN - CNP as Advanced Practice Nurse (Nurse Practitioner Family)    History obtained from:  patient, non-family caregiver - attending APRN, electronic medical record    PALLIATIVE DIAGNOSES AND ORDERS/RECOMMENDATIONS/PLAN:   1. Acute cough  Acute cough x 2 weeks  Patient has been treated with antibiotics as well as Mucinex  Facility has obtained a chest x-ray    2. Frequent falls  Continue fall precautions, including bed alarm and mattress on the floor for safety, bed in lowest position    3. Severe Lewy body dementia with other behavioral disturbance (HCC)  Continue to monitor    4. Encounter for palliative care  Current goals of care include:  Maximize safety awareness and reduce number of falls, Maintain or improve functional status, Maintain or improve quality of life, Would want hospitalization if needed     Reinitiate physical therapy to maximize strength and endurance  Will continue goals of care discussions based on clinical course  Follow up SNF visit in 4 weeks and as needed    CHIEF COMPLAINT:     Chief Complaint   Patient presents with    Cough     Cough x2 weeks, been treated with antibiotics and mucinex       CLINICAL SUMMARY:          Edwin Gordon is a 78 y.o. 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     Multiple recent hospitalizations and ED visits related to frequent falls.  Facility attending APRN reports the patient has

## 2024-08-22 ENCOUNTER — OFFICE VISIT (OUTPATIENT)
Dept: PALLATIVE CARE | Age: 79
End: 2024-08-22
Payer: MEDICARE

## 2024-08-22 DIAGNOSIS — G31.83 SEVERE LEWY BODY DEMENTIA WITH OTHER BEHAVIORAL DISTURBANCE (HCC): ICD-10-CM

## 2024-08-22 DIAGNOSIS — Z51.5 ENCOUNTER FOR PALLIATIVE CARE: ICD-10-CM

## 2024-08-22 DIAGNOSIS — Z91.81 AT HIGH RISK FOR FALLS: Primary | ICD-10-CM

## 2024-08-22 DIAGNOSIS — F02.C18 SEVERE LEWY BODY DEMENTIA WITH OTHER BEHAVIORAL DISTURBANCE (HCC): ICD-10-CM

## 2024-08-22 PROCEDURE — 1123F ACP DISCUSS/DSCN MKR DOCD: CPT

## 2024-08-22 PROCEDURE — 99309 SBSQ NF CARE MODERATE MDM 30: CPT

## 2024-08-26 NOTE — PROGRESS NOTES
Supportive Care/Community Based Palliative Care  Follow Up Note        Patient Name:  Edwin Gordon  Medical Record Number:  [id number]  YOB: 1945    Date of Visit: 8/22/2024  Location of Visit:  [facility] (admission date 11/6/2024)    Patient Care Team:  Yon Smith as PCP - General (Family Medicine)  Dejuan, José Miguel LÓPEZ MD as Consulting Physician (Cardiology)  Mick Marie APRN - CNP as Advanced Practice Nurse (Nurse Practitioner Family)    History obtained from:  patient, non-family caregiver - attending APRN, electronic medical record    PALLIATIVE DIAGNOSES AND ORDERS/RECOMMENDATIONS/PLAN:   Edwin \"Tyler Gordon\" was seen today for other.    Diagnoses and all orders for this visit:    At high risk for falls  Continue extra safety measures, including a mattress on the floor next to his bed  Encourage use of assistive devices as needed for ambulation  Patient instructed to wait for help before transferring    Severe Lewy body dementia with other behavioral disturbance (HCC)  Continue to monitor  Comfort measures only, long term SNF    Encounter for palliative care  Current goals of care include:  Maximize safety awareness and reduce number of falls, Maintain or improve functional status, Maintain or improve quality of life, Would want hospitalization if needed     Reinitiate physical therapy to maximize strength and endurance  Will continue goals of care discussions based on clinical course  Follow up SNF visit in 4 weeks and as needed    CHIEF COMPLAINT:     Chief Complaint   Patient presents with    Other     Follow-up for progress with therapy.  No recent falls this week.       CLINICAL SUMMARY:          Edwin Gordon is a 78 y.o. 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     Multiple recent hospitalizations

## 2024-09-03 ENCOUNTER — LAB REQUISITION (OUTPATIENT)
Dept: LAB | Facility: HOSPITAL | Age: 79
DRG: 872 | End: 2024-09-03
Payer: MEDICARE

## 2024-09-03 DIAGNOSIS — R35.0 FREQUENCY OF MICTURITION: ICD-10-CM

## 2024-09-03 LAB
BACTERIA UR QL AUTO: ABNORMAL /HPF
BILIRUB UR QL STRIP: NEGATIVE
CLARITY UR: ABNORMAL
COLOR UR: YELLOW
GLUCOSE UR STRIP-MCNC: NEGATIVE MG/DL
HGB UR QL STRIP.AUTO: ABNORMAL
HYALINE CASTS UR QL AUTO: ABNORMAL /LPF
KETONES UR QL STRIP: NEGATIVE
LEUKOCYTE ESTERASE UR QL STRIP.AUTO: ABNORMAL
NITRITE UR QL STRIP: NEGATIVE
PH UR STRIP.AUTO: >=9 [PH] (ref 5–8)
PROT UR QL STRIP: ABNORMAL
RBC # UR STRIP: ABNORMAL /HPF
REF LAB TEST METHOD: ABNORMAL
SP GR UR STRIP: 1.01 (ref 1–1.03)
SQUAMOUS #/AREA URNS HPF: ABNORMAL /HPF
UROBILINOGEN UR QL STRIP: ABNORMAL
WBC # UR STRIP: ABNORMAL /HPF

## 2024-09-03 PROCEDURE — 81001 URINALYSIS AUTO W/SCOPE: CPT | Performed by: NURSE PRACTITIONER

## 2024-09-03 PROCEDURE — 87086 URINE CULTURE/COLONY COUNT: CPT | Performed by: NURSE PRACTITIONER

## 2024-09-03 PROCEDURE — 87186 SC STD MICRODIL/AGAR DIL: CPT | Performed by: NURSE PRACTITIONER

## 2024-09-03 PROCEDURE — 87077 CULTURE AEROBIC IDENTIFY: CPT | Performed by: NURSE PRACTITIONER

## 2024-09-04 ENCOUNTER — TELEPHONE (OUTPATIENT)
Dept: UROLOGY | Age: 79
End: 2024-09-04

## 2024-09-04 NOTE — TELEPHONE ENCOUNTER
left VM about rescheduling follow up on 9/12 with APRN. If patient contacts office, please r/s next available.

## 2024-09-05 ENCOUNTER — APPOINTMENT (OUTPATIENT)
Dept: CT IMAGING | Facility: HOSPITAL | Age: 79
DRG: 872 | End: 2024-09-05
Payer: MEDICARE

## 2024-09-05 ENCOUNTER — HOSPITAL ENCOUNTER (INPATIENT)
Facility: HOSPITAL | Age: 79
LOS: 5 days | Discharge: NURSING, REHAB OR LONG-TERM CARE | DRG: 872 | End: 2024-09-10
Attending: FAMILY MEDICINE | Admitting: FAMILY MEDICINE
Payer: MEDICARE

## 2024-09-05 DIAGNOSIS — Z74.09 IMPAIRED MOBILITY: ICD-10-CM

## 2024-09-05 DIAGNOSIS — N17.9 ACUTE RENAL FAILURE, UNSPECIFIED ACUTE RENAL FAILURE TYPE: ICD-10-CM

## 2024-09-05 DIAGNOSIS — A41.89 OTHER SPECIFIED SEPSIS: Primary | ICD-10-CM

## 2024-09-05 DIAGNOSIS — N39.0 URINARY TRACT INFECTION WITHOUT HEMATURIA, SITE UNSPECIFIED: ICD-10-CM

## 2024-09-05 PROBLEM — A41.9 SEPSIS: Status: ACTIVE | Noted: 2024-09-05

## 2024-09-05 LAB
ALBUMIN SERPL-MCNC: 3.6 G/DL (ref 3.5–5.2)
ALBUMIN/GLOB SERPL: 1 G/DL
ALP SERPL-CCNC: 71 U/L (ref 39–117)
ALT SERPL W P-5'-P-CCNC: 26 U/L (ref 1–41)
ANION GAP SERPL CALCULATED.3IONS-SCNC: 17 MMOL/L (ref 5–15)
APTT PPP: 26.6 SECONDS (ref 24.5–36)
AST SERPL-CCNC: 77 U/L (ref 1–40)
B PARAPERT DNA SPEC QL NAA+PROBE: NOT DETECTED
B PERT DNA SPEC QL NAA+PROBE: NOT DETECTED
BACTERIA SPEC AEROBE CULT: ABNORMAL
BACTERIA UR QL AUTO: ABNORMAL /HPF
BASOPHILS # BLD AUTO: 0.02 10*3/MM3 (ref 0–0.2)
BASOPHILS NFR BLD AUTO: 0.3 % (ref 0–1.5)
BILIRUB SERPL-MCNC: 0.4 MG/DL (ref 0–1.2)
BILIRUB UR QL STRIP: NEGATIVE
BUN SERPL-MCNC: 42 MG/DL (ref 8–23)
BUN/CREAT SERPL: 14.4 (ref 7–25)
C PNEUM DNA NPH QL NAA+NON-PROBE: NOT DETECTED
CALCIUM SPEC-SCNC: 9 MG/DL (ref 8.6–10.5)
CHLORIDE SERPL-SCNC: 101 MMOL/L (ref 98–107)
CK SERPL-CCNC: 344 U/L (ref 20–200)
CLARITY UR: ABNORMAL
CO2 SERPL-SCNC: 21 MMOL/L (ref 22–29)
COLOR UR: YELLOW
CREAT SERPL-MCNC: 2.91 MG/DL (ref 0.76–1.27)
D-LACTATE SERPL-SCNC: 1 MMOL/L (ref 0.5–2)
D-LACTATE SERPL-SCNC: 3.2 MMOL/L (ref 0.5–2)
DEPRECATED RDW RBC AUTO: 44.9 FL (ref 37–54)
EGFRCR SERPLBLD CKD-EPI 2021: 21.4 ML/MIN/1.73
EOSINOPHIL # BLD AUTO: 0.01 10*3/MM3 (ref 0–0.4)
EOSINOPHIL NFR BLD AUTO: 0.1 % (ref 0.3–6.2)
ERYTHROCYTE [DISTWIDTH] IN BLOOD BY AUTOMATED COUNT: 13.1 % (ref 12.3–15.4)
FLUAV SUBTYP SPEC NAA+PROBE: NOT DETECTED
FLUBV RNA ISLT QL NAA+PROBE: NOT DETECTED
GLOBULIN UR ELPH-MCNC: 3.6 GM/DL
GLUCOSE SERPL-MCNC: 127 MG/DL (ref 65–99)
GLUCOSE UR STRIP-MCNC: NEGATIVE MG/DL
HADV DNA SPEC NAA+PROBE: NOT DETECTED
HCOV 229E RNA SPEC QL NAA+PROBE: NOT DETECTED
HCOV HKU1 RNA SPEC QL NAA+PROBE: NOT DETECTED
HCOV NL63 RNA SPEC QL NAA+PROBE: NOT DETECTED
HCOV OC43 RNA SPEC QL NAA+PROBE: NOT DETECTED
HCT VFR BLD AUTO: 36 % (ref 37.5–51)
HGB BLD-MCNC: 12.3 G/DL (ref 13–17.7)
HGB UR QL STRIP.AUTO: ABNORMAL
HMPV RNA NPH QL NAA+NON-PROBE: NOT DETECTED
HOLD SPECIMEN: NORMAL
HOLD SPECIMEN: NORMAL
HPIV1 RNA ISLT QL NAA+PROBE: NOT DETECTED
HPIV2 RNA SPEC QL NAA+PROBE: NOT DETECTED
HPIV3 RNA NPH QL NAA+PROBE: NOT DETECTED
HPIV4 P GENE NPH QL NAA+PROBE: NOT DETECTED
HYALINE CASTS UR QL AUTO: ABNORMAL /LPF
IMM GRANULOCYTES # BLD AUTO: 0.05 10*3/MM3 (ref 0–0.05)
IMM GRANULOCYTES NFR BLD AUTO: 0.7 % (ref 0–0.5)
INR PPP: 1.13 (ref 0.91–1.09)
KETONES UR QL STRIP: NEGATIVE
LEUKOCYTE ESTERASE UR QL STRIP.AUTO: ABNORMAL
LYMPHOCYTES # BLD AUTO: 0.46 10*3/MM3 (ref 0.7–3.1)
LYMPHOCYTES NFR BLD AUTO: 6.9 % (ref 19.6–45.3)
M PNEUMO IGG SER IA-ACNC: NOT DETECTED
MAGNESIUM SERPL-MCNC: 1.8 MG/DL (ref 1.6–2.4)
MCH RBC QN AUTO: 31.9 PG (ref 26.6–33)
MCHC RBC AUTO-ENTMCNC: 34.2 G/DL (ref 31.5–35.7)
MCV RBC AUTO: 93.5 FL (ref 79–97)
MONOCYTES # BLD AUTO: 1.18 10*3/MM3 (ref 0.1–0.9)
MONOCYTES NFR BLD AUTO: 17.6 % (ref 5–12)
NEUTROPHILS NFR BLD AUTO: 4.98 10*3/MM3 (ref 1.7–7)
NEUTROPHILS NFR BLD AUTO: 74.4 % (ref 42.7–76)
NITRITE UR QL STRIP: NEGATIVE
NRBC BLD AUTO-RTO: 0 /100 WBC (ref 0–0.2)
PH UR STRIP.AUTO: >=9 [PH] (ref 5–8)
PLATELET # BLD AUTO: 216 10*3/MM3 (ref 140–450)
PMV BLD AUTO: 10.8 FL (ref 6–12)
POTASSIUM SERPL-SCNC: 4.3 MMOL/L (ref 3.5–5.2)
PROCALCITONIN SERPL-MCNC: 1.38 NG/ML (ref 0–0.25)
PROT SERPL-MCNC: 7.2 G/DL (ref 6–8.5)
PROT UR QL STRIP: ABNORMAL
PROTHROMBIN TIME: 15 SECONDS (ref 11.8–14.8)
RBC # BLD AUTO: 3.85 10*6/MM3 (ref 4.14–5.8)
RBC # UR STRIP: ABNORMAL /HPF
REF LAB TEST METHOD: ABNORMAL
RHINOVIRUS RNA SPEC NAA+PROBE: NOT DETECTED
RSV RNA NPH QL NAA+NON-PROBE: NOT DETECTED
SARS-COV-2 RNA NPH QL NAA+NON-PROBE: NOT DETECTED
SODIUM SERPL-SCNC: 139 MMOL/L (ref 136–145)
SP GR UR STRIP: 1.01 (ref 1–1.03)
SQUAMOUS #/AREA URNS HPF: ABNORMAL /HPF
TROPONIN T SERPL HS-MCNC: 144 NG/L
UROBILINOGEN UR QL STRIP: ABNORMAL
WBC # UR STRIP: ABNORMAL /HPF
WBC NRBC COR # BLD AUTO: 6.7 10*3/MM3 (ref 3.4–10.8)
WHOLE BLOOD HOLD COAG: NORMAL
WHOLE BLOOD HOLD SPECIMEN: NORMAL
YEAST URNS QL MICRO: ABNORMAL /HPF

## 2024-09-05 PROCEDURE — 80053 COMPREHEN METABOLIC PANEL: CPT | Performed by: FAMILY MEDICINE

## 2024-09-05 PROCEDURE — 87040 BLOOD CULTURE FOR BACTERIA: CPT | Performed by: FAMILY MEDICINE

## 2024-09-05 PROCEDURE — 83735 ASSAY OF MAGNESIUM: CPT | Performed by: FAMILY MEDICINE

## 2024-09-05 PROCEDURE — 36415 COLL VENOUS BLD VENIPUNCTURE: CPT | Performed by: FAMILY MEDICINE

## 2024-09-05 PROCEDURE — 81001 URINALYSIS AUTO W/SCOPE: CPT | Performed by: FAMILY MEDICINE

## 2024-09-05 PROCEDURE — 87186 SC STD MICRODIL/AGAR DIL: CPT | Performed by: FAMILY MEDICINE

## 2024-09-05 PROCEDURE — 70450 CT HEAD/BRAIN W/O DYE: CPT

## 2024-09-05 PROCEDURE — 84484 ASSAY OF TROPONIN QUANT: CPT | Performed by: FAMILY MEDICINE

## 2024-09-05 PROCEDURE — 25810000003 SODIUM CHLORIDE 0.9 % SOLUTION: Performed by: FAMILY MEDICINE

## 2024-09-05 PROCEDURE — 0202U NFCT DS 22 TRGT SARS-COV-2: CPT | Performed by: FAMILY MEDICINE

## 2024-09-05 PROCEDURE — 85730 THROMBOPLASTIN TIME PARTIAL: CPT | Performed by: FAMILY MEDICINE

## 2024-09-05 PROCEDURE — 93005 ELECTROCARDIOGRAM TRACING: CPT | Performed by: FAMILY MEDICINE

## 2024-09-05 PROCEDURE — 87077 CULTURE AEROBIC IDENTIFY: CPT | Performed by: FAMILY MEDICINE

## 2024-09-05 PROCEDURE — 85025 COMPLETE CBC W/AUTO DIFF WBC: CPT | Performed by: FAMILY MEDICINE

## 2024-09-05 PROCEDURE — 87086 URINE CULTURE/COLONY COUNT: CPT | Performed by: FAMILY MEDICINE

## 2024-09-05 PROCEDURE — 85610 PROTHROMBIN TIME: CPT | Performed by: FAMILY MEDICINE

## 2024-09-05 PROCEDURE — 93010 ELECTROCARDIOGRAM REPORT: CPT | Performed by: INTERNAL MEDICINE

## 2024-09-05 PROCEDURE — 25010000002 CEFTRIAXONE PER 250 MG: Performed by: FAMILY MEDICINE

## 2024-09-05 PROCEDURE — 99285 EMERGENCY DEPT VISIT HI MDM: CPT

## 2024-09-05 PROCEDURE — 82550 ASSAY OF CK (CPK): CPT | Performed by: FAMILY MEDICINE

## 2024-09-05 PROCEDURE — P9612 CATHETERIZE FOR URINE SPEC: HCPCS

## 2024-09-05 PROCEDURE — 83605 ASSAY OF LACTIC ACID: CPT | Performed by: FAMILY MEDICINE

## 2024-09-05 PROCEDURE — 84145 PROCALCITONIN (PCT): CPT | Performed by: FAMILY MEDICINE

## 2024-09-05 RX ORDER — ACETAMINOPHEN 160 MG/5ML
650 SOLUTION ORAL EVERY 4 HOURS PRN
Status: DISCONTINUED | OUTPATIENT
Start: 2024-09-05 | End: 2024-09-10 | Stop reason: HOSPADM

## 2024-09-05 RX ORDER — POLYETHYLENE GLYCOL 3350 17 G/17G
17 POWDER, FOR SOLUTION ORAL DAILY PRN
Status: DISCONTINUED | OUTPATIENT
Start: 2024-09-05 | End: 2024-09-10 | Stop reason: HOSPADM

## 2024-09-05 RX ORDER — BISACODYL 5 MG/1
5 TABLET, DELAYED RELEASE ORAL DAILY PRN
Status: DISCONTINUED | OUTPATIENT
Start: 2024-09-05 | End: 2024-09-10 | Stop reason: HOSPADM

## 2024-09-05 RX ORDER — ASPIRIN 81 MG/1
81 TABLET ORAL DAILY
Status: DISCONTINUED | OUTPATIENT
Start: 2024-09-06 | End: 2024-09-10 | Stop reason: HOSPADM

## 2024-09-05 RX ORDER — SODIUM CHLORIDE 0.9 % (FLUSH) 0.9 %
10 SYRINGE (ML) INJECTION AS NEEDED
Status: DISCONTINUED | OUTPATIENT
Start: 2024-09-05 | End: 2024-09-10 | Stop reason: HOSPADM

## 2024-09-05 RX ORDER — SODIUM CHLORIDE 0.9 % (FLUSH) 0.9 %
10 SYRINGE (ML) INJECTION EVERY 12 HOURS SCHEDULED
Status: DISCONTINUED | OUTPATIENT
Start: 2024-09-05 | End: 2024-09-10 | Stop reason: HOSPADM

## 2024-09-05 RX ORDER — SODIUM CHLORIDE 0.9 % (FLUSH) 0.9 %
10 SYRINGE (ML) INJECTION AS NEEDED
Status: DISCONTINUED | OUTPATIENT
Start: 2024-09-05 | End: 2024-09-09 | Stop reason: SDUPTHER

## 2024-09-05 RX ORDER — TAMSULOSIN HYDROCHLORIDE 0.4 MG/1
0.4 CAPSULE ORAL NIGHTLY
Status: DISCONTINUED | OUTPATIENT
Start: 2024-09-05 | End: 2024-09-10 | Stop reason: HOSPADM

## 2024-09-05 RX ORDER — BISACODYL 10 MG
10 SUPPOSITORY, RECTAL RECTAL DAILY PRN
Status: DISCONTINUED | OUTPATIENT
Start: 2024-09-05 | End: 2024-09-10 | Stop reason: HOSPADM

## 2024-09-05 RX ORDER — ACETAMINOPHEN 650 MG/1
650 SUPPOSITORY RECTAL EVERY 4 HOURS PRN
Status: DISCONTINUED | OUTPATIENT
Start: 2024-09-05 | End: 2024-09-10 | Stop reason: HOSPADM

## 2024-09-05 RX ORDER — ACETAMINOPHEN 650 MG/1
650 SUPPOSITORY RECTAL ONCE
Status: COMPLETED | OUTPATIENT
Start: 2024-09-05 | End: 2024-09-05

## 2024-09-05 RX ORDER — ACETAMINOPHEN 325 MG/1
650 TABLET ORAL EVERY 4 HOURS PRN
Status: DISCONTINUED | OUTPATIENT
Start: 2024-09-05 | End: 2024-09-10 | Stop reason: HOSPADM

## 2024-09-05 RX ORDER — SODIUM CHLORIDE 9 MG/ML
75 INJECTION, SOLUTION INTRAVENOUS CONTINUOUS
Status: DISCONTINUED | OUTPATIENT
Start: 2024-09-05 | End: 2024-09-10 | Stop reason: HOSPADM

## 2024-09-05 RX ORDER — AMOXICILLIN 250 MG
2 CAPSULE ORAL 2 TIMES DAILY PRN
Status: DISCONTINUED | OUTPATIENT
Start: 2024-09-05 | End: 2024-09-10 | Stop reason: HOSPADM

## 2024-09-05 RX ORDER — CARBIDOPA AND LEVODOPA 25; 100 MG/1; MG/1
2 TABLET ORAL 3 TIMES DAILY
Status: DISCONTINUED | OUTPATIENT
Start: 2024-09-05 | End: 2024-09-10 | Stop reason: HOSPADM

## 2024-09-05 RX ORDER — SODIUM CHLORIDE 9 MG/ML
40 INJECTION, SOLUTION INTRAVENOUS AS NEEDED
Status: DISCONTINUED | OUTPATIENT
Start: 2024-09-05 | End: 2024-09-10 | Stop reason: HOSPADM

## 2024-09-05 RX ORDER — ATORVASTATIN CALCIUM 40 MG/1
80 TABLET, FILM COATED ORAL NIGHTLY
Status: DISCONTINUED | OUTPATIENT
Start: 2024-09-05 | End: 2024-09-10 | Stop reason: HOSPADM

## 2024-09-05 RX ADMIN — ACETAMINOPHEN 650 MG: 650 SUPPOSITORY RECTAL at 19:25

## 2024-09-05 RX ADMIN — CEFTRIAXONE SODIUM 2000 MG: 2 INJECTION, POWDER, FOR SOLUTION INTRAMUSCULAR; INTRAVENOUS at 22:06

## 2024-09-05 RX ADMIN — SODIUM CHLORIDE 2061 ML: 9 INJECTION, SOLUTION INTRAVENOUS at 19:39

## 2024-09-06 ENCOUNTER — APPOINTMENT (OUTPATIENT)
Dept: ULTRASOUND IMAGING | Facility: HOSPITAL | Age: 79
DRG: 872 | End: 2024-09-06
Payer: MEDICARE

## 2024-09-06 PROBLEM — G93.40 ACUTE ENCEPHALOPATHY: Status: ACTIVE | Noted: 2024-09-06

## 2024-09-06 PROBLEM — N39.0 SEPSIS SECONDARY TO UTI: Status: ACTIVE | Noted: 2024-09-05

## 2024-09-06 LAB
AMMONIA BLD-SCNC: 45 UMOL/L (ref 16–60)
ANION GAP SERPL CALCULATED.3IONS-SCNC: 12 MMOL/L (ref 5–15)
ANISOCYTOSIS BLD QL: ABNORMAL
BASOPHILS # BLD MANUAL: 0 10*3/MM3 (ref 0–0.2)
BASOPHILS NFR BLD MANUAL: 0 % (ref 0–1.5)
BUN SERPL-MCNC: 43 MG/DL (ref 8–23)
BUN/CREAT SERPL: 15.4 (ref 7–25)
CALCIUM SPEC-SCNC: 8.4 MG/DL (ref 8.6–10.5)
CHLORIDE SERPL-SCNC: 106 MMOL/L (ref 98–107)
CLUMPED PLATELETS: PRESENT
CO2 SERPL-SCNC: 23 MMOL/L (ref 22–29)
CREAT SERPL-MCNC: 2.79 MG/DL (ref 0.76–1.27)
DEPRECATED RDW RBC AUTO: 46.5 FL (ref 37–54)
EGFRCR SERPLBLD CKD-EPI 2021: 22.5 ML/MIN/1.73
EOSINOPHIL # BLD MANUAL: 0 10*3/MM3 (ref 0–0.4)
EOSINOPHIL NFR BLD MANUAL: 0 % (ref 0.3–6.2)
ERYTHROCYTE [DISTWIDTH] IN BLOOD BY AUTOMATED COUNT: 13.1 % (ref 12.3–15.4)
GEN 5 2HR TROPONIN T REFLEX: 137 NG/L
GLUCOSE SERPL-MCNC: 146 MG/DL (ref 65–99)
HCT VFR BLD AUTO: 30.7 % (ref 37.5–51)
HGB BLD-MCNC: 10.1 G/DL (ref 13–17.7)
LYMPHOCYTES # BLD MANUAL: 0.53 10*3/MM3 (ref 0.7–3.1)
LYMPHOCYTES NFR BLD MANUAL: 7.1 % (ref 5–12)
MCH RBC QN AUTO: 31.8 PG (ref 26.6–33)
MCHC RBC AUTO-ENTMCNC: 32.9 G/DL (ref 31.5–35.7)
MCV RBC AUTO: 96.5 FL (ref 79–97)
MONOCYTES # BLD: 0.37 10*3/MM3 (ref 0.1–0.9)
NEUTROPHILS # BLD AUTO: 4.36 10*3/MM3 (ref 1.7–7)
NEUTROPHILS NFR BLD MANUAL: 69.7 % (ref 42.7–76)
NEUTS BAND NFR BLD MANUAL: 13.1 % (ref 0–5)
PLATELET # BLD AUTO: 160 10*3/MM3 (ref 140–450)
PMV BLD AUTO: 10.7 FL (ref 6–12)
POLYCHROMASIA BLD QL SMEAR: ABNORMAL
POTASSIUM SERPL-SCNC: 4.2 MMOL/L (ref 3.5–5.2)
QT INTERVAL: 346 MS
QTC INTERVAL: 446 MS
RBC # BLD AUTO: 3.18 10*6/MM3 (ref 4.14–5.8)
SODIUM SERPL-SCNC: 141 MMOL/L (ref 136–145)
TROPONIN T DELTA: -7 NG/L
TSH SERPL DL<=0.05 MIU/L-ACNC: 0.78 UIU/ML (ref 0.27–4.2)
VARIANT LYMPHS NFR BLD MANUAL: 10.1 % (ref 19.6–45.3)
VIT B12 BLD-MCNC: 307 PG/ML (ref 211–946)
WBC MORPH BLD: NORMAL
WBC NRBC COR # BLD AUTO: 5.26 10*3/MM3 (ref 3.4–10.8)

## 2024-09-06 PROCEDURE — 85025 COMPLETE CBC W/AUTO DIFF WBC: CPT | Performed by: FAMILY MEDICINE

## 2024-09-06 PROCEDURE — 76775 US EXAM ABDO BACK WALL LIM: CPT

## 2024-09-06 PROCEDURE — 84443 ASSAY THYROID STIM HORMONE: CPT | Performed by: HOSPITALIST

## 2024-09-06 PROCEDURE — 25810000003 SODIUM CHLORIDE 0.9 % SOLUTION: Performed by: FAMILY MEDICINE

## 2024-09-06 PROCEDURE — 25010000002 CEFTRIAXONE PER 250 MG: Performed by: FAMILY MEDICINE

## 2024-09-06 PROCEDURE — 82607 VITAMIN B-12: CPT | Performed by: HOSPITALIST

## 2024-09-06 PROCEDURE — 82140 ASSAY OF AMMONIA: CPT | Performed by: HOSPITALIST

## 2024-09-06 PROCEDURE — 85007 BL SMEAR W/DIFF WBC COUNT: CPT | Performed by: FAMILY MEDICINE

## 2024-09-06 PROCEDURE — 80048 BASIC METABOLIC PNL TOTAL CA: CPT | Performed by: FAMILY MEDICINE

## 2024-09-06 RX ORDER — FINASTERIDE 5 MG/1
5 TABLET, FILM COATED ORAL DAILY
COMMUNITY

## 2024-09-06 RX ORDER — GUAIFENESIN 200 MG/10ML
400 LIQUID ORAL EVERY 4 HOURS PRN
COMMUNITY
End: 2024-09-10 | Stop reason: HOSPADM

## 2024-09-06 RX ORDER — CARBOXYMETHYLCELLULOSE SODIUM 5 MG/ML
2 SOLUTION/ DROPS OPHTHALMIC 3 TIMES DAILY PRN
COMMUNITY

## 2024-09-06 RX ORDER — RIVASTIGMINE TARTRATE 1.5 MG/1
1.5 CAPSULE ORAL 2 TIMES DAILY
COMMUNITY
End: 2024-09-10 | Stop reason: HOSPADM

## 2024-09-06 RX ORDER — NYSTATIN 100000 [USP'U]/G
POWDER TOPICAL EVERY 12 HOURS SCHEDULED
Status: DISCONTINUED | OUTPATIENT
Start: 2024-09-06 | End: 2024-09-10 | Stop reason: HOSPADM

## 2024-09-06 RX ORDER — DIVALPROEX SODIUM 125 MG/1
250 CAPSULE, COATED PELLETS ORAL 2 TIMES DAILY
COMMUNITY

## 2024-09-06 RX ADMIN — CARBIDOPA AND LEVODOPA 2 TABLET: 25; 100 TABLET ORAL at 08:09

## 2024-09-06 RX ADMIN — ATORVASTATIN CALCIUM 80 MG: 40 TABLET ORAL at 20:20

## 2024-09-06 RX ADMIN — ASPIRIN 81 MG: 81 TABLET, COATED ORAL at 08:09

## 2024-09-06 RX ADMIN — SODIUM CHLORIDE 1000 MG: 900 INJECTION INTRAVENOUS at 21:13

## 2024-09-06 RX ADMIN — ATORVASTATIN CALCIUM 80 MG: 40 TABLET ORAL at 00:22

## 2024-09-06 RX ADMIN — Medication 10 ML: at 00:22

## 2024-09-06 RX ADMIN — TAMSULOSIN HYDROCHLORIDE 0.4 MG: 0.4 CAPSULE ORAL at 20:20

## 2024-09-06 RX ADMIN — SODIUM CHLORIDE 75 ML/HR: 9 INJECTION, SOLUTION INTRAVENOUS at 14:29

## 2024-09-06 RX ADMIN — NYSTATIN: 100000 POWDER TOPICAL at 10:06

## 2024-09-06 RX ADMIN — CARBIDOPA AND LEVODOPA 2 TABLET: 25; 100 TABLET ORAL at 21:13

## 2024-09-06 RX ADMIN — CARBIDOPA AND LEVODOPA 2 TABLET: 25; 100 TABLET ORAL at 16:33

## 2024-09-06 RX ADMIN — SODIUM CHLORIDE 75 ML/HR: 9 INJECTION, SOLUTION INTRAVENOUS at 00:21

## 2024-09-06 RX ADMIN — NYSTATIN: 100000 POWDER TOPICAL at 21:13

## 2024-09-06 RX ADMIN — CARBIDOPA AND LEVODOPA 2 TABLET: 25; 100 TABLET ORAL at 00:22

## 2024-09-06 NOTE — CASE MANAGEMENT/SOCIAL WORK
Discharge Planning Assessment  [facility]     Patient Name: Speedy Villalba  MRN: [id number]  Today's Date: 9/6/2024    Admit Date: 9/5/2024        Discharge Needs Assessment       Row Name 09/06/24 0910       Living Environment    People in Home facility resident    Current Living Arrangements extended care facility    In the past 12 months has the electric, gas, oil, or water company threatened to shut off services in your home? No       Discharge Needs Assessment    Discharge Coordination/Progress Resides at SNF and plans to return.                   Discharge Plan    No documentation.                 Continued Care and Services - Admitted Since 9/5/2024    No active coordination exists for this encounter.          Demographic Summary    No documentation.                  Functional Status    No documentation.                  Psychosocial    No documentation.                  Abuse/Neglect    No documentation.                  Legal    No documentation.                  Substance Abuse    No documentation.                  Patient Forms    No documentation.                     Sukh Weaver RN

## 2024-09-06 NOTE — CASE MANAGEMENT/SOCIAL WORK
Continued Stay Note   [place]     Patient Name: Speedy Villalba  MRN: [id number]  Today's Date: 9/6/2024    Admit Date: 9/5/2024    Plan: [facility]   Discharge Plan       Row Name 09/06/24 0923       Plan    Plan [facility]    Patient/Family in Agreement with Plan yes    Plan Comments Pt is from [facility].  SW has left a message for Venessa in admission to check on bed hold status.  No precert. required.                   Discharge Codes    No documentation.                       DAVINA BobW

## 2024-09-06 NOTE — CONSULTS
[facility]  INPATIENT WOUND & OSTOMY CONSULTATION    Today's Date: 09/06/24    Patient Name: Speedy Villalba  MRN: [id number]  [facility]: [id number]  PCP: Sen Ramsey MD  Referring Provider:   Consulting Provider (From admission, onward)      Start Ordered     Status Ordering Provider    09/06/24 0157 09/06/24 0156  Inpatient Wound Care MD Consult  Once        Specialty:  Wound Care  Provider:  Vicky Fields APRN    Acknowledged SUSHMA MCCORMACK    09/06/24 0103 09/06/24 0103    Once        Specialty:  Wound Care  Provider:  (Not yet assigned)    Canceled SUSHMA MCCORMACK           Attending Provider: Randee Boyd MD  Length of Stay: 1    SUBJECTIVE   Chief Complaint: Excoriation of sacrum and groins, nonblanchable redness to coccyx    HPI: Speedy Villalba, a 78 y.o.male, presents with a past medical history of ***.  A full past medical history is listed below.  Inpatient wound care consulted due to ***    Comfort glide repositioning sheet and wedges in place.  Pillows used for offloading. Silicone border foam dressings to sacrum and bilateral heels for prevention. External urinary catheter in place with penis wrap for moisture control.     Visit Dx:    ICD-10-CM ICD-9-CM   1. Other specified sepsis  A41.89 038.8     995.91   2. Acute renal failure, unspecified acute renal failure type  N17.9 584.9   3. Urinary tract infection without hematuria, site unspecified  N39.0 599.0       Hospital Problem List:     Sepsis secondary to UTI    Cognitive decline    Primary hypertension    Atypical parkinsonism    Subdural hematoma    Acute encephalopathy      History:   Past Medical History:   Diagnosis Date    Acute encephalopathy 9/6/2024    Arthritis     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    Cognitive decline 10/30/2023    Coronary artery disease 10 years    x2 stents    Diabetes mellitus < 2 years    non-insulin dependent    Difficulty walking < 1 year    multiple falls in the last  6-8 months    DNR (do not resuscitate)     Gout     HL (hearing loss) 20+ years    Wears hearing aids occasionally    Hyperlipidemia 5 years    Insomnia     Lewy body dementia     Memory loss 3 years+/-    gradual and subtle    Muscle weakness     Primary hypertension 10/30/2023    Sleep apnea 20+ year    Non-compliant with PEEP for tx    Stroke 11/2022    Subdural hematoma     TIA (transient ischemic attack) 01/22/2024     Past Surgical History:   Procedure Laterality Date    APPENDECTOMY      BACK SURGERY  2016    CAROTID STENT  2010?    KNEE SURGERY  1960    LAMINECTOMY  2015?    discectomy & fusion     Social History     Socioeconomic History    Marital status:    Tobacco Use    Smoking status: Never     Passive exposure: Past    Smokeless tobacco: Never   Vaping Use    Vaping status: Never Used   Substance and Sexual Activity    Alcohol use: Yes     Alcohol/week: 3.0 standard drinks of alcohol     Types: 3 Drinks containing 0.5 oz of alcohol per week    Drug use: Never    Sexual activity: Not Currently     Partners: Female     Birth control/protection: Vasectomy     Family History   Problem Relation Age of Onset    Parkinsonism Father        Allergies:  Allergies   Allergen Reactions    Serna-2 Inhibitors (Sulfonamide) Unknown - Low Severity       Medications:    Current Facility-Administered Medications:     acetaminophen (TYLENOL) tablet 650 mg, 650 mg, Oral, Q4H PRN **OR** acetaminophen (TYLENOL) 160 MG/5ML oral solution 650 mg, 650 mg, Oral, Q4H PRN **OR** acetaminophen (TYLENOL) suppository 650 mg, 650 mg, Rectal, Q4H PRN, Uriah Manriquez MD    aspirin EC tablet 81 mg, 81 mg, Oral, Daily, Uriah Manriquez MD, 81 mg at 09/06/24 0809    atorvastatin (LIPITOR) tablet 80 mg, 80 mg, Oral, Nightly, Uriah Manriquez MD, 80 mg at 09/06/24 0022    sennosides-docusate (PERICOLACE) 8.6-50 MG per tablet 2 tablet, 2 tablet, Oral, BID PRN **AND** polyethylene glycol (MIRALAX) packet 17 g,  17 g, Oral, Daily PRN **AND** bisacodyl (DULCOLAX) EC tablet 5 mg, 5 mg, Oral, Daily PRN **AND** bisacodyl (DULCOLAX) suppository 10 mg, 10 mg, Rectal, Daily PRN, Uriah Manriquez MD    Calcium Replacement - Follow Nurse / BPA Driven Protocol, , Does not apply, PRMitra LIU Mariano Ariel, MD    carbidopa-levodopa (SINEMET)  MG per tablet 2 tablet, 2 tablet, Oral, TID, Uriah Manriquez MD, 2 tablet at 09/06/24 0809    cefTRIAXone (ROCEPHIN) 1,000 mg in sodium chloride 0.9 % 100 mL MBP, 1,000 mg, Intravenous, Q24H, Uriah Manriquez MD    Magnesium Standard Dose Replacement - Follow Nurse / BPA Driven Protocol, , Does not apply, PRMitra LIU Mariano Ariel, MD    nystatin (MYCOSTATIN) powder, , Topical, Q12H, Randee Boyd MD    Phosphorus Replacement - Follow Nurse / BPA Driven Protocol, , Does not apply, Mitra FLOWER Mariano Ariel, MD    Potassium Replacement - Follow Nurse / BPA Driven Protocol, , Does not apply, Mitra FLOWER Mariano Ariel, MD    [COMPLETED] Insert Peripheral IV, , , Once **AND** sodium chloride 0.9 % flush 10 mL, 10 mL, Intravenous, PRN, Uriah Manriquez MD    sodium chloride 0.9 % flush 10 mL, 10 mL, Intravenous, Q12H, Uriah Manriquez MD, 10 mL at 09/06/24 0022    sodium chloride 0.9 % flush 10 mL, 10 mL, Intravenous, PRN, Uriah Manriquez MD    sodium chloride 0.9 % infusion 40 mL, 40 mL, Intravenous, PRNMitra Mariano Ariel, MD    sodium chloride 0.9 % infusion, 75 mL/hr, Intravenous, Continuous, Uriah Manriquez MD, Last Rate: 75 mL/hr at 09/06/24 0021, 75 mL/hr at 09/06/24 0021    tamsulosin (FLOMAX) 24 hr capsule 0.4 mg, 0.4 mg, Oral, Nightly, Uriah Manriquez MD    OBJECTIVE     Vitals:    09/06/24 0840   BP: 133/65   Pulse: 86   Resp: 18   Temp: 97.8 °F (36.6 °C)   SpO2: 93%       PHYSICAL EXAM: ***  Physical Exam  Vitals and nursing note reviewed.   Constitutional:       General: He is sleeping.       Appearance: He is obese.      Comments: Body mass index is 37.89 kg/m².    HENT:      Head: Normocephalic and atraumatic.   Eyes:      General: Lids are normal. Gaze aligned appropriately.   Cardiovascular:      Rate and Rhythm: Normal rate and regular rhythm.   Pulmonary:      Effort: Pulmonary effort is normal. No respiratory distress.   Abdominal:      General: Abdomen is protuberant.   Genitourinary:     Comments: External urinary catheter in place with penis wrap for moisture control  Musculoskeletal:      Cervical back: Normal range of motion and neck supple.   Feet:      Comments: Silicone border foam dressings to bilateral heels for prevention  Skin:     General: Skin is warm and dry.      Findings: Erythema present.      Comments: Candidal intertrigo of skin folds of abdomen and groins. Defined border of erythema noted. No broken skin at this time.  Sacral skin is intact. No erythema. No open wound.    Neurological:      Mental Status: He is easily aroused. He is lethargic and disoriented.      Motor: Weakness present.   Psychiatric:         Attention and Perception: He is inattentive.         Mood and Affect: Affect is flat.         Behavior: Behavior is cooperative.            Results Review:  Lab Results (last 48 hours)       Procedure Component Value Units Date/Time    CBC & Differential [[id number]]  (Abnormal) Collected: 09/06/24 0317    Specimen: Blood Updated: 09/06/24 0539    Narrative:      The following orders were created for panel order CBC & Differential.  Procedure                               Abnormality         Status                     ---------                               -----------         ------                     CBC Auto Differential[[id number]]        Abnormal            Final result                 Please view results for these tests on the individual orders.    CBC Auto Differential [[id number]]  (Abnormal) Collected: 09/06/24 0317    Specimen: Blood Updated: 09/06/24 0539      WBC 5.26 10*3/mm3      RBC 3.18 10*6/mm3      Hemoglobin 10.1 g/dL      Hematocrit 30.7 %      MCV 96.5 fL      MCH 31.8 pg      MCHC 32.9 g/dL      RDW 13.1 %      RDW-SD 46.5 fl      MPV 10.7 fL      Platelets 160 10*3/mm3     Manual Differential [[id number]]  (Abnormal) Collected: 09/06/24 0317    Specimen: Blood Updated: 09/06/24 0539     Neutrophil % 69.7 %      Lymphocyte % 10.1 %      Monocyte % 7.1 %      Eosinophil % 0.0 %      Basophil % 0.0 %      Bands %  13.1 %      Neutrophils Absolute 4.36 10*3/mm3      Lymphocytes Absolute 0.53 10*3/mm3      Monocytes Absolute 0.37 10*3/mm3      Eosinophils Absolute 0.00 10*3/mm3      Basophils Absolute 0.00 10*3/mm3      Anisocytosis Slight/1+     Polychromasia Slight/1+     WBC Morphology Normal     Clumped Platelets Present    Basic Metabolic Panel [[id number]]  (Abnormal) Collected: 09/06/24 0317    Specimen: Blood Updated: 09/06/24 0458     Glucose 146 mg/dL      BUN 43 mg/dL      Creatinine 2.79 mg/dL      Sodium 141 mmol/L      Potassium 4.2 mmol/L      Chloride 106 mmol/L      CO2 23.0 mmol/L      Calcium 8.4 mg/dL      BUN/Creatinine Ratio 15.4     Anion Gap 12.0 mmol/L      eGFR 22.5 mL/min/1.73     Narrative:      GFR Normal >60  Chronic Kidney Disease <60  Kidney Failure <15    The GFR formula is only valid for adults with stable renal function between ages 18 and 70.    High Sensitivity Troponin T 2Hr [[id number]]  (Abnormal) Collected: 09/05/24 2352    Specimen: Blood Updated: 09/06/24 0027     HS Troponin T 137 ng/L      Troponin T Delta -7 ng/L     Narrative:      High Sensitive Troponin T Reference Range:  <14.0 ng/L- Negative Female for AMI  <22.0 ng/L- Negative Male for AMI  >=14 - Abnormal Female indicating possible myocardial injury.  >=22 - Abnormal Male indicating possible myocardial injury.   Clinicians would have to utilize clinical acumen, EKG, Troponin, and serial changes to determine if it is an Acute Myocardial Infarction or myocardial  injury due to an underlying chronic condition.         STAT Lactic Acid, Reflex [[id number]]  (Normal) Collected: 09/05/24 2204    Specimen: Blood Updated: 09/05/24 2236     Lactate 1.0 mmol/L     Urinalysis With Culture If Indicated - Straight Cath [[id number]]  (Abnormal) Collected: 09/05/24 1936    Specimen: Urine from Straight Cath Updated: 09/05/24 2015     Color, UA Yellow     Appearance, UA Turbid     pH, UA >=9.0     Specific Gravity, UA 1.011     Glucose, UA Negative     Ketones, UA Negative     Bilirubin, UA Negative     Blood, UA Moderate (2+)     Protein, UA >=300 mg/dL (3+)     Leuk Esterase, UA Large (3+)     Nitrite, UA Negative     Urobilinogen, UA 0.2 E.U./dL    Narrative:      In absence of clinical symptoms, the presence of pyuria, bacteria, and/or nitrites on the urinalysis result does not correlate with infection.    Urinalysis, Microscopic Only - Straight Cath [[id number]]  (Abnormal) Collected: 09/05/24 1936    Specimen: Urine from Straight Cath Updated: 09/05/24 2015     RBC, UA 0-2 /HPF      WBC, UA Too Numerous to Count /HPF      Bacteria, UA 2+ /HPF      Squamous Epithelial Cells, UA 0-2 /HPF      Yeast, UA Small/1+ Yeast /HPF      Hyaline Casts, UA None Seen /LPF      Methodology Manual Light Microscopy    Urine Culture - Urine, Straight Cath [[id number]] Collected: 09/05/24 1936    Specimen: Urine from Straight Cath Updated: 09/05/24 2015    Respiratory Panel PCR w/COVID-19(SARS-CoV-2) ELIZABETH/DALLIN/OLINDA/PAD/COR/MARY In-House, NP Swab in UTM/VTM, 2 HR TAT - Swab, Nasopharynx [[id number]]  (Normal) Collected: 09/05/24 1904    Specimen: Swab from Nasopharynx Updated: 09/05/24 2014     ADENOVIRUS, PCR Not Detected     Coronavirus 229E Not Detected     Coronavirus HKU1 Not Detected     Coronavirus NL63 Not Detected     Coronavirus OC43 Not Detected     COVID19 Not Detected     Human Metapneumovirus Not Detected     Human Rhinovirus/Enterovirus Not Detected     Influenza A PCR Not Detected     Influenza B  PCR Not Detected     Parainfluenza Virus 1 Not Detected     Parainfluenza Virus 2 Not Detected     Parainfluenza Virus 3 Not Detected     Parainfluenza Virus 4 Not Detected     RSV, PCR Not Detected     Bordetella pertussis pcr Not Detected     Bordetella parapertussis PCR Not Detected     Chlamydophila pneumoniae PCR Not Detected     Mycoplasma pneumo by PCR Not Detected    Narrative:      In the setting of a positive respiratory panel with a viral infection PLUS a negative procalcitonin without other underlying concern for bacterial infection, consider observing off antibiotics or discontinuation of antibiotics and continue supportive care. If the respiratory panel is positive for atypical bacterial infection (Bordetella pertussis, Chlamydophila pneumoniae, or Mycoplasma pneumoniae), consider antibiotic de-escalation to target atypical bacterial infection.    Blood Culture - Blood, Hand, Right [[id number]] Collected: 09/05/24 1936    Specimen: Blood from Hand, Right Updated: 09/05/24 1949    Blood Culture - Blood, Hand, Left [[id number]] Collected: 09/05/24 1810    Specimen: Blood from Hand, Left Updated: 09/05/24 1924    High Sensitivity Troponin T [[id number]]  (Abnormal) Collected: 09/05/24 1810    Specimen: Blood Updated: 09/05/24 1918     HS Troponin T 144 ng/L     Narrative:      High Sensitive Troponin T Reference Range:  <14.0 ng/L- Negative Female for AMI  <22.0 ng/L- Negative Male for AMI  >=14 - Abnormal Female indicating possible myocardial injury.  >=22 - Abnormal Male indicating possible myocardial injury.   Clinicians would have to utilize clinical acumen, EKG, Troponin, and serial changes to determine if it is an Acute Myocardial Infarction or myocardial injury due to an underlying chronic condition.         Procalcitonin [[id number]]  (Abnormal) Collected: 09/05/24 1810    Specimen: Blood Updated: 09/05/24 1913     Procalcitonin 1.38 ng/mL     Narrative:      As a Marker for Sepsis  "(Non-Neonates):    1. <0.5 ng/mL represents a low risk of severe sepsis and/or septic shock.  2. >2 ng/mL represents a high risk of severe sepsis and/or septic shock.    As a Marker for Lower Respiratory Tract Infections that require antibiotic therapy:    PCT on Admission    Antibiotic Therapy       6-12 Hrs later    >0.5                Strongly Recommended  >0.25 - <0.5        Recommended   0.1 - 0.25          Discouraged              Remeasure/reassess PCT  <0.1                Strongly Discouraged     Remeasure/reassess PCT    As 28 day mortality risk marker: \"Change in Procalcitonin Result\" (>80% or <=80%) if Day 0 (or Day 1) and Day 4 values are available. Refer to http://www.[employer]pct-calculator.com    Change in PCT <=80%  A decrease of PCT levels below or equal to 80% defines a positive change in PCT test result representing a higher risk for 28-day all-cause mortality of patients diagnosed with severe sepsis for septic shock.    Change in PCT >80%  A decrease of PCT levels of more than 80% defines a negative change in PCT result representing a lower risk for 28-day all-cause mortality of patients diagnosed with severe sepsis or septic shock.       Comprehensive Metabolic Panel [[id number]]  (Abnormal) Collected: 09/05/24 1810    Specimen: Blood Updated: 09/05/24 1909     Glucose 127 mg/dL      BUN 42 mg/dL      Creatinine 2.91 mg/dL      Sodium 139 mmol/L      Potassium 4.3 mmol/L      Chloride 101 mmol/L      CO2 21.0 mmol/L      Calcium 9.0 mg/dL      Total Protein 7.2 g/dL      Albumin 3.6 g/dL      ALT (SGPT) 26 U/L      AST (SGOT) 77 U/L      Alkaline Phosphatase 71 U/L      Total Bilirubin 0.4 mg/dL      Globulin 3.6 gm/dL      A/G Ratio 1.0 g/dL      BUN/Creatinine Ratio 14.4     Anion Gap 17.0 mmol/L      eGFR 21.4 mL/min/1.73     Narrative:      GFR Normal >60  Chronic Kidney Disease <60  Kidney Failure <15    The GFR formula is only valid for adults with stable renal function between ages 18 and " 70.    CK [[id number]]  (Abnormal) Collected: 09/05/24 1810    Specimen: Blood Updated: 09/05/24 1909     Creatine Kinase 344 U/L     Magnesium [[id number]]  (Normal) Collected: 09/05/24 1810    Specimen: Blood Updated: 09/05/24 1908     Magnesium 1.8 mg/dL     Lactic Acid, Plasma [[id number]]  (Abnormal) Collected: 09/05/24 1810    Specimen: Blood Updated: 09/05/24 1903     Lactate 3.2 mmol/L     Protime-INR [[id number]]  (Abnormal) Collected: 09/05/24 1810    Specimen: Blood Updated: 09/05/24 1859     Protime 15.0 Seconds      INR 1.13    aPTT [[id number]]  (Normal) Collected: 09/05/24 1810    Specimen: Blood Updated: 09/05/24 1859     PTT 26.6 seconds     CBC & Differential [[id number]]  (Abnormal) Collected: 09/05/24 1810    Specimen: Blood Updated: 09/05/24 1851    Narrative:      The following orders were created for panel order CBC & Differential.  Procedure                               Abnormality         Status                     ---------                               -----------         ------                     CBC Auto Differential[[id number]]        Abnormal            Final result                 Please view results for these tests on the individual orders.    CBC Auto Differential [[id number]]  (Abnormal) Collected: 09/05/24 1810    Specimen: Blood Updated: 09/05/24 1851     WBC 6.70 10*3/mm3      RBC 3.85 10*6/mm3      Hemoglobin 12.3 g/dL      Hematocrit 36.0 %      MCV 93.5 fL      MCH 31.9 pg      MCHC 34.2 g/dL      RDW 13.1 %      RDW-SD 44.9 fl      MPV 10.8 fL      Platelets 216 10*3/mm3      Neutrophil % 74.4 %      Lymphocyte % 6.9 %      Monocyte % 17.6 %      Eosinophil % 0.1 %      Basophil % 0.3 %      Immature Grans % 0.7 %      Neutrophils, Absolute 4.98 10*3/mm3      Lymphocytes, Absolute 0.46 10*3/mm3      Monocytes, Absolute 1.18 10*3/mm3      Eosinophils, Absolute 0.01 10*3/mm3      Basophils, Absolute 0.02 10*3/mm3      Immature Grans, Absolute 0.05 10*3/mm3      nRBC 0.0 /100 WBC      [place] Draw [[id number]] Collected: 09/05/24 1810    Specimen: Blood Updated: 09/05/24 1845    Narrative:      The following orders were created for panel order [place] Draw.  Procedure                               Abnormality         Status                     ---------                               -----------         ------                     Green Top (Gel)[[id number]]                                  Final result               Lavender Top[[id number]]                                     Final result               Carter Top[[id number]]                                         Final result               Light Blue Top[[id number]]                                   Final result                 Please view results for these tests on the individual orders.    Green Top (Gel) [[id number]] Collected: 09/05/24 1810    Specimen: Blood Updated: 09/05/24 1845     Extra Tube Hold for add-ons.     Comment: Auto resulted.       Lavender Top [[id number]] Collected: 09/05/24 1810    Specimen: Blood Updated: 09/05/24 1845     Extra Tube hold for add-on     Comment: Auto resulted       Gray Top [[id number]] Collected: 09/05/24 1810    Specimen: Blood Updated: 09/05/24 1845     Extra Tube Hold for add-ons.     Comment: Auto resulted.       Light Blue Top [[id number]] Collected: 09/05/24 1810    Specimen: Blood Updated: 09/05/24 1845     Extra Tube Hold for add-ons.     Comment: Auto resulted             Imaging Results (Last 72 Hours)       Procedure Component Value Units Date/Time    CT Head Without Contrast [[id number]] Collected: 09/05/24 2012     Updated: 09/05/24 2017    Narrative:      CT HEAD WO CONTRAST- 9/5/2024 6:53 PM     HISTORY: Altered mental state     COMPARISON: 5/23/2024     DLP: 1703.49 mGy.cm. All CT scans are performed using dose optimization  techniques as appropriate to the performed exam and including at least  one of the following: Automated exposure control, adjustment of the mA  and/or kV according to size, and  the use of the iterative reconstruction  technique.     TECHNIQUE: Serial axial tomographic images of the brain were obtained  without the use of intravenous contrast.     FINDINGS:  The midline structures are nondisplaced. There is moderate cerebral and  cerebellar atrophy, with an associated increase in the prominence of the  ventricles and sulci. The basilar cisterns are normal in size and  configuration. There is no evidence of intracranial hemorrhage or  mass-effect. There is low attenuation in the periventricular white  matter, consistent with chronic ischemic change. There are no abnormal  extra-axial fluid collections. There is no evidence of tonsillar  herniation.     The included orbits and their contents are unremarkable. There is  opacification of the right maxillary sinus which appears to be chronic  in nature. The visualized paranasal sinuses and mastoid air cells are  otherwise normally aerated.. There is hyperostosis frontalis interna. No  acute calvarial abnormalities present..       Impression:         1. Moderate cerebral and cerebellar atrophy with chronic microvascular  disease but no evidence of acute intracranial process.  2. Chronic appearing right maxillary sinus disease.           This report was signed and finalized on 9/5/2024 8:14 PM by Dr. Clemente Izaguirre MD.                  ASSESSMENT/PLAN       Examination and evaluation of wound(s) was performed.    DIAGNOSIS:   At high risk for skin breakdown  Candidal intertrigo   Bowel and bladder incontinence  Obesity - Body mass index is 37.89 kg/m².   Impaired mobility and ADLs    Sepsis secondary to UTI    Cognitive decline    Primary hypertension    Atypical parkinsonism    Subdural hematoma    Acute encephalopathy       PLAN:   Orders placed for wound care and pressure/moisture management as listed below.       Start     Ordered    09/06/24 1800  Skin Care  2 Times Daily      Question Answer Comment   Wound Locations Abdominal and  bilateral groin skin folds    Wound Care Instructions Clean with no rinse skin cleanser. Pat dry. Apply Nystatin to area. May use pillow cases to area and change often to maintain dry environment.    Cleanser: Cleansing Foam        09/06/24 1029    09/06/24 0800  Oral Care  2 Times Daily       09/05/24 2259    09/06/24 0555  Turn Patient  Now Then Every 2 Hours         09/06/24 0554    09/06/24 0555  Elevate Heels Off of Bed  Until Discontinued         09/06/24 0554    09/06/24 0555  Use Seat Cushion When Up In Chair  Continuous         09/06/24 0554    09/06/24 0555  Silicone Border Dressing to Bony Prominences  Per Order Details        Comments: Apply silicone border foam dressing per protocol to sacral spine/bilateral heels for protection.  Nursing to change dressing every 3 days and PRN if soiled. Nursing is to peel back dressing with every assessment to assess skin underneath dressing. No barrier cream under dressing.    09/06/24 0554    09/06/24 0555  Use Repositioning Wedge to Position Patient  Continuous        Comments: Use Comfort Glide repositioning sheet and wedges to position patient.    09/06/24 0554    Unscheduled  Wound Care  As Needed      Question:  Wound Care Instructions  Answer:  Apply Moisture Barrier After Any Incontinence    09/06/24 0554             Discussed findings and treatment plan including risks, benefits, and treatment options with patient in detail. Patient agreed with treatment plan.      This document has been electronically signed by ANTHONY Sheridan on 9/6/2024 09:52 CDT

## 2024-09-06 NOTE — PROGRESS NOTES
Nutrition Services    Patient Name:  Speedy Villalba  YOB: 1945  MRN: [id number]  Admit Date:  9/5/2024    Inpatient Nutrition Services screening for wound(s). Per the assessment and workflow policy, pressure injuries stage >/=2 warrant nutrition assessment. Patient EHR reviewed. Current skin condition does not warrant MNT at this time. Nutrition services will continue to monitor per protocol.     Electronically signed by:  Evelyn Arceo  09/06/24 10:38 CDT

## 2024-09-06 NOTE — ED PROVIDER NOTES
HPI:    Patient is a 78-year-old white man who presents from positive response nursing no with altered mental state, fever and generalized pain.  Patient has been treated for urinary tract infection with Bactrim for the past 2 days.  Temperature upon arrival was 100.7.  When speaking with the patient the patient stares off in the distance and does not really speak much from my interview of the patient.  She is very weak and fatigued.  When asked what is his pain level and where he was unable to answer the question.      REVIEW OF SYSTEMS    CONSTITUTIONAL: Positive for fever and fatigue  NEUROLOGIC: Positive for altered mental state and general weakness  GENITOURINARY: Positive for history of urinary tract infection being treated  The remaining 13 point review of system is unable to be obtained secondary to the patient's altered mental state.    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    Cognitive decline 10/30/2023    Coronary artery disease 10 years    x2 stents    Diabetes mellitus < 2 years    non-insulin dependent    Difficulty walking < 1 year    multiple falls in the last 6-8 months    DNR (do not resuscitate)     Gout     HL (hearing loss) 20+ years    Wears hearing aids occasionally    Hyperlipidemia 5 years    Insomnia     Lewy body dementia     Memory loss 3 years+/-    gradual and subtle    Muscle weakness     Primary hypertension 10/30/2023    Sleep apnea 20+ year    Non-compliant with PEEP for tx    Stroke 11/2022    Subdural hematoma     TIA (transient ischemic attack) 01/22/2024       FAMILY HISTORY  Family History   Problem Relation Age of Onset    Parkinsonism Father        SOCIAL HISTORY  Social History     Socioeconomic History    Marital status:    Tobacco Use    Smoking status: Never     Passive exposure: Past    Smokeless tobacco: Never   Vaping Use    Vaping status: Never Used   Substance and Sexual Activity     Alcohol use: Yes     Alcohol/week: 3.0 standard drinks of alcohol     Types: 3 Drinks containing 0.5 oz of alcohol per week    Drug use: Never    Sexual activity: Not Currently     Partners: Female     Birth control/protection: Vasectomy       IMMUNIZATION HISTORY  Deferred to primary care physician.    SURGICAL HISTORY  Past Surgical History:   Procedure Laterality Date    APPENDECTOMY      BACK SURGERY  2016    CAROTID STENT  2010?    KNEE SURGERY  1960    LAMINECTOMY  2015?    discectomy & fusion       CURRENT MEDICATIONS    Current Facility-Administered Medications:     cefTRIAXone (ROCEPHIN) 2,000 mg in sodium chloride 0.9 % 100 mL MBP, 2,000 mg, Intravenous, Once, Az Saenz Jr., MD    [COMPLETED] Insert Peripheral IV, , , Once **AND** sodium chloride 0.9 % flush 10 mL, 10 mL, Intravenous, PRN, Az Saenz Jr., MD    Current Outpatient Medications:     acetaminophen (TYLENOL) 325 MG tablet, Take 2 tablets by mouth 2 (Two) Times a Day., Disp: , Rfl:     allopurinol (ZYLOPRIM) 100 MG tablet, Take 1 tablet by mouth Daily., Disp: , Rfl:     aspirin 81 MG EC tablet, Take 1 tablet by mouth Daily., Disp: , Rfl:     atorvastatin (LIPITOR) 80 MG tablet, Take 1 tablet by mouth Every Night., Disp: 90 tablet, Rfl: 0    carbidopa-levodopa (SINEMET)  MG per tablet, Take 2 tablets by mouth 3 (Three) Times a Day., Disp: 180 tablet, Rfl: 3    Cholecalciferol (VITAMIN D3) 125 MCG (5000 UT) tablet tablet, Take 1 tablet by mouth Daily., Disp: , Rfl:     CIMETIDINE 200 MG tablet, , Disp: , Rfl:     clopidogrel (PLAVIX) 75 MG tablet, , Disp: , Rfl:     Cranberry-Vitamin C-Inulin (UTI-Stat) liquid, Take 30 mL by mouth 2 (Two) Times a Day. Mix with 120mL of water or juice, Disp: , Rfl:     cyclobenzaprine (FLEXERIL) 10 MG tablet, Take 1 tablet by mouth every night at bedtime., Disp: , Rfl:     hydrocortisone (Preparation H) 1 % cream, Apply 1 Application topically to the appropriate area as directed Every 6  "(Six) Hours As Needed (hemorrhoids)., Disp: , Rfl:     losartan (COZAAR) 50 MG tablet, Take 1 tablet by mouth Daily., Disp: , Rfl:     Magnesium Oxide -Mg Supplement 400 (240 Mg) MG tablet, Take 1 tablet by mouth Daily., Disp: , Rfl:     Melatonin 10 MG tablet, Take 1 tablet by mouth Every Night., Disp: , Rfl:     moxifloxacin (VIGAMOX) 0.5 % ophthalmic solution, , Disp: , Rfl:     multivitamin with minerals (Centrum Silver Adult 50+) tablet tablet, Take 1 tablet by mouth Daily., Disp: , Rfl:     nystatin (MYCOSTATIN) 084972 UNIT/GM powder, Apply 1 Application topically to the appropriate area as directed 2 (Two) Times a Day. Apply to abdominal fold/groin, Disp: , Rfl:     ondansetron ODT (ZOFRAN-ODT) 4 MG disintegrating tablet, Place 1 tablet on the tongue Every 6 (Six) Hours As Needed for Nausea or Vomiting., Disp: , Rfl:     polyethylene glycol (MIRALAX) 17 g packet, Take 17 g by mouth 2 (Two) Times a Day As Needed (constipation)., Disp: , Rfl:     spironolactone (ALDACTONE) 25 MG tablet, Take 1 tablet by mouth Daily., Disp: , Rfl:     tamsulosin (FLOMAX) 0.4 MG capsule 24 hr capsule, Take 1 capsule by mouth Every Night., Disp: , Rfl:     traZODone (DESYREL) 50 MG tablet, Take 1.5 tablets by mouth Every Night., Disp: , Rfl:     zinc sulfate (ZINCATE) 220 (50 Zn) MG capsule, Take 1 capsule by mouth Daily., Disp: , Rfl:     ALLERGIES  Allergies   Allergen Reactions    Serna-2 Inhibitors (Sulfonamide) Unknown - Low Severity       Neuro exam    VITAL SIGNS:   /66   Pulse 86   Temp (!) 100.7 °F (38.2 °C) (Oral)   Resp 20   Ht 173 cm (68.11\")   Wt 111 kg (244 lb)   SpO2 98%   BMI 36.98 kg/m²     Constitutional: Patient is awake and  does not appear to be be in distress.  Unable to determine if the patient has discomfort due to not able to answer the question.    ENT: There is a normal pharynx with no acute erythema or exudate and oral mucosa is moist.  Nose is clear with no drainage.  Tympanic membranes " intact and non-erythemic.    Cardiovascular: S1-S2 tachycardic with diastolic murmur 2 out of 6.    Respiratory: Patient with decreased breath sounds in both lung fields in the bases particularly no obvious crackles are noted.    Abdomen: Soft, nontender.  Bowel sounds are normal in all 4 quadrants. There is no rebound or guarding noted.  There is no abdominal distention or hepatosplenomegaly.    Integumentary: Dusky appearing skin.    Genitourinary: No tenderness to palpation of the costovertebral angles are suprapubic area.    Neurological: Patient moves all 4 extremities when stimulated however is staring into the distance and unable to fully assess his note illogical stents due to his altered mental state      RADIOLOGY/PROCEDURES      CT Head Without Contrast   Final Result       1. Moderate cerebral and cerebellar atrophy with chronic microvascular   disease but no evidence of acute intracranial process.   2. Chronic appearing right maxillary sinus disease.               This report was signed and finalized on 9/5/2024 8:14 PM by Dr. Clemente Izaguirre MD.                  FUTURE APPOINTMENTS     No future appointments.             COURSE & MEDICAL DECISION MAKING     Patient's partial differential diagnosis can include:    Sepsis, urosepsis, TIA, stroke, electrolyte abnormality, pneumonia, viral syndrome and others    Patient is septic and has likely sepsis secondary to urinary tract infection.  CT scan of the head was negative and shows no acute intracranial bleed or cause of his altered mental state.  Patient was given a septic bolus of normal saline.  Patient was given IV Rocephin.  Patient will be admitted to the hospitalist.  Discussed the case with hospitalist Dr. Manriquez who accept the patient as admission to his medical service.  He is also aware of the patient is in acute renal failure with elevated troponin which is likely secondary to his renal failure.        Patient's level of risk:  Moderate        CRITICAL CARE    CRITICAL CARE: No    CRITICAL CARE TIME: None    Also Old charts were reviewed per [facility] EMR.  Pertinent details are summarized above.  All laboratory, radiologic, and EKG studies that were performed in the Emergency Department were a necessary part of the evaluation needed to exclude unstable or  emergent medical conditions:     Patient was hemodynamically and neurologically stable in the ED.   Pertinent studies were reviewed as above.     Recent Results (from the past 24 hour(s))   Green Top (Gel)    Collection Time: 09/05/24  6:10 PM   Result Value Ref Range    Extra Tube Hold for add-ons.    Lavender Top    Collection Time: 09/05/24  6:10 PM   Result Value Ref Range    Extra Tube hold for add-on    Gray Top    Collection Time: 09/05/24  6:10 PM   Result Value Ref Range    Extra Tube Hold for add-ons.    Light Blue Top    Collection Time: 09/05/24  6:10 PM   Result Value Ref Range    Extra Tube Hold for add-ons.    Comprehensive Metabolic Panel    Collection Time: 09/05/24  6:10 PM    Specimen: Blood   Result Value Ref Range    Glucose 127 (H) 65 - 99 mg/dL    BUN 42 (H) 8 - 23 mg/dL    Creatinine 2.91 (H) 0.76 - 1.27 mg/dL    Sodium 139 136 - 145 mmol/L    Potassium 4.3 3.5 - 5.2 mmol/L    Chloride 101 98 - 107 mmol/L    CO2 21.0 (L) 22.0 - 29.0 mmol/L    Calcium 9.0 8.6 - 10.5 mg/dL    Total Protein 7.2 6.0 - 8.5 g/dL    Albumin 3.6 3.5 - 5.2 g/dL    ALT (SGPT) 26 1 - 41 U/L    AST (SGOT) 77 (H) 1 - 40 U/L    Alkaline Phosphatase 71 39 - 117 U/L    Total Bilirubin 0.4 0.0 - 1.2 mg/dL    Globulin 3.6 gm/dL    A/G Ratio 1.0 g/dL    BUN/Creatinine Ratio 14.4 7.0 - 25.0    Anion Gap 17.0 (H) 5.0 - 15.0 mmol/L    eGFR 21.4 (L) >60.0 mL/min/1.73   Protime-INR    Collection Time: 09/05/24  6:10 PM    Specimen: Blood   Result Value Ref Range    Protime 15.0 (H) 11.8 - 14.8 Seconds    INR 1.13 (H) 0.91 - 1.09   aPTT    Collection Time: 09/05/24  6:10 PM    Specimen: Blood   Result Value  Ref Range    PTT 26.6 24.5 - 36.0 seconds   CK    Collection Time: 09/05/24  6:10 PM    Specimen: Blood   Result Value Ref Range    Creatine Kinase 344 (H) 20 - 200 U/L   Magnesium    Collection Time: 09/05/24  6:10 PM    Specimen: Blood   Result Value Ref Range    Magnesium 1.8 1.6 - 2.4 mg/dL   Lactic Acid, Plasma    Collection Time: 09/05/24  6:10 PM    Specimen: Blood   Result Value Ref Range    Lactate 3.2 (C) 0.5 - 2.0 mmol/L   Procalcitonin    Collection Time: 09/05/24  6:10 PM    Specimen: Blood   Result Value Ref Range    Procalcitonin 1.38 (H) 0.00 - 0.25 ng/mL   CBC Auto Differential    Collection Time: 09/05/24  6:10 PM    Specimen: Blood   Result Value Ref Range    WBC 6.70 3.40 - 10.80 10*3/mm3    RBC 3.85 (L) 4.14 - 5.80 10*6/mm3    Hemoglobin 12.3 (L) 13.0 - 17.7 g/dL    Hematocrit 36.0 (L) 37.5 - 51.0 %    MCV 93.5 79.0 - 97.0 fL    MCH 31.9 26.6 - 33.0 pg    MCHC 34.2 31.5 - 35.7 g/dL    RDW 13.1 12.3 - 15.4 %    RDW-SD 44.9 37.0 - 54.0 fl    MPV 10.8 6.0 - 12.0 fL    Platelets 216 140 - 450 10*3/mm3    Neutrophil % 74.4 42.7 - 76.0 %    Lymphocyte % 6.9 (L) 19.6 - 45.3 %    Monocyte % 17.6 (H) 5.0 - 12.0 %    Eosinophil % 0.1 (L) 0.3 - 6.2 %    Basophil % 0.3 0.0 - 1.5 %    Immature Grans % 0.7 (H) 0.0 - 0.5 %    Neutrophils, Absolute 4.98 1.70 - 7.00 10*3/mm3    Lymphocytes, Absolute 0.46 (L) 0.70 - 3.10 10*3/mm3    Monocytes, Absolute 1.18 (H) 0.10 - 0.90 10*3/mm3    Eosinophils, Absolute 0.01 0.00 - 0.40 10*3/mm3    Basophils, Absolute 0.02 0.00 - 0.20 10*3/mm3    Immature Grans, Absolute 0.05 0.00 - 0.05 10*3/mm3    nRBC 0.0 0.0 - 0.2 /100 WBC   High Sensitivity Troponin T    Collection Time: 09/05/24  6:10 PM    Specimen: Blood   Result Value Ref Range    HS Troponin T 144 (C) <22 ng/L   Respiratory Panel PCR w/COVID-19(SARS-CoV-2) [facility]IN[facility] In-House, NP Swab in [facility], 2 HR TAT - Swab, Nasopharynx    Collection Time: 09/05/24  7:04 PM    Specimen: Nasopharynx; Swab   Result  Value Ref Range    ADENOVIRUS, PCR Not Detected Not Detected    Coronavirus 229E Not Detected Not Detected    Coronavirus HKU1 Not Detected Not Detected    Coronavirus NL63 Not Detected Not Detected    Coronavirus OC43 Not Detected Not Detected    COVID19 Not Detected Not Detected - Ref. Range    Human Metapneumovirus Not Detected Not Detected    Human Rhinovirus/Enterovirus Not Detected Not Detected    Influenza A PCR Not Detected Not Detected    Influenza B PCR Not Detected Not Detected    Parainfluenza Virus 1 Not Detected Not Detected    Parainfluenza Virus 2 Not Detected Not Detected    Parainfluenza Virus 3 Not Detected Not Detected    Parainfluenza Virus 4 Not Detected Not Detected    RSV, PCR Not Detected Not Detected    Bordetella pertussis pcr Not Detected Not Detected    Bordetella parapertussis PCR Not Detected Not Detected    Chlamydophila pneumoniae PCR Not Detected Not Detected    Mycoplasma pneumo by PCR Not Detected Not Detected   ECG 12 Lead Altered Mental Status    Collection Time: 09/05/24  7:16 PM   Result Value Ref Range    QT Interval 346 ms    QTC Interval 446 ms   Urinalysis With Culture If Indicated - Straight Cath    Collection Time: 09/05/24  7:36 PM    Specimen: Straight Cath; Urine   Result Value Ref Range    Color, UA Yellow Yellow, Straw    Appearance, UA Turbid (A) Clear    pH, UA >=9.0 (H) 5.0 - 8.0    Specific Gravity, UA 1.011 1.005 - 1.030    Glucose, UA Negative Negative    Ketones, UA Negative Negative    Bilirubin, UA Negative Negative    Blood, UA Moderate (2+) (A) Negative    Protein, UA >=300 mg/dL (3+) (A) Negative    Leuk Esterase, UA Large (3+) (A) Negative    Nitrite, UA Negative Negative    Urobilinogen, UA 0.2 E.U./dL 0.2 - 1.0 E.U./dL   Urinalysis, Microscopic Only - Straight Cath    Collection Time: 09/05/24  7:36 PM    Specimen: Straight Cath; Urine   Result Value Ref Range    RBC, UA 0-2 None Seen, 0-2 /HPF    WBC, UA Too Numerous to Count (A) None Seen, 0-2 /HPF     Bacteria, UA 2+ (A) None Seen /HPF    Squamous Epithelial Cells, UA 0-2 None Seen, 0-2 /HPF    Yeast, UA Small/1+ Yeast (A) None Seen /HPF    Hyaline Casts, UA None Seen None Seen /LPF    Methodology Manual Light Microscopy          The patient received:  Medications   sodium chloride 0.9 % flush 10 mL (has no administration in time range)   cefTRIAXone (ROCEPHIN) 2,000 mg in sodium chloride 0.9 % 100 mL MBP (has no administration in time range)   sodium chloride 0.9 % bolus 2,061 mL (2,061 mL Intravenous New Bag 9/5/24 1939)   acetaminophen (TYLENOL) suppository 650 mg (650 mg Rectal Given 9/5/24 1925)              ED Disposition       ED Disposition   Decision to Admit    Condition   --    Comment   Level of Care: Telemetry [5]   Diagnosis: Sepsis [[id number]]   Admitting Physician: SUSHMA MCCORMACK [[id number]]   Attending Physician: SUSHMA MCCORMACK [[id number]]   Certification: I Certify That Inpatient Hospital Services Are Medically Necessary For Greater Than 2 Midnights                     Dragon disclaimer:  Part of this note may be an electronic transcription/translation of spoken language to printed text using the Dragon Dictation System.     This information is consistent with my knowledge of the patient’s controlled substance use history.      FINAL IMPRESSION   Diagnosis Plan   1. Other specified sepsis      Early urosepsis      2. Acute renal failure, unspecified acute renal failure type        3. Urinary tract infection without hematuria, site unspecified              MD Dany Slade Jr, Thomas Mark Jr., MD  09/05/24 7341

## 2024-09-06 NOTE — NURSING NOTE
[facility]  INPATIENT WOUND & OSTOMY CARE    Today's Date: 09/06/24    Patient Name: Speedy Villalba  MRN: [id number]  [facility]: [id number]  PCP: Sen Ramsey MD  Attending Provider: Uriah Manriquez,*  Length of Stay: 1    Pressure injury prevention measure ordered per protocol due to patient being at risk for skin breakdown.    Apply silicone foam border dressing per protocol to sacral spine/bilateral heels for protection.  Nursing to change dressing every 3 days and PRN if soiled. Nursing is to peel back dressing with every assessment to assess skin underneath dressing. No barrier cream under dressing.     Please reach out to wound care nurse if any skin issues arise.     This document has been electronically signed by Zulema Olivas RN on 9/6/2024 05:54 CDT

## 2024-09-06 NOTE — PLAN OF CARE
Goal Outcome Evaluation:  Plan of Care Reviewed With: patient        Progress: improving  Outcome Evaluation: VSS. S 76-91. showing improvement in appetite this afternoon. continuing on IV fluid hydration, PO fluids continue to be encouraged. no SOB noted. continue current treatment plan

## 2024-09-06 NOTE — H&P
[facility]  HISTORY AND PHYSICAL    Date of Admission: 9/5/2024  Primary Care Physician: Sen Ramsey MD    Subjective   Primary Historian: Patient    Chief Complaint: AMS    History of Present Illness  78-year-old male with past medical history of atypical parkinsonian syndrome, cognitive decline, diabetes mellitus, coronary artery disease, subdural hematoma presents to the emergency room referred from skilled nursing facility due to persistent fever, confusion and generalized pain.  He has been treated for UTI with Bactrim for 2 days and continues to have elevated temperature.  He presents with a temp of 100.7, heart rate of 120, normal WBC and abnormal urinalysis.    Review of Systems   Otherwise complete ROS reviewed and negative except as mentioned in the HPI.    Past Medical History:   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    Cognitive decline 10/30/2023    Coronary artery disease 10 years    x2 stents    Diabetes mellitus < 2 years    non-insulin dependent    Difficulty walking < 1 year    multiple falls in the last 6-8 months    DNR (do not resuscitate)     Gout     HL (hearing loss) 20+ years    Wears hearing aids occasionally    Hyperlipidemia 5 years    Insomnia     Lewy body dementia     Memory loss 3 years+/-    gradual and subtle    Muscle weakness     Primary hypertension 10/30/2023    Sleep apnea 20+ year    Non-compliant with PEEP for tx    Stroke 11/2022    Subdural hematoma     TIA (transient ischemic attack) 01/22/2024     Past Surgical History:  Past Surgical History:   Procedure Laterality Date    APPENDECTOMY      BACK SURGERY  2016    CAROTID STENT  2010?    KNEE SURGERY  1960    LAMINECTOMY  2015?    discectomy & fusion     Social History:  reports that he has never smoked. He has been exposed to tobacco smoke. He has never used smokeless tobacco. He reports current alcohol use of  about 3.0 standard drinks of alcohol per week. He reports that he does not use drugs.    Family History: family history includes Parkinsonism in his father.       Allergies:  Allergies   Allergen Reactions    Serna-2 Inhibitors (Sulfonamide) Unknown - Low Severity       Medications:  Prior to Admission medications    Medication Sig Start Date End Date Taking? Authorizing Provider   acetaminophen (TYLENOL) 325 MG tablet Take 2 tablets by mouth 2 (Two) Times a Day.    Michael George MD   allopurinol (ZYLOPRIM) 100 MG tablet Take 1 tablet by mouth Daily. 7/13/20   Michael George MD   aspirin 81 MG EC tablet Take 1 tablet by mouth Daily.    Michael George MD   atorvastatin (LIPITOR) 80 MG tablet Take 1 tablet by mouth Every Night. 1/23/24   Randee Boyd MD   carbidopa-levodopa (SINEMET)  MG per tablet Take 2 tablets by mouth 3 (Three) Times a Day. 11/20/23   Rodrigue Hebert PA   Cholecalciferol (VITAMIN D3) 125 MCG (5000 UT) tablet tablet Take 1 tablet by mouth Daily.    Michael George MD   CIMETIDINE 200 MG tablet  5/16/24   Michael George MD   clopidogrel (PLAVIX) 75 MG tablet  4/12/24   Michael George MD   Cranberry-Vitamin C-Inulin (UTI-Stat) liquid Take 30 mL by mouth 2 (Two) Times a Day. Mix with 120mL of water or juice    Michael George MD   cyclobenzaprine (FLEXERIL) 10 MG tablet Take 1 tablet by mouth every night at bedtime. 7/29/20   Michael George MD   hydrocortisone (Preparation H) 1 % cream Apply 1 Application topically to the appropriate area as directed Every 6 (Six) Hours As Needed (hemorrhoids).    Michael George MD   losartan (COZAAR) 50 MG tablet Take 1 tablet by mouth Daily. 7/29/20   Michael George MD   Magnesium Oxide -Mg Supplement 400 (240 Mg) MG tablet Take 1 tablet by mouth Daily.    Michael George MD   Melatonin 10 MG tablet Take 1 tablet by mouth Every Night.    Michael George MD   moxifloxacin  "(VIGAMOX) 0.5 % ophthalmic solution  5/3/24   Michael George MD   multivitamin with minerals (Centrum Silver Adult 50+) tablet tablet Take 1 tablet by mouth Daily.    Michael George MD   nystatin (MYCOSTATIN) 257777 UNIT/GM powder Apply 1 Application topically to the appropriate area as directed 2 (Two) Times a Day. Apply to abdominal fold/groin    Michael George MD   ondansetron ODT (ZOFRAN-ODT) 4 MG disintegrating tablet Place 1 tablet on the tongue Every 6 (Six) Hours As Needed for Nausea or Vomiting.    Michael George MD   polyethylene glycol (MIRALAX) 17 g packet Take 17 g by mouth 2 (Two) Times a Day As Needed (constipation).    Michael George MD   spironolactone (ALDACTONE) 25 MG tablet Take 1 tablet by mouth Daily.    Michael George MD   tamsulosin (FLOMAX) 0.4 MG capsule 24 hr capsule Take 1 capsule by mouth Every Night. 3/12/24   Michael George MD   traZODone (DESYREL) 50 MG tablet Take 1.5 tablets by mouth Every Night.    Michael George MD   zinc sulfate (ZINCATE) 220 (50 Zn) MG capsule Take 1 capsule by mouth Daily.    Michael George MD     I have utilized all available immediate resources to obtain, update, or review the patient's current medications (including all prescriptions, over-the-counter products, herbals, cannabis/cannabidiol products, and vitamin/mineral/dietary (nutritional) supplements).    Objective     Vital Signs: /66   Pulse 86   Temp (!) 100.7 °F (38.2 °C) (Oral)   Resp 20   Ht 173 cm (68.11\")   Wt 111 kg (244 lb)   SpO2 98%   BMI 36.98 kg/m²   Physical Exam  Constitutional:       Appearance: He is well-developed. He is ill-appearing.   HENT:      Head: Normocephalic and atraumatic.      Right Ear: External ear normal.      Left Ear: External ear normal.      Nose: Nose normal.      Mouth/Throat:      Mouth: Mucous membranes are dry.   Eyes:      General:         Right eye: No discharge.         Left eye: No " discharge.      Extraocular Movements: Extraocular movements intact.      Conjunctiva/sclera: Conjunctivae normal.      Pupils: Pupils are equal, round, and reactive to light.   Neck:      Vascular: No JVD.   Cardiovascular:      Rate and Rhythm: Normal rate and regular rhythm.      Heart sounds: Normal heart sounds. No murmur heard.  Pulmonary:      Effort: Pulmonary effort is normal. No respiratory distress.      Breath sounds: Normal breath sounds. No wheezing or rales.   Chest:      Chest wall: No tenderness.   Abdominal:      General: Bowel sounds are normal. There is no distension.      Palpations: Abdomen is soft.      Tenderness: There is no abdominal tenderness. There is no guarding or rebound.   Musculoskeletal:         General: No tenderness or deformity. Normal range of motion.      Cervical back: Normal range of motion and neck supple. No rigidity.      Right lower leg: No edema.      Left lower leg: No edema.   Skin:     General: Skin is warm and dry.      Findings: No rash.   Neurological:      Mental Status: He is alert. Mental status is at baseline. He is disoriented.      Cranial Nerves: No cranial nerve deficit.      Sensory: No sensory deficit.      Motor: No abnormal muscle tone.      Deep Tendon Reflexes: Reflexes normal.     Results Reviewed:  Lab Results (last 24 hours)       Procedure Component Value Units Date/Time    High Sensitivity Troponin T 2Hr [[id number]] Updated: 09/05/24 2238    Specimen: Blood     STAT Lactic Acid, Reflex [[id number]]  (Normal) Collected: 09/05/24 2204    Specimen: Blood Updated: 09/05/24 2236     Lactate 1.0 mmol/L     Urinalysis With Culture If Indicated - Straight Cath [[id number]]  (Abnormal) Collected: 09/05/24 1936    Specimen: Urine from Straight Cath Updated: 09/05/24 2015     Color, UA Yellow     Appearance, UA Turbid     pH, UA >=9.0     Specific Gravity, UA 1.011     Glucose, UA Negative     Ketones, UA Negative     Bilirubin, UA Negative     Blood, UA  Moderate (2+)     Protein, UA >=300 mg/dL (3+)     Leuk Esterase, UA Large (3+)     Nitrite, UA Negative     Urobilinogen, UA 0.2 E.U./dL    Narrative:      In absence of clinical symptoms, the presence of pyuria, bacteria, and/or nitrites on the urinalysis result does not correlate with infection.    Urinalysis, Microscopic Only - Straight Cath [[id number]]  (Abnormal) Collected: 09/05/24 1936    Specimen: Urine from Straight Cath Updated: 09/05/24 2015     RBC, UA 0-2 /HPF      WBC, UA Too Numerous to Count /HPF      Bacteria, UA 2+ /HPF      Squamous Epithelial Cells, UA 0-2 /HPF      Yeast, UA Small/1+ Yeast /HPF      Hyaline Casts, UA None Seen /LPF      Methodology Manual Light Microscopy    Urine Culture - Urine, Straight Cath [[id number]] Collected: 09/05/24 1936    Specimen: Urine from Straight Cath Updated: 09/05/24 2015    Respiratory Panel PCR w/COVID-19(SARS-CoV-2) ELIZABETH/DALLIN/OLINDA/PAD/COR/MARY In-House, NP Swab in UTM/VTM, 2 HR TAT - Swab, Nasopharynx [[id number]]  (Normal) Collected: 09/05/24 1904    Specimen: Swab from Nasopharynx Updated: 09/05/24 2014     ADENOVIRUS, PCR Not Detected     Coronavirus 229E Not Detected     Coronavirus HKU1 Not Detected     Coronavirus NL63 Not Detected     Coronavirus OC43 Not Detected     COVID19 Not Detected     Human Metapneumovirus Not Detected     Human Rhinovirus/Enterovirus Not Detected     Influenza A PCR Not Detected     Influenza B PCR Not Detected     Parainfluenza Virus 1 Not Detected     Parainfluenza Virus 2 Not Detected     Parainfluenza Virus 3 Not Detected     Parainfluenza Virus 4 Not Detected     RSV, PCR Not Detected     Bordetella pertussis pcr Not Detected     Bordetella parapertussis PCR Not Detected     Chlamydophila pneumoniae PCR Not Detected     Mycoplasma pneumo by PCR Not Detected    Narrative:      In the setting of a positive respiratory panel with a viral infection PLUS a negative procalcitonin without other underlying concern for bacterial  infection, consider observing off antibiotics or discontinuation of antibiotics and continue supportive care. If the respiratory panel is positive for atypical bacterial infection (Bordetella pertussis, Chlamydophila pneumoniae, or Mycoplasma pneumoniae), consider antibiotic de-escalation to target atypical bacterial infection.    Blood Culture - Blood, Hand, Right [[id number]] Collected: 09/05/24 1936    Specimen: Blood from Hand, Right Updated: 09/05/24 1949    Blood Culture - Blood, Hand, Left [[id number]] Collected: 09/05/24 1810    Specimen: Blood from Hand, Left Updated: 09/05/24 1924    High Sensitivity Troponin T [[id number]]  (Abnormal) Collected: 09/05/24 1810    Specimen: Blood Updated: 09/05/24 1918     HS Troponin T 144 ng/L     Narrative:      High Sensitive Troponin T Reference Range:  <14.0 ng/L- Negative Female for AMI  <22.0 ng/L- Negative Male for AMI  >=14 - Abnormal Female indicating possible myocardial injury.  >=22 - Abnormal Male indicating possible myocardial injury.   Clinicians would have to utilize clinical acumen, EKG, Troponin, and serial changes to determine if it is an Acute Myocardial Infarction or myocardial injury due to an underlying chronic condition.         Procalcitonin [[id number]]  (Abnormal) Collected: 09/05/24 1810    Specimen: Blood Updated: 09/05/24 1913     Procalcitonin 1.38 ng/mL     Narrative:      As a Marker for Sepsis (Non-Neonates):    1. <0.5 ng/mL represents a low risk of severe sepsis and/or septic shock.  2. >2 ng/mL represents a high risk of severe sepsis and/or septic shock.    As a Marker for Lower Respiratory Tract Infections that require antibiotic therapy:    PCT on Admission    Antibiotic Therapy       6-12 Hrs later    >0.5                Strongly Recommended  >0.25 - <0.5        Recommended   0.1 - 0.25          Discouraged              Remeasure/reassess PCT  <0.1                Strongly Discouraged     Remeasure/reassess PCT    As 28 day mortality risk  "marker: \"Change in Procalcitonin Result\" (>80% or <=80%) if Day 0 (or Day 1) and Day 4 values are available. Refer to http://www.[email or web address] [place]pct[facility]calculator.com    Change in PCT <=80%  A decrease of PCT levels below or equal to 80% defines a positive change in PCT test result representing a higher risk for 28-day all-cause mortality of patients diagnosed with severe sepsis for septic shock.    Change in PCT >80%  A decrease of PCT levels of more than 80% defines a negative change in PCT result representing a lower risk for 28-day all-cause mortality of patients diagnosed with severe sepsis or septic shock.       Comprehensive Metabolic Panel [[id number]]  (Abnormal) Collected: 09/05/24 1810    Specimen: Blood Updated: 09/05/24 1909     Glucose 127 mg/dL      BUN 42 mg/dL      Creatinine 2.91 mg/dL      Sodium 139 mmol/L      Potassium 4.3 mmol/L      Chloride 101 mmol/L      CO2 21.0 mmol/L      Calcium 9.0 mg/dL      Total Protein 7.2 g/dL      Albumin 3.6 g/dL      ALT (SGPT) 26 U/L      AST (SGOT) 77 U/L      Alkaline Phosphatase 71 U/L      Total Bilirubin 0.4 mg/dL      Globulin 3.6 gm/dL      A/G Ratio 1.0 g/dL      BUN/Creatinine Ratio 14.4     Anion Gap 17.0 mmol/L      eGFR 21.4 mL/min/1.73     Narrative:      GFR Normal >60  Chronic Kidney Disease <60  Kidney Failure <15    The GFR formula is only valid for adults with stable renal function between ages 18 and 70.    CK [[id number]]  (Abnormal) Collected: 09/05/24 1810    Specimen: Blood Updated: 09/05/24 1909     Creatine Kinase 344 U/L     Magnesium [[id number]]  (Normal) Collected: 09/05/24 1810    Specimen: Blood Updated: 09/05/24 1908     Magnesium 1.8 mg/dL     Lactic Acid, Plasma [[id number]]  (Abnormal) Collected: 09/05/24 1810    Specimen: Blood Updated: 09/05/24 1903     Lactate 3.2 mmol/L     Protime-INR [[id number]]  (Abnormal) Collected: 09/05/24 1810    Specimen: Blood Updated: 09/05/24 1859     Protime 15.0 Seconds      INR 1.13    aPTT [[id number]]  " (Normal) Collected: 09/05/24 1810    Specimen: Blood Updated: 09/05/24 1859     PTT 26.6 seconds     CBC & Differential [[id number]]  (Abnormal) Collected: 09/05/24 1810    Specimen: Blood Updated: 09/05/24 1851    Narrative:      The following orders were created for panel order CBC & Differential.  Procedure                               Abnormality         Status                     ---------                               -----------         ------                     CBC Auto Differential[[id number]]        Abnormal            Final result                 Please view results for these tests on the individual orders.    CBC Auto Differential [[id number]]  (Abnormal) Collected: 09/05/24 1810    Specimen: Blood Updated: 09/05/24 1851     WBC 6.70 10*3/mm3      RBC 3.85 10*6/mm3      Hemoglobin 12.3 g/dL      Hematocrit 36.0 %      MCV 93.5 fL      MCH 31.9 pg      MCHC 34.2 g/dL      RDW 13.1 %      RDW-SD 44.9 fl      MPV 10.8 fL      Platelets 216 10*3/mm3      Neutrophil % 74.4 %      Lymphocyte % 6.9 %      Monocyte % 17.6 %      Eosinophil % 0.1 %      Basophil % 0.3 %      Immature Grans % 0.7 %      Neutrophils, Absolute 4.98 10*3/mm3      Lymphocytes, Absolute 0.46 10*3/mm3      Monocytes, Absolute 1.18 10*3/mm3      Eosinophils, Absolute 0.01 10*3/mm3      Basophils, Absolute 0.02 10*3/mm3      Immature Grans, Absolute 0.05 10*3/mm3      nRBC 0.0 /100 WBC     [place] Draw [[id number]] Collected: 09/05/24 1810    Specimen: Blood Updated: 09/05/24 1845    Narrative:      The following orders were created for panel order [place] Draw.  Procedure                               Abnormality         Status                     ---------                               -----------         ------                     Green Top (Gel)[[id number]]                                  Final result               Lavender Top[[id number]]                                     Final result               Carter Top[[id number]]                                          Final result               Light Blue Top[[id number]]                                   Final result                 Please view results for these tests on the individual orders.    Green Top (Gel) [[id number]] Collected: 09/05/24 1810    Specimen: Blood Updated: 09/05/24 1845     Extra Tube Hold for add-ons.     Comment: Auto resulted.       Lavender Top [[id number]] Collected: 09/05/24 1810    Specimen: Blood Updated: 09/05/24 1845     Extra Tube hold for add-on     Comment: Auto resulted       Gray Top [[id number]] Collected: 09/05/24 1810    Specimen: Blood Updated: 09/05/24 1845     Extra Tube Hold for add-ons.     Comment: Auto resulted.       Light Blue Top [[id number]] Collected: 09/05/24 1810    Specimen: Blood Updated: 09/05/24 1845     Extra Tube Hold for add-ons.     Comment: Auto resulted             Imaging Results (Last 24 Hours)       Procedure Component Value Units Date/Time    CT Head Without Contrast [[id number]] Collected: 09/05/24 2012     Updated: 09/05/24 2017    Narrative:      CT HEAD WO CONTRAST- 9/5/2024 6:53 PM     HISTORY: Altered mental state     COMPARISON: 5/23/2024     DLP: 1703.49 mGy.cm. All CT scans are performed using dose optimization  techniques as appropriate to the performed exam and including at least  one of the following: Automated exposure control, adjustment of the mA  and/or kV according to size, and the use of the iterative reconstruction  technique.     TECHNIQUE: Serial axial tomographic images of the brain were obtained  without the use of intravenous contrast.     FINDINGS:  The midline structures are nondisplaced. There is moderate cerebral and  cerebellar atrophy, with an associated increase in the prominence of the  ventricles and sulci. The basilar cisterns are normal in size and  configuration. There is no evidence of intracranial hemorrhage or  mass-effect. There is low attenuation in the periventricular white  matter, consistent with chronic  ischemic change. There are no abnormal  extra-axial fluid collections. There is no evidence of tonsillar  herniation.     The included orbits and their contents are unremarkable. There is  opacification of the right maxillary sinus which appears to be chronic  in nature. The visualized paranasal sinuses and mastoid air cells are  otherwise normally aerated.. There is hyperostosis frontalis interna. No  acute calvarial abnormalities present..       Impression:         1. Moderate cerebral and cerebellar atrophy with chronic microvascular  disease but no evidence of acute intracranial process.  2. Chronic appearing right maxillary sinus disease.           This report was signed and finalized on 9/5/2024 8:14 PM by Dr. Clemente Izaguirre MD.             I have personally reviewed and interpreted the radiology studies and ECG obtained at time of admission.     Assessment / Plan   Assessment:   Active Hospital Problems    Diagnosis     **Sepsis secondary to UTI     Acute encephalopathy     Subdural hematoma     Atypical parkinsonism     Primary hypertension     Cognitive decline      Treatment Plan  The patient will be admitted to my service here at [facility].   Admit to medical floor  Vitals every 4 hours  Cardiac diet  IV fluids received sepsis bolus in the emergency room continue with normal saline 75 cc an hour  Up with assistance fall precautions    Sepsis due to UTI and acute encephalopathy  Continue Rocephin 1 g IV every 24 hours  Follow-up blood and urine cultures  Reorient frequently    Cognitive decline and atypical parkinsonism  Continue Sinemet  tablets 3 times daily    DVT prophylaxis SCDs    Medical Decision Making  Number and Complexity of problems: 4 complex medical problems  Differential Diagnosis: see above    Conditions and Status        Condition is unchanged.     [facility] Data  External documents reviewed: [employer] EHR  Cardiac tracing (EKG, telemetry) interpretation: NSR  Radiology  interpretation: See above  Labs reviewed: see above  Any tests that were considered but not ordered: none     Decision rules/scores evaluated (example [id number], Wells, etc): N/A     Discussed with: Patient     Care Planning  Shared decision making: POA  Code status and discussions: DNR/DNI    Disposition  Social Determinants of Health that impact treatment or disposition: none  Estimated length of stay is over 2 midnights.     I confirmed that the patient's advanced care plan is present, code status is documented, and a surrogate decision maker is listed in the patient's medical record.     The patient's surrogate decision maker is Maciel Villalba.     The patient was seen and examined by me on 9/05/2024 at 2249.    Electronically signed by Uriah Manriquez MD, 09/05/24, 22:49 CDT.

## 2024-09-06 NOTE — PROGRESS NOTES
[facility] Emergency Room Medicine Services  INPATIENT PROGRESS NOTE    Patient Name: Speedy Villalba  Date of Admission: 9/5/2024  Today's Date: 09/06/24  Length of Stay: 1  Primary Care Physician: Sen Ramsey MD    Subjective   Chief Complaint: Mental status change  HPI     78-year-old male with past medical history of atypical parkinsonian syndrome, cognitive decline, diabetes mellitus, coronary artery disease, subdural hematoma presents to the emergency room referred from skilled nursing facility due to persistent fever, confusion and generalized pain. He has been treated for UTI with Bactrim for 2 days and continues to have elevated temperature. He presents with a temp of 100.7, heart rate of 120, normal WBC and abnormal urinalysis.   9/6  As before presented with mental status change and decline, CT head did not show any acute changes but showed atrophy consistent with dementia was treated for UTI, urine culture was positive for Proteus pan sensitive blood culture remains unremarkable, remains on IV Rocephin day #2 now afebrile white count unremarkable and room air, lactic acidosis has resolved, noticed acute renal failure, will ultrasound renal avoid all nephrotoxins, check B12 ammonia TSH, DNR, is from a [place] point plan is to return back    Review of Systems   All pertinent negatives and positives are as above. All other systems have been reviewed and are negative unless otherwise stated.     Objective    Temp:  [97.8 °F (36.6 °C)-100.7 °F (38.2 °C)] 97.8 °F (36.6 °C)  Heart Rate:  [] 86  Resp:  [18-20] 18  BP: ()/() 133/65  Physical Exam  Constitutional:       Appearance: He is obese.   HENT:      Head: Normocephalic.      Nose: Nose normal.   Eyes:      Pupils: Pupils are equal, round, and reactive to light.   Cardiovascular:      Rate and Rhythm: Normal rate and regular rhythm.   Pulmonary:      Breath sounds: Stridor present.   Abdominal:      General:  Abdomen is flat.      Palpations: Abdomen is soft.   Musculoskeletal:         General: Normal range of motion.      Cervical back: Normal range of motion.             Results Review:  I have reviewed the labs, radiology results, and diagnostic studies.    Laboratory Data:   Results from last 7 days   Lab Units 09/06/24  0317 09/05/24  1810   WBC 10*3/mm3 5.26 6.70   HEMOGLOBIN g/dL 10.1* 12.3*   HEMATOCRIT % 30.7* 36.0*   PLATELETS 10*3/mm3 160 216        Results from last 7 days   Lab Units 09/06/24  0317 09/05/24  1810   SODIUM mmol/L 141 139   POTASSIUM mmol/L 4.2 4.3   CHLORIDE mmol/L 106 101   CO2 mmol/L 23.0 21.0*   BUN mg/dL 43* 42*   CREATININE mg/dL 2.79* 2.91*   CALCIUM mg/dL 8.4* 9.0   BILIRUBIN mg/dL  --  0.4   ALK PHOS U/L  --  71   ALT (SGPT) U/L  --  26   AST (SGOT) U/L  --  77*   GLUCOSE mg/dL 146* 127*       Culture Data:   Urine Culture   Date Value Ref Range Status   09/03/2024 >100,000 CFU/mL Proteus mirabilis (A)  Final       Radiology Data:   Imaging Results (Last 24 Hours)       Procedure Component Value Units Date/Time    CT Head Without Contrast [[id number]] Collected: 09/05/24 2012     Updated: 09/05/24 2017    Narrative:      CT HEAD WO CONTRAST- 9/5/2024 6:53 PM     HISTORY: Altered mental state     COMPARISON: 5/23/2024     DLP: 1703.49 mGy.cm. All CT scans are performed using dose optimization  techniques as appropriate to the performed exam and including at least  one of the following: Automated exposure control, adjustment of the mA  and/or kV according to size, and the use of the iterative reconstruction  technique.     TECHNIQUE: Serial axial tomographic images of the brain were obtained  without the use of intravenous contrast.     FINDINGS:  The midline structures are nondisplaced. There is moderate cerebral and  cerebellar atrophy, with an associated increase in the prominence of the  ventricles and sulci. The basilar cisterns are normal in size and  configuration. There is no  evidence of intracranial hemorrhage or  mass-effect. There is low attenuation in the periventricular white  matter, consistent with chronic ischemic change. There are no abnormal  extra-axial fluid collections. There is no evidence of tonsillar  herniation.     The included orbits and their contents are unremarkable. There is  opacification of the right maxillary sinus which appears to be chronic  in nature. The visualized paranasal sinuses and mastoid air cells are  otherwise normally aerated.. There is hyperostosis frontalis interna. No  acute calvarial abnormalities present..       Impression:         1. Moderate cerebral and cerebellar atrophy with chronic microvascular  disease but no evidence of acute intracranial process.  2. Chronic appearing right maxillary sinus disease.           This report was signed and finalized on 9/5/2024 8:14 PM by Dr. Clemente Izaguirre MD.               I have reviewed the patient's current medications.     Assessment/Plan   Assessment  Active Hospital Problems    Diagnosis     **Sepsis secondary to UTI     Acute encephalopathy     Subdural hematoma     Atypical parkinsonism     Primary hypertension     Cognitive decline        Treatment Plan   Admit to medical floor  Vitals every 4 hours  Cardiac diet  IV fluids received sepsis bolus in the emergency room continue with normal saline 75 cc an hour  Up with assistance fall precautions     Sepsis due to UTI and acute encephalopathy  Continue Rocephin 1 g IV every 24 hours  Follow-up blood and urine cultures  Reorient frequently     Cognitive decline and atypical parkinsonism  Continue Sinemet  tablets 3 times daily     DVT prophylaxis SCDs     Medical Decision Making  Number and Complexity of problems: 4 complex medical problems  Differential Diagnosis: see above     Conditions and Status        Condition is unchanged.     [facility] Data  External documents reviewed: [employer] EHR  Cardiac tracing (EKG, telemetry) interpretation:  NSR  Radiology interpretation: See above  Labs reviewed: see above  Any tests that were considered but not ordered: none     Decision rules/scores evaluated (example [id number], [place], etc): N/A     Discussed with: Patient     Care Planning  Shared decision making: POA  Code status and discussions: DNR/DNI     Disposition  Social Determinants of Health that impact treatment or disposition: none  Estimated length of stay is over 2 midnights.      I confirmed that the patient's advanced care plan is present, code status is documented, and a surrogate decision maker is listed in the patient's medical record.     Electronically signed by Randee Boyd MD, 09/06/24, 10:37 CDT.

## 2024-09-06 NOTE — ED NOTES
Nursing report ED to floor  Speedy Villalba  78 y.o.  male    HPI:   Chief Complaint   Patient presents with    Weakness - Generalized    Difficulty Urinating       Admitting doctor:   Uriah Manriquez MD    Consulting provider(s):  Consults       No orders found from 8/7/2024 to 9/6/2024.             Admitting diagnosis:   The primary encounter diagnosis was Other specified sepsis. Diagnoses of Acute renal failure, unspecified acute renal failure type and Urinary tract infection without hematuria, site unspecified were also pertinent to this visit.    Code status:   Current Code Status       Date Active Code Status Order ID Comments User Context       9/5/2024 2258 No CPR (Do Not Attempt to Resuscitate) [id number]  Uriah Manriquez MD ED        Question Answer    Code Status (Patient has no pulse and is not breathing) No CPR (Do Not Attempt to Resuscitate)    Medical Interventions (Patient has pulse or is breathing) Limited Support    Medical Intervention Limits: No intubation (DNI)     No cardioversion                    Allergies:   Serna-2 inhibitors (sulfonamide)    Intake and Output  No intake or output data in the 24 hours ending 09/05/24 2319    Weight:       09/05/24  1734   Weight: 111 kg (244 lb)       Most recent vitals:   Vitals:    09/05/24 2101 09/05/24 2102 09/05/24 2116 09/05/24 2316   BP: 155/84  134/66 144/71   BP Location:       Patient Position:       Pulse:  93 86 83   Resp:       Temp:       TempSrc:       SpO2:  100% 98% 98%   Weight:       Height:         Oxygen Therapy: .    Active LDAs/IV Access:   Lines, Drains & Airways       Active LDAs       Name Placement date Placement time Site Days    Peripheral IV 09/05/24 2206 Posterior;Right Hand 09/05/24 2206  Hand  less than 1                    Labs (abnormal labs have a star):   Labs Reviewed   COMPREHENSIVE METABOLIC PANEL - Abnormal; Notable for the following components:       Result Value    Glucose 127 (*)     BUN 42 (*)   "   Creatinine 2.91 (*)     CO2 21.0 (*)     AST (SGOT) 77 (*)     Anion Gap 17.0 (*)     eGFR 21.4 (*)     All other components within normal limits    Narrative:     GFR Normal >60  Chronic Kidney Disease <60  Kidney Failure <15    The GFR formula is only valid for adults with stable renal function between ages 18 and 70.   PROTIME-INR - Abnormal; Notable for the following components:    Protime 15.0 (*)     INR 1.13 (*)     All other components within normal limits   URINALYSIS W/ CULTURE IF INDICATED - Abnormal; Notable for the following components:    Appearance, UA Turbid (*)     pH, UA >=9.0 (*)     Blood, UA Moderate (2+) (*)     Protein, UA >=300 mg/dL (3+) (*)     Leuk Esterase, UA Large (3+) (*)     All other components within normal limits    Narrative:     In absence of clinical symptoms, the presence of pyuria, bacteria, and/or nitrites on the urinalysis result does not correlate with infection.   CK - Abnormal; Notable for the following components:    Creatine Kinase 344 (*)     All other components within normal limits   LACTIC ACID, PLASMA - Abnormal; Notable for the following components:    Lactate 3.2 (*)     All other components within normal limits   PROCALCITONIN - Abnormal; Notable for the following components:    Procalcitonin 1.38 (*)     All other components within normal limits    Narrative:     As a Marker for Sepsis (Non-Neonates):    1. <0.5 ng/mL represents a low risk of severe sepsis and/or septic shock.  2. >2 ng/mL represents a high risk of severe sepsis and/or septic shock.    As a Marker for Lower Respiratory Tract Infections that require antibiotic therapy:    PCT on Admission    Antibiotic Therapy       6-12 Hrs later    >0.5                Strongly Recommended  >0.25 - <0.5        Recommended   0.1 - 0.25          Discouraged              Remeasure/reassess PCT  <0.1                Strongly Discouraged     Remeasure/reassess PCT    As 28 day mortality risk marker: \"Change in " "Procalcitonin Result\" (>80% or <=80%) if Day 0 (or Day 1) and Day 4 values are available. Refer to [email or web address]-pct-calculator.com    Change in PCT <=80%  A decrease of PCT levels below or equal to 80% defines a positive change in PCT test result representing a higher risk for 28-day all-cause mortality of patients diagnosed with severe sepsis for septic shock.    Change in PCT >80%  A decrease of PCT levels of more than 80% defines a negative change in PCT result representing a lower risk for 28-day all-cause mortality of patients diagnosed with severe sepsis or septic shock.      CBC WITH AUTO DIFFERENTIAL - Abnormal; Notable for the following components:    RBC 3.85 (*)     Hemoglobin 12.3 (*)     Hematocrit 36.0 (*)     Lymphocyte % 6.9 (*)     Monocyte % 17.6 (*)     Eosinophil % 0.1 (*)     Immature Grans % 0.7 (*)     Lymphocytes, Absolute 0.46 (*)     Monocytes, Absolute 1.18 (*)     All other components within normal limits   TROPONIN - Abnormal; Notable for the following components:    HS Troponin T 144 (*)     All other components within normal limits    Narrative:     High Sensitive Troponin T Reference Range:  <14.0 ng/L- Negative Female for AMI  <22.0 ng/L- Negative Male for AMI  >=14 - Abnormal Female indicating possible myocardial injury.  >=22 - Abnormal Male indicating possible myocardial injury.   Clinicians would have to utilize clinical acumen, EKG, Troponin, and serial changes to determine if it is an Acute Myocardial Infarction or myocardial injury due to an underlying chronic condition.        URINALYSIS, MICROSCOPIC ONLY - Abnormal; Notable for the following components:    WBC, UA Too Numerous to Count (*)     Bacteria, UA 2+ (*)     Yeast, UA Small/1+ Yeast (*)     All other components within normal limits   RESPIRATORY PANEL PCR W/ COVID-19 (SARS-COV-2), NP SWAB IN UTM/VTP, 2 HR TAT - Normal    Narrative:     In the setting of a positive respiratory panel with a viral infection PLUS a " negative procalcitonin without other underlying concern for bacterial infection, consider observing off antibiotics or discontinuation of antibiotics and continue supportive care. If the respiratory panel is positive for atypical bacterial infection (Bordetella pertussis, Chlamydophila pneumoniae, or Mycoplasma pneumoniae), consider antibiotic de-escalation to target atypical bacterial infection.   APTT - Normal   MAGNESIUM - Normal   LACTIC ACID, REFLEX - Normal   BLOOD CULTURE   BLOOD CULTURE   URINE CULTURE   RAINBOW DRAW    Narrative:     The following orders were created for panel order Lee Draw.  Procedure                               Abnormality         Status                     ---------                               -----------         ------                     Green Top (Gel)[[id number]]                                  Final result               Lavender Top[[id number]]                                     Final result               Carter Top[[id number]]                                         Final result               Light Blue Top[[id number]]                                   Final result                 Please view results for these tests on the individual orders.   HIGH SENSITIVITIY TROPONIN T 2HR   BASIC METABOLIC PANEL   CBC WITH AUTO DIFFERENTIAL   GREEN TOP   LAVENDER TOP   GRAY TOP   LIGHT BLUE TOP   CBC AND DIFFERENTIAL    Narrative:     The following orders were created for panel order CBC & Differential.  Procedure                               Abnormality         Status                     ---------                               -----------         ------                     CBC Auto Differential[[id number]]        Abnormal            Final result                 Please view results for these tests on the individual orders.   CBC AND DIFFERENTIAL    Narrative:     The following orders were created for panel order CBC & Differential.  Procedure                               Abnormality          Status                     ---------                               -----------         ------                     CBC Auto Differential[[id number]]                                                         Please view results for these tests on the individual orders.       Meds given in ED:   Medications   sodium chloride 0.9 % flush 10 mL (has no administration in time range)   aspirin EC tablet 81 mg (has no administration in time range)   atorvastatin (LIPITOR) tablet 80 mg (has no administration in time range)   carbidopa-levodopa (SINEMET)  MG per tablet 2 tablet (has no administration in time range)   tamsulosin (FLOMAX) 24 hr capsule 0.4 mg (has no administration in time range)   cefTRIAXone (ROCEPHIN) 1,000 mg in sodium chloride 0.9 % 100 mL MBP (has no administration in time range)   sodium chloride 0.9 % flush 10 mL (has no administration in time range)   sodium chloride 0.9 % flush 10 mL (has no administration in time range)   sodium chloride 0.9 % infusion 40 mL (has no administration in time range)   sodium chloride 0.9 % infusion (has no administration in time range)   Potassium Replacement - Follow Nurse / BPA Driven Protocol (has no administration in time range)   Magnesium Standard Dose Replacement - Follow Nurse / BPA Driven Protocol (has no administration in time range)   Phosphorus Replacement - Follow Nurse / BPA Driven Protocol (has no administration in time range)   Calcium Replacement - Follow Nurse / BPA Driven Protocol (has no administration in time range)   acetaminophen (TYLENOL) tablet 650 mg (has no administration in time range)     Or   acetaminophen (TYLENOL) 160 MG/5ML oral solution 650 mg (has no administration in time range)     Or   acetaminophen (TYLENOL) suppository 650 mg (has no administration in time range)   sennosides-docusate (PERICOLACE) 8.6-50 MG per tablet 2 tablet (has no administration in time range)     And   polyethylene glycol (MIRALAX) packet 17 g (has no  administration in time range)     And   bisacodyl (DULCOLAX) EC tablet 5 mg (has no administration in time range)     And   bisacodyl (DULCOLAX) suppository 10 mg (has no administration in time range)   sodium chloride 0.9 % bolus 2,061 mL (2,061 mL Intravenous New Bag 9/5/24 1939)   acetaminophen (TYLENOL) suppository 650 mg (650 mg Rectal Given 9/5/24 1925)   cefTRIAXone (ROCEPHIN) 2,000 mg in sodium chloride 0.9 % 100 mL MBP (2,000 mg Intravenous New Bag 9/5/24 2206)     sodium chloride, 75 mL/hr         NIH Stroke Scale:       Isolation/Infection(s):  No active isolations   No active infections     COVID Testing  Collected .  Resulted .    Nursing report ED to floor:  Mental status: .  Ambulatory status: .  Precautions: .    ED nurse phone extentsion- ..

## 2024-09-06 NOTE — PLAN OF CARE
Goal Outcome Evaluation:              Outcome Evaluation: Patient admitted to floor this shift.  Pt with cofusion and forgetful.  IVF infusing.  Pt incontinent of urine.  Critical trops recieved but decreased from previous value.  Facility called and asked for medication list waiting for fax. No complaints of pain.  Sinus rhythm rate 87-95

## 2024-09-07 LAB
ANION GAP SERPL CALCULATED.3IONS-SCNC: 9 MMOL/L (ref 5–15)
BACTERIA SPEC AEROBE CULT: ABNORMAL
BASOPHILS # BLD AUTO: 0.01 10*3/MM3 (ref 0–0.2)
BASOPHILS NFR BLD AUTO: 0.2 % (ref 0–1.5)
BUN SERPL-MCNC: 41 MG/DL (ref 8–23)
BUN/CREAT SERPL: 16.2 (ref 7–25)
CALCIUM SPEC-SCNC: 7.9 MG/DL (ref 8.6–10.5)
CHLORIDE SERPL-SCNC: 108 MMOL/L (ref 98–107)
CO2 SERPL-SCNC: 19 MMOL/L (ref 22–29)
CREAT SERPL-MCNC: 2.53 MG/DL (ref 0.76–1.27)
DEPRECATED RDW RBC AUTO: 46.5 FL (ref 37–54)
EGFRCR SERPLBLD CKD-EPI 2021: 25.3 ML/MIN/1.73
EOSINOPHIL # BLD AUTO: 0.05 10*3/MM3 (ref 0–0.4)
EOSINOPHIL NFR BLD AUTO: 1.1 % (ref 0.3–6.2)
ERYTHROCYTE [DISTWIDTH] IN BLOOD BY AUTOMATED COUNT: 13.2 % (ref 12.3–15.4)
GLUCOSE BLDC GLUCOMTR-MCNC: 119 MG/DL (ref 70–130)
GLUCOSE SERPL-MCNC: 97 MG/DL (ref 65–99)
HCT VFR BLD AUTO: 25.6 % (ref 37.5–51)
HGB BLD-MCNC: 8.4 G/DL (ref 13–17.7)
IMM GRANULOCYTES # BLD AUTO: 0.02 10*3/MM3 (ref 0–0.05)
IMM GRANULOCYTES NFR BLD AUTO: 0.4 % (ref 0–0.5)
LYMPHOCYTES # BLD AUTO: 0.65 10*3/MM3 (ref 0.7–3.1)
LYMPHOCYTES NFR BLD AUTO: 14 % (ref 19.6–45.3)
MCH RBC QN AUTO: 31.5 PG (ref 26.6–33)
MCHC RBC AUTO-ENTMCNC: 32.8 G/DL (ref 31.5–35.7)
MCV RBC AUTO: 95.9 FL (ref 79–97)
MONOCYTES # BLD AUTO: 0.61 10*3/MM3 (ref 0.1–0.9)
MONOCYTES NFR BLD AUTO: 13.1 % (ref 5–12)
NEUTROPHILS NFR BLD AUTO: 3.31 10*3/MM3 (ref 1.7–7)
NEUTROPHILS NFR BLD AUTO: 71.2 % (ref 42.7–76)
NRBC BLD AUTO-RTO: 0 /100 WBC (ref 0–0.2)
PLATELET # BLD AUTO: 151 10*3/MM3 (ref 140–450)
PMV BLD AUTO: 10.1 FL (ref 6–12)
POTASSIUM SERPL-SCNC: 3.7 MMOL/L (ref 3.5–5.2)
RBC # BLD AUTO: 2.67 10*6/MM3 (ref 4.14–5.8)
SODIUM SERPL-SCNC: 136 MMOL/L (ref 136–145)
WBC NRBC COR # BLD AUTO: 4.65 10*3/MM3 (ref 3.4–10.8)

## 2024-09-07 PROCEDURE — 80048 BASIC METABOLIC PNL TOTAL CA: CPT | Performed by: FAMILY MEDICINE

## 2024-09-07 PROCEDURE — 36415 COLL VENOUS BLD VENIPUNCTURE: CPT | Performed by: FAMILY MEDICINE

## 2024-09-07 PROCEDURE — 25810000003 SODIUM CHLORIDE 0.9 % SOLUTION: Performed by: FAMILY MEDICINE

## 2024-09-07 PROCEDURE — 85025 COMPLETE CBC W/AUTO DIFF WBC: CPT | Performed by: FAMILY MEDICINE

## 2024-09-07 PROCEDURE — 25010000002 CEFTRIAXONE PER 250 MG: Performed by: FAMILY MEDICINE

## 2024-09-07 PROCEDURE — 82948 REAGENT STRIP/BLOOD GLUCOSE: CPT

## 2024-09-07 RX ADMIN — NYSTATIN: 100000 POWDER TOPICAL at 21:04

## 2024-09-07 RX ADMIN — ATORVASTATIN CALCIUM 80 MG: 40 TABLET ORAL at 21:03

## 2024-09-07 RX ADMIN — Medication 10 ML: at 10:19

## 2024-09-07 RX ADMIN — SODIUM CHLORIDE 1000 MG: 900 INJECTION INTRAVENOUS at 21:04

## 2024-09-07 RX ADMIN — SODIUM CHLORIDE 75 ML/HR: 9 INJECTION, SOLUTION INTRAVENOUS at 04:01

## 2024-09-07 RX ADMIN — CARBIDOPA AND LEVODOPA 2 TABLET: 25; 100 TABLET ORAL at 21:04

## 2024-09-07 RX ADMIN — NYSTATIN: 100000 POWDER TOPICAL at 10:19

## 2024-09-07 RX ADMIN — SODIUM CHLORIDE 75 ML/HR: 9 INJECTION, SOLUTION INTRAVENOUS at 17:41

## 2024-09-07 RX ADMIN — TAMSULOSIN HYDROCHLORIDE 0.4 MG: 0.4 CAPSULE ORAL at 21:04

## 2024-09-07 RX ADMIN — CARBIDOPA AND LEVODOPA 2 TABLET: 25; 100 TABLET ORAL at 10:19

## 2024-09-07 RX ADMIN — ASPIRIN 81 MG: 81 TABLET, COATED ORAL at 10:19

## 2024-09-07 RX ADMIN — CARBIDOPA AND LEVODOPA 2 TABLET: 25; 100 TABLET ORAL at 17:41

## 2024-09-07 NOTE — PLAN OF CARE
Problem: Adult Inpatient Plan of Care  Goal: Plan of Care Review  Outcome: Ongoing, Progressing  Goal: Patient-Specific Goal (Individualized)  Outcome: Ongoing, Progressing  Goal: Absence of Hospital-Acquired Illness or Injury  Outcome: Ongoing, Progressing  Intervention: Identify and Manage Fall Risk  Recent Flowsheet Documentation  Taken 9/7/2024 0400 by Dennis Pink RN  Safety Promotion/Fall Prevention: safety round/check completed  Taken 9/7/2024 0200 by Dennis Pink RN  Safety Promotion/Fall Prevention: safety round/check completed  Taken 9/7/2024 0000 by Dennis Pink RN  Safety Promotion/Fall Prevention: safety round/check completed  Taken 9/6/2024 2200 by Dennis Pink RN  Safety Promotion/Fall Prevention: safety round/check completed  Taken 9/6/2024 2100 by Dennis Pink RN  Safety Promotion/Fall Prevention: safety round/check completed  Taken 9/6/2024 2000 by Dennis Pink RN  Safety Promotion/Fall Prevention: safety round/check completed  Taken 9/6/2024 1900 by Dennis Pink RN  Safety Promotion/Fall Prevention: safety round/check completed  Intervention: Prevent and Manage VTE (Venous Thromboembolism) Risk  Recent Flowsheet Documentation  Taken 9/6/2024 2025 by Dennis Pink RN  VTE Prevention/Management: sequential compression devices on  Goal: Optimal Comfort and Wellbeing  Outcome: Ongoing, Progressing  Intervention: Provide Person-Centered Care  Recent Flowsheet Documentation  Taken 9/6/2024 2025 by Dennis Pink RN  Trust Relationship/Rapport:   care explained   choices provided   emotional support provided   empathic listening provided   questions answered   questions encouraged  Goal: Readiness for Transition of Care  Outcome: Ongoing, Progressing     Problem: Asthma Comorbidity  Goal: Maintenance of Asthma Control  Outcome: Ongoing, Progressing     Problem: Behavioral Health Comorbidity  Goal: Maintenance of Behavioral Health Symptom Control  Outcome: Ongoing, Progressing     Problem: COPD (Chronic Obstructive  Pulmonary Disease) Comorbidity  Goal: Maintenance of COPD Symptom Control  Outcome: Ongoing, Progressing     Problem: Diabetes Comorbidity  Goal: Blood Glucose Level Within Targeted Range  Outcome: Ongoing, Progressing     Problem: Heart Failure Comorbidity  Goal: Maintenance of Heart Failure Symptom Control  Outcome: Ongoing, Progressing     Problem: Hypertension Comorbidity  Goal: Blood Pressure in Desired Range  Outcome: Ongoing, Progressing     Problem: Obstructive Sleep Apnea Risk or Actual Comorbidity Management  Goal: Unobstructed Breathing During Sleep  Outcome: Ongoing, Progressing     Problem: Osteoarthritis Comorbidity  Goal: Maintenance of Osteoarthritis Symptom Control  Outcome: Ongoing, Progressing     Problem: Pain Chronic (Persistent) (Comorbidity Management)  Goal: Acceptable Pain Control and Functional Ability  Outcome: Ongoing, Progressing  Intervention: Optimize Psychosocial Wellbeing  Recent Flowsheet Documentation  Taken 9/6/2024 2025 by Dennis Pink RN  Diversional Activities: television     Problem: Seizure Disorder Comorbidity  Goal: Maintenance of Seizure Control  Outcome: Ongoing, Progressing     Problem: Skin Injury Risk Increased  Goal: Skin Health and Integrity  Outcome: Ongoing, Progressing     Problem: Fall Injury Risk  Goal: Absence of Fall and Fall-Related Injury  Outcome: Ongoing, Progressing  Intervention: Promote Injury-Free Environment  Recent Flowsheet Documentation  Taken 9/7/2024 0400 by Dnenis Pink RN  Safety Promotion/Fall Prevention: safety round/check completed  Taken 9/7/2024 0200 by Dennis Pink RN  Safety Promotion/Fall Prevention: safety round/check completed  Taken 9/7/2024 0000 by Dennis Pink RN  Safety Promotion/Fall Prevention: safety round/check completed  Taken 9/6/2024 2200 by Dennis Pink RN  Safety Promotion/Fall Prevention: safety round/check completed  Taken 9/6/2024 2100 by Dennis Pink RN  Safety Promotion/Fall Prevention: safety round/check completed  Taken  9/6/2024 2000 by Dennis Pink, RN  Safety Promotion/Fall Prevention: safety round/check completed  Taken 9/6/2024 1900 by Dennis Pink RN  Safety Promotion/Fall Prevention: safety round/check completed   Goal Outcome Evaluation:         Patient is confused. Patient has not had any complaints of pain. Patient has slept part of the night. Patient has not had any adverse events occur.

## 2024-09-07 NOTE — PROGRESS NOTES
[facility] Medicine Services  INPATIENT PROGRESS NOTE    Patient Name: Speedy Villalba  Date of Admission: 9/5/2024  Today's Date: 09/07/24  Length of Stay: 2  Primary Care Physician: Sen Ramsey MD    Subjective   Chief Complaint: Mental status change  Weakness - Generalized    Difficulty Urinating          78-year-old male with past medical history of atypical parkinsonian syndrome, cognitive decline, diabetes mellitus, coronary artery disease, subdural hematoma presents to the emergency room referred from skilled nursing facility due to persistent fever, confusion and generalized pain. He has been treated for UTI with Bactrim for 2 days and continues to have elevated temperature. He presents with a temp of 100.7, heart rate of 120, normal WBC and abnormal urinalysis.   9/6  As before presented with mental status change and decline, CT head did not show any acute changes but showed atrophy consistent with dementia was treated for UTI, urine culture was positive for Proteus pan sensitive blood culture remains unremarkable, remains on IV Rocephin day #2 now afebrile white count unremarkable and room air, lactic acidosis has resolved, noticed acute renal failure, will ultrasound renal avoid all nephrotoxins, check B12 ammonia TSH, DNR, is from a [place] point plan is to return back  9/7  As before ultrasound renal was unremarkable kidney function is slightly better we will consult nephrology, TSH B12 ammonia unremarkable  Review of Systems   Genitourinary:  Positive for difficulty urinating.      All pertinent negatives and positives are as above. All other systems have been reviewed and are negative unless otherwise stated.     Objective    Temp:  [97.6 °F (36.4 °C)-100.3 °F (37.9 °C)] 98.7 °F (37.1 °C)  Heart Rate:  [81-88] 86  Resp:  [16-18] 16  BP: (112-160)/(46-71) 113/61  Physical Exam  Constitutional:       Appearance: He is obese.   HENT:      Head:  Normocephalic.      Nose: Nose normal.   Eyes:      Pupils: Pupils are equal, round, and reactive to light.   Cardiovascular:      Rate and Rhythm: Normal rate and regular rhythm.   Pulmonary:      Breath sounds: Stridor present.   Abdominal:      General: Abdomen is flat.      Palpations: Abdomen is soft.   Musculoskeletal:         General: Normal range of motion.      Cervical back: Normal range of motion.             Results Review:  I have reviewed the labs, radiology results, and diagnostic studies.    Laboratory Data:   Results from last 7 days   Lab Units 09/07/24 0224 09/06/24 0317 09/05/24  1810   WBC 10*3/mm3 4.65 5.26 6.70   HEMOGLOBIN g/dL 8.4* 10.1* 12.3*   HEMATOCRIT % 25.6* 30.7* 36.0*   PLATELETS 10*3/mm3 151 160 216        Results from last 7 days   Lab Units 09/07/24 0224 09/06/24 0317 09/05/24  1810   SODIUM mmol/L 136 141 139   POTASSIUM mmol/L 3.7 4.2 4.3   CHLORIDE mmol/L 108* 106 101   CO2 mmol/L 19.0* 23.0 21.0*   BUN mg/dL 41* 43* 42*   CREATININE mg/dL 2.53* 2.79* 2.91*   CALCIUM mg/dL 7.9* 8.4* 9.0   BILIRUBIN mg/dL  --   --  0.4   ALK PHOS U/L  --   --  71   ALT (SGPT) U/L  --   --  26   AST (SGOT) U/L  --   --  77*   GLUCOSE mg/dL 97 146* 127*       Culture Data:   Urine Culture   Date Value Ref Range Status   09/03/2024 >100,000 CFU/mL Proteus mirabilis (A)  Final       Radiology Data:   Imaging Results (Last 24 Hours)       Procedure Component Value Units Date/Time    US Renal Bilateral [[id number]] Collected: 09/06/24 1442     Updated: 09/06/24 1447    Narrative:      EXAMINATION: US RENAL BILATERAL-     9/6/2024 12:35 PM     HISTORY: ALEA; A41.89-Other specified sepsis; N17.9-Acute kidney failure,  unspecified; N39.0-Urinary tract infection, site not specified     Grayscale and color-flow renal ultrasound.     Normal size and position of both kidneys.  Normal cortical thickness and normal cortical echogenicity.     No hydronephrosis or shadowing stone.     The right kidney measures  126 x 67 x 56 mm.  7 cm upper pole cyst.     The LEFT kidney is less well visualized.  The left kidney measures 110 x 71 x 62 mm.       Impression:      Impression:  1. No acute kidney abnormality is seen.           This report was signed and finalized on 9/6/2024 2:43 PM by Dr. Eyal Morin MD.               I have reviewed the patient's current medications.     Assessment/Plan   Assessment  Active Hospital Problems    Diagnosis     **Sepsis secondary to UTI     Acute encephalopathy     Subdural hematoma     Atypical parkinsonism     Primary hypertension     Cognitive decline        Treatment Plan   Admit to medical floor  Vitals every 4 hours  Cardiac diet  IV fluids received sepsis bolus in the emergency room continue with normal saline 75 cc an hour  Up with assistance fall precautions     Sepsis due to UTI and acute encephalopathy  Continue Rocephin 1 g IV every 24 hours  Follow-up blood and urine cultures  Reorient frequently     Cognitive decline and atypical parkinsonism  Continue Sinemet  tablets 3 times daily     DVT prophylaxis SCDs     Medical Decision Making  Number and Complexity of problems: 4 complex medical problems  Differential Diagnosis: see above     Conditions and Status        Condition is unchanged.     MDM Data  External documents reviewed: [facility]  Cardiac tracing (EKG, telemetry) interpretation: NSR  Radiology interpretation: See above  Labs reviewed: see above  Any tests that were considered but not ordered: none     Decision rules/scores evaluated (example [id number], [place], etc): N/A     Discussed with: Patient     Care Planning  Shared decision making: POA  Code status and discussions: DNR/DNI     Disposition  Social Determinants of Health that impact treatment or disposition: none  Estimated length of stay is over 2 midnights.      I confirmed that the patient's advanced care plan is present, code status is documented, and a surrogate decision maker is listed in the  patient's medical record.     Electronically signed by Randee Boyd MD, 09/07/24, 10:35 CDT.

## 2024-09-07 NOTE — CONSULTS
Nephrology ([facility]) Consult Note      Patient:  Speedy Villalba  YOB: 1945  Date of Service: 9/7/2024  MRN: [id number]   Acct: [id number]   Primary Care Physician: Sen Ramsey MD  Advance Directive:   Code Status and Medical Interventions: No CPR (Do Not Attempt to Resuscitate); Limited Support; No intubation (DNI), No cardioversion   Ordered at: 09/05/24 2083     Medical Intervention Limits:    No intubation (DNI)    No cardioversion     Code Status (Patient has no pulse and is not breathing):    No CPR (Do Not Attempt to Resuscitate)     Medical Interventions (Patient has pulse or is breathing):    Limited Support     Admit Date: 9/5/2024       Hospital Day: 2  Referring Provider: Uriah Manriquez,*      Patient Seen, Chart, Consults, Notes, Labs, Radiology studies reviewed.      Subjective:  Speedy Villalba is a 78 y.o. male  whom we were consulted for acute kidney injury.  Patient has a history of Parkinson disease, cognitive impairment, type 2 diabetes, obesity, coronary artery disease, history of subdural hematoma.  He is a chronic resident of a nursing home.  He was complaining of fever with generalized pain.  He was also found to be little more confused.  He was recently diagnosed with UTI and given Bactrim.  On presentation his temperature was 100.7 and he was tachycardic.  His serum creatinine usually ranges 1.1-1.2.  It was 2.9 on admission and patient was started along with antibiotics and some IV fluids.  He noticed improved urine output.  His serum creatinine is slowly improving.  Patient is a poor historian.    Allergies:  Serna-2 inhibitors (sulfonamide)    Home Meds:  Medications Prior to Admission   Medication Sig Dispense Refill Last Dose    acetaminophen (TYLENOL) 325 MG tablet Take 2 tablets by mouth 2 (Two) Times a Day.       allopurinol (ZYLOPRIM) 100 MG tablet Take 1 tablet by mouth Daily.       amoxicillin-clavulanate (AUGMENTIN)  875-125 MG per tablet Take 1 tablet by mouth 2 (Two) Times a Day.       aspirin 81 MG EC tablet Take 1 tablet by mouth Daily.       atorvastatin (LIPITOR) 80 MG tablet Take 1 tablet by mouth Every Night. 90 tablet 0     carbidopa-levodopa (SINEMET)  MG per tablet Take 2 tablets by mouth 3 (Three) Times a Day. 180 tablet 3     carboxymethylcellulose (REFRESH PLUS) 0.5 % solution Administer 2 drops to both eyes 3 (Three) Times a Day As Needed for Dry Eyes.       Cholecalciferol (VITAMIN D3) 125 MCG (5000 UT) tablet tablet Take 1 tablet by mouth Daily.       Cranberry-Vitamin C-Inulin (UTI-Stat) liquid Take 30 mL by mouth 2 (Two) Times a Day. Mix with 120mL of water or juice       cyclobenzaprine (FLEXERIL) 10 MG tablet Take 1 tablet by mouth Every 8 (Eight) Hours As Needed for Muscle Spasms.       Divalproex Sodium (DEPAKOTE SPRINKLE) 125 MG capsule Take 2 capsules by mouth 2 (Two) Times a Day.       estrogens, conjugated, (PREMARIN) 0.45 MG tablet Take 1 tablet by mouth Every Night.       finasteride (PROSCAR) 5 MG tablet Take 1 tablet by mouth Daily.       guaifenesin (ROBITUSSIN) 100 MG/5ML liquid Take 20 mL by mouth Every 4 (Four) Hours As Needed for Cough.       hydrocortisone (Preparation H) 1 % cream Apply 1 Application topically to the appropriate area as directed Every 6 (Six) Hours As Needed (hemorrhoids).       losartan (COZAAR) 50 MG tablet Take 1 tablet by mouth Daily.       Magnesium Oxide -Mg Supplement 400 (240 Mg) MG tablet Take 1 tablet by mouth Daily.       Melatonin 10 MG tablet Take 1 tablet by mouth Every Night.       Menthol, Topical Analgesic, (BIOFREEZE EX) Apply 1 Application topically 2 (Two) Times a Day.       multivitamin with minerals (Centrum Silver Adult 50+) tablet tablet Take 1 tablet by mouth Daily.       ondansetron ODT (ZOFRAN-ODT) 4 MG disintegrating tablet Place 1 tablet on the tongue Every 6 (Six) Hours As Needed for Nausea or Vomiting.       polyethylene glycol (MIRALAX)  17 g packet Take 17 g by mouth 2 (Two) Times a Day As Needed (constipation).       rivastigmine (EXELON) 1.5 MG capsule Take 1 capsule by mouth 2 (Two) Times a Day.       spironolactone (ALDACTONE) 25 MG tablet Take 1 tablet by mouth Daily.          Medicines:  Current Facility-Administered Medications   Medication Dose Route Frequency Provider Last Rate Last Admin    acetaminophen (TYLENOL) tablet 650 mg  650 mg Oral Q4H PRN Uriah Manriquez MD        Or    acetaminophen (TYLENOL) 160 MG/5ML oral solution 650 mg  650 mg Oral Q4H PRN Uriah Manriquez MD        Or    acetaminophen (TYLENOL) suppository 650 mg  650 mg Rectal Q4H PRN Uriah Manriquez MD        aspirin EC tablet 81 mg  81 mg Oral Daily Uriah Manriquez MD   81 mg at 09/07/24 1019    atorvastatin (LIPITOR) tablet 80 mg  80 mg Oral Nightly Uriah Manriquez MD   80 mg at 09/06/24 2020    sennosides-docusate (PERICOLACE) 8.6-50 MG per tablet 2 tablet  2 tablet Oral BID PRN Uriah Manriquez MD        And    polyethylene glycol (MIRALAX) packet 17 g  17 g Oral Daily PRN Uriah Manriquez MD        And    bisacodyl (DULCOLAX) EC tablet 5 mg  5 mg Oral Daily PRN Uriah Manriquez MD        And    bisacodyl (DULCOLAX) suppository 10 mg  10 mg Rectal Daily PRN Uriah Manriquez MD        Calcium Replacement - Follow Nurse / BPA Driven Protocol   Does not apply PRN Uriah Manriquez MD        carbidopa-levodopa (SINEMET)  MG per tablet 2 tablet  2 tablet Oral TID Uriah Manriquez MD   2 tablet at 09/07/24 1019    cefTRIAXone (ROCEPHIN) 1,000 mg in sodium chloride 0.9 % 100 mL MBP  1,000 mg Intravenous Q24H Uriah Manriquez  mL/hr at 09/06/24 2113 1,000 mg at 09/06/24 2113    Magnesium Standard Dose Replacement - Follow Nurse / BPA Driven Protocol   Does not apply PRN Uriah Manriquez MD        nystatin (MYCOSTATIN) powder   Topical Q12H Randee Boyd,  MD   Given at 09/07/24 1019    Phosphorus Replacement - Follow Nurse / BPA Driven Protocol   Does not apply PRN Uriah Manriquez MD        Potassium Replacement - Follow Nurse / BPA Driven Protocol   Does not apply PRN Uriah Manriquez MD        sodium chloride 0.9 % flush 10 mL  10 mL Intravenous PRN Uriah Manriquez MD        sodium chloride 0.9 % flush 10 mL  10 mL Intravenous Q12H Uriah Manriquez MD   10 mL at 09/07/24 1019    sodium chloride 0.9 % flush 10 mL  10 mL Intravenous PRN Uriah Manriquez MD        sodium chloride 0.9 % infusion 40 mL  40 mL Intravenous PRN Uriah Manriquez MD        sodium chloride 0.9 % infusion  75 mL/hr Intravenous Continuous Uriah Manriquez MD 75 mL/hr at 09/07/24 0401 75 mL/hr at 09/07/24 0401    tamsulosin (FLOMAX) 24 hr capsule 0.4 mg  0.4 mg Oral Nightly Uriah Manriquez MD   0.4 mg at 09/06/24 2020       Past Medical History:  Past Medical History:   Diagnosis Date    Acute encephalopathy 9/6/2024    Arthritis     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    Cognitive decline 10/30/2023    Coronary artery disease 10 years    x2 stents    Diabetes mellitus < 2 years    non-insulin dependent    Difficulty walking < 1 year    multiple falls in the last 6-8 months    DNR (do not resuscitate)     Gout     HL (hearing loss) 20+ years    Wears hearing aids occasionally    Hyperlipidemia 5 years    Insomnia     Lewy body dementia     Memory loss 3 years+/-    gradual and subtle    Muscle weakness     Primary hypertension 10/30/2023    Sleep apnea 20+ year    Non-compliant with PEEP for tx    Stroke 11/2022    Subdural hematoma     TIA (transient ischemic attack) 01/22/2024       Past Surgical History:  Past Surgical History:   Procedure Laterality Date    APPENDECTOMY      BACK SURGERY  2016    CAROTID STENT  2010?    KNEE SURGERY  1960    LAMINECTOMY  2015?    discectomy & fusion       Family History  Family History   Problem  "Relation Age of Onset    Parkinsonism Father        Social History  Social History     Socioeconomic History    Marital status:    Tobacco Use    Smoking status: Never     Passive exposure: Past    Smokeless tobacco: Never   Vaping Use    Vaping status: Never Used   Substance and Sexual Activity    Alcohol use: Yes     Alcohol/week: 3.0 standard drinks of alcohol     Types: 3 Drinks containing 0.5 oz of alcohol per week    Drug use: Never    Sexual activity: Not Currently     Partners: Female     Birth control/protection: Vasectomy         Review of Systems:  History obtained from chart review and the patient  General ROS: No fever or chills  Respiratory ROS: No cough, shortness of breath, wheezing  Cardiovascular ROS: no chest pain or dyspnea on exertion  Gastrointestinal ROS: No abdominal pain or melena  Genito-Urinary ROS: No dysuria or hematuria  14 point ROS reviewed with the patient and negative except as noted above and in the HPI unless unable to obtain.    Objective:  /47 (BP Location: Right arm, Patient Position: Sitting)   Pulse 78   Temp 99.1 °F (37.3 °C) (Oral)   Resp 16   Ht 173 cm (68.11\")   Wt 113 kg (250 lb)   SpO2 94%   BMI 37.89 kg/m²     Intake/Output Summary (Last 24 hours) at 9/7/2024 1351  Last data filed at 9/7/2024 1104  Gross per 24 hour   Intake 740 ml   Output 2550 ml   Net -1810 ml     General: awake/alert   Head: Atraumatic, normocephalic  Neck: No masses, no JVD  Chest:  clear to auscultation bilaterally without respiratory distress  CVS: regular rate and rhythm  Abdominal: soft, nontender, normal bowel sounds  Extremities: no cyanosis or edema  Skin: warm and dry without rash  Neuro: No focal motor deficits  Musculoskeletal: No obvious joint effusions    Labs:  Lab Results (last 72 hours)       Procedure Component Value Units Date/Time    POC Glucose Once [[id number]]  (Normal) Collected: 09/07/24 1107    Specimen: Blood Updated: 09/07/24 1212     Glucose 119 mg/dL  "     Comment: : [id number] Damir LisaMeter ID: [id number]       Basic Metabolic Panel [[id number]]  (Abnormal) Collected: 09/07/24 0224    Specimen: Blood Updated: 09/07/24 0319     Glucose 97 mg/dL      BUN 41 mg/dL      Creatinine 2.53 mg/dL      Sodium 136 mmol/L      Potassium 3.7 mmol/L      Comment: Slight hemolysis detected by analyzer. Result may be falsely elevated.        Chloride 108 mmol/L      CO2 19.0 mmol/L      Calcium 7.9 mg/dL      BUN/Creatinine Ratio 16.2     Anion Gap 9.0 mmol/L      eGFR 25.3 mL/min/1.73     Narrative:      GFR Normal >60  Chronic Kidney Disease <60  Kidney Failure <15    The GFR formula is only valid for adults with stable renal function between ages 18 and 70.    CBC & Differential [[id number]]  (Abnormal) Collected: 09/07/24 0224    Specimen: Blood Updated: 09/07/24 0300    Narrative:      The following orders were created for panel order CBC & Differential.  Procedure                               Abnormality         Status                     ---------                               -----------         ------                     CBC Auto Differential[[id number]]        Abnormal            Final result                 Please view results for these tests on the individual orders.    CBC Auto Differential [[id number]]  (Abnormal) Collected: 09/07/24 0224    Specimen: Blood Updated: 09/07/24 0300     WBC 4.65 10*3/mm3      RBC 2.67 10*6/mm3      Hemoglobin 8.4 g/dL      Hematocrit 25.6 %      MCV 95.9 fL      MCH 31.5 pg      MCHC 32.8 g/dL      RDW 13.2 %      RDW-SD 46.5 fl      MPV 10.1 fL      Platelets 151 10*3/mm3      Neutrophil % 71.2 %      Lymphocyte % 14.0 %      Monocyte % 13.1 %      Eosinophil % 1.1 %      Basophil % 0.2 %      Immature Grans % 0.4 %      Neutrophils, Absolute 3.31 10*3/mm3      Lymphocytes, Absolute 0.65 10*3/mm3      Monocytes, Absolute 0.61 10*3/mm3      Eosinophils, Absolute 0.05 10*3/mm3      Basophils, Absolute 0.01 10*3/mm3      Immature  Grans, Absolute 0.02 10*3/mm3      nRBC 0.0 /100 WBC     Vitamin B12 [[id number]]  (Normal) Collected: 09/06/24 1119    Specimen: Blood Updated: 09/06/24 2006     Vitamin B-12 307 pg/mL     Narrative:      Results may be falsely increased if patient taking Biotin.      Blood Culture - Blood, Hand, Right [[id number]]  (Normal) Collected: 09/05/24 1936    Specimen: Blood from Hand, Right Updated: 09/06/24 2000     Blood Culture No growth at 24 hours    Blood Culture - Blood, Hand, Left [[id number]]  (Normal) Collected: 09/05/24 1810    Specimen: Blood from Hand, Left Updated: 09/06/24 1930     Blood Culture No growth at 24 hours    Urine Culture - Urine, Straight Cath [[id number]]  (Abnormal) Collected: 09/05/24 1936    Specimen: Urine from Straight Cath Updated: 09/06/24 1210     Urine Culture >100,000 CFU/mL Gram Negative Bacilli    Narrative:      Colonization of the urinary tract without infection is common. Treatment is discouraged unless the patient is symptomatic, pregnant, or undergoing an invasive urologic procedure.    TSH [[id number]]  (Normal) Collected: 09/06/24 1119    Specimen: Blood Updated: 09/06/24 1151     TSH 0.775 uIU/mL     Ammonia [[id number]]  (Normal) Collected: 09/06/24 1119    Specimen: Blood Updated: 09/06/24 1145     Ammonia 45 umol/L     CBC & Differential [[id number]]  (Abnormal) Collected: 09/06/24 0317    Specimen: Blood Updated: 09/06/24 0539    Narrative:      The following orders were created for panel order CBC & Differential.  Procedure                               Abnormality         Status                     ---------                               -----------         ------                     CBC Auto Differential[[id number]]        Abnormal            Final result                 Please view results for these tests on the individual orders.    CBC Auto Differential [[id number]]  (Abnormal) Collected: 09/06/24 0317    Specimen: Blood Updated: 09/06/24 0539     WBC 5.26 10*3/mm3       RBC 3.18 10*6/mm3      Hemoglobin 10.1 g/dL      Hematocrit 30.7 %      MCV 96.5 fL      MCH 31.8 pg      MCHC 32.9 g/dL      RDW 13.1 %      RDW-SD 46.5 fl      MPV 10.7 fL      Platelets 160 10*3/mm3     Manual Differential [[id number]]  (Abnormal) Collected: 09/06/24 0317    Specimen: Blood Updated: 09/06/24 0539     Neutrophil % 69.7 %      Lymphocyte % 10.1 %      Monocyte % 7.1 %      Eosinophil % 0.0 %      Basophil % 0.0 %      Bands %  13.1 %      Neutrophils Absolute 4.36 10*3/mm3      Lymphocytes Absolute 0.53 10*3/mm3      Monocytes Absolute 0.37 10*3/mm3      Eosinophils Absolute 0.00 10*3/mm3      Basophils Absolute 0.00 10*3/mm3      Anisocytosis Slight/1+     Polychromasia Slight/1+     WBC Morphology Normal     Clumped Platelets Present    Basic Metabolic Panel [[id number]]  (Abnormal) Collected: 09/06/24 0317    Specimen: Blood Updated: 09/06/24 0458     Glucose 146 mg/dL      BUN 43 mg/dL      Creatinine 2.79 mg/dL      Sodium 141 mmol/L      Potassium 4.2 mmol/L      Chloride 106 mmol/L      CO2 23.0 mmol/L      Calcium 8.4 mg/dL      BUN/Creatinine Ratio 15.4     Anion Gap 12.0 mmol/L      eGFR 22.5 mL/min/1.73     Narrative:      GFR Normal >60  Chronic Kidney Disease <60  Kidney Failure <15    The GFR formula is only valid for adults with stable renal function between ages 18 and 70.    High Sensitivity Troponin T 2Hr [[id number]]  (Abnormal) Collected: 09/05/24 2352    Specimen: Blood Updated: 09/06/24 0027     HS Troponin T 137 ng/L      Troponin T Delta -7 ng/L     Narrative:      High Sensitive Troponin T Reference Range:  <14.0 ng/L- Negative Female for AMI  <22.0 ng/L- Negative Male for AMI  >=14 - Abnormal Female indicating possible myocardial injury.  >=22 - Abnormal Male indicating possible myocardial injury.   Clinicians would have to utilize clinical acumen, EKG, Troponin, and serial changes to determine if it is an Acute Myocardial Infarction or myocardial injury due to an underlying  chronic condition.         STAT Lactic Acid, Reflex [[id number]]  (Normal) Collected: 09/05/24 2204    Specimen: Blood Updated: 09/05/24 2236     Lactate 1.0 mmol/L     Urinalysis With Culture If Indicated - Straight Cath [[id number]]  (Abnormal) Collected: 09/05/24 1936    Specimen: Urine from Straight Cath Updated: 09/05/24 2015     Color, UA Yellow     Appearance, UA Turbid     pH, UA >=9.0     Specific Gravity, UA 1.011     Glucose, UA Negative     Ketones, UA Negative     Bilirubin, UA Negative     Blood, UA Moderate (2+)     Protein, UA >=300 mg/dL (3+)     Leuk Esterase, UA Large (3+)     Nitrite, UA Negative     Urobilinogen, UA 0.2 E.U./dL    Narrative:      In absence of clinical symptoms, the presence of pyuria, bacteria, and/or nitrites on the urinalysis result does not correlate with infection.    Urinalysis, Microscopic Only - Straight Cath [[id number]]  (Abnormal) Collected: 09/05/24 1936    Specimen: Urine from Straight Cath Updated: 09/05/24 2015     RBC, UA 0-2 /HPF      WBC, UA Too Numerous to Count /HPF      Bacteria, UA 2+ /HPF      Squamous Epithelial Cells, UA 0-2 /HPF      Yeast, UA Small/1+ Yeast /HPF      Hyaline Casts, UA None Seen /LPF      Methodology Manual Light Microscopy    Respiratory Panel PCR w/COVID-19(SARS-CoV-2) ELIZABETH/DALLIN/OLINDA/PAD/[facility]/MARY In-House, NP Swab in UTM/VTM, 2 HR TAT - Swab, Nasopharynx [[id number]]  (Normal) Collected: 09/05/24 1904    Specimen: Swab from Nasopharynx Updated: 09/05/24 2014     ADENOVIRUS, PCR Not Detected     Coronavirus 229E Not Detected     Coronavirus HKU1 Not Detected     Coronavirus NL63 Not Detected     Coronavirus OC43 Not Detected     COVID19 Not Detected     Human Metapneumovirus Not Detected     Human Rhinovirus/Enterovirus Not Detected     Influenza A PCR Not Detected     Influenza B PCR Not Detected     Parainfluenza Virus 1 Not Detected     Parainfluenza Virus 2 Not Detected     Parainfluenza Virus 3 Not Detected     Parainfluenza Virus 4 Not  Detected     RSV, PCR Not Detected     Bordetella pertussis pcr Not Detected     Bordetella parapertussis PCR Not Detected     Chlamydophila pneumoniae PCR Not Detected     Mycoplasma pneumo by PCR Not Detected    Narrative:      In the setting of a positive respiratory panel with a viral infection PLUS a negative procalcitonin without other underlying concern for bacterial infection, consider observing off antibiotics or discontinuation of antibiotics and continue supportive care. If the respiratory panel is positive for atypical bacterial infection (Bordetella pertussis, Chlamydophila pneumoniae, or Mycoplasma pneumoniae), consider antibiotic de-escalation to target atypical bacterial infection.    High Sensitivity Troponin T [[id number]]  (Abnormal) Collected: 09/05/24 1810    Specimen: Blood Updated: 09/05/24 1918     HS Troponin T 144 ng/L     Narrative:      High Sensitive Troponin T Reference Range:  <14.0 ng/L- Negative Female for AMI  <22.0 ng/L- Negative Male for AMI  >=14 - Abnormal Female indicating possible myocardial injury.  >=22 - Abnormal Male indicating possible myocardial injury.   Clinicians would have to utilize clinical acumen, EKG, Troponin, and serial changes to determine if it is an Acute Myocardial Infarction or myocardial injury due to an underlying chronic condition.         Procalcitonin [[id number]]  (Abnormal) Collected: 09/05/24 1810    Specimen: Blood Updated: 09/05/24 1913     Procalcitonin 1.38 ng/mL     Narrative:      As a Marker for Sepsis (Non-Neonates):    1. <0.5 ng/mL represents a low risk of severe sepsis and/or septic shock.  2. >2 ng/mL represents a high risk of severe sepsis and/or septic shock.    As a Marker for Lower Respiratory Tract Infections that require antibiotic therapy:    PCT on Admission    Antibiotic Therapy       6-12 Hrs later    >0.5                Strongly Recommended  >0.25 - <0.5        Recommended   0.1 - 0.25          Discouraged               "Remeasure/reassess PCT  <0.1                Strongly Discouraged     Remeasure/reassess PCT    As 28 day mortality risk marker: \"Change in Procalcitonin Result\" (>80% or <=80%) if Day 0 (or Day 1) and Day 4 values are available. Refer to http://[email or web address]-pct-calculator.com    Change in PCT <=80%  A decrease of PCT levels below or equal to 80% defines a positive change in PCT test result representing a higher risk for 28-day all-cause mortality of patients diagnosed with severe sepsis for septic shock.    Change in PCT >80%  A decrease of PCT levels of more than 80% defines a negative change in PCT result representing a lower risk for 28-day all-cause mortality of patients diagnosed with severe sepsis or septic shock.       Comprehensive Metabolic Panel [[id number]]  (Abnormal) Collected: 09/05/24 1810    Specimen: Blood Updated: 09/05/24 1909     Glucose 127 mg/dL      BUN 42 mg/dL      Creatinine 2.91 mg/dL      Sodium 139 mmol/L      Potassium 4.3 mmol/L      Chloride 101 mmol/L      CO2 21.0 mmol/L      Calcium 9.0 mg/dL      Total Protein 7.2 g/dL      Albumin 3.6 g/dL      ALT (SGPT) 26 U/L      AST (SGOT) 77 U/L      Alkaline Phosphatase 71 U/L      Total Bilirubin 0.4 mg/dL      Globulin 3.6 gm/dL      A/G Ratio 1.0 g/dL      BUN/Creatinine Ratio 14.4     Anion Gap 17.0 mmol/L      eGFR 21.4 mL/min/1.73     Narrative:      GFR Normal >60  Chronic Kidney Disease <60  Kidney Failure <15    The GFR formula is only valid for adults with stable renal function between ages 18 and 70.    CK [[id number]]  (Abnormal) Collected: 09/05/24 1810    Specimen: Blood Updated: 09/05/24 1909     Creatine Kinase 344 U/L     Magnesium [[id number]]  (Normal) Collected: 09/05/24 1810    Specimen: Blood Updated: 09/05/24 1908     Magnesium 1.8 mg/dL     Lactic Acid, Plasma [[id number]]  (Abnormal) Collected: 09/05/24 1810    Specimen: Blood Updated: 09/05/24 1903     Lactate 3.2 mmol/L     Protime-INR [[id number]]  (Abnormal) " Collected: 09/05/24 1810    Specimen: Blood Updated: 09/05/24 1859     Protime 15.0 Seconds      INR 1.13    aPTT [[id number]]  (Normal) Collected: 09/05/24 1810    Specimen: Blood Updated: 09/05/24 1859     PTT 26.6 seconds     CBC & Differential [[id number]]  (Abnormal) Collected: 09/05/24 1810    Specimen: Blood Updated: 09/05/24 1851    Narrative:      The following orders were created for panel order CBC & Differential.  Procedure                               Abnormality         Status                     ---------                               -----------         ------                     CBC Auto Differential[[id number]]        Abnormal            Final result                 Please view results for these tests on the individual orders.    CBC Auto Differential [[id number]]  (Abnormal) Collected: 09/05/24 1810    Specimen: Blood Updated: 09/05/24 1851     WBC 6.70 10*3/mm3      RBC 3.85 10*6/mm3      Hemoglobin 12.3 g/dL      Hematocrit 36.0 %      MCV 93.5 fL      MCH 31.9 pg      MCHC 34.2 g/dL      RDW 13.1 %      RDW-SD 44.9 fl      MPV 10.8 fL      Platelets 216 10*3/mm3      Neutrophil % 74.4 %      Lymphocyte % 6.9 %      Monocyte % 17.6 %      Eosinophil % 0.1 %      Basophil % 0.3 %      Immature Grans % 0.7 %      Neutrophils, Absolute 4.98 10*3/mm3      Lymphocytes, Absolute 0.46 10*3/mm3      Monocytes, Absolute 1.18 10*3/mm3      Eosinophils, Absolute 0.01 10*3/mm3      Basophils, Absolute 0.02 10*3/mm3      Immature Grans, Absolute 0.05 10*3/mm3      nRBC 0.0 /100 WBC     [place] Draw [[id number]] Collected: 09/05/24 1810    Specimen: Blood Updated: 09/05/24 1845    Narrative:      The following orders were created for panel order [place] Draw.  Procedure                               Abnormality         Status                     ---------                               -----------         ------                     Green Top (Gel)[[id number]]                                  Final result                Lavender Top[[id number]]                                     Final result               Carter Top[[id number]]                                         Final result               Light Blue Top[[id number]]                                   Final result                 Please view results for these tests on the individual orders.    Green Top (Gel) [[id number]] Collected: 09/05/24 1810    Specimen: Blood Updated: 09/05/24 1845     Extra Tube Hold for add-ons.     Comment: Auto resulted.       Lavender Top [[id number]] Collected: 09/05/24 1810    Specimen: Blood Updated: 09/05/24 1845     Extra Tube hold for add-on     Comment: Auto resulted       Gray Top [[id number]] Collected: 09/05/24 1810    Specimen: Blood Updated: 09/05/24 1845     Extra Tube Hold for add-ons.     Comment: Auto resulted.       Light Blue Top [[id number]] Collected: 09/05/24 1810    Specimen: Blood Updated: 09/05/24 1845     Extra Tube Hold for add-ons.     Comment: Auto resulted               Radiology:   Imaging Results (Last 72 Hours)       Procedure Component Value Units Date/Time    US Renal Bilateral [[id number]] Collected: 09/06/24 1442     Updated: 09/06/24 1447    Narrative:      EXAMINATION: US RENAL BILATERAL-     9/6/2024 12:35 PM     HISTORY: ALEA; A41.89-Other specified sepsis; N17.9-Acute kidney failure,  unspecified; N39.0-Urinary tract infection, site not specified     Grayscale and color-flow renal ultrasound.     Normal size and position of both kidneys.  Normal cortical thickness and normal cortical echogenicity.     No hydronephrosis or shadowing stone.     The right kidney measures 126 x 67 x 56 mm.  7 cm upper pole cyst.     The LEFT kidney is less well visualized.  The left kidney measures 110 x 71 x 62 mm.       Impression:      Impression:  1. No acute kidney abnormality is seen.           This report was signed and finalized on 9/6/2024 2:43 PM by Dr. Eyal Morin MD.       CT Head Without Contrast [[id number]] Collected:  "09/05/24 2012     Updated: 09/05/24 2017    Narrative:      CT HEAD WO CONTRAST- 9/5/2024 6:53 PM     HISTORY: Altered mental state     COMPARISON: 5/23/2024     DLP: 1703.49 mGy.cm. All CT scans are performed using dose optimization  techniques as appropriate to the performed exam and including at least  one of the following: Automated exposure control, adjustment of the mA  and/or kV according to size, and the use of the iterative reconstruction  technique.     TECHNIQUE: Serial axial tomographic images of the brain were obtained  without the use of intravenous contrast.     FINDINGS:  The midline structures are nondisplaced. There is moderate cerebral and  cerebellar atrophy, with an associated increase in the prominence of the  ventricles and sulci. The basilar cisterns are normal in size and  configuration. There is no evidence of intracranial hemorrhage or  mass-effect. There is low attenuation in the periventricular white  matter, consistent with chronic ischemic change. There are no abnormal  extra-axial fluid collections. There is no evidence of tonsillar  herniation.     The included orbits and their contents are unremarkable. There is  opacification of the right maxillary sinus which appears to be chronic  in nature. The visualized paranasal sinuses and mastoid air cells are  otherwise normally aerated.. There is hyperostosis frontalis interna. No  acute calvarial abnormalities present..       Impression:         1. Moderate cerebral and cerebellar atrophy with chronic microvascular  disease but no evidence of acute intracranial process.  2. Chronic appearing right maxillary sinus disease.           This report was signed and finalized on 9/5/2024 8:14 PM by Dr. Clemente Izaguirre MD.               Culture:  No components found for: \"WOUNDCUL\", \"3\"  No components found for: \"CSFCUL\", \"3\"  No components found for: \"BC\", \"3\"  No components found for: \"URINECUL\", \"3\"      Assessment   -Acute kidney " injury-prerenal azotemia versus AIN  -Type 2 diabetes  -Urine tract infection  -Morbid obesity  -Anemia of chronic disease, also rule out blood loss      Plan:  Agree with current management of IV fluids and IV antibiotics.  Avoid hypotension and nephrotoxins.  Will check uric acid and PTH level.      Thank you for the consult, we appreciate the opportunity to provide care to your patients.  Feel free to contact me if I can be of any further assistance.      Chan Ag MD  9/7/2024  13:51 CDT

## 2024-09-07 NOTE — PLAN OF CARE
Goal Outcome Evaluation:  Plan of Care Reviewed With: patient        Progress: no change  Outcome Evaluation: Only oriented to self this shift. No changes. Bed alarm set.

## 2024-09-08 LAB
ALBUMIN SERPL-MCNC: 2.7 G/DL (ref 3.5–5.2)
ALBUMIN/GLOB SERPL: 1 G/DL
ALP SERPL-CCNC: 66 U/L (ref 39–117)
ALT SERPL W P-5'-P-CCNC: 19 U/L (ref 1–41)
ANION GAP SERPL CALCULATED.3IONS-SCNC: 8 MMOL/L (ref 5–15)
AST SERPL-CCNC: 156 U/L (ref 1–40)
BASOPHILS # BLD AUTO: 0.02 10*3/MM3 (ref 0–0.2)
BASOPHILS NFR BLD AUTO: 0.3 % (ref 0–1.5)
BILIRUB SERPL-MCNC: 0.2 MG/DL (ref 0–1.2)
BUN SERPL-MCNC: 35 MG/DL (ref 8–23)
BUN/CREAT SERPL: 18.4 (ref 7–25)
CALCIUM SPEC-SCNC: 8.4 MG/DL (ref 8.6–10.5)
CHLORIDE SERPL-SCNC: 110 MMOL/L (ref 98–107)
CO2 SERPL-SCNC: 22 MMOL/L (ref 22–29)
CREAT SERPL-MCNC: 1.9 MG/DL (ref 0.76–1.27)
DEPRECATED RDW RBC AUTO: 44.6 FL (ref 37–54)
EGFRCR SERPLBLD CKD-EPI 2021: 35.7 ML/MIN/1.73
EOSINOPHIL # BLD AUTO: 0.1 10*3/MM3 (ref 0–0.4)
EOSINOPHIL NFR BLD AUTO: 1.5 % (ref 0.3–6.2)
ERYTHROCYTE [DISTWIDTH] IN BLOOD BY AUTOMATED COUNT: 12.8 % (ref 12.3–15.4)
GLOBULIN UR ELPH-MCNC: 2.8 GM/DL
GLUCOSE SERPL-MCNC: 123 MG/DL (ref 65–99)
HCT VFR BLD AUTO: 27.8 % (ref 37.5–51)
HGB BLD-MCNC: 9.2 G/DL (ref 13–17.7)
IMM GRANULOCYTES # BLD AUTO: 0.05 10*3/MM3 (ref 0–0.05)
IMM GRANULOCYTES NFR BLD AUTO: 0.7 % (ref 0–0.5)
LYMPHOCYTES # BLD AUTO: 0.72 10*3/MM3 (ref 0.7–3.1)
LYMPHOCYTES NFR BLD AUTO: 10.7 % (ref 19.6–45.3)
MCH RBC QN AUTO: 31.2 PG (ref 26.6–33)
MCHC RBC AUTO-ENTMCNC: 33.1 G/DL (ref 31.5–35.7)
MCV RBC AUTO: 94.2 FL (ref 79–97)
MONOCYTES # BLD AUTO: 0.59 10*3/MM3 (ref 0.1–0.9)
MONOCYTES NFR BLD AUTO: 8.8 % (ref 5–12)
NEUTROPHILS NFR BLD AUTO: 5.26 10*3/MM3 (ref 1.7–7)
NEUTROPHILS NFR BLD AUTO: 78 % (ref 42.7–76)
NRBC BLD AUTO-RTO: 0 /100 WBC (ref 0–0.2)
PLATELET # BLD AUTO: 168 10*3/MM3 (ref 140–450)
PMV BLD AUTO: 9.9 FL (ref 6–12)
POTASSIUM SERPL-SCNC: 4 MMOL/L (ref 3.5–5.2)
PROT SERPL-MCNC: 5.5 G/DL (ref 6–8.5)
PTH-INTACT SERPL-MCNC: 67.9 PG/ML (ref 15–65)
RBC # BLD AUTO: 2.95 10*6/MM3 (ref 4.14–5.8)
SODIUM SERPL-SCNC: 140 MMOL/L (ref 136–145)
URATE SERPL-MCNC: 4.7 MG/DL (ref 3.4–7)
WBC NRBC COR # BLD AUTO: 6.74 10*3/MM3 (ref 3.4–10.8)

## 2024-09-08 PROCEDURE — 25810000003 SODIUM CHLORIDE 0.9 % SOLUTION: Performed by: FAMILY MEDICINE

## 2024-09-08 PROCEDURE — 80053 COMPREHEN METABOLIC PANEL: CPT | Performed by: HOSPITALIST

## 2024-09-08 PROCEDURE — 97162 PT EVAL MOD COMPLEX 30 MIN: CPT | Performed by: PHYSICAL THERAPIST

## 2024-09-08 PROCEDURE — 97166 OT EVAL MOD COMPLEX 45 MIN: CPT

## 2024-09-08 PROCEDURE — 84550 ASSAY OF BLOOD/URIC ACID: CPT | Performed by: INTERNAL MEDICINE

## 2024-09-08 PROCEDURE — 85025 COMPLETE CBC W/AUTO DIFF WBC: CPT | Performed by: FAMILY MEDICINE

## 2024-09-08 PROCEDURE — 83970 ASSAY OF PARATHORMONE: CPT | Performed by: INTERNAL MEDICINE

## 2024-09-08 PROCEDURE — 25010000002 CEFTRIAXONE PER 250 MG: Performed by: FAMILY MEDICINE

## 2024-09-08 RX ADMIN — NYSTATIN: 100000 POWDER TOPICAL at 09:45

## 2024-09-08 RX ADMIN — NYSTATIN: 100000 POWDER TOPICAL at 21:15

## 2024-09-08 RX ADMIN — CARBIDOPA AND LEVODOPA 2 TABLET: 25; 100 TABLET ORAL at 09:44

## 2024-09-08 RX ADMIN — SODIUM CHLORIDE 75 ML/HR: 9 INJECTION, SOLUTION INTRAVENOUS at 06:29

## 2024-09-08 RX ADMIN — TAMSULOSIN HYDROCHLORIDE 0.4 MG: 0.4 CAPSULE ORAL at 21:15

## 2024-09-08 RX ADMIN — SODIUM CHLORIDE 75 ML/HR: 9 INJECTION, SOLUTION INTRAVENOUS at 16:54

## 2024-09-08 RX ADMIN — ASPIRIN 81 MG: 81 TABLET, COATED ORAL at 09:45

## 2024-09-08 RX ADMIN — SODIUM CHLORIDE 1000 MG: 900 INJECTION INTRAVENOUS at 21:19

## 2024-09-08 RX ADMIN — Medication 10 ML: at 09:45

## 2024-09-08 RX ADMIN — CARBIDOPA AND LEVODOPA 2 TABLET: 25; 100 TABLET ORAL at 16:54

## 2024-09-08 RX ADMIN — ATORVASTATIN CALCIUM 80 MG: 40 TABLET ORAL at 21:15

## 2024-09-08 RX ADMIN — CARBIDOPA AND LEVODOPA 2 TABLET: 25; 100 TABLET ORAL at 21:15

## 2024-09-08 NOTE — PLAN OF CARE
Problem: Adult Inpatient Plan of Care  Goal: Plan of Care Review  Outcome: Ongoing, Not Progressing  Flowsheets (Taken 9/8/2024 1845)  Progress: no change  Plan of Care Reviewed With: patient     Problem: Adult Inpatient Plan of Care  Goal: Absence of Hospital-Acquired Illness or Injury  Outcome: Ongoing, Progressing  Intervention: Identify and Manage Fall Risk  Recent Flowsheet Documentation  Taken 9/8/2024 1800 by Shantel Sanchez RN  Safety Promotion/Fall Prevention: safety round/check completed  Taken 9/8/2024 1700 by Shantel Sanchez RN  Safety Promotion/Fall Prevention: safety round/check completed  Taken 9/8/2024 1600 by Shantel Sanchez RN  Safety Promotion/Fall Prevention: safety round/check completed  Taken 9/8/2024 1500 by Shantel Sanchez RN  Safety Promotion/Fall Prevention: safety round/check completed  Taken 9/8/2024 1400 by Shantel Sanchez RN  Safety Promotion/Fall Prevention: safety round/check completed  Taken 9/8/2024 1300 by Shantel Sanchez RN  Safety Promotion/Fall Prevention: safety round/check completed  Taken 9/8/2024 1200 by Shantel Sanchez RN  Safety Promotion/Fall Prevention: safety round/check completed  Taken 9/8/2024 1100 by Shantel Sanchez RN  Safety Promotion/Fall Prevention: safety round/check completed  Taken 9/8/2024 1000 by Shantel Sanchez RN  Safety Promotion/Fall Prevention: safety round/check completed  Taken 9/8/2024 0900 by Shantel Sanchez RN  Safety Promotion/Fall Prevention: safety round/check completed  Taken 9/8/2024 0800 by Shantel Sanchez RN  Safety Promotion/Fall Prevention: safety round/check completed  Taken 9/8/2024 0715 by Shantel Sanchez RN  Safety Promotion/Fall Prevention: safety round/check completed  Intervention: Prevent Skin Injury  Recent Flowsheet Documentation  Taken 9/8/2024 0800 by Shantel Sanchez RN  Body Position:   30 degrees   position changed independently  Intervention: Prevent and Manage VTE (Venous Thromboembolism) Risk  Recent Flowsheet Documentation  Taken 9/8/2024 0800  by Shantel Sanchez, RN  Activity Management:   bedrest   activity encouraged  Intervention: Prevent Infection  Recent Flowsheet Documentation  Taken 9/8/2024 0800 by Shantel Sanchez, RN  Infection Prevention:   environmental surveillance performed   hand hygiene promoted   rest/sleep promoted   single patient room provided  Goal: Optimal Comfort and Wellbeing  Outcome: Ongoing, Progressing     Problem: Osteoarthritis Comorbidity  Goal: Maintenance of Osteoarthritis Symptom Control  Intervention: Maintain Osteoarthritis Symptom Control  Recent Flowsheet Documentation  Taken 9/8/2024 0800 by Shantel Sanchez, RN  Activity Management:   bedrest   activity encouraged     Problem: Skin Injury Risk Increased  Goal: Skin Health and Integrity  Intervention: Optimize Skin Protection       Problem: Fall Injury Risk  Goal: Absence of Fall and Fall-Related Injury  Intervention: Promote Injury-Free Environment     Goal Outcome Evaluation:  Plan of Care Reviewed With: patient        Progress: no change

## 2024-09-08 NOTE — PROGRESS NOTES
Nephrology ([facility]) Progress Note      Patient:  Speedy Villalba  YOB: 1945  Date of Service: 9/8/2024  MRN: [id number]   Acct: [id number]   Primary Care Physician: Sen Ramsey MD  Advance Directive:   Code Status and Medical Interventions: No CPR (Do Not Attempt to Resuscitate); Limited Support; No intubation (DNI), No cardioversion   Ordered at: 09/05/24 5726     Medical Intervention Limits:    No intubation (DNI)    No cardioversion     Code Status (Patient has no pulse and is not breathing):    No CPR (Do Not Attempt to Resuscitate)     Medical Interventions (Patient has pulse or is breathing):    Limited Support     Admit Date: 9/5/2024       Hospital Day: 3  Referring Provider: Uriah Manriquez,*      Patient Seen, Chart, Consults, Notes, Labs, Radiology studies reviewed.      Subjective:  Speedy Villalba is a 78 y.o. male  whom we were consulted for acute kidney injury.  Patient has a history of Parkinson disease, cognitive impairment, type 2 diabetes, obesity, coronary artery disease, history of subdural hematoma.  He is a chronic resident of a nursing home.  He was complaining of fever with generalized pain.  He was also found to be little more confused.  He was recently diagnosed with UTI and given Bactrim.  On presentation his temperature was 100.7 and he was tachycardic.  His serum creatinine usually ranges 1.1-1.2.  It was 2.9 on admission and patient was started along with antibiotics and some IV fluids.  He noticed improved urine output.  His serum creatinine is slowly improving.  Patient is a poor historian.  Today, patient was awake and offers no new complaints.    Allergies:  Serna-2 inhibitors (sulfonamide)    Home Meds:  Medications Prior to Admission   Medication Sig Dispense Refill Last Dose    acetaminophen (TYLENOL) 325 MG tablet Take 2 tablets by mouth 2 (Two) Times a Day.       allopurinol (ZYLOPRIM) 100 MG tablet Take 1 tablet by  mouth Daily.       amoxicillin-clavulanate (AUGMENTIN) 875-125 MG per tablet Take 1 tablet by mouth 2 (Two) Times a Day.       aspirin 81 MG EC tablet Take 1 tablet by mouth Daily.       atorvastatin (LIPITOR) 80 MG tablet Take 1 tablet by mouth Every Night. 90 tablet 0     carbidopa-levodopa (SINEMET)  MG per tablet Take 2 tablets by mouth 3 (Three) Times a Day. 180 tablet 3     carboxymethylcellulose (REFRESH PLUS) 0.5 % solution Administer 2 drops to both eyes 3 (Three) Times a Day As Needed for Dry Eyes.       Cholecalciferol (VITAMIN D3) 125 MCG (5000 UT) tablet tablet Take 1 tablet by mouth Daily.       Cranberry-Vitamin C-Inulin (UTI-Stat) liquid Take 30 mL by mouth 2 (Two) Times a Day. Mix with 120mL of water or juice       cyclobenzaprine (FLEXERIL) 10 MG tablet Take 1 tablet by mouth Every 8 (Eight) Hours As Needed for Muscle Spasms.       Divalproex Sodium (DEPAKOTE SPRINKLE) 125 MG capsule Take 2 capsules by mouth 2 (Two) Times a Day.       estrogens, conjugated, (PREMARIN) 0.45 MG tablet Take 1 tablet by mouth Every Night.       finasteride (PROSCAR) 5 MG tablet Take 1 tablet by mouth Daily.       guaifenesin (ROBITUSSIN) 100 MG/5ML liquid Take 20 mL by mouth Every 4 (Four) Hours As Needed for Cough.       hydrocortisone (Preparation H) 1 % cream Apply 1 Application topically to the appropriate area as directed Every 6 (Six) Hours As Needed (hemorrhoids).       losartan (COZAAR) 50 MG tablet Take 1 tablet by mouth Daily.       Magnesium Oxide -Mg Supplement 400 (240 Mg) MG tablet Take 1 tablet by mouth Daily.       Melatonin 10 MG tablet Take 1 tablet by mouth Every Night.       Menthol, Topical Analgesic, (BIOFREEZE EX) Apply 1 Application topically 2 (Two) Times a Day.       multivitamin with minerals (Centrum Silver Adult 50+) tablet tablet Take 1 tablet by mouth Daily.       ondansetron ODT (ZOFRAN-ODT) 4 MG disintegrating tablet Place 1 tablet on the tongue Every 6 (Six) Hours As Needed for  Nausea or Vomiting.       polyethylene glycol (MIRALAX) 17 g packet Take 17 g by mouth 2 (Two) Times a Day As Needed (constipation).       rivastigmine (EXELON) 1.5 MG capsule Take 1 capsule by mouth 2 (Two) Times a Day.       spironolactone (ALDACTONE) 25 MG tablet Take 1 tablet by mouth Daily.          Medicines:  Current Facility-Administered Medications   Medication Dose Route Frequency Provider Last Rate Last Admin    acetaminophen (TYLENOL) tablet 650 mg  650 mg Oral Q4H PRN Uriah Manriquez MD        Or    acetaminophen (TYLENOL) 160 MG/5ML oral solution 650 mg  650 mg Oral Q4H PRN Uriah Manriquez MD        Or    acetaminophen (TYLENOL) suppository 650 mg  650 mg Rectal Q4H PRN Uriah Manriquez MD        aspirin EC tablet 81 mg  81 mg Oral Daily Uriah Manriquez MD   81 mg at 09/08/24 0945    atorvastatin (LIPITOR) tablet 80 mg  80 mg Oral Nightly Uriah Manriquez MD   80 mg at 09/07/24 2103    sennosides-docusate (PERICOLACE) 8.6-50 MG per tablet 2 tablet  2 tablet Oral BID PRN Uriah Manriquez MD        And    polyethylene glycol (MIRALAX) packet 17 g  17 g Oral Daily PRN Uriah Manriquez MD        And    bisacodyl (DULCOLAX) EC tablet 5 mg  5 mg Oral Daily PRN Uriah Manriquez MD        And    bisacodyl (DULCOLAX) suppository 10 mg  10 mg Rectal Daily PRN Uriah Manriquez MD        Calcium Replacement - Follow Nurse / BPA Driven Protocol   Does not apply PRN Uriah Manriquez MD        carbidopa-levodopa (SINEMET)  MG per tablet 2 tablet  2 tablet Oral TID Uriah Manriquez MD   2 tablet at 09/08/24 0944    cefTRIAXone (ROCEPHIN) 1,000 mg in sodium chloride 0.9 % 100 mL MBP  1,000 mg Intravenous Q24H Uriah Manriquez  mL/hr at 09/07/24 2104 1,000 mg at 09/07/24 2104    Magnesium Standard Dose Replacement - Follow Nurse / BPA Driven Protocol   Does not apply PRN Uriah Manriquez MD         nystatin (MYCOSTATIN) powder   Topical Q12H Randee Boyd MD   Given at 09/08/24 0945    Phosphorus Replacement - Follow Nurse / BPA Driven Protocol   Does not apply Uriah Carvajal MD        Potassium Replacement - Follow Nurse / BPA Driven Protocol   Does not apply Uriah Carvajal MD        sodium chloride 0.9 % flush 10 mL  10 mL Intravenous PRN Uriah Manriquez MD        sodium chloride 0.9 % flush 10 mL  10 mL Intravenous Q12H Uriah Manriquez MD   10 mL at 09/08/24 0945    sodium chloride 0.9 % flush 10 mL  10 mL Intravenous PRN Uriah Manriquez MD        sodium chloride 0.9 % infusion 40 mL  40 mL Intravenous PRN Uriah Manriquez MD        sodium chloride 0.9 % infusion  75 mL/hr Intravenous Continuous Uriah Manriquez MD 75 mL/hr at 09/08/24 0629 75 mL/hr at 09/08/24 0629    tamsulosin (FLOMAX) 24 hr capsule 0.4 mg  0.4 mg Oral Nightly Uriah Manriquez MD   0.4 mg at 09/07/24 2104       Past Medical History:  Past Medical History:   Diagnosis Date    Acute encephalopathy 9/6/2024    Arthritis     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    Cognitive decline 10/30/2023    Coronary artery disease 10 years    x2 stents    Diabetes mellitus < 2 years    non-insulin dependent    Difficulty walking < 1 year    multiple falls in the last 6-8 months    DNR (do not resuscitate)     Gout     HL (hearing loss) 20+ years    Wears hearing aids occasionally    Hyperlipidemia 5 years    Insomnia     Lewy body dementia     Memory loss 3 years+/-    gradual and subtle    Muscle weakness     Primary hypertension 10/30/2023    Sleep apnea 20+ year    Non-compliant with PEEP for tx    Stroke 11/2022    Subdural hematoma     TIA (transient ischemic attack) 01/22/2024       Past Surgical History:  Past Surgical History:   Procedure Laterality Date    APPENDECTOMY      BACK SURGERY  2016    CAROTID STENT  2010?    KNEE SURGERY  1960    LAMINECTOMY  2015?     "discectomy & fusion       Family History  Family History   Problem Relation Age of Onset    Parkinsonism Father        Social History  Social History     Socioeconomic History    Marital status:    Tobacco Use    Smoking status: Never     Passive exposure: Past    Smokeless tobacco: Never   Vaping Use    Vaping status: Never Used   Substance and Sexual Activity    Alcohol use: Yes     Alcohol/week: 3.0 standard drinks of alcohol     Types: 3 Drinks containing 0.5 oz of alcohol per week    Drug use: Never    Sexual activity: Not Currently     Partners: Female     Birth control/protection: Vasectomy         Review of Systems:  History obtained from chart review and the patient  General ROS: No fever or chills  Respiratory ROS: No cough, shortness of breath, wheezing  Cardiovascular ROS: no chest pain or dyspnea on exertion  Gastrointestinal ROS: No abdominal pain or melena  Genito-Urinary ROS: No dysuria or hematuria  14 point ROS reviewed with the patient and negative except as noted above and in the HPI unless unable to obtain.    Objective:  /63 (BP Location: Right arm, Patient Position: Lying)   Pulse 72   Temp 98.1 °F (36.7 °C) (Oral)   Resp 18   Ht 173 cm (68.11\")   Wt 113 kg (250 lb)   SpO2 96%   BMI 37.89 kg/m²     Intake/Output Summary (Last 24 hours) at 9/8/2024 1506  Last data filed at 9/8/2024 1115  Gross per 24 hour   Intake 325 ml   Output 2125 ml   Net -1800 ml     General: awake/alert   Head: Atraumatic, normocephalic  Neck: No masses, no JVD  Chest:  clear to auscultation bilaterally without respiratory distress  CVS: regular rate and rhythm  Abdominal: soft, nontender, normal bowel sounds  Extremities: no cyanosis or edema  Skin: warm and dry without rash  Neuro: No focal motor deficits  Musculoskeletal: No obvious joint effusions    Labs:  Lab Results (last 72 hours)       Procedure Component Value Units Date/Time    POC Glucose Once [[id number]]  (Normal) Collected: 09/07/24 1107 "    Specimen: Blood Updated: 09/07/24 1212     Glucose 119 mg/dL      Comment: : [id number] Damir LisaMeter ID: [id number]       Basic Metabolic Panel [[id number]]  (Abnormal) Collected: 09/07/24 0224    Specimen: Blood Updated: 09/07/24 0319     Glucose 97 mg/dL      BUN 41 mg/dL      Creatinine 2.53 mg/dL      Sodium 136 mmol/L      Potassium 3.7 mmol/L      Comment: Slight hemolysis detected by analyzer. Result may be falsely elevated.        Chloride 108 mmol/L      CO2 19.0 mmol/L      Calcium 7.9 mg/dL      BUN/Creatinine Ratio 16.2     Anion Gap 9.0 mmol/L      eGFR 25.3 mL/min/1.73     Narrative:      GFR Normal >60  Chronic Kidney Disease <60  Kidney Failure <15    The GFR formula is only valid for adults with stable renal function between ages 18 and 70.    CBC & Differential [[id number]]  (Abnormal) Collected: 09/07/24 0224    Specimen: Blood Updated: 09/07/24 0300    Narrative:      The following orders were created for panel order CBC & Differential.  Procedure                               Abnormality         Status                     ---------                               -----------         ------                     CBC Auto Differential[[id number]]        Abnormal            Final result                 Please view results for these tests on the individual orders.    CBC Auto Differential [[id number]]  (Abnormal) Collected: 09/07/24 0224    Specimen: Blood Updated: 09/07/24 0300     WBC 4.65 10*3/mm3      RBC 2.67 10*6/mm3      Hemoglobin 8.4 g/dL      Hematocrit 25.6 %      MCV 95.9 fL      MCH 31.5 pg      MCHC 32.8 g/dL      RDW 13.2 %      RDW-SD 46.5 fl      MPV 10.1 fL      Platelets 151 10*3/mm3      Neutrophil % 71.2 %      Lymphocyte % 14.0 %      Monocyte % 13.1 %      Eosinophil % 1.1 %      Basophil % 0.2 %      Immature Grans % 0.4 %      Neutrophils, Absolute 3.31 10*3/mm3      Lymphocytes, Absolute 0.65 10*3/mm3      Monocytes, Absolute 0.61 10*3/mm3      Eosinophils, Absolute 0.05  10*3/mm3      Basophils, Absolute 0.01 10*3/mm3      Immature Grans, Absolute 0.02 10*3/mm3      nRBC 0.0 /100 WBC     Vitamin B12 [[id number]]  (Normal) Collected: 09/06/24 1119    Specimen: Blood Updated: 09/06/24 2006     Vitamin B-12 307 pg/mL     Narrative:      Results may be falsely increased if patient taking Biotin.      Blood Culture - Blood, Hand, Right [[id number]]  (Normal) Collected: 09/05/24 1936    Specimen: Blood from Hand, Right Updated: 09/06/24 2000     Blood Culture No growth at 24 hours    Blood Culture - Blood, Hand, Left [[id number]]  (Normal) Collected: 09/05/24 1810    Specimen: Blood from Hand, Left Updated: 09/06/24 1930     Blood Culture No growth at 24 hours    Urine Culture - Urine, Straight Cath [[id number]]  (Abnormal) Collected: 09/05/24 1936    Specimen: Urine from Straight Cath Updated: 09/06/24 1210     Urine Culture >100,000 CFU/mL Gram Negative Bacilli    Narrative:      Colonization of the urinary tract without infection is common. Treatment is discouraged unless the patient is symptomatic, pregnant, or undergoing an invasive urologic procedure.    TSH [[id number]]  (Normal) Collected: 09/06/24 1119    Specimen: Blood Updated: 09/06/24 1151     TSH 0.775 uIU/mL     Ammonia [[id number]]  (Normal) Collected: 09/06/24 1119    Specimen: Blood Updated: 09/06/24 1145     Ammonia 45 umol/L     CBC & Differential [[id number]]  (Abnormal) Collected: 09/06/24 0317    Specimen: Blood Updated: 09/06/24 0539    Narrative:      The following orders were created for panel order CBC & Differential.  Procedure                               Abnormality         Status                     ---------                               -----------         ------                     CBC Auto Differential[[id number]]        Abnormal            Final result                 Please view results for these tests on the individual orders.    CBC Auto Differential [[id number]]  (Abnormal) Collected: 09/06/24 0317     Specimen: Blood Updated: 09/06/24 0539     WBC 5.26 10*3/mm3      RBC 3.18 10*6/mm3      Hemoglobin 10.1 g/dL      Hematocrit 30.7 %      MCV 96.5 fL      MCH 31.8 pg      MCHC 32.9 g/dL      RDW 13.1 %      RDW-SD 46.5 fl      MPV 10.7 fL      Platelets 160 10*3/mm3     Manual Differential [[id number]]  (Abnormal) Collected: 09/06/24 0317    Specimen: Blood Updated: 09/06/24 0539     Neutrophil % 69.7 %      Lymphocyte % 10.1 %      Monocyte % 7.1 %      Eosinophil % 0.0 %      Basophil % 0.0 %      Bands %  13.1 %      Neutrophils Absolute 4.36 10*3/mm3      Lymphocytes Absolute 0.53 10*3/mm3      Monocytes Absolute 0.37 10*3/mm3      Eosinophils Absolute 0.00 10*3/mm3      Basophils Absolute 0.00 10*3/mm3      Anisocytosis Slight/1+     Polychromasia Slight/1+     WBC Morphology Normal     Clumped Platelets Present    Basic Metabolic Panel [[id number]]  (Abnormal) Collected: 09/06/24 0317    Specimen: Blood Updated: 09/06/24 0458     Glucose 146 mg/dL      BUN 43 mg/dL      Creatinine 2.79 mg/dL      Sodium 141 mmol/L      Potassium 4.2 mmol/L      Chloride 106 mmol/L      CO2 23.0 mmol/L      Calcium 8.4 mg/dL      BUN/Creatinine Ratio 15.4     Anion Gap 12.0 mmol/L      eGFR 22.5 mL/min/1.73     Narrative:      GFR Normal >60  Chronic Kidney Disease <60  Kidney Failure <15    The GFR formula is only valid for adults with stable renal function between ages 18 and 70.    High Sensitivity Troponin T 2Hr [[id number]]  (Abnormal) Collected: 09/05/24 2352    Specimen: Blood Updated: 09/06/24 0027     HS Troponin T 137 ng/L      Troponin T Delta -7 ng/L     Narrative:      High Sensitive Troponin T Reference Range:  <14.0 ng/L- Negative Female for AMI  <22.0 ng/L- Negative Male for AMI  >=14 - Abnormal Female indicating possible myocardial injury.  >=22 - Abnormal Male indicating possible myocardial injury.   Clinicians would have to utilize clinical acumen, EKG, Troponin, and serial changes to determine if it is an  Acute Myocardial Infarction or myocardial injury due to an underlying chronic condition.         STAT Lactic Acid, Reflex [[id number]]  (Normal) Collected: 09/05/24 2204    Specimen: Blood Updated: 09/05/24 2236     Lactate 1.0 mmol/L     Urinalysis With Culture If Indicated - Straight Cath [[id number]]  (Abnormal) Collected: 09/05/24 1936    Specimen: Urine from Straight Cath Updated: 09/05/24 2015     Color, UA Yellow     Appearance, UA Turbid     pH, UA >=9.0     Specific Gravity, UA 1.011     Glucose, UA Negative     Ketones, UA Negative     Bilirubin, UA Negative     Blood, UA Moderate (2+)     Protein, UA >=300 mg/dL (3+)     Leuk Esterase, UA Large (3+)     Nitrite, UA Negative     Urobilinogen, UA 0.2 E.U./dL    Narrative:      In absence of clinical symptoms, the presence of pyuria, bacteria, and/or nitrites on the urinalysis result does not correlate with infection.    Urinalysis, Microscopic Only - Straight Cath [[id number]]  (Abnormal) Collected: 09/05/24 1936    Specimen: Urine from Straight Cath Updated: 09/05/24 2015     RBC, UA 0-2 /HPF      WBC, UA Too Numerous to Count /HPF      Bacteria, UA 2+ /HPF      Squamous Epithelial Cells, UA 0-2 /HPF      Yeast, UA Small/1+ Yeast /HPF      Hyaline Casts, UA None Seen /LPF      Methodology Manual Light Microscopy    Respiratory Panel PCR w/COVID-19(SARS-CoV-2) ELIZABETH/DALLIN/OLINDA/PAD/[facility] In-House, NP Swab in UTM/VTM, 2 HR TAT - Swab, Nasopharynx [[id number]]  (Normal) Collected: 09/05/24 1904    Specimen: Swab from Nasopharynx Updated: 09/05/24 2014     ADENOVIRUS, PCR Not Detected     Coronavirus 229E Not Detected     Coronavirus HKU1 Not Detected     Coronavirus NL63 Not Detected     Coronavirus OC43 Not Detected     COVID19 Not Detected     Human Metapneumovirus Not Detected     Human Rhinovirus/Enterovirus Not Detected     Influenza A PCR Not Detected     Influenza B PCR Not Detected     Parainfluenza Virus 1 Not Detected     Parainfluenza Virus 2 Not Detected      Parainfluenza Virus 3 Not Detected     Parainfluenza Virus 4 Not Detected     RSV, PCR Not Detected     Bordetella pertussis pcr Not Detected     Bordetella parapertussis PCR Not Detected     Chlamydophila pneumoniae PCR Not Detected     Mycoplasma pneumo by PCR Not Detected    Narrative:      In the setting of a positive respiratory panel with a viral infection PLUS a negative procalcitonin without other underlying concern for bacterial infection, consider observing off antibiotics or discontinuation of antibiotics and continue supportive care. If the respiratory panel is positive for atypical bacterial infection (Bordetella pertussis, Chlamydophila pneumoniae, or Mycoplasma pneumoniae), consider antibiotic de-escalation to target atypical bacterial infection.    High Sensitivity Troponin T [[id number]]  (Abnormal) Collected: 09/05/24 1810    Specimen: Blood Updated: 09/05/24 1918     HS Troponin T 144 ng/L     Narrative:      High Sensitive Troponin T Reference Range:  <14.0 ng/L- Negative Female for AMI  <22.0 ng/L- Negative Male for AMI  >=14 - Abnormal Female indicating possible myocardial injury.  >=22 - Abnormal Male indicating possible myocardial injury.   Clinicians would have to utilize clinical acumen, EKG, Troponin, and serial changes to determine if it is an Acute Myocardial Infarction or myocardial injury due to an underlying chronic condition.         Procalcitonin [[id number]]  (Abnormal) Collected: 09/05/24 1810    Specimen: Blood Updated: 09/05/24 1913     Procalcitonin 1.38 ng/mL     Narrative:      As a Marker for Sepsis (Non-Neonates):    1. <0.5 ng/mL represents a low risk of severe sepsis and/or septic shock.  2. >2 ng/mL represents a high risk of severe sepsis and/or septic shock.    As a Marker for Lower Respiratory Tract Infections that require antibiotic therapy:    PCT on Admission    Antibiotic Therapy       6-12 Hrs later    >0.5                Strongly Recommended  >0.25 - <0.5        " Recommended   0.1 - 0.25          Discouraged              Remeasure/reassess PCT  <0.1                Strongly Discouraged     Remeasure/reassess PCT    As 28 day mortality risk marker: \"Change in Procalcitonin Result\" (>80% or <=80%) if Day 0 (or Day 1) and Day 4 values are available. Refer to http://[email or web address]-pct-calculator.[email or web address]    Change in PCT <=80%  A decrease of PCT levels below or equal to 80% defines a positive change in PCT test result representing a higher risk for 28-day all-cause mortality of patients diagnosed with severe sepsis for septic shock.    Change in PCT >80%  A decrease of PCT levels of more than 80% defines a negative change in PCT result representing a lower risk for 28-day all-cause mortality of patients diagnosed with severe sepsis or septic shock.       Comprehensive Metabolic Panel [[id number]]  (Abnormal) Collected: 09/05/24 1810    Specimen: Blood Updated: 09/05/24 1909     Glucose 127 mg/dL      BUN 42 mg/dL      Creatinine 2.91 mg/dL      Sodium 139 mmol/L      Potassium 4.3 mmol/L      Chloride 101 mmol/L      CO2 21.0 mmol/L      Calcium 9.0 mg/dL      Total Protein 7.2 g/dL      Albumin 3.6 g/dL      ALT (SGPT) 26 U/L      AST (SGOT) 77 U/L      Alkaline Phosphatase 71 U/L      Total Bilirubin 0.4 mg/dL      Globulin 3.6 gm/dL      A/G Ratio 1.0 g/dL      BUN/Creatinine Ratio 14.4     Anion Gap 17.0 mmol/L      eGFR 21.4 mL/min/1.73     Narrative:      GFR Normal >60  Chronic Kidney Disease <60  Kidney Failure <15    The GFR formula is only valid for adults with stable renal function between ages 18 and 70.    CK [[id number]]  (Abnormal) Collected: 09/05/24 1810    Specimen: Blood Updated: 09/05/24 1909     Creatine Kinase 344 U/L     Magnesium [[id number]]  (Normal) Collected: 09/05/24 1810    Specimen: Blood Updated: 09/05/24 1908     Magnesium 1.8 mg/dL     Lactic Acid, Plasma [[id number]]  (Abnormal) Collected: 09/05/24 1810    Specimen: Blood Updated: 09/05/24 1903     Lactate " 3.2 mmol/L     Protime-INR [[id number]]  (Abnormal) Collected: 09/05/24 1810    Specimen: Blood Updated: 09/05/24 1859     Protime 15.0 Seconds      INR 1.13    aPTT [[id number]]  (Normal) Collected: 09/05/24 1810    Specimen: Blood Updated: 09/05/24 1859     PTT 26.6 seconds     CBC & Differential [[id number]]  (Abnormal) Collected: 09/05/24 1810    Specimen: Blood Updated: 09/05/24 1851    Narrative:      The following orders were created for panel order CBC & Differential.  Procedure                               Abnormality         Status                     ---------                               -----------         ------                     CBC Auto Differential[[id number]]        Abnormal            Final result                 Please view results for these tests on the individual orders.    CBC Auto Differential [[id number]]  (Abnormal) Collected: 09/05/24 1810    Specimen: Blood Updated: 09/05/24 1851     WBC 6.70 10*3/mm3      RBC 3.85 10*6/mm3      Hemoglobin 12.3 g/dL      Hematocrit 36.0 %      MCV 93.5 fL      MCH 31.9 pg      MCHC 34.2 g/dL      RDW 13.1 %      RDW-SD 44.9 fl      MPV 10.8 fL      Platelets 216 10*3/mm3      Neutrophil % 74.4 %      Lymphocyte % 6.9 %      Monocyte % 17.6 %      Eosinophil % 0.1 %      Basophil % 0.3 %      Immature Grans % 0.7 %      Neutrophils, Absolute 4.98 10*3/mm3      Lymphocytes, Absolute 0.46 10*3/mm3      Monocytes, Absolute 1.18 10*3/mm3      Eosinophils, Absolute 0.01 10*3/mm3      Basophils, Absolute 0.02 10*3/mm3      Immature Grans, Absolute 0.05 10*3/mm3      nRBC 0.0 /100 WBC     [place] Draw [[id number]] Collected: 09/05/24 1810    Specimen: Blood Updated: 09/05/24 1845    Narrative:      The following orders were created for panel order [place] Draw.  Procedure                               Abnormality         Status                     ---------                               -----------         ------                     Green Top (Gel)[[id number]]                                   Final result               Lavender Top[[id number]]                                     Final result               Carter Top[[id number]]                                         Final result               Light Blue Top[[id number]]                                   Final result                 Please view results for these tests on the individual orders.    Green Top (Gel) [[id number]] Collected: 09/05/24 1810    Specimen: Blood Updated: 09/05/24 1845     Extra Tube Hold for add-ons.     Comment: Auto resulted.       Lavender Top [[id number]] Collected: 09/05/24 1810    Specimen: Blood Updated: 09/05/24 1845     Extra Tube hold for add-on     Comment: Auto resulted       Gray Top [[id number]] Collected: 09/05/24 1810    Specimen: Blood Updated: 09/05/24 1845     Extra Tube Hold for add-ons.     Comment: Auto resulted.       Light Blue Top [[id number]] Collected: 09/05/24 1810    Specimen: Blood Updated: 09/05/24 1845     Extra Tube Hold for add-ons.     Comment: Auto resulted               Radiology:   Imaging Results (Last 72 Hours)       Procedure Component Value Units Date/Time    US Renal Bilateral [[id number]] Collected: 09/06/24 1442     Updated: 09/06/24 1447    Narrative:      EXAMINATION: US RENAL BILATERAL-     9/6/2024 12:35 PM     HISTORY: ALEA; A41.89-Other specified sepsis; N17.9-Acute kidney failure,  unspecified; N39.0-Urinary tract infection, site not specified     Grayscale and color-flow renal ultrasound.     Normal size and position of both kidneys.  Normal cortical thickness and normal cortical echogenicity.     No hydronephrosis or shadowing stone.     The right kidney measures 126 x 67 x 56 mm.  7 cm upper pole cyst.     The LEFT kidney is less well visualized.  The left kidney measures 110 x 71 x 62 mm.       Impression:      Impression:  1. No acute kidney abnormality is seen.           This report was signed and finalized on 9/6/2024 2:43 PM by Dr. Eyal Morin MD.        "CT Head Without Contrast [[id number]] Collected: 09/05/24 2012     Updated: 09/05/24 2017    Narrative:      CT HEAD WO CONTRAST- 9/5/2024 6:53 PM     HISTORY: Altered mental state     COMPARISON: 5/23/2024     DLP: 1703.49 mGy.cm. All CT scans are performed using dose optimization  techniques as appropriate to the performed exam and including at least  one of the following: Automated exposure control, adjustment of the mA  and/or kV according to size, and the use of the iterative reconstruction  technique.     TECHNIQUE: Serial axial tomographic images of the brain were obtained  without the use of intravenous contrast.     FINDINGS:  The midline structures are nondisplaced. There is moderate cerebral and  cerebellar atrophy, with an associated increase in the prominence of the  ventricles and sulci. The basilar cisterns are normal in size and  configuration. There is no evidence of intracranial hemorrhage or  mass-effect. There is low attenuation in the periventricular white  matter, consistent with chronic ischemic change. There are no abnormal  extra-axial fluid collections. There is no evidence of tonsillar  herniation.     The included orbits and their contents are unremarkable. There is  opacification of the right maxillary sinus which appears to be chronic  in nature. The visualized paranasal sinuses and mastoid air cells are  otherwise normally aerated.. There is hyperostosis frontalis interna. No  acute calvarial abnormalities present..       Impression:         1. Moderate cerebral and cerebellar atrophy with chronic microvascular  disease but no evidence of acute intracranial process.  2. Chronic appearing right maxillary sinus disease.           This report was signed and finalized on 9/5/2024 8:14 PM by Dr. Clemente Izaguirre MD.               Culture:  No components found for: \"WOUNDCUL\", \"3\"  No components found for: \"CSFCUL\", \"3\"  No components found for: \"BC\", \"3\"  No components found for: " "\"URINECUL\", \"3\"      Assessment   -Acute kidney injury-prerenal azotemia versus AIN  -Type 2 diabetes  -Urine tract infection  -Morbid obesity  -Anemia of chronic disease, also rule out blood loss      Plan:  Agree with current management of IV fluids and IV antibiotics.  Avoid hypotension and nephrotoxins.        Chan Ag MD  9/8/2024  15:06 CDT  "

## 2024-09-08 NOTE — PLAN OF CARE
Goal Outcome Evaluation:  Plan of Care Reviewed With: patient        Progress: no change  Outcome Evaluation: PT eval completed. Pt alert and oriented x2 with cues and options. Per SNF, pt can t/f to w/c with assist x2 and self-propels with feet. Today, pt rolls in bed L and R for hygiene and linen change- requiring mod A to do so. Pt reqd max VC for command following and often tactile cues as well. Pt reqd dep x2 for scooting up in bed and further mobility deferred due to weakness. Pt will benefit from skilled PT to improve fxl mob and endurance. Rec d/c SNF.      Anticipated Discharge Disposition (PT): skilled nursing facility

## 2024-09-08 NOTE — THERAPY EVALUATION
Patient Name: Speedy Villalba  : 1945    MRN: [id number]                              Today's Date: 2024       Admit Date: 2024    Visit Dx:     ICD-10-CM ICD-9-CM   1. Other specified sepsis  A41.89 038.8     995.91   2. Acute renal failure, unspecified acute renal failure type  N17.9 584.9   3. Urinary tract infection without hematuria, site unspecified  N39.0 599.0   4. Impaired mobility [Z74.09]  Z74.09 799.89     Patient Active Problem List   Diagnosis    Gastroenteritis    Coronary artery disease involving native coronary artery of native heart without angina pectoris    Cognitive decline    Frequent falls    Type 2 diabetes mellitus without complication, without long-term current use of insulin    Primary hypertension    Atypical parkinsonism    Sleep disturbance    Delusions    TIA (transient ischemic attack)    Subdural hematoma    Sepsis secondary to UTI    Acute encephalopathy     Past Medical History:   Diagnosis Date    Acute encephalopathy 2024    Arthritis     Atypical parkinsonism 2023    Cognitive decline 10/30/2023    Coronary artery disease 10 years    x2 stents    Diabetes mellitus < 2 years    non-insulin dependent    Difficulty walking < 1 year    multiple falls in the last 6-8 months    DNR (do not resuscitate)     Gout     HL (hearing loss) 20+ years    Wears hearing aids occasionally    Hyperlipidemia 5 years    Insomnia     Lewy body dementia     Memory loss 3 years+/-    gradual and subtle    Muscle weakness     Primary hypertension 10/30/2023    Sleep apnea 20+ year    Non-compliant with PEEP for tx    Stroke 2022    Subdural hematoma     TIA (transient ischemic attack) 2024     Past Surgical History:   Procedure Laterality Date    APPENDECTOMY      BACK SURGERY  2016    CAROTID STENT  ?    KNEE SURGERY  1960    LAMINECTOMY  2015?    discectomy & fusion      General Information       Row Name 24 1521          Physical Therapy Time and  Intention    Document Type evaluation  ED from NH with persistent fever, confusion and generalized pain. Hx of Lewy Body Dementia, SDH. Dx: sepsis due to UTI  -SB     Mode of Treatment physical therapy  -SB       Row Name 09/08/24 1521          General Information    Patient Profile Reviewed yes  -SB     Prior Level of Function --  per SNF, pt able to t/f to w/c with assist x2 and propels w/c with B feet  -SB     Existing Precautions/Restrictions fall  -SB     Barriers to Rehab medically complex;previous functional deficit;cognitive status;physical barrier  -SB       Row Name 09/08/24 1521          Living Environment    People in Home facility resident  -SB       Row Name 09/08/24 1521          Cognition    Orientation Status (Cognition) oriented to;person;time;verbal cues/prompts needed for orientation;disoriented to;place;situation  -SB       Row Name 09/08/24 1521          Safety Issues, Functional Mobility    Safety Issues Affecting Function (Mobility) ability to follow commands;friction/shear risk;problem-solving;safety precaution awareness;safety precautions follow-through/compliance  -SB     Impairments Affecting Function (Mobility) cognition;endurance/activity tolerance;strength;postural/trunk control  -SB     Cognitive Impairments, Mobility Safety/Performance attention;safety precaution awareness;safety precaution follow-through;problem-solving/reasoning  -SB               User Key  (r) = Recorded By, (t) = Taken By, (c) = Cosigned By      Initials Name Provider Type    Araceli Caldwell, JAYNA DPT Physical Therapist                   Mobility       Row Name 09/08/24 1521          Bed Mobility    Bed Mobility rolling left;scooting/bridging;rolling right  -SB     Rolling Left [place] (Bed Mobility) moderate assist (50% patient effort);verbal cues  -SB     Rolling Right [place] (Bed Mobility) moderate assist (50% patient effort);verbal cues  -SB     Scooting/Bridging [place] (Bed Mobility)  dependent (less than 25% patient effort);2 person assist  -SB     Assistive Device (Bed Mobility) bed rails;head of bed elevated;draw sheet  -SB     Comment, (Bed Mobility) further mobility deferred due to weakness  -SB               User Key  (r) = Recorded By, (t) = Taken By, (c) = Cosigned By      Initials Name Provider Type    Araceli Caldwell PT DPT Physical Therapist                   Obj/Interventions       Row Name 09/08/24 1521          Range of Motion Comprehensive    General Range of Motion bilateral lower extremity ROM WFL  -SB       Row Name 09/08/24 1521          Strength Comprehensive (MMT)    General Manual Muscle Testing (MMT) Assessment lower extremity strength deficits identified  -SB     Comment, General Manual Muscle Testing (MMT) Assessment BLE 3-/5  -SB       Row Name 09/08/24 1521          Balance    Balance Assessment --  -SB       Row Name 09/08/24 1521          Sensory Assessment (Somatosensory)    Sensory Assessment (Somatosensory) LE sensation intact  -SB               User Key  (r) = Recorded By, (t) = Taken By, (c) = Cosigned By      Initials Name Provider Type    Araceli Caldwell PT DPT Physical Therapist                   Goals/Plan       Row Name 09/08/24 1521          Bed Mobility Goal 1 (PT)    Activity/Assistive Device (Bed Mobility Goal 1, PT) sit to supine;supine to sit;rolling to left;rolling to right  -SB     [place] Level/Cues Needed (Bed Mobility Goal 1, PT) minimum assist (75% or more patient effort)  -SB     Time Frame (Bed Mobility Goal 1, PT) long term goal (LTG)  -SB     Progress/Outcomes (Bed Mobility Goal 1, PT) new goal  -SB       Row Name 09/08/24 1521          Transfer Goal 1 (PT)    Activity/Assistive Device (Transfer Goal 1, PT) sit-to-stand/stand-to-sit;bed-to-chair/chair-to-bed;walker, rolling  -SB     [place] Level/Cues Needed (Transfer Goal 1, PT) minimum assist (75% or more patient effort)  -SB     Time Frame (Transfer Goal 1, PT) long term  goal (LTG)  -SB     Progress/Outcome (Transfer Goal 1, PT) new goal  -SB       Row Name 09/08/24 1521          Gait Training Goal 1 (PT)    Activity/Assistive Device (Gait Training Goal 1, PT) gait (walking locomotion);increase endurance/gait distance;increase energy conservation;decrease fall risk;walker, rolling  -SB     [place] Level (Gait Training Goal 1, PT) minimum assist (75% or more patient effort)  -SB     Distance (Gait Training Goal 1, PT) 10  -SB     Time Frame (Gait Training Goal 1, PT) long term goal (LTG)  -SB     Progress/Outcome (Gait Training Goal 1, PT) new goal  -SB       Row Name 09/08/24 1521          Therapy Assessment/Plan (PT)    Planned Therapy Interventions (PT) balance training;strengthening;bed mobility training;gait training;patient/family education;transfer training;postural re-education  -SB               User Key  (r) = Recorded By, (t) = Taken By, (c) = Cosigned By      Initials Name Provider Type    SB Araceli Danielson, PT DPT Physical Therapist                   Clinical Impression       Row Name 09/08/24 1521          Pain    Pretreatment Pain Rating 0/10 - no pain  -SB     Posttreatment Pain Rating 0/10 - no pain  -SB     Pain Intervention(s) Medication (See MAR);Repositioned;Ambulation/increased activity  -SB     Additional Documentation Pain Scale: Numbers Pre/Post-Treatment (Group)  -SB       Row Name 09/08/24 1521          Plan of Care Review    Plan of Care Reviewed With patient  -SB     Progress no change  -SB     Outcome Evaluation PT eval completed. Pt alert and oriented x2 with cues and options. Per SNF, pt can t/f to w/c with assist x2 and self-propels with feet. Today, pt rolls in bed L and R for hygiene and linen change- requiring mod A to do so. Pt reqd max VC for command following and often tactile cues as well. Pt reqd dep x2 for scooting up in bed and further mobility deferred due to weakness. Pt will benefit from skilled PT to improve fxl mob and endurance.  Rec d/c SNF.  -SB       Row Name 09/08/24 1521          Therapy Assessment/Plan (PT)    Patient/Family Therapy Goals Statement (PT) improve function  -SB     Rehab Potential (PT) good, to achieve stated therapy goals  -SB     Criteria for Skilled Interventions Met (PT) yes;meets criteria;skilled treatment is necessary  -SB     Therapy Frequency (PT) 2 times/day  -SB     Predicted Duration of Therapy Intervention (PT) until d/c or goals met  -SB       Row Name 09/08/24 1521          Positioning and Restraints    Pre-Treatment Position in bed  -SB     Post Treatment Position bed  -SB     In Bed fowlers;call light within reach;encouraged to call for assist;exit alarm on;side rails up x2  -SB               User Key  (r) = Recorded By, (t) = Taken By, (c) = Cosigned By      Initials Name Provider Type    Araceli Caldwell PT DPT Physical Therapist                   Outcome Measures       Row Name 09/08/24 1521          How much help from another person do you currently need...    Turning from your back to your side while in flat bed without using bedrails? 2  -SB     Moving from lying on back to sitting on the side of a flat bed without bedrails? 2  -SB     Moving to and from a bed to a chair (including a wheelchair)? 1  -SB     Standing up from a chair using your arms (e.g., wheelchair, bedside chair)? 1  -SB     Climbing 3-5 steps with a railing? 1  -SB     To walk in hospital room? 1  -SB     AM-PAC 6 Clicks Score (PT) 8  -SB     Highest Level of Mobility Goal 3 --> Sit at edge of bed  -SB       Row Name 09/08/24 1521 09/08/24 0741       Functional Assessment    Outcome Measure Options AM-PAC 6 Clicks Basic Mobility (PT)  -SB AM-PAC 6 Clicks Daily Activity (OT)  -LS              User Key  (r) = Recorded By, (t) = Taken By, (c) = Cosigned By      Initials Name Provider Type    Araceli Caldwell PT DPT Physical Therapist    Raiza Laws OTR/L Occupational Therapist                                 Physical Therapy  Education       Title: PT OT SLP Therapies (In Progress)       Topic: Physical Therapy (In Progress)       Point: Mobility training (In Progress)       Learning Progress Summary             Patient Acceptance, E, NR by SB at 9/8/2024 1601    Comment: pt edu on POC, benefits of act and d/c plans                         Point: Home exercise program (Not Started)       Learner Progress:  Not documented in this visit.              Point: Body mechanics (Not Started)       Learner Progress:  Not documented in this visit.              Point: Precautions (In Progress)       Learning Progress Summary             Patient Acceptance, E, NR by SB at 9/8/2024 1601    Comment: pt edu on POC, benefits of act and d/c plans                                         User Key       Initials Effective Dates Name Provider Type Discipline    SB 07/11/23 -  Araceli Danielson, PT DPT Physical Therapist PT                  PT Recommendation and Plan  Planned Therapy Interventions (PT): balance training, strengthening, bed mobility training, gait training, patient/family education, transfer training, postural re-education  Plan of Care Reviewed With: patient  Progress: no change  Outcome Evaluation: PT eval completed. Pt alert and oriented x2 with cues and options. Per SNF, pt can t/f to w/c with assist x2 and self-propels with feet. Today, pt rolls in bed L and R for hygiene and linen change- requiring mod A to do so. Pt reqd max VC for command following and often tactile cues as well. Pt reqd dep x2 for scooting up in bed and further mobility deferred due to weakness. Pt will benefit from skilled PT to improve fxl mob and endurance. Rec d/c SNF.     Time Calculation:         PT Charges       Row Name 09/08/24 1601             Time Calculation    Start Time 1521  10 min CR for total of 25 min  -SB      Stop Time 1536  -SB      Time Calculation (min) 15 min  -SB      PT Received On 09/08/24  -SB      PT Goal Re-Cert Due Date 09/18/24  -SB          Untimed Charges    PT Eval/Re-eval Minutes 25  -SB         Total Minutes    Untimed Charges Total Minutes 25  -SB       Total Minutes 25  -SB                User Key  (r) = Recorded By, (t) = Taken By, (c) = Cosigned By      Initials Name Provider Type    Araceli Caldwell, PT DPT Physical Therapist                  Therapy Charges for Today       Code Description Service Date Service Provider Modifiers Qty    74180661622 HC PT EVAL MOD COMPLEXITY 2 9/8/2024 Araceli Danielson PT DPT GP 1            PT G-Codes  Outcome Measure Options: AM-PAC 6 Clicks Basic Mobility (PT)  AM-PAC 6 Clicks Score (PT): 8  AM-PAC 6 Clicks Score (OT): 11  PT Discharge Summary  Anticipated Discharge Disposition (PT): skilled nursing facility    Araceli Danielson PT DPT  9/8/2024

## 2024-09-08 NOTE — THERAPY EVALUATION
Patient Name: Speedy Villalba  : 1945    MRN: [id number]                              Today's Date: 2024       Admit Date: 2024    Visit Dx:     ICD-10-CM ICD-9-CM   1. Other specified sepsis  A41.89 038.8     995.91   2. Acute renal failure, unspecified acute renal failure type  N17.9 584.9   3. Urinary tract infection without hematuria, site unspecified  N39.0 599.0     Patient Active Problem List   Diagnosis    Gastroenteritis    Coronary artery disease involving native coronary artery of native heart without angina pectoris    Cognitive decline    Frequent falls    Type 2 diabetes mellitus without complication, without long-term current use of insulin    Primary hypertension    Atypical parkinsonism    Sleep disturbance    Delusions    TIA (transient ischemic attack)    Subdural hematoma    Sepsis secondary to UTI    Acute encephalopathy     Past Medical History:   Diagnosis Date    Acute encephalopathy 2024    Arthritis     Atypical parkinsonism 2023    Cognitive decline 10/30/2023    Coronary artery disease 10 years    x2 stents    Diabetes mellitus < 2 years    non-insulin dependent    Difficulty walking < 1 year    multiple falls in the last 6-8 months    DNR (do not resuscitate)     Gout     HL (hearing loss) 20+ years    Wears hearing aids occasionally    Hyperlipidemia 5 years    Insomnia     Lewy body dementia     Memory loss 3 years+/-    gradual and subtle    Muscle weakness     Primary hypertension 10/30/2023    Sleep apnea 20+ year    Non-compliant with PEEP for tx    Stroke 2022    Subdural hematoma     TIA (transient ischemic attack) 2024     Past Surgical History:   Procedure Laterality Date    APPENDECTOMY      BACK SURGERY  2016    CAROTID STENT  ?    KNEE SURGERY  1960    LAMINECTOMY  2015?    discectomy & fusion      General Information       Row Name 24 0741          OT Time and Intention    Document Type evaluation  ED from NH with persistent  fever, confusion and generalized pain. Hx of Lewy Body Dementia, SDH. Dx: sepsis due to UTI.  -LS     Mode of Treatment occupational therapy  -       Row Name 09/08/24 0741          General Information    Patient Profile Reviewed yes  -LS     Prior Level of Function --  Per SNF, at baseline pt is able to t/f to w/c with assist x2, pedals around facility with use of B feet. He requires Max A for all adls except self feeding.  -     Existing Precautions/Restrictions fall  -LS     Barriers to Rehab previous functional deficit;cognitive status  -       Row Name 09/08/24 0741          Living Environment    People in Home facility resident  -       Row Name 09/08/24 0741          Home Main Entrance    Number of Stairs, Main Entrance none  -       Row Name 09/08/24 0741          Stairs Within Home, Primary    Number of Stairs, Within Home, Primary none  -       Row Name 09/08/24 0741          Cognition    Orientation Status (Cognition) oriented to;person  -       Row Name 09/08/24 0741          Safety Issues, Functional Mobility    Safety Issues Affecting Function (Mobility) awareness of need for assistance;friction/shear risk;insight into deficits/self-awareness;problem-solving;safety precaution awareness;safety precautions follow-through/compliance;sequencing abilities  -LS     Impairments Affecting Function (Mobility) balance;cognition;endurance/activity tolerance;strength;range of motion (ROM);postural/trunk control  -     Cognitive Impairments, Mobility Safety/Performance attention;awareness, need for assistance;insight into deficits/self-awareness;judgment;problem-solving/reasoning;safety precaution awareness;safety precaution follow-through;sequencing abilities  -               User Key  (r) = Recorded By, (t) = Taken By, (c) = Cosigned By      Initials Name Provider Type     Raiza Palafox OTR/L Occupational Therapist                     Mobility/ADL's       Row Name 09/08/24 0741          Bed  Mobility    Bed Mobility supine-sit;sit-supine  -     Supine-Sit [place] (Bed Mobility) maximum assist (25% patient effort);verbal cues;nonverbal cues (demo/gesture)  -LS     Sit-Supine [place] (Bed Mobility) maximum assist (25% patient effort);verbal cues;nonverbal cues (demo/gesture)  -     Bed Mobility, Safety Issues cognitive deficits limit understanding;decreased use of arms for pushing/pulling;decreased use of legs for bridging/pushing;impaired trunk control for bed mobility  -     Assistive Device (Bed Mobility) bed rails;head of bed elevated  -       Row Name 09/08/24 0741          Activities of Daily Living    BADL Assessment/Intervention upper body dressing;lower body dressing;toileting  -       Row Name 09/08/24 0741          Upper Body Dressing Assessment/Training    [place] Level (Upper Body Dressing) upper body dressing skills;maximum assist (25% patient effort)  -     Position (Upper Body Dressing) sitting up in bed  -       Row Name 09/08/24 0741          Lower Body Dressing Assessment/Training    [place] Level (Lower Body Dressing) lower body dressing skills;dependent (less than 25% patient effort)  -LS     Position (Lower Body Dressing) supine  -       Row Name 09/08/24 0741          Toileting Assessment/Training    [place] Level (Toileting) toileting skills;dependent (less than 25% patient effort)  -LS     Position (Toileting) supine  -               User Key  (r) = Recorded By, (t) = Taken By, (c) = Cosigned By      Initials Name Provider Type     Raiza Palafox, OTR/L Occupational Therapist                   Obj/Interventions       Row Name 09/08/24 0741          Sensory Assessment (Somatosensory)    Sensory Assessment (Somatosensory) unable/difficult to assess  -       Row Name 09/08/24 0741          Vision Assessment/Intervention    Visual Impairment/Limitations other (see comments)  Pt kept eyes close majority of time, however, when eyes were  open, vision appeared WFL as evidenced by Pt tracking therapist around room and following visual cues  -LS       Row Name 09/08/24 0741          Range of Motion Comprehensive    General Range of Motion upper extremity range of motion deficits identified  -LS     Comment, General Range of Motion R shoulder AROM/PROM impaired 25%; L shoulder AROM/PROM impaired 50%; BUE AROM/PROM otherwise WFL  -LS       Row Name 09/08/24 0741          Strength Comprehensive (MMT)    General Manual Muscle Testing (MMT) Assessment upper extremity strength deficits identified  -LS     Comment, General Manual Muscle Testing (MMT) Assessment R shoulder 4/5 within available range; L shoulder 3-/5; BUE strength grossly 4+/5 at all other joints  -LS       Row Name 09/08/24 0741          Motor Skills    Motor Skills coordination  -LS     Coordination bilateral;finger to nose;WNL  -LS       Row Name 09/08/24 0741          Balance    Balance Assessment sitting static balance  -LS     Static Sitting Balance maximum assist  -LS     Position, Sitting Balance supported;sitting edge of bed  -LS               User Key  (r) = Recorded By, (t) = Taken By, (c) = Cosigned By      Initials Name Provider Type    LS Raiza Palafox, OTR/L Occupational Therapist                   Goals/Plan       Row Name 09/08/24 0741          Grooming Goal 1 (OT)    Activity/Device (Grooming Goal 1, OT) grooming skills, all;oral care;wash face, hands  -LS     [place] (Grooming Goal 1, OT) minimum assist (75% or more patient effort)  -LS     Time Frame (Grooming Goal 1, OT) long term goal (LTG);10 days  -LS     Strategies/Barriers (Grooming Goal 1, OT) While seated EOB  -LS     Progress/Outcome (Grooming Goal 1, OT) new goal  -       Row Name 09/08/24 0741          Self-Feeding Goal 1 (OT)    Activity/Device (Self-Feeding Goal 1, OT) self-feeding skills, all  -LS     [place] Level/Cues Needed (Self-Feeding Goal 1, OT) set-up required  -LS     Time Frame  (Self-Feeding Goal 1, OT) long term goal (LTG);10 days  -LS     Progress/Outcomes (Self-Feeding Goal 1, OT) new goal  -LS       Row Name 09/08/24 0741          Therapy Assessment/Plan (OT)    Planned Therapy Interventions (OT) activity tolerance training;functional balance retraining;occupation/activity based interventions;ROM/therapeutic exercise;strengthening exercise;transfer/mobility retraining;patient/caregiver education/training;adaptive equipment training;BADL retraining;passive ROM/stretching;cognitive/visual perception retraining;manual therapy/joint mobilization  -LS               User Key  (r) = Recorded By, (t) = Taken By, (c) = Cosigned By      Initials Name Provider Type    LS Raiza Palafox OTR/L Occupational Therapist                   Clinical Impression       Row Name 09/08/24 0741          Pain Assessment    Pretreatment Pain Rating 0/10 - no pain  -LS     Posttreatment Pain Rating 0/10 - no pain  -LS       Row Name 09/08/24 0741          Plan of Care Review    Plan of Care Reviewed With patient  -LS     Progress no change  -LS     Outcome Evaluation OT eval completed. Pt in fowlers upon therapist arrival; A&O to person only and kept eyes shut for first half of eval, but followed 90% of commands and conversed with OT while eyes remained shut. Per SNF, the Pt required Max x2 for transfers and Max A for all other BADLs except self feeding for which he required set-up only. Pt performed supine<>sit utilizing bedrail with HOB elevated with Max A and verbal/visual/tactile cues for sequencng and body mechanics. Pt required Max A to maintain static sitting balance with strong posterior lean. Pt demos some BUE AROM and strength deficits. B finger-to-nose WFL. Pt currently dependent for all LB BADLs, Max A for UB bathing/dressing, and Min A for self feeding. Skilled OT intervention indicated in order to address deficits in fxl mobility, fxl activity tolerance, balance, strength, and use of adaptive  techniques/equipment during performance of BADLs. Recommend return to SNF at discharge.  -       Row Name 09/08/24 0741          Therapy Assessment/Plan (OT)    Rehab Potential (OT) fair, will monitor progress closely  -     Criteria for Skilled Therapeutic Interventions Met (OT) yes;skilled treatment is necessary  -     Therapy Frequency (OT) 3 times/wk  -       Row Name 09/08/24 0741          Therapy Plan Review/Discharge Plan (OT)    Anticipated Discharge Disposition (OT) skilled nursing facility  -       Row Name 09/08/24 0741          Positioning and Restraints    Pre-Treatment Position in bed  -LS     Post Treatment Position bed  -LS     In Bed fowlers;side rails up x2;call light within reach;encouraged to call for assist;exit alarm on;heels elevated  -               User Key  (r) = Recorded By, (t) = Taken By, (c) = Cosigned By      Initials Name Provider Type    LS Raiza Palafox, OTR/L Occupational Therapist                   Outcome Measures       Row Name 09/08/24 0741          How much help from another is currently needed...    Putting on and taking off regular lower body clothing? 1  -LS     Bathing (including washing, rinsing, and drying) 2  -LS     Toileting (which includes using toilet bed pan or urinal) 1  -LS     Putting on and taking off regular upper body clothing 2  -LS     Taking care of personal grooming (such as brushing teeth) 2  -LS     Eating meals 3  -LS     AM-PAC 6 Clicks Score (OT) 11  -       Row Name 09/07/24 2114          How much help from another person do you currently need...    Turning from your back to your side while in flat bed without using bedrails? 1  -AO     Moving from lying on back to sitting on the side of a flat bed without bedrails? 1  -AO     Moving to and from a bed to a chair (including a wheelchair)? 1  -AO     Standing up from a chair using your arms (e.g., wheelchair, bedside chair)? 1  -AO     Climbing 3-5 steps with a railing? 1  -AO     To  walk in hospital room? 1  -AO     AM-PAC 6 Clicks Score (PT) 6  -AO     Highest Level of Mobility Goal 2 --> Bed activities/dependent transfer  -AO       Row Name 09/08/24 0741          Functional Assessment    Outcome Measure Options AM-PAC 6 Clicks Daily Activity (OT)  -               User Key  (r) = Recorded By, (t) = Taken By, (c) = Cosigned By      Initials Name Provider Type     Raiza Palafox OTR/L Occupational Therapist    Dennis Grajeda, RN Registered Nurse                    Occupational Therapy Education       Title: PT OT SLP Therapies (In Progress)       Topic: Occupational Therapy (In Progress)       Point: ADL training (Done)       Description:   Instruct learner(s) on proper safety adaptation and remediation techniques during self care or transfers.   Instruct in proper use of assistive devices.                  Learning Progress Summary             Patient Acceptance, E, VU,NR by  at 9/8/2024 0809                         Point: Home exercise program (Not Started)       Description:   Instruct learner(s) on appropriate technique for monitoring, assisting and/or progressing therapeutic exercises/activities.                  Learner Progress:  Not documented in this visit.              Point: Precautions (Done)       Description:   Instruct learner(s) on prescribed precautions during self-care and functional transfers.                  Learning Progress Summary             Patient Acceptance, E, VU,NR by  at 9/8/2024 0809                         Point: Body mechanics (Done)       Description:   Instruct learner(s) on proper positioning and spine alignment during self-care, functional mobility activities and/or exercises.                  Learning Progress Summary             Patient Acceptance, E, VU,NR by  at 9/8/2024 0809                                         User Key       Initials Effective Dates Name Provider Type [facility] 06/20/22 -  Raiza Palafox OTR/L Occupational Therapist  OT                  OT Recommendation and Plan  Planned Therapy Interventions (OT): activity tolerance training, functional balance retraining, occupation/activity based interventions, ROM/therapeutic exercise, strengthening exercise, transfer/mobility retraining, patient/caregiver education/training, adaptive equipment training, BADL retraining, passive ROM/stretching, cognitive/visual perception retraining, manual therapy/joint mobilization  Therapy Frequency (OT): 3 times/wk  Plan of Care Review  Plan of Care Reviewed With: patient  Progress: no change  Outcome Evaluation: OT eval completed. Pt in fowlers upon therapist arrival; A&O to person only and kept eyes shut for first half of eval, but followed 90% of commands and conversed with OT while eyes remained shut. Per SNF, the Pt required Max x2 for transfers and Max A for all other BADLs except self feeding for which he required set-up only. Pt performed supine<>sit utilizing bedrail with HOB elevated with Max A and verbal/visual/tactile cues for sequencng and body mechanics. Pt required Max A to maintain static sitting balance with strong posterior lean. Pt demos some BUE AROM and strength deficits. B finger-to-nose WFL. Pt currently dependent for all LB BADLs, Max A for UB bathing/dressing, and Min A for self feeding. Skilled OT intervention indicated in order to address deficits in fxl mobility, fxl activity tolerance, balance, strength, and use of adaptive techniques/equipment during performance of BADLs. Recommend return to SNF at discharge.     Time Calculation:         Time Calculation- OT       Row Name 09/08/24 0741             Time Calculation- OT    OT Start Time 0741  +10 minutes chart review  -      OT Stop Time 0809  -      OT Time Calculation (min) 28 min  -      OT Non-Billable Time (min) 38 min  -      OT Received On 09/08/24  -      OT Goal Re-Cert Due Date 09/18/24  -                User Key  (r) = Recorded By, (t) = Taken By, (c)  = Cosigned By      Initials Name Provider Type    LS Raiza Palafox OTR/L Occupational Therapist                  Therapy Charges for Today       Code Description Service Date Service Provider Modifiers Qty    [id number] HC OT EVAL MOD COMPLEXITY 3 9/8/2024 Raiza Palafox OTR/L GO 1                 Raiza Palafox OTR/VALENTINE  9/8/2024

## 2024-09-08 NOTE — PLAN OF CARE
Problem: Adult Inpatient Plan of Care  Goal: Plan of Care Review  Outcome: Ongoing, Progressing  Goal: Patient-Specific Goal (Individualized)  Outcome: Ongoing, Progressing  Goal: Absence of Hospital-Acquired Illness or Injury  Outcome: Ongoing, Progressing  Intervention: Identify and Manage Fall Risk  Recent Flowsheet Documentation  Taken 9/8/2024 0400 by Dennis Pink RN  Safety Promotion/Fall Prevention: safety round/check completed  Taken 9/8/2024 0200 by Dennis Pink RN  Safety Promotion/Fall Prevention: safety round/check completed  Taken 9/8/2024 0000 by Dennis Pink RN  Safety Promotion/Fall Prevention: safety round/check completed  Taken 9/7/2024 2200 by Dennis Pink RN  Safety Promotion/Fall Prevention: safety round/check completed  Taken 9/7/2024 2100 by Dennis Pink RN  Safety Promotion/Fall Prevention: safety round/check completed  Taken 9/7/2024 2000 by Dennis Pink RN  Safety Promotion/Fall Prevention: safety round/check completed  Taken 9/7/2024 1900 by Dennis Pink RN  Safety Promotion/Fall Prevention: safety round/check completed  Intervention: Prevent and Manage VTE (Venous Thromboembolism) Risk  Recent Flowsheet Documentation  Taken 9/7/2024 2113 by Dennis Pink RN  VTE Prevention/Management: sequential compression devices off  Goal: Optimal Comfort and Wellbeing  Outcome: Ongoing, Progressing  Intervention: Provide Person-Centered Care  Recent Flowsheet Documentation  Taken 9/7/2024 2113 by Dennis Pink RN  Trust Relationship/Rapport:   care explained   empathic listening provided   choices provided   emotional support provided   questions encouraged   questions answered  Goal: Readiness for Transition of Care  Outcome: Ongoing, Progressing     Problem: Asthma Comorbidity  Goal: Maintenance of Asthma Control  Outcome: Ongoing, Progressing     Problem: Behavioral Health Comorbidity  Goal: Maintenance of Behavioral Health Symptom Control  Outcome: Ongoing, Progressing     Problem: COPD (Chronic  Obstructive Pulmonary Disease) Comorbidity  Goal: Maintenance of COPD Symptom Control  Outcome: Ongoing, Progressing     Problem: Diabetes Comorbidity  Goal: Blood Glucose Level Within Targeted Range  Outcome: Ongoing, Progressing     Problem: Heart Failure Comorbidity  Goal: Maintenance of Heart Failure Symptom Control  Outcome: Ongoing, Progressing     Problem: Hypertension Comorbidity  Goal: Blood Pressure in Desired Range  Outcome: Ongoing, Progressing     Problem: Obstructive Sleep Apnea Risk or Actual Comorbidity Management  Goal: Unobstructed Breathing During Sleep  Outcome: Ongoing, Progressing     Problem: Osteoarthritis Comorbidity  Goal: Maintenance of Osteoarthritis Symptom Control  Outcome: Ongoing, Progressing     Problem: Pain Chronic (Persistent) (Comorbidity Management)  Goal: Acceptable Pain Control and Functional Ability  Outcome: Ongoing, Progressing  Intervention: Optimize Psychosocial Wellbeing  Recent Flowsheet Documentation  Taken 9/7/2024 2113 by Dennis Pink RN  Diversional Activities: television     Problem: Seizure Disorder Comorbidity  Goal: Maintenance of Seizure Control  Outcome: Ongoing, Progressing     Problem: Skin Injury Risk Increased  Goal: Skin Health and Integrity  Outcome: Ongoing, Progressing     Problem: Fall Injury Risk  Goal: Absence of Fall and Fall-Related Injury  Outcome: Ongoing, Progressing  Intervention: Promote Injury-Free Environment  Recent Flowsheet Documentation  Taken 9/8/2024 0400 by Dennis Pink RN  Safety Promotion/Fall Prevention: safety round/check completed  Taken 9/8/2024 0200 by Dennis Pink RN  Safety Promotion/Fall Prevention: safety round/check completed  Taken 9/8/2024 0000 by Dennis Pink RN  Safety Promotion/Fall Prevention: safety round/check completed  Taken 9/7/2024 2200 by Dennis Pikn RN  Safety Promotion/Fall Prevention: safety round/check completed  Taken 9/7/2024 2100 by Dennis Pink RN  Safety Promotion/Fall Prevention: safety round/check  completed  Taken 9/7/2024 2000 by Dennis Pink, RN  Safety Promotion/Fall Prevention: safety round/check completed  Taken 9/7/2024 1900 by Dennis Pink, RN  Safety Promotion/Fall Prevention: safety round/check completed   Goal Outcome Evaluation:         Patient did not slept much overnight. Patient's behavior was worse tonight than last night but still in line with Lewy body dementia. Patient did not have any complaints of pain. Patient did not have any adverse events occur.

## 2024-09-08 NOTE — PLAN OF CARE
Goal Outcome Evaluation:  Plan of Care Reviewed With: patient        Progress: no change  Outcome Evaluation: OT eval completed. Pt in fowlers upon therapist arrival; A&O to person only and kept eyes shut for first half of eval, but followed 90% of commands and conversed with OT while eyes remained shut. Per SNF, the Pt required Max x2 for transfers and Max A for all other BADLs except self feeding for which he required set-up only. Pt performed supine<>sit utilizing bedrail with HOB elevated with Max A and verbal/visual/tactile cues for sequencng and body mechanics. Pt required Max A to maintain static sitting balance with strong posterior lean. Pt demos some BUE AROM and strength deficits. B finger-to-nose WFL. Pt currently dependent for all LB BADLs, Max A for UB bathing/dressing, and Min A for self feeding. Skilled OT intervention indicated in order to address deficits in fxl mobility, fxl activity tolerance, balance, strength, and use of adaptive techniques/equipment during performance of BADLs. Recommend return to SNF at discharge.      Anticipated Discharge Disposition (OT): skilled nursing facility

## 2024-09-08 NOTE — PROGRESS NOTES
[facility] Medicine Services  INPATIENT PROGRESS NOTE    Patient Name: Speedy Villalba  Date of Admission: 9/5/2024  Today's Date: 09/08/24  Length of Stay: 3  Primary Care Physician: Sen Ramsey MD    Subjective   Chief Complaint: Mental status change  Weakness - Generalized    Difficulty Urinating          78-year-old male with past medical history of atypical parkinsonian syndrome, cognitive decline, diabetes mellitus, coronary artery disease, subdural hematoma presents to the emergency room referred from skilled nursing facility due to persistent fever, confusion and generalized pain. He has been treated for UTI with Bactrim for 2 days and continues to have elevated temperature. He presents with a temp of 100.7, heart rate of 120, normal WBC and abnormal urinalysis.   9/6  As before presented with mental status change and decline, CT head did not show any acute changes but showed atrophy consistent with dementia was treated for UTI, urine culture was positive for Proteus pan sensitive blood culture remains unremarkable, remains on IV Rocephin day #2 now afebrile white count unremarkable and room air, lactic acidosis has resolved, noticed acute renal failure, will ultrasound renal avoid all nephrotoxins, check B12 ammonia TSH, DNR, is from a [place] point plan is to return back  9/7  As before ultrasound renal was unremarkable kidney function is slightly better we will consult nephrology, TSH B12 ammonia unremarkable  9/8  As before creatinine is down ultrasound renal no acute appreciate nephrology, continue PT OT as before admitted for mental status change found to have pyelonephritis /UTI Proteus pansensitive/catabolic encephalopathy lactic acidosis acute on chronic renal failure  Review of Systems   Genitourinary:  Positive for difficulty urinating.      All pertinent negatives and positives are as above. All other systems have been reviewed and are negative  unless otherwise stated.     Objective    Temp:  [97.6 °F (36.4 °C)-98.1 °F (36.7 °C)] 98.1 °F (36.7 °C)  Heart Rate:  [72-91] 72  Resp:  [16-18] 18  BP: (110-141)/(60-68) 127/63  Physical Exam  Constitutional:       Appearance: He is obese.   HENT:      Head: Normocephalic.      Nose: Nose normal.   Eyes:      Pupils: Pupils are equal, round, and reactive to light.   Cardiovascular:      Rate and Rhythm: Normal rate and regular rhythm.   Pulmonary:      Breath sounds: Stridor present.   Abdominal:      General: Abdomen is flat.      Palpations: Abdomen is soft.   Musculoskeletal:         General: Normal range of motion.      Cervical back: Normal range of motion.             Results Review:  I have reviewed the labs, radiology results, and diagnostic studies.    Laboratory Data:   Results from last 7 days   Lab Units 09/08/24  0204 09/07/24 0224 09/06/24 0317   WBC 10*3/mm3 6.74 4.65 5.26   HEMOGLOBIN g/dL 9.2* 8.4* 10.1*   HEMATOCRIT % 27.8* 25.6* 30.7*   PLATELETS 10*3/mm3 168 151 160        Results from last 7 days   Lab Units 09/08/24  0204 09/07/24  0224 09/06/24 0317 09/05/24  1810   SODIUM mmol/L 140 136 141 139   POTASSIUM mmol/L 4.0 3.7 4.2 4.3   CHLORIDE mmol/L 110* 108* 106 101   CO2 mmol/L 22.0 19.0* 23.0 21.0*   BUN mg/dL 35* 41* 43* 42*   CREATININE mg/dL 1.90* 2.53* 2.79* 2.91*   CALCIUM mg/dL 8.4* 7.9* 8.4* 9.0   BILIRUBIN mg/dL 0.2  --   --  0.4   ALK PHOS U/L 66  --   --  71   ALT (SGPT) U/L 19  --   --  26   AST (SGOT) U/L 156*  --   --  77*   GLUCOSE mg/dL 123* 97 146* 127*       Culture Data:   Urine Culture   Date Value Ref Range Status   09/03/2024 >100,000 CFU/mL Proteus mirabilis (A)  Final       Radiology Data:   Imaging Results (Last 24 Hours)       ** No results found for the last 24 hours. **            I have reviewed the patient's current medications.     Assessment/Plan   Assessment  Active Hospital Problems    Diagnosis     **Sepsis secondary to UTI     Acute encephalopathy      Subdural hematoma     Atypical parkinsonism     Primary hypertension     Cognitive decline        Treatment Plan   Admit to medical floor  Vitals every 4 hours  Cardiac diet  IV fluids received sepsis bolus in the emergency room continue with normal saline 75 cc an hour  Up with assistance fall precautions     Sepsis due to UTI and acute encephalopathy  Continue Rocephin 1 g IV every 24 hours  Follow-up blood and urine cultures  Reorient frequently     Cognitive decline and atypical parkinsonism  Continue Sinemet  tablets 3 times daily     DVT prophylaxis SCDs     Medical Decision Making  Number and Complexity of problems: 4 complex medical problems  Differential Diagnosis: see above     Conditions and Status        Condition is unchanged.     [facility] Data  External documents reviewed: What's Trending EHR  Cardiac tracing (EKG, telemetry) interpretation: NSR  Radiology interpretation: See above  Labs reviewed: see above  Any tests that were considered but not ordered: none     Decision rules/scores evaluated (example [id number], Wells, etc): N/A     Discussed with: Patient     Care Planning  Shared decision making: POA  Code status and discussions: DNR/DNI     Disposition  Social Determinants of Health that impact treatment or disposition: none  Estimated length of stay is over 2 midnights.      I confirmed that the patient's advanced care plan is present, code status is documented, and a surrogate decision maker is listed in the patient's medical record.     Electronically signed by Randee Boyd MD, 09/08/24, 11:32 CDT.

## 2024-09-09 LAB
ALBUMIN SERPL-MCNC: 2.9 G/DL (ref 3.5–5.2)
ALBUMIN/GLOB SERPL: 1 G/DL
ALP SERPL-CCNC: 73 U/L (ref 39–117)
ALT SERPL W P-5'-P-CCNC: 20 U/L (ref 1–41)
ANION GAP SERPL CALCULATED.3IONS-SCNC: 10 MMOL/L (ref 5–15)
AST SERPL-CCNC: 125 U/L (ref 1–40)
BASOPHILS # BLD AUTO: 0.03 10*3/MM3 (ref 0–0.2)
BASOPHILS NFR BLD AUTO: 0.4 % (ref 0–1.5)
BILIRUB SERPL-MCNC: 0.3 MG/DL (ref 0–1.2)
BUN SERPL-MCNC: 29 MG/DL (ref 8–23)
BUN/CREAT SERPL: 17.9 (ref 7–25)
CALCIUM SPEC-SCNC: 8.2 MG/DL (ref 8.6–10.5)
CHLORIDE SERPL-SCNC: 110 MMOL/L (ref 98–107)
CO2 SERPL-SCNC: 21 MMOL/L (ref 22–29)
CREAT SERPL-MCNC: 1.62 MG/DL (ref 0.76–1.27)
DEPRECATED RDW RBC AUTO: 44.5 FL (ref 37–54)
EGFRCR SERPLBLD CKD-EPI 2021: 43.2 ML/MIN/1.73
EOSINOPHIL # BLD AUTO: 0.19 10*3/MM3 (ref 0–0.4)
EOSINOPHIL NFR BLD AUTO: 2.6 % (ref 0.3–6.2)
ERYTHROCYTE [DISTWIDTH] IN BLOOD BY AUTOMATED COUNT: 12.8 % (ref 12.3–15.4)
GLOBULIN UR ELPH-MCNC: 3 GM/DL
GLUCOSE SERPL-MCNC: 127 MG/DL (ref 65–99)
HCT VFR BLD AUTO: 29.2 % (ref 37.5–51)
HGB BLD-MCNC: 9.5 G/DL (ref 13–17.7)
IMM GRANULOCYTES # BLD AUTO: 0.08 10*3/MM3 (ref 0–0.05)
IMM GRANULOCYTES NFR BLD AUTO: 1.1 % (ref 0–0.5)
IRON 24H UR-MRATE: 33 MCG/DL (ref 59–158)
IRON SATN MFR SERPL: 21 % (ref 20–50)
LYMPHOCYTES # BLD AUTO: 0.85 10*3/MM3 (ref 0.7–3.1)
LYMPHOCYTES NFR BLD AUTO: 11.7 % (ref 19.6–45.3)
MCH RBC QN AUTO: 30.9 PG (ref 26.6–33)
MCHC RBC AUTO-ENTMCNC: 32.5 G/DL (ref 31.5–35.7)
MCV RBC AUTO: 95.1 FL (ref 79–97)
MONOCYTES # BLD AUTO: 0.48 10*3/MM3 (ref 0.1–0.9)
MONOCYTES NFR BLD AUTO: 6.6 % (ref 5–12)
NEUTROPHILS NFR BLD AUTO: 5.66 10*3/MM3 (ref 1.7–7)
NEUTROPHILS NFR BLD AUTO: 77.6 % (ref 42.7–76)
NRBC BLD AUTO-RTO: 0 /100 WBC (ref 0–0.2)
PLATELET # BLD AUTO: 197 10*3/MM3 (ref 140–450)
PMV BLD AUTO: 9.8 FL (ref 6–12)
POTASSIUM SERPL-SCNC: 4 MMOL/L (ref 3.5–5.2)
PROT SERPL-MCNC: 5.9 G/DL (ref 6–8.5)
RBC # BLD AUTO: 3.07 10*6/MM3 (ref 4.14–5.8)
SODIUM SERPL-SCNC: 141 MMOL/L (ref 136–145)
TIBC SERPL-MCNC: 155 MCG/DL (ref 298–536)
TRANSFERRIN SERPL-MCNC: 104 MG/DL (ref 200–360)
WBC NRBC COR # BLD AUTO: 7.29 10*3/MM3 (ref 3.4–10.8)

## 2024-09-09 PROCEDURE — 25010000002 CEFTRIAXONE PER 250 MG: Performed by: FAMILY MEDICINE

## 2024-09-09 PROCEDURE — 83540 ASSAY OF IRON: CPT | Performed by: INTERNAL MEDICINE

## 2024-09-09 PROCEDURE — 84466 ASSAY OF TRANSFERRIN: CPT | Performed by: INTERNAL MEDICINE

## 2024-09-09 PROCEDURE — 25810000003 SODIUM CHLORIDE 0.9 % SOLUTION: Performed by: FAMILY MEDICINE

## 2024-09-09 PROCEDURE — 85025 COMPLETE CBC W/AUTO DIFF WBC: CPT | Performed by: HOSPITALIST

## 2024-09-09 PROCEDURE — 80053 COMPREHEN METABOLIC PANEL: CPT | Performed by: HOSPITALIST

## 2024-09-09 PROCEDURE — 97530 THERAPEUTIC ACTIVITIES: CPT

## 2024-09-09 RX ADMIN — NYSTATIN: 100000 POWDER TOPICAL at 20:40

## 2024-09-09 RX ADMIN — TAMSULOSIN HYDROCHLORIDE 0.4 MG: 0.4 CAPSULE ORAL at 20:40

## 2024-09-09 RX ADMIN — SODIUM CHLORIDE 75 ML/HR: 9 INJECTION, SOLUTION INTRAVENOUS at 22:13

## 2024-09-09 RX ADMIN — NYSTATIN: 100000 POWDER TOPICAL at 08:56

## 2024-09-09 RX ADMIN — CARBIDOPA AND LEVODOPA 2 TABLET: 25; 100 TABLET ORAL at 20:40

## 2024-09-09 RX ADMIN — SODIUM CHLORIDE 75 ML/HR: 9 INJECTION, SOLUTION INTRAVENOUS at 09:01

## 2024-09-09 RX ADMIN — SODIUM CHLORIDE 1000 MG: 900 INJECTION INTRAVENOUS at 22:14

## 2024-09-09 RX ADMIN — Medication 10 ML: at 08:56

## 2024-09-09 RX ADMIN — CARBIDOPA AND LEVODOPA 2 TABLET: 25; 100 TABLET ORAL at 08:56

## 2024-09-09 RX ADMIN — ATORVASTATIN CALCIUM 80 MG: 40 TABLET ORAL at 20:40

## 2024-09-09 RX ADMIN — CARBIDOPA AND LEVODOPA 2 TABLET: 25; 100 TABLET ORAL at 17:08

## 2024-09-09 RX ADMIN — ASPIRIN 81 MG: 81 TABLET, COATED ORAL at 08:55

## 2024-09-09 NOTE — PLAN OF CARE
Problem: Adult Inpatient Plan of Care  Goal: Plan of Care Review  Outcome: Ongoing, Progressing  Goal: Patient-Specific Goal (Individualized)  Outcome: Ongoing, Progressing  Goal: Absence of Hospital-Acquired Illness or Injury  Outcome: Ongoing, Progressing  Intervention: Identify and Manage Fall Risk  Recent Flowsheet Documentation  Taken 9/9/2024 0400 by Dennis Pink RN  Safety Promotion/Fall Prevention: safety round/check completed  Taken 9/9/2024 0200 by Dennis Pink RN  Safety Promotion/Fall Prevention: safety round/check completed  Taken 9/9/2024 0000 by Dennis Pink RN  Safety Promotion/Fall Prevention: safety round/check completed  Taken 9/8/2024 2200 by Dennis Pink RN  Safety Promotion/Fall Prevention: safety round/check completed  Taken 9/8/2024 2100 by Dennis Pink RN  Safety Promotion/Fall Prevention: safety round/check completed  Taken 9/8/2024 2000 by Dennis Pink RN  Safety Promotion/Fall Prevention: safety round/check completed  Taken 9/8/2024 1900 by Dennis Pink RN  Safety Promotion/Fall Prevention: safety round/check completed  Intervention: Prevent and Manage VTE (Venous Thromboembolism) Risk  Recent Flowsheet Documentation  Taken 9/8/2024 2128 by Dennis Pink RN  VTE Prevention/Management: sequential compression devices off  Goal: Optimal Comfort and Wellbeing  Outcome: Ongoing, Progressing  Intervention: Provide Person-Centered Care  Recent Flowsheet Documentation  Taken 9/8/2024 2128 by Dennis Pink RN  Trust Relationship/Rapport:   care explained   choices provided   emotional support provided   empathic listening provided   questions answered   questions encouraged  Goal: Readiness for Transition of Care  Outcome: Ongoing, Progressing     Problem: Asthma Comorbidity  Goal: Maintenance of Asthma Control  Outcome: Ongoing, Progressing     Problem: Behavioral Health Comorbidity  Goal: Maintenance of Behavioral Health Symptom Control  Outcome: Ongoing, Progressing     Problem: COPD (Chronic  Obstructive Pulmonary Disease) Comorbidity  Goal: Maintenance of COPD Symptom Control  Outcome: Ongoing, Progressing     Problem: Diabetes Comorbidity  Goal: Blood Glucose Level Within Targeted Range  Outcome: Ongoing, Progressing     Problem: Heart Failure Comorbidity  Goal: Maintenance of Heart Failure Symptom Control  Outcome: Ongoing, Progressing     Problem: Hypertension Comorbidity  Goal: Blood Pressure in Desired Range  Outcome: Ongoing, Progressing     Problem: Obstructive Sleep Apnea Risk or Actual Comorbidity Management  Goal: Unobstructed Breathing During Sleep  Outcome: Ongoing, Progressing     Problem: Osteoarthritis Comorbidity  Goal: Maintenance of Osteoarthritis Symptom Control  Outcome: Ongoing, Progressing     Problem: Pain Chronic (Persistent) (Comorbidity Management)  Goal: Acceptable Pain Control and Functional Ability  Outcome: Ongoing, Progressing     Problem: Seizure Disorder Comorbidity  Goal: Maintenance of Seizure Control  Outcome: Ongoing, Progressing     Problem: Skin Injury Risk Increased  Goal: Skin Health and Integrity  Outcome: Ongoing, Progressing     Problem: Fall Injury Risk  Goal: Absence of Fall and Fall-Related Injury  Outcome: Ongoing, Progressing  Intervention: Promote Injury-Free Environment  Recent Flowsheet Documentation  Taken 9/9/2024 0400 by Dennis Pink RN  Safety Promotion/Fall Prevention: safety round/check completed  Taken 9/9/2024 0200 by Dennis Pink RN  Safety Promotion/Fall Prevention: safety round/check completed  Taken 9/9/2024 0000 by Dennis Pink RN  Safety Promotion/Fall Prevention: safety round/check completed  Taken 9/8/2024 2200 by Dennis Pink RN  Safety Promotion/Fall Prevention: safety round/check completed  Taken 9/8/2024 2100 by Dennis Pink RN  Safety Promotion/Fall Prevention: safety round/check completed  Taken 9/8/2024 2000 by Dennis Pink RN  Safety Promotion/Fall Prevention: safety round/check completed  Taken 9/8/2024 1900 by Dennis Pink RN  Safety  Promotion/Fall Prevention: safety round/check completed   Goal Outcome Evaluation:            Patient has slept more tonight than he did last night. Patient pulled one IV. Patient has not had any complaints of pain. Patient has not had any other adverse events occur.

## 2024-09-09 NOTE — PLAN OF CARE
Goal Outcome Evaluation:   Patient required Mod assist to come to sit EOB. Mod assist to maintain balance as he has a strong posterior lean at times. Patient was able to pump his ankles and extend knees. Tolerated hamstring stretches. Patient is very weak and will continue to benefit fro strengthening activities.

## 2024-09-09 NOTE — PROGRESS NOTES
Nephrology ([facility]) Progress Note      Patient:  Speedy Villalba  YOB: 1945  Date of Service: 9/9/2024  MRN: [id number]   Acct: [id number]   Primary Care Physician: Sen Ramsey MD  Advance Directive:   Code Status and Medical Interventions: No CPR (Do Not Attempt to Resuscitate); Limited Support; No intubation (DNI), No cardioversion   Ordered at: 09/05/24 6956     Medical Intervention Limits:    No intubation (DNI)    No cardioversion     Code Status (Patient has no pulse and is not breathing):    No CPR (Do Not Attempt to Resuscitate)     Medical Interventions (Patient has pulse or is breathing):    Limited Support     Admit Date: 9/5/2024       Hospital Day: 4  Referring Provider: Uriah Manriquez,*      Patient personally seen and examined.  Complete chart including Consults, Notes, Operative Reports, Labs, Cardiology, and Radiology studies reviewed as able.    Chief complaint: Abnormal labs.    Subjective:  Speedy Villalba is a 78 y.o. male  whom we were consulted for acute kidney injury.  Patient has a history of Parkinson disease, cognitive impairment, type 2 diabetes, obesity, coronary artery disease, history of subdural hematoma.  He is a chronic resident of a nursing home.  He was complaining of fever with generalized pain.  He was also found to be little more confused.  He was recently diagnosed with UTI and given Bactrim.  On presentation his temperature was 100.7 and he was tachycardic.  His serum creatinine usually ranges 1.1-1.2.  It was 2.9 on admission and patient was started along with antibiotics and some IV fluids.  He noticed improved urine output.  His serum creatinine is slowly improving.  Patient is a poor historian.       This afternoon patient feels well.  He denies any shortness of breath.    Allergies:  Serna-2 inhibitors (sulfonamide)    Home Meds:  Medications Prior to Admission   Medication Sig Dispense Refill Last Dose     acetaminophen (TYLENOL) 325 MG tablet Take 2 tablets by mouth 2 (Two) Times a Day.       allopurinol (ZYLOPRIM) 100 MG tablet Take 1 tablet by mouth Daily.       amoxicillin-clavulanate (AUGMENTIN) 875-125 MG per tablet Take 1 tablet by mouth 2 (Two) Times a Day.       aspirin 81 MG EC tablet Take 1 tablet by mouth Daily.       atorvastatin (LIPITOR) 80 MG tablet Take 1 tablet by mouth Every Night. 90 tablet 0     carbidopa-levodopa (SINEMET)  MG per tablet Take 2 tablets by mouth 3 (Three) Times a Day. 180 tablet 3     carboxymethylcellulose (REFRESH PLUS) 0.5 % solution Administer 2 drops to both eyes 3 (Three) Times a Day As Needed for Dry Eyes.       Cholecalciferol (VITAMIN D3) 125 MCG (5000 UT) tablet tablet Take 1 tablet by mouth Daily.       Cranberry-Vitamin C-Inulin (UTI-Stat) liquid Take 30 mL by mouth 2 (Two) Times a Day. Mix with 120mL of water or juice       cyclobenzaprine (FLEXERIL) 10 MG tablet Take 1 tablet by mouth Every 8 (Eight) Hours As Needed for Muscle Spasms.       Divalproex Sodium (DEPAKOTE SPRINKLE) 125 MG capsule Take 2 capsules by mouth 2 (Two) Times a Day.       estrogens, conjugated, (PREMARIN) 0.45 MG tablet Take 1 tablet by mouth Every Night.       finasteride (PROSCAR) 5 MG tablet Take 1 tablet by mouth Daily.       guaifenesin (ROBITUSSIN) 100 MG/5ML liquid Take 20 mL by mouth Every 4 (Four) Hours As Needed for Cough.       hydrocortisone (Preparation H) 1 % cream Apply 1 Application topically to the appropriate area as directed Every 6 (Six) Hours As Needed (hemorrhoids).       losartan (COZAAR) 50 MG tablet Take 1 tablet by mouth Daily.       Magnesium Oxide -Mg Supplement 400 (240 Mg) MG tablet Take 1 tablet by mouth Daily.       Melatonin 10 MG tablet Take 1 tablet by mouth Every Night.       Menthol, Topical Analgesic, (BIOFREEZE EX) Apply 1 Application topically 2 (Two) Times a Day.       multivitamin with minerals (Centrum Silver Adult 50+) tablet tablet Take 1 tablet  by mouth Daily.       ondansetron ODT (ZOFRAN-ODT) 4 MG disintegrating tablet Place 1 tablet on the tongue Every 6 (Six) Hours As Needed for Nausea or Vomiting.       polyethylene glycol (MIRALAX) 17 g packet Take 17 g by mouth 2 (Two) Times a Day As Needed (constipation).       rivastigmine (EXELON) 1.5 MG capsule Take 1 capsule by mouth 2 (Two) Times a Day.       spironolactone (ALDACTONE) 25 MG tablet Take 1 tablet by mouth Daily.          Medicines:  Current Facility-Administered Medications   Medication Dose Route Frequency Provider Last Rate Last Admin    acetaminophen (TYLENOL) tablet 650 mg  650 mg Oral Q4H PRN Uriah Manriquez MD        Or    acetaminophen (TYLENOL) 160 MG/5ML oral solution 650 mg  650 mg Oral Q4H PRN Uriah Manriquez MD        Or    acetaminophen (TYLENOL) suppository 650 mg  650 mg Rectal Q4H PRN Uriah Manriquez MD        aspirin EC tablet 81 mg  81 mg Oral Daily Uriah Manriquez MD   81 mg at 09/09/24 0855    atorvastatin (LIPITOR) tablet 80 mg  80 mg Oral Nightly Uriah Manriquez MD   80 mg at 09/08/24 2115    sennosides-docusate (PERICOLACE) 8.6-50 MG per tablet 2 tablet  2 tablet Oral BID PRN Uriah Manirquez MD        And    polyethylene glycol (MIRALAX) packet 17 g  17 g Oral Daily PRN Uriah Manriquez MD        And    bisacodyl (DULCOLAX) EC tablet 5 mg  5 mg Oral Daily PRN Uriah Manriquez MD        And    bisacodyl (DULCOLAX) suppository 10 mg  10 mg Rectal Daily PRN Uriah Manriquez MD        Calcium Replacement - Follow Nurse / BPA Driven Protocol   Does not apply PRN Uriah Manriquez MD        carbidopa-levodopa (SINEMET)  MG per tablet 2 tablet  2 tablet Oral TID Uriah Manriquez MD   2 tablet at 09/09/24 0856    cefTRIAXone (ROCEPHIN) 1,000 mg in sodium chloride 0.9 % 100 mL MBP  1,000 mg Intravenous Q24H Steve Cody MD        Magnesium Standard Dose Replacement - Follow Nurse  / BPA Driven Protocol   Does not apply PRN Uriah Manriquez MD        nystatin (MYCOSTATIN) powder   Topical Q12H Randee Boyd MD   Given at 09/09/24 0856    Phosphorus Replacement - Follow Nurse / BPA Driven Protocol   Does not apply PRN Uriah Manriquez MD        Potassium Replacement - Follow Nurse / BPA Driven Protocol   Does not apply PRN Uriah Manriquez MD        sodium chloride 0.9 % flush 10 mL  10 mL Intravenous Q12H Uriah Manriquez MD   10 mL at 09/09/24 0856    sodium chloride 0.9 % flush 10 mL  10 mL Intravenous PRN Uriah Manriquez MD        sodium chloride 0.9 % infusion 40 mL  40 mL Intravenous PRN Uriah Manriquez MD        sodium chloride 0.9 % infusion  75 mL/hr Intravenous Continuous Uriah Manriquez MD 75 mL/hr at 09/09/24 0901 75 mL/hr at 09/09/24 0901    tamsulosin (FLOMAX) 24 hr capsule 0.4 mg  0.4 mg Oral Nightly Uriah Manriquez MD   0.4 mg at 09/08/24 2115       Past Medical History:  Past Medical History:   Diagnosis Date    Acute encephalopathy 9/6/2024    Arthritis     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    Cognitive decline 10/30/2023    Coronary artery disease 10 years    x2 stents    Diabetes mellitus < 2 years    non-insulin dependent    Difficulty walking < 1 year    multiple falls in the last 6-8 months    DNR (do not resuscitate)     Gout     HL (hearing loss) 20+ years    Wears hearing aids occasionally    Hyperlipidemia 5 years    Insomnia     Lewy body dementia     Memory loss 3 years+/-    gradual and subtle    Muscle weakness     Primary hypertension 10/30/2023    Sleep apnea 20+ year    Non-compliant with PEEP for tx    Stroke 11/2022    Subdural hematoma     TIA (transient ischemic attack) 01/22/2024       Past Surgical History:  Past Surgical History:   Procedure Laterality Date    APPENDECTOMY      BACK SURGERY  2016    CAROTID STENT  2010?    KNEE SURGERY  1960    LAMINECTOMY  2015?    discectomy & fusion        Family History  Family History   Problem Relation Age of Onset    Parkinsonism Father        Social History  Social History     Socioeconomic History    Marital status:    Tobacco Use    Smoking status: Never     Passive exposure: Past    Smokeless tobacco: Never   Vaping Use    Vaping status: Never Used   Substance and Sexual Activity    Alcohol use: Yes     Alcohol/week: 3.0 standard drinks of alcohol     Types: 3 Drinks containing 0.5 oz of alcohol per week    Drug use: Never    Sexual activity: Not Currently     Partners: Female     Birth control/protection: Vasectomy       Review of Systems:  History obtained from chart review and the patient  General ROS: No fever or chills  Respiratory ROS: No cough, shortness of breath, wheezing  Cardiovascular ROS: No chest pain or palpitations  Gastrointestinal ROS: No abdominal pain or melena  Genito-Urinary ROS: No dysuria or hematuria  Psych ROS: No anxiety and depression  14 point ROS reviewed with the patient and negative except as noted above and in the HPI unless unable to obtain.    Objective:  Patient Vitals for the past 24 hrs:   BP Temp Temp src Pulse Resp SpO2   09/09/24 1158 136/65 97.5 °F (36.4 °C) Oral 77 18 97 %   09/09/24 0758 135/68 97.4 °F (36.3 °C) Axillary 81 16 96 %   09/09/24 0327 159/76 97.4 °F (36.3 °C) Oral 81 18 100 %   09/1945 117/72 97.2 °F (36.2 °C) Oral 77 18 95 %   09/08/24 1638 133/67 98 °F (36.7 °C) Oral 85 20 94 %       Intake/Output Summary (Last 24 hours) at 9/9/2024 1453  Last data filed at 9/9/2024 1158  Gross per 24 hour   Intake 360 ml   Output 3100 ml   Net -2740 ml     General: awake/alert   HEENT: Normocephalic atraumatic head  Neck: Supple with no JVD or carotid bruits.  Chest:  clear to auscultation bilaterally without respiratory distress  CVS: regular rate and rhythm  Abdominal: soft, nontender, positive bowel sounds  Extremities: no cyanosis or edema  Skin: warm and dry without rash      Labs:  Results  from last 7 days   Lab Units 09/09/24  0242 09/08/24  0204 09/07/24  0224   WBC 10*3/mm3 7.29 6.74 4.65   HEMOGLOBIN g/dL 9.5* 9.2* 8.4*   HEMATOCRIT % 29.2* 27.8* 25.6*   PLATELETS 10*3/mm3 197 168 151         Results from last 7 days   Lab Units 09/09/24  0242 09/08/24  0204 09/07/24 0224 09/06/24  0317 09/05/24  1810   SODIUM mmol/L 141 140 136   < > 139   POTASSIUM mmol/L 4.0 4.0 3.7   < > 4.3   CHLORIDE mmol/L 110* 110* 108*   < > 101   CO2 mmol/L 21.0* 22.0 19.0*   < > 21.0*   BUN mg/dL 29* 35* 41*   < > 42*   CREATININE mg/dL 1.62* 1.90* 2.53*   < > 2.91*   CALCIUM mg/dL 8.2* 8.4* 7.9*   < > 9.0   EGFR mL/min/1.73 43.2* 35.7* 25.3*   < > 21.4*   BILIRUBIN mg/dL 0.3 0.2  --   --  0.4   ALK PHOS U/L 73 66  --   --  71   ALT (SGPT) U/L 20 19  --   --  26   AST (SGOT) U/L 125* 156*  --   --  77*   GLUCOSE mg/dL 127* 123* 97   < > 127*    < > = values in this interval not displayed.       Radiology:   Imaging Results (Last 72 Hours)       ** No results found for the last 72 hours. **            Culture:  Blood Culture   Date Value Ref Range Status   09/05/2024 No growth at 3 days  Preliminary   09/05/2024 No growth at 3 days  Preliminary     Urine Culture   Date Value Ref Range Status   09/05/2024 >100,000 CFU/mL Proteus mirabilis (A)  Final   09/03/2024 >100,000 CFU/mL Proteus mirabilis (A)  Final         Assessment   1.  Acute kidney injury/resolving.  2.  Prerenal azotemia.  3.  Urinary tract infection.  4.  Severe abdominal obesity.  5.  Anemia of chronic disease    Plan:  1.  Continue IV fluid.  2.  Baseline iron studies.  3.  Renal function continues to improve  4.  IV antibiotics      Popeye Mott MD  9/9/2024  14:53 CDT

## 2024-09-09 NOTE — PLAN OF CARE
Goal Outcome Evaluation:           Progress: improving  Outcome Evaluation: vss. pt continues with intermittent confusion. ivf continue as ordered. labs improving. safety maintained. sinus 74-79 per tele. turns as patient allows

## 2024-09-09 NOTE — PROGRESS NOTES
[facility] Medicine Services  INPATIENT PROGRESS NOTE    Patient Name: Speedy Villalba  Date of Admission: 9/5/2024  Today's Date: 09/09/24  Length of Stay: 4  Primary Care Physician: Sen Ramsey MD    Subjective   Chief Complaint: Acute on chronic renal failure/pyelonephritis  HPI   Blood pressure stable, afebrile.  Patient is doing better.  Creatinine is improving.  Hemoglobin stable, white blood cells normal.    Review of Systems   Constitutional:  Positive for activity change, appetite change and fatigue. Negative for chills and fever.   HENT:  Negative for hearing loss, nosebleeds, tinnitus and trouble swallowing.    Eyes:  Negative for visual disturbance.   Respiratory:  Negative for cough, chest tightness, shortness of breath and wheezing.    Cardiovascular:  Negative for chest pain, palpitations and leg swelling.   Gastrointestinal:  Negative for abdominal distention, abdominal pain, blood in stool, constipation, diarrhea, nausea and vomiting.   Endocrine: Negative for cold intolerance, heat intolerance, polydipsia, polyphagia and polyuria.   Genitourinary:  Negative for decreased urine volume, difficulty urinating, dysuria, flank pain, frequency and hematuria.   Musculoskeletal:  Positive for arthralgias, gait problem and myalgias. Negative for joint swelling.   Skin:  Negative for rash.   Allergic/Immunologic: Negative for immunocompromised state.   Neurological:  Positive for weakness. Negative for dizziness, syncope, light-headedness and headaches.   Hematological:  Negative for adenopathy. Does not bruise/bleed easily.   Psychiatric/Behavioral:  Negative for confusion and sleep disturbance. The patient is not nervous/anxious.         All pertinent negatives and positives are as above. All other systems have been reviewed and are negative unless otherwise stated.     Objective    Temp:  [97.2 °F (36.2 °C)-98 °F (36.7 °C)] 97.5 °F (36.4 °C)  Heart Rate:  [77-85]  77  Resp:  [16-20] 18  BP: (117-159)/(65-76) 136/65  Physical Exam  Vitals and nursing note reviewed.   Constitutional:       Comments: Advanced age.  Chronically ill.   HENT:      Head: Normocephalic.   Eyes:      Conjunctiva/sclera: Conjunctivae normal.      Pupils: Pupils are equal, round, and reactive to light.   Cardiovascular:      Rate and Rhythm: Normal rate and regular rhythm.      Heart sounds: Normal heart sounds.   Pulmonary:      Effort: Pulmonary effort is normal. No respiratory distress.      Breath sounds: Normal breath sounds.      Comments: Patient is on room air.  Clear.  Abdominal:      General: Bowel sounds are normal. There is no distension.      Palpations: Abdomen is soft.      Tenderness: There is no abdominal tenderness.      Comments: Obesity .   Musculoskeletal:         General: No swelling.      Cervical back: Neck supple.   Skin:     General: Skin is warm and dry.      Capillary Refill: Capillary refill takes 2 to 3 seconds.      Findings: No rash.   Neurological:      General: No focal deficit present.      Mental Status: He is alert and oriented to person, place, and time.      Motor: Weakness present.      Coordination: Coordination abnormal.      Gait: Gait abnormal.   Psychiatric:         Mood and Affect: Mood normal.         Behavior: Behavior normal.             Results Review:  I have reviewed the labs, radiology results, and diagnostic studies.    Laboratory Data:   Results from last 7 days   Lab Units 09/09/24  0242 09/08/24  0204 09/07/24  0224   WBC 10*3/mm3 7.29 6.74 4.65   HEMOGLOBIN g/dL 9.5* 9.2* 8.4*   HEMATOCRIT % 29.2* 27.8* 25.6*   PLATELETS 10*3/mm3 197 168 151        Results from last 7 days   Lab Units 09/09/24  0242 09/08/24  0204 09/07/24  0224 09/06/24  0317 09/05/24  1810   SODIUM mmol/L 141 140 136   < > 139   POTASSIUM mmol/L 4.0 4.0 3.7   < > 4.3   CHLORIDE mmol/L 110* 110* 108*   < > 101   CO2 mmol/L 21.0* 22.0 19.0*   < > 21.0*   BUN mg/dL 29* 35* 41*   <  > 42*   CREATININE mg/dL 1.62* 1.90* 2.53*   < > 2.91*   CALCIUM mg/dL 8.2* 8.4* 7.9*   < > 9.0   BILIRUBIN mg/dL 0.3 0.2  --   --  0.4   ALK PHOS U/L 73 66  --   --  71   ALT (SGPT) U/L 20 19  --   --  26   AST (SGOT) U/L 125* 156*  --   --  77*   GLUCOSE mg/dL 127* 123* 97   < > 127*    < > = values in this interval not displayed.       Culture Data:   Blood Culture   Date Value Ref Range Status   09/05/2024 No growth at 3 days  Preliminary   09/05/2024 No growth at 3 days  Preliminary     Urine Culture   Date Value Ref Range Status   09/05/2024 >100,000 CFU/mL Proteus mirabilis (A)  Final   09/03/2024 >100,000 CFU/mL Proteus mirabilis (A)  Final       Radiology Data:   Imaging Results (Last 24 Hours)       ** No results found for the last 24 hours. **            I have reviewed the patient's current medications.     Assessment/Plan   Assessment  Active Hospital Problems    Diagnosis     **Sepsis secondary to UTI     Acute encephalopathy     Subdural hematoma     Atypical parkinsonism     Primary hypertension     Cognitive decline        Treatment Plan  Pyelonephritis.  Patient did 2 days of Bactrim outpatient.  Rocephin antibiotics.    Metabolic encephalopathy  CT scan head-Moderate cerebral and cerebellar atrophy with chronic microvascular  disease but no evidence of acute intracranial process, Chronic appearing right maxillary sinus disease.    Acute on CKD.  Nephrology consult.  Normal saline 75 cc an hour.  Creatinine is improving.  US renal-No acute kidney abnormality is seen.     Prostate hypertrophy . Flomax.    Parkinsonism.  Cognitive decline.  Sinemet.    Anemia.  Hemoglobin stable.  No sign of acute bleed.    Diabetes.    CAD/hyperlipidemia.  Aspirin.  Lipitor.    History of subdural hematoma.    Advanced age.  78 years old.    Coccyx wound.  Consult wound care.    Nutrition.  Cardiac diet.    Deconditioning.  PT and OT consult    Blood culture-no growth in 3 days.  Urine culture-Proteus.  Respiratory  PCR-negative.    DNR.  DNI.  Bed hold at [facility].    Medical Decision Making  Number and Complexity of problems: Hide nephritis/metabolic acidosis encephalopathy/acute on chronic renal failure/prostate hypertrophy/parkinsonism  Differential Diagnosis: None    Conditions and Status        Condition is unchanged.     [facility] Data  External documents reviewed: Previous note .  Cardiac tracing (EKG, telemetry) interpretation: Sinus  Radiology interpretation: Ultrasound/CT scan  Labs reviewed: Laboratory  Any tests that were considered but not ordered: Laboratory in AM     Decision rules/scores evaluated (example [id number], Wells, etc): None     Discussed with: Patient     Care Planning  Shared decision making: Patient   Code status and discussions: DNR . DNI    Disposition  Social Determinants of Health that impact treatment or disposition: From nursing home  1 to 3 days    Electronically signed by Steve Cody MD, 09/09/24, 13:14 CDT.

## 2024-09-09 NOTE — CASE MANAGEMENT/SOCIAL WORK
Continued Stay Note  [facility]     Patient Name: Speedy Villalba  MRN: [id number]  Today's Date: 9/9/2024    Admit Date: 9/5/2024    Plan: [facility]   Discharge Plan       Row Name 09/09/24 1001       Plan    Plan [facility]    Patient/Family in Agreement with Plan yes    Final Discharge Disposition Code 03 - skilled nursing facility (SNF)    Final Note Pt has a bed hold at [facility] and can return; no precert. required.  Phone: [phone] Fax: [phone].                   Discharge Codes    No documentation.                 Expected Discharge Date and Time       Expected Discharge Date Expected Discharge Time    Sep 8, 2024               MARÍA Bob

## 2024-09-10 VITALS
HEIGHT: 68 IN | TEMPERATURE: 98.6 F | HEART RATE: 74 BPM | RESPIRATION RATE: 18 BRPM | DIASTOLIC BLOOD PRESSURE: 71 MMHG | BODY MASS INDEX: 37.89 KG/M2 | WEIGHT: 250 LBS | OXYGEN SATURATION: 94 % | SYSTOLIC BLOOD PRESSURE: 142 MMHG

## 2024-09-10 LAB
ALBUMIN SERPL-MCNC: 2.8 G/DL (ref 3.5–5.2)
ALBUMIN/GLOB SERPL: 1 G/DL
ALP SERPL-CCNC: 77 U/L (ref 39–117)
ALT SERPL W P-5'-P-CCNC: 15 U/L (ref 1–41)
ANION GAP SERPL CALCULATED.3IONS-SCNC: 9 MMOL/L (ref 5–15)
AST SERPL-CCNC: 76 U/L (ref 1–40)
BACTERIA SPEC AEROBE CULT: NORMAL
BACTERIA SPEC AEROBE CULT: NORMAL
BASOPHILS # BLD AUTO: 0.03 10*3/MM3 (ref 0–0.2)
BASOPHILS NFR BLD AUTO: 0.3 % (ref 0–1.5)
BILIRUB SERPL-MCNC: 0.2 MG/DL (ref 0–1.2)
BUN SERPL-MCNC: 25 MG/DL (ref 8–23)
BUN/CREAT SERPL: 18.1 (ref 7–25)
CALCIUM SPEC-SCNC: 8.4 MG/DL (ref 8.6–10.5)
CHLORIDE SERPL-SCNC: 112 MMOL/L (ref 98–107)
CHOLEST SERPL-MCNC: 88 MG/DL (ref 0–200)
CO2 SERPL-SCNC: 20 MMOL/L (ref 22–29)
CREAT SERPL-MCNC: 1.38 MG/DL (ref 0.76–1.27)
DEPRECATED RDW RBC AUTO: 44 FL (ref 37–54)
EGFRCR SERPLBLD CKD-EPI 2021: 52.3 ML/MIN/1.73
EOSINOPHIL # BLD AUTO: 0.22 10*3/MM3 (ref 0–0.4)
EOSINOPHIL NFR BLD AUTO: 2.5 % (ref 0.3–6.2)
ERYTHROCYTE [DISTWIDTH] IN BLOOD BY AUTOMATED COUNT: 12.7 % (ref 12.3–15.4)
GLOBULIN UR ELPH-MCNC: 2.9 GM/DL
GLUCOSE SERPL-MCNC: 134 MG/DL (ref 65–99)
HBA1C MFR BLD: 5.6 % (ref 4.8–5.6)
HCT VFR BLD AUTO: 28.6 % (ref 37.5–51)
HDLC SERPL-MCNC: 28 MG/DL (ref 40–60)
HGB BLD-MCNC: 9.6 G/DL (ref 13–17.7)
IMM GRANULOCYTES # BLD AUTO: 0.11 10*3/MM3 (ref 0–0.05)
IMM GRANULOCYTES NFR BLD AUTO: 1.3 % (ref 0–0.5)
LDLC SERPL CALC-MCNC: 42 MG/DL (ref 0–100)
LDLC/HDLC SERPL: 1.48 {RATIO}
LYMPHOCYTES # BLD AUTO: 1.09 10*3/MM3 (ref 0.7–3.1)
LYMPHOCYTES NFR BLD AUTO: 12.6 % (ref 19.6–45.3)
MCH RBC QN AUTO: 31.6 PG (ref 26.6–33)
MCHC RBC AUTO-ENTMCNC: 33.6 G/DL (ref 31.5–35.7)
MCV RBC AUTO: 94.1 FL (ref 79–97)
MONOCYTES # BLD AUTO: 0.56 10*3/MM3 (ref 0.1–0.9)
MONOCYTES NFR BLD AUTO: 6.5 % (ref 5–12)
NEUTROPHILS NFR BLD AUTO: 6.64 10*3/MM3 (ref 1.7–7)
NEUTROPHILS NFR BLD AUTO: 76.8 % (ref 42.7–76)
NRBC BLD AUTO-RTO: 0 /100 WBC (ref 0–0.2)
PLATELET # BLD AUTO: 225 10*3/MM3 (ref 140–450)
PMV BLD AUTO: 9.6 FL (ref 6–12)
POTASSIUM SERPL-SCNC: 3.7 MMOL/L (ref 3.5–5.2)
PROT SERPL-MCNC: 5.7 G/DL (ref 6–8.5)
RBC # BLD AUTO: 3.04 10*6/MM3 (ref 4.14–5.8)
SODIUM SERPL-SCNC: 141 MMOL/L (ref 136–145)
TRIGL SERPL-MCNC: 93 MG/DL (ref 0–150)
VLDLC SERPL-MCNC: 18 MG/DL (ref 5–40)
WBC NRBC COR # BLD AUTO: 8.65 10*3/MM3 (ref 3.4–10.8)

## 2024-09-10 PROCEDURE — 83036 HEMOGLOBIN GLYCOSYLATED A1C: CPT | Performed by: FAMILY MEDICINE

## 2024-09-10 PROCEDURE — 85025 COMPLETE CBC W/AUTO DIFF WBC: CPT | Performed by: FAMILY MEDICINE

## 2024-09-10 PROCEDURE — 80061 LIPID PANEL: CPT | Performed by: FAMILY MEDICINE

## 2024-09-10 PROCEDURE — 80053 COMPREHEN METABOLIC PANEL: CPT | Performed by: FAMILY MEDICINE

## 2024-09-10 RX ORDER — TAMSULOSIN HYDROCHLORIDE 0.4 MG/1
0.4 CAPSULE ORAL NIGHTLY
Start: 2024-09-10

## 2024-09-10 RX ORDER — ACETAMINOPHEN 325 MG/1
650 TABLET ORAL EVERY 6 HOURS PRN
Start: 2024-09-10

## 2024-09-10 RX ORDER — NYSTATIN 100000 [USP'U]/G
POWDER TOPICAL EVERY 12 HOURS SCHEDULED
Start: 2024-09-10

## 2024-09-10 RX ORDER — CEFDINIR 300 MG/1
300 CAPSULE ORAL 2 TIMES DAILY
Qty: 10 CAPSULE | Refills: 0 | Status: SHIPPED | OUTPATIENT
Start: 2024-09-10 | End: 2024-09-15

## 2024-09-10 RX ADMIN — CARBIDOPA AND LEVODOPA 2 TABLET: 25; 100 TABLET ORAL at 08:23

## 2024-09-10 RX ADMIN — Medication 10 ML: at 08:24

## 2024-09-10 RX ADMIN — ASPIRIN 81 MG: 81 TABLET, COATED ORAL at 08:23

## 2024-09-10 RX ADMIN — ACETAMINOPHEN 650 MG: 325 TABLET ORAL at 03:27

## 2024-09-10 RX ADMIN — NYSTATIN: 100000 POWDER TOPICAL at 08:24

## 2024-09-10 NOTE — THERAPY DISCHARGE NOTE
Acute Care - Physical Therapy Discharge Summary  [facility]       Patient Name: Speedy Villalba  : 1945  MRN: [id number]    Today's Date: 9/10/2024                 Admit Date: 2024      PT Recommendation and Plan    Visit Dx:    ICD-10-CM ICD-9-CM   1. Other specified sepsis  A41.89 038.8     995.91   2. Acute renal failure, unspecified acute renal failure type  N17.9 584.9   3. Urinary tract infection without hematuria, site unspecified  N39.0 599.0   4. Impaired mobility [Z74.09]  Z74.09 799.89        Outcome Measures       Row Name 24 1200             How much help from another person do you currently need...    Turning from your back to your side while in flat bed without using bedrails? 2  -AGUSTÍN      Moving from lying on back to sitting on the side of a flat bed without bedrails? 2  -AGUSTÍN      Moving to and from a bed to a chair (including a wheelchair)? 1  -AGUSTÍN      Standing up from a chair using your arms (e.g., wheelchair, bedside chair)? 1  -AGUSTÍN      Climbing 3-5 steps with a railing? 1  -AGUSTÍN      To walk in hospital room? 1  -AGUSTÍN      AM-PAC 6 Clicks Score (PT) 8  -AGUSTÍN      Highest Level of Mobility Goal 3 --> Sit at edge of bed  -AGUSTÍN                User Key  (r) = Recorded By, (t) = Taken By, (c) = Cosigned By      Initials Name Provider Type    Alesia Ashford PTA Physical Therapist Assistant                         PT Rehab Goals       Row Name 09/10/24 1500             Bed Mobility Goal 1 (PT)    Gillespie Level/Cues Needed (Bed Mobility Goal 1, PT) maximum assist (25-49% patient effort)  Goal: min  -AGUSTÍN      Progress/Outcomes (Bed Mobility Goal 1, PT) goal not met  -AGUSTÍN         Transfer Goal 1 (PT)    Gillespie Level/Cues Needed (Transfer Goal 1, PT) --  Goal: mIN.  Not attempted  -AGUSTÍN      Progress/Outcome (Transfer Goal 1, PT) goal not met  -AGUSTÍN         Gait Training Goal 1 (PT)    Gillespie Level (Gait Training Goal 1, PT) --  Goal: MIN. Pt. doesn't walk.  -AGUSTÍN      Distance  (Gait Training Goal 1, PT) 0  Goal: 10  -AGUSTÍN      Progress/Outcome (Gait Training Goal 1, PT) goal not met  -AGUSTÍN                User Key  (r) = Recorded By, (t) = Taken By, (c) = Cosigned By      Initials Name Provider Type Discipline    Alesia Ashford PTA Physical Therapist Assistant PT                    Therapy Charges for Today       Code Description Service Date Service Provider Modifiers Qty    [id number]  PT THERAPEUTIC ACT EA 15 MIN 9/9/2024 Alesia Mcdonald PTA GP 1            PT Discharge Summary  Anticipated Discharge Disposition (PT): skilled nursing facility  Reason for Discharge: Discharge from facility  Outcomes Achieved: Unable to make functional progress toward goals at this time  Discharge Destination: SNF      Alesia Mcdonald PTA   9/10/2024

## 2024-09-10 NOTE — PROGRESS NOTES
Nephrology ([facility]) Progress Note      Patient:  Speedy Villalba  YOB: 1945  Date of Service: 9/10/2024  MRN: [id number]   Acct: [id number]   Primary Care Physician: Sen Ramsey MD  Advance Directive:   Code Status and Medical Interventions: No CPR (Do Not Attempt to Resuscitate); Limited Support; No intubation (DNI), No cardioversion   Ordered at: 09/05/24 5473     Medical Intervention Limits:    No intubation (DNI)    No cardioversion     Code Status (Patient has no pulse and is not breathing):    No CPR (Do Not Attempt to Resuscitate)     Medical Interventions (Patient has pulse or is breathing):    Limited Support     Admit Date: 9/5/2024       Hospital Day: 5  Referring Provider: Uriah Manriquez,*      Patient personally seen and examined.  Complete chart including Consults, Notes, Operative Reports, Labs, Cardiology, and Radiology studies reviewed as able.        Subjective:  Speedy Villalba is a 78 y.o. male for whom we were consulted for evaluation and treatment of acute kidney injury. Patient has a history of Parkinson disease, cognitive impairment, type 2 diabetes, obesity, coronary artery disease, history of subdural hematoma. He is a chronic resident of a nursing home. He was complaining of fever with generalized pain. He was also found to be little more confused. He was recently diagnosed with UTI and given Bactrim. On presentation his temperature was 100.7 and he was tachycardic. His serum creatinine usually ranges 1.1-1.2. It was 2.9 on admission and patient was started along with antibiotics and some IV fluids. He noticed improved urine output. His serum creatinine is slowly improving. Patient is a poor historian.     Today feeling better. No new complaint or overnight issues. Urine output nonoliguric     Allergies:  Serna-2 inhibitors (sulfonamide)    Home Meds:  Medications Prior to Admission   Medication Sig Dispense Refill Last Dose     allopurinol (ZYLOPRIM) 100 MG tablet Take 1 tablet by mouth Daily.       amoxicillin-clavulanate (AUGMENTIN) 875-125 MG per tablet Take 1 tablet by mouth 2 (Two) Times a Day.       aspirin 81 MG EC tablet Take 1 tablet by mouth Daily.       atorvastatin (LIPITOR) 80 MG tablet Take 1 tablet by mouth Every Night. 90 tablet 0     carbidopa-levodopa (SINEMET)  MG per tablet Take 2 tablets by mouth 3 (Three) Times a Day. 180 tablet 3     carboxymethylcellulose (REFRESH PLUS) 0.5 % solution Administer 2 drops to both eyes 3 (Three) Times a Day As Needed for Dry Eyes.       Cholecalciferol (VITAMIN D3) 125 MCG (5000 UT) tablet tablet Take 1 tablet by mouth Daily.       Cranberry-Vitamin C-Inulin (UTI-Stat) liquid Take 30 mL by mouth 2 (Two) Times a Day. Mix with 120mL of water or juice       cyclobenzaprine (FLEXERIL) 10 MG tablet Take 1 tablet by mouth Every 8 (Eight) Hours As Needed for Muscle Spasms.       Divalproex Sodium (DEPAKOTE SPRINKLE) 125 MG capsule Take 2 capsules by mouth 2 (Two) Times a Day.       estrogens, conjugated, (PREMARIN) 0.45 MG tablet Take 1 tablet by mouth Every Night.       finasteride (PROSCAR) 5 MG tablet Take 1 tablet by mouth Daily.       guaifenesin (ROBITUSSIN) 100 MG/5ML liquid Take 20 mL by mouth Every 4 (Four) Hours As Needed for Cough.       hydrocortisone (Preparation H) 1 % cream Apply 1 Application topically to the appropriate area as directed Every 6 (Six) Hours As Needed (hemorrhoids).       losartan (COZAAR) 50 MG tablet Take 1 tablet by mouth Daily.       Magnesium Oxide -Mg Supplement 400 (240 Mg) MG tablet Take 1 tablet by mouth Daily.       Melatonin 10 MG tablet Take 1 tablet by mouth Every Night.       Menthol, Topical Analgesic, (BIOFREEZE EX) Apply 1 Application topically 2 (Two) Times a Day.       multivitamin with minerals (Centrum Silver Adult 50+) tablet tablet Take 1 tablet by mouth Daily.       ondansetron ODT (ZOFRAN-ODT) 4 MG disintegrating tablet Place 1  tablet on the tongue Every 6 (Six) Hours As Needed for Nausea or Vomiting.       polyethylene glycol (MIRALAX) 17 g packet Take 17 g by mouth 2 (Two) Times a Day As Needed (constipation).       rivastigmine (EXELON) 1.5 MG capsule Take 1 capsule by mouth 2 (Two) Times a Day.       spironolactone (ALDACTONE) 25 MG tablet Take 1 tablet by mouth Daily.       [DISCONTINUED] acetaminophen (TYLENOL) 325 MG tablet Take 2 tablets by mouth 2 (Two) Times a Day.          Medicines:  Current Facility-Administered Medications   Medication Dose Route Frequency Provider Last Rate Last Admin    acetaminophen (TYLENOL) tablet 650 mg  650 mg Oral Q4H PRN Uriah Manriquez MD   650 mg at 09/10/24 0327    Or    acetaminophen (TYLENOL) 160 MG/5ML oral solution 650 mg  650 mg Oral Q4H PRN Uriah Manriquez MD        Or    acetaminophen (TYLENOL) suppository 650 mg  650 mg Rectal Q4H PRN Uriah Manriquez MD        aspirin EC tablet 81 mg  81 mg Oral Daily Uriah Manriquez MD   81 mg at 09/10/24 0823    atorvastatin (LIPITOR) tablet 80 mg  80 mg Oral Nightly Uriah Manriquez MD   80 mg at 09/09/24 2040    sennosides-docusate (PERICOLACE) 8.6-50 MG per tablet 2 tablet  2 tablet Oral BID PRN Uriah Manriquez MD        And    polyethylene glycol (MIRALAX) packet 17 g  17 g Oral Daily PRN Uriah Manriquez MD        And    bisacodyl (DULCOLAX) EC tablet 5 mg  5 mg Oral Daily PRN Uriah Manriquez MD        And    bisacodyl (DULCOLAX) suppository 10 mg  10 mg Rectal Daily PRN Uriah Manriquez MD        Calcium Replacement - Follow Nurse / BPA Driven Protocol   Does not apply PRUriah Ayala MD        carbidopa-levodopa (SINEMET)  MG per tablet 2 tablet  2 tablet Oral TID Uriah Manriquez MD   2 tablet at 09/10/24 0823    cefTRIAXone (ROCEPHIN) 1,000 mg in sodium chloride 0.9 % 100 mL MBP  1,000 mg Intravenous Q24H Steve Cody  mL/hr at  09/09/24 2214 1,000 mg at 09/09/24 2214    Magnesium Standard Dose Replacement - Follow Nurse / BPA Driven Protocol   Does not apply PRN Uriah Manriquez MD        nystatin (MYCOSTATIN) powder   Topical Q12H Randee Boyd MD   Given at 09/10/24 0824    Phosphorus Replacement - Follow Nurse / BPA Driven Protocol   Does not apply PRN Uriah Manriquez MD        Potassium Replacement - Follow Nurse / BPA Driven Protocol   Does not apply PRN Uriah Manriquez MD        sodium chloride 0.9 % flush 10 mL  10 mL Intravenous Q12H Uriah Manriquez MD   10 mL at 09/10/24 0824    sodium chloride 0.9 % flush 10 mL  10 mL Intravenous PRN Uriah Manriquez MD        sodium chloride 0.9 % infusion 40 mL  40 mL Intravenous PRN Uriah Manriquez MD        sodium chloride 0.9 % infusion  75 mL/hr Intravenous Continuous Uriah Manriquez MD 75 mL/hr at 09/09/24 2213 75 mL/hr at 09/09/24 2213    tamsulosin (FLOMAX) 24 hr capsule 0.4 mg  0.4 mg Oral Nightly Uriah Manriquez MD   0.4 mg at 09/09/24 2040       Past Medical History:  Past Medical History:   Diagnosis Date    Acute encephalopathy 9/6/2024    Arthritis     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    Cognitive decline 10/30/2023    Coronary artery disease 10 years    x2 stents    Diabetes mellitus < 2 years    non-insulin dependent    Difficulty walking < 1 year    multiple falls in the last 6-8 months    DNR (do not resuscitate)     Gout     HL (hearing loss) 20+ years    Wears hearing aids occasionally    Hyperlipidemia 5 years    Insomnia     Lewy body dementia     Memory loss 3 years+/-    gradual and subtle    Muscle weakness     Primary hypertension 10/30/2023    Sleep apnea 20+ year    Non-compliant with PEEP for tx    Stroke 11/2022    Subdural hematoma     TIA (transient ischemic attack) 01/22/2024       Past Surgical History:  Past Surgical History:   Procedure Laterality Date    APPENDECTOMY      BACK SURGERY   2016    CAROTID STENT  2010?    KNEE SURGERY  1960    LAMINECTOMY  2015?    discectomy & fusion       Family History  Family History   Problem Relation Age of Onset    Parkinsonism Father        Social History  Social History     Socioeconomic History    Marital status:    Tobacco Use    Smoking status: Never     Passive exposure: Past    Smokeless tobacco: Never   Vaping Use    Vaping status: Never Used   Substance and Sexual Activity    Alcohol use: Yes     Alcohol/week: 3.0 standard drinks of alcohol     Types: 3 Drinks containing 0.5 oz of alcohol per week    Drug use: Never    Sexual activity: Not Currently     Partners: Female     Birth control/protection: Vasectomy       Review of Systems:  History obtained from chart review and the patient  General ROS: No fever or chills  Respiratory ROS: No cough, shortness of breath, wheezing  Cardiovascular ROS: No chest pain or palpitations  Gastrointestinal ROS: No abdominal pain or melena  Genito-Urinary ROS: No dysuria or hematuria  Psych ROS: No anxiety and depression  14 point ROS reviewed with the patient and negative except as noted above and in the HPI unless unable to obtain.    Objective:  Patient Vitals for the past 24 hrs:   BP Temp Temp src Pulse Resp SpO2   09/10/24 0831 142/71 98.6 °F (37 °C) Oral 74 18 94 %   09/10/24 0350 154/77 98 °F (36.7 °C) Oral 78 18 99 %   09/09/24 2150 159/66 98.3 °F (36.8 °C) Oral 84 18 94 %   09/09/24 1554 138/70 97.6 °F (36.4 °C) Oral 78 18 98 %   09/09/24 1158 136/65 97.5 °F (36.4 °C) Oral 77 18 97 %       Intake/Output Summary (Last 24 hours) at 9/10/2024 1001  Last data filed at 9/10/2024 0350  Gross per 24 hour   Intake 360 ml   Output 2200 ml   Net -1840 ml     General: awake/alert   Chest:  clear to auscultation bilaterally without respiratory distress  CVS: regular rate and rhythm  Abdominal: soft, nontender, positive bowel sounds  Extremities: no cyanosis or edema  Skin: warm and dry without  rash      Labs:  Results from last 7 days   Lab Units 09/10/24  0410 09/09/24  0242 09/08/24  0204   WBC 10*3/mm3 8.65 7.29 6.74   HEMOGLOBIN g/dL 9.6* 9.5* 9.2*   HEMATOCRIT % 28.6* 29.2* 27.8*   PLATELETS 10*3/mm3 225 197 168         Results from last 7 days   Lab Units 09/10/24  0410 09/09/24  0242 09/08/24  0204   SODIUM mmol/L 141 141 140   POTASSIUM mmol/L 3.7 4.0 4.0   CHLORIDE mmol/L 112* 110* 110*   CO2 mmol/L 20.0* 21.0* 22.0   BUN mg/dL 25* 29* 35*   CREATININE mg/dL 1.38* 1.62* 1.90*   CALCIUM mg/dL 8.4* 8.2* 8.4*   EGFR mL/min/1.73 52.3* 43.2* 35.7*   BILIRUBIN mg/dL 0.2 0.3 0.2   ALK PHOS U/L 77 73 66   ALT (SGPT) U/L 15 20 19   AST (SGOT) U/L 76* 125* 156*   GLUCOSE mg/dL 134* 127* 123*       Radiology:   Imaging Results (Last 72 Hours)       ** No results found for the last 72 hours. **            Culture:  Blood Culture   Date Value Ref Range Status   09/05/2024 No growth at 4 days  Preliminary   09/05/2024 No growth at 4 days  Preliminary     Urine Culture   Date Value Ref Range Status   09/05/2024 >100,000 CFU/mL Proteus mirabilis (A)  Final         Assessment    Acute kidney injury, prerenal--resolving  Urinary tract infection   Anemia   Hypertension   Parkinson's  Cognitive decline    Plan:   Renal function recovering well  OK for discharge from renal standpoint. Follow up in office in 1-2 weeks      Thompson Sahu, ANTHONY  9/10/2024  10:01 CDT

## 2024-09-10 NOTE — PLAN OF CARE
Goal Outcome Evaluation:  Plan of Care Reviewed With: patient        Problem: Adult Inpatient Plan of Care  Goal: Plan of Care Review  Outcome: Ongoing, Progressing  Flowsheets (Taken 9/10/2024 7113)  Progress: no change  Plan of Care Reviewed With: patient  Outcome Evaluation: Pt is alert to self, but is confused about where he is and why he is here. VSS. breathing unlabored on RA. Pt c/o pain in shoulders, we readjusted position and meds were given, with relief. Pt has primafit and has had sufficient UO. Pt refuses to wear the SCD's. He does allow us to turn him but refuses at first then changes his mind. Pt turned with wedges and pillows as allowed. Pt anticipated discharge to [facility] today, SR 75-82 with PAC on tele.  safety maintained, care plan ongoing.

## 2024-09-10 NOTE — DISCHARGE SUMMARY
[facility] Medicine Services  DISCHARGE SUMMARY       Date of Admission: 9/5/2024  Date of Discharge:  9/10/2024  Primary Care Physician: Sen Ramsey MD    Presenting Problem/History of Present Illness:      Final Discharge Diagnoses:  Active Hospital Problems    Diagnosis     **Sepsis secondary to UTI     Acute encephalopathy     Subdural hematoma     Atypical parkinsonism     Primary hypertension     Cognitive decline     Coronary artery disease involving native coronary artery of native heart without angina pectoris     Type 2 diabetes mellitus without complication, without long-term current use of insulin        Consults: Nephrology      Pertinent Test Results:   Results for orders placed during the hospital encounter of 01/22/24    Adult Transthoracic Echo Complete W/ Cont if Necessary Per Protocol (With Agitated Saline)    Interpretation Summary    Left ventricular systolic function is normal. Left ventricular ejection fraction appears to be 61 - 65%.    Left ventricular diastolic function is consistent with (grade I) impaired relaxation.    Saline test results are negative.    Estimated right ventricular systolic pressure from tricuspid regurgitation is normal (<35 mmHg).    There is a trivial pericardial effusion adjacent to the right ventricle.      Imaging Results (All)       Procedure Component Value Units Date/Time    US Renal Bilateral [[id number]] Collected: 09/06/24 1442     Updated: 09/06/24 1447    Narrative:      EXAMINATION: US RENAL BILATERAL-     9/6/2024 12:35 PM     HISTORY: ALEA; A41.89-Other specified sepsis; N17.9-Acute kidney failure,  unspecified; N39.0-Urinary tract infection, site not specified     Grayscale and color-flow renal ultrasound.     Normal size and position of both kidneys.  Normal cortical thickness and normal cortical echogenicity.     No hydronephrosis or shadowing stone.     The right kidney measures 126 x 67 x 56 mm.  7 cm  upper pole cyst.     The LEFT kidney is less well visualized.  The left kidney measures 110 x 71 x 62 mm.       Impression:      Impression:  1. No acute kidney abnormality is seen.           This report was signed and finalized on 9/6/2024 2:43 PM by Dr. Eyal Morin MD.       CT Head Without Contrast [[id number]] Collected: 09/05/24 2012     Updated: 09/05/24 2017    Narrative:      CT HEAD WO CONTRAST- 9/5/2024 6:53 PM     HISTORY: Altered mental state     COMPARISON: 5/23/2024     DLP: 1703.49 mGy.cm. All CT scans are performed using dose optimization  techniques as appropriate to the performed exam and including at least  one of the following: Automated exposure control, adjustment of the mA  and/or kV according to size, and the use of the iterative reconstruction  technique.     TECHNIQUE: Serial axial tomographic images of the brain were obtained  without the use of intravenous contrast.     FINDINGS:  The midline structures are nondisplaced. There is moderate cerebral and  cerebellar atrophy, with an associated increase in the prominence of the  ventricles and sulci. The basilar cisterns are normal in size and  configuration. There is no evidence of intracranial hemorrhage or  mass-effect. There is low attenuation in the periventricular white  matter, consistent with chronic ischemic change. There are no abnormal  extra-axial fluid collections. There is no evidence of tonsillar  herniation.     The included orbits and their contents are unremarkable. There is  opacification of the right maxillary sinus which appears to be chronic  in nature. The visualized paranasal sinuses and mastoid air cells are  otherwise normally aerated.. There is hyperostosis frontalis interna. No  acute calvarial abnormalities present..       Impression:         1. Moderate cerebral and cerebellar atrophy with chronic microvascular  disease but no evidence of acute intracranial process.  2. Chronic appearing right maxillary sinus  disease.           This report was signed and finalized on 9/5/2024 8:14 PM by Dr. Clemente Izaguirre MD.             LAB RESULTS:      Lab 09/10/24  0410 09/09/24  0242 09/08/24  0204 09/07/24  0224 09/06/24  0317 09/05/24  2204 09/05/24  1810   WBC 8.65 7.29 6.74 4.65 5.26  --  6.70   HEMOGLOBIN 9.6* 9.5* 9.2* 8.4* 10.1*  --  12.3*   HEMATOCRIT 28.6* 29.2* 27.8* 25.6* 30.7*  --  36.0*   PLATELETS 225 197 168 151 160  --  216   NEUTROS ABS 6.64 5.66 5.26 3.31 4.36  --  4.98   IMMATURE GRANS (ABS) 0.11* 0.08* 0.05 0.02  --   --  0.05   LYMPHS ABS 1.09 0.85 0.72 0.65*  --   --  0.46*   MONOS ABS 0.56 0.48 0.59 0.61  --   --  1.18*   EOS ABS 0.22 0.19 0.10 0.05 0.00  --  0.01   MCV 94.1 95.1 94.2 95.9 96.5  --  93.5   PROCALCITONIN  --   --   --   --   --   --  1.38*   LACTATE  --   --   --   --   --  1.0 3.2*   PROTIME  --   --   --   --   --   --  15.0*   APTT  --   --   --   --   --   --  26.6         Lab 09/10/24  0410 09/09/24  0242 09/08/24  0204 09/07/24  0224 09/06/24  1119 09/06/24 0317 09/05/24  1810   SODIUM 141 141 140 136  --  141 139   POTASSIUM 3.7 4.0 4.0 3.7  --  4.2 4.3   CHLORIDE 112* 110* 110* 108*  --  106 101   CO2 20.0* 21.0* 22.0 19.0*  --  23.0 21.0*   ANION GAP 9.0 10.0 8.0 9.0  --  12.0 17.0*   BUN 25* 29* 35* 41*  --  43* 42*   CREATININE 1.38* 1.62* 1.90* 2.53*  --  2.79* 2.91*   EGFR 52.3* 43.2* 35.7* 25.3*  --  22.5* 21.4*   GLUCOSE 134* 127* 123* 97  --  146* 127*   CALCIUM 8.4* 8.2* 8.4* 7.9*  --  8.4* 9.0   MAGNESIUM  --   --   --   --   --   --  1.8   HEMOGLOBIN A1C 5.60  --   --   --   --   --   --    TSH  --   --   --   --  0.775  --   --          Lab 09/10/24  0410 09/09/24  0242 09/08/24  0204 09/05/24  1810   TOTAL PROTEIN 5.7* 5.9* 5.5* 7.2   ALBUMIN 2.8* 2.9* 2.7* 3.6   GLOBULIN 2.9 3.0 2.8 3.6   ALT (SGPT) 15 20 19 26   AST (SGOT) 76* 125* 156* 77*   BILIRUBIN 0.2 0.3 0.2 0.4   ALK PHOS 77 73 66 71         Lab 09/05/24  2352 09/05/24  1810   HSTROP T 137* 144*   PROTIME  --   15.0*   INR  --  1.13*         Lab 09/10/24  0410   CHOLESTEROL 88   LDL CHOL 42   HDL CHOL 28*   TRIGLYCERIDES 93         Lab 09/09/24  0242 09/06/24  1119   IRON 33*  --    IRON SATURATION (TSAT) 21  --    TIBC 155*  --    TRANSFERRIN 104*  --    VITAMIN B 12  --  307         Brief Urine Lab Results  (Last result in the past 365 days)        Color   Clarity   Blood   Leuk Est   Nitrite   Protein   CREAT   Urine HCG        09/05/24 1936 Yellow   Turbid   Moderate (2+)   Large (3+)   Negative   >=300 mg/dL (3+)                 Microbiology Results (last 10 days)       Procedure Component Value - Date/Time    Blood Culture - Blood, Hand, Right [[id number]]  (Normal) Collected: 09/05/24 1936    Lab Status: Preliminary result Specimen: Blood from Hand, Right Updated: 09/09/24 2000     Blood Culture No growth at 4 days    Urine Culture - Urine, Straight Cath [[id number]]  (Abnormal)  (Susceptibility) Collected: 09/05/24 1936    Lab Status: Final result Specimen: Urine from Straight Cath Updated: 09/07/24 1707     Urine Culture >100,000 CFU/mL Proteus mirabilis    Narrative:      Colonization of the urinary tract without infection is common. Treatment is discouraged unless the patient is symptomatic, pregnant, or undergoing an invasive urologic procedure.    Susceptibility        Proteus mirabilis      KERVIN      Amoxicillin + Clavulanate Susceptible      Ampicillin Susceptible      Ampicillin + Sulbactam Susceptible      Cefazolin Susceptible      Cefepime Susceptible      Ceftazidime Susceptible      Ceftriaxone Susceptible      Gentamicin Susceptible      Levofloxacin Resistant      Nitrofurantoin Resistant      Piperacillin + Tazobactam Susceptible      Trimethoprim + Sulfamethoxazole Resistant                           Respiratory Panel PCR w/COVID-19(SARS-CoV-2) ELIZABETH/DALLIN/OLINDA/PAD/COR/MARY In-House, NP Swab in UTM/VTM, 2 HR TAT - Swab, Nasopharynx [[id number]]  (Normal) Collected: 09/05/24 1904    Lab Status: Final result  Specimen: Swab from Nasopharynx Updated: 09/05/24 2014     ADENOVIRUS, PCR Not Detected     Coronavirus 229E Not Detected     Coronavirus HKU1 Not Detected     Coronavirus NL63 Not Detected     Coronavirus OC43 Not Detected     COVID19 Not Detected     Human Metapneumovirus Not Detected     Human Rhinovirus/Enterovirus Not Detected     Influenza A PCR Not Detected     Influenza B PCR Not Detected     Parainfluenza Virus 1 Not Detected     Parainfluenza Virus 2 Not Detected     Parainfluenza Virus 3 Not Detected     Parainfluenza Virus 4 Not Detected     RSV, PCR Not Detected     Bordetella pertussis pcr Not Detected     Bordetella parapertussis PCR Not Detected     Chlamydophila pneumoniae PCR Not Detected     Mycoplasma pneumo by PCR Not Detected    Narrative:      In the setting of a positive respiratory panel with a viral infection PLUS a negative procalcitonin without other underlying concern for bacterial infection, consider observing off antibiotics or discontinuation of antibiotics and continue supportive care. If the respiratory panel is positive for atypical bacterial infection (Bordetella pertussis, Chlamydophila pneumoniae, or Mycoplasma pneumoniae), consider antibiotic de-escalation to target atypical bacterial infection.    Blood Culture - Blood, Hand, Left [[id number]]  (Normal) Collected: 09/05/24 1810    Lab Status: Preliminary result Specimen: Blood from Hand, Left Updated: 09/09/24 1930     Blood Culture No growth at 4 days    Urine Culture - Urine, Urine, Catheter [[id number]]  (Abnormal)  (Susceptibility) Collected: 09/03/24 0430    Lab Status: Final result Specimen: Urine, Catheter Updated: 09/05/24 0914     Urine Culture >100,000 CFU/mL Proteus mirabilis    Narrative:      Colonization of the urinary tract without infection is common. Treatment is discouraged unless the patient is symptomatic, pregnant, or undergoing an invasive urologic procedure.    Susceptibility        Proteus mirabilis       KERVIN      Amoxicillin + Clavulanate Susceptible      Ampicillin Susceptible      Ampicillin + Sulbactam Susceptible      Cefazolin Susceptible      Cefepime Susceptible      Ceftazidime Susceptible      Ceftriaxone Susceptible      Gentamicin Susceptible      Levofloxacin Resistant      Nitrofurantoin Resistant      Piperacillin + Tazobactam Susceptible      Trimethoprim + Sulfamethoxazole Resistant                               HPI .  78-year-old male with past medical history of atypical parkinsonian syndrome, cognitive decline, diabetes mellitus, coronary artery disease, subdural hematoma presents to the emergency room referred from skilled nursing facility due to persistent fever, confusion and generalized pain. He has been treated for UTI with Bactrim for 2 days and continues to have elevated temperature. He presents with a temp of 100.7, heart rate of 120, normal WBC and abnormal urinalysis.     Hospital Course:   Pyelonephritis.  Patient did 2 days of Bactrim outpatient.  Rocephin antibiotics.     Metabolic encephalopathy.  Back to baseline.  CT scan head-Moderate cerebral and cerebellar atrophy with chronic microvascular  disease but no evidence of acute intracranial process, Chronic appearing right maxillary sinus disease.     Acute on CKD.  Nephrology consult.  Normal saline 75 cc an hour.  Creatinine is improving.  US renal-No acute kidney abnormality is seen.      Prostate hypertrophy . Flomax.     Parkinsonism.  Cognitive decline.  Sinemet.     Anemia.  Hemoglobin stable.  No sign of acute bleed.     Diabetes.  Hemoglobin A1 C 5.6.     CAD/hyperlipidemia.  Aspirin.  Lipitor.     History of subdural hematoma.     Advanced age.  78 years old.     Coccyx wound.  Consult wound care.     Nutrition.  Cardiac diet.     Deconditioning.  PT and OT consult     Blood culture-no growth in 3 days.  Urine culture-Proteus.  Respiratory PCR-negative.     DNR.  DNI.  Bed hold at [facility].    Vital signs stable,  "afebrile.  Will send patient back to rehab.  Follow-up rehab physician as soon as able.    Physical Exam on Discharge:  /71 (BP Location: Left arm, Patient Position: Lying)   Pulse 74   Temp 98.6 °F (37 °C) (Oral)   Resp 18   Ht 173 cm (68.11\")   Wt 113 kg (250 lb)   SpO2 94%   BMI 37.89 kg/m²   Physical Exam  Vitals and nursing note reviewed.   Constitutional:       Comments: Advanced age.  Chronically ill.   HENT:      Head: Normocephalic.   Eyes:      Conjunctiva/sclera: Conjunctivae normal.      Pupils: Pupils are equal, round, and reactive to light.   Cardiovascular:      Rate and Rhythm: Normal rate and regular rhythm.      Heart sounds: Normal heart sounds.   Pulmonary:      Effort: Pulmonary effort is normal. No respiratory distress.      Breath sounds: Normal breath sounds.      Comments: Patient is on room air.  Clear.  Abdominal:      General: Bowel sounds are normal. There is no distension.      Palpations: Abdomen is soft.      Tenderness: There is no abdominal tenderness.      Comments: Obesity .   Musculoskeletal:         General: No swelling.      Cervical back: Neck supple.   Skin:     General: Skin is warm and dry.      Capillary Refill: Capillary refill takes 2 to 3 seconds.      Findings: No rash.   Neurological:      General: No focal deficit present.      Mental Status: He is alert and oriented to person, place, and time.      Motor: Weakness present.      Coordination: Coordination abnormal.      Gait: Gait abnormal.   Psychiatric:         Mood and Affect: Mood normal.         Behavior: Behavior normal.     Condition on Discharge: Stable.    Discharge Disposition: Discharge back to rehab.  Skilled Nursing Facility (DC - External)    Discharge Medications:     Discharge Medications        New Medications        Instructions Start Date   cefdinir 300 MG capsule  Commonly known as: OMNICEF   300 mg, Oral, 2 Times Daily      nystatin 282522 UNIT/GM powder  Commonly known as: " MYCOSTATIN   Topical, Every 12 Hours Scheduled             Changes to Medications        Instructions Start Date   acetaminophen 325 MG tablet  Commonly known as: TYLENOL  What changed:   when to take this  reasons to take this   650 mg, Oral, Every 6 Hours PRN             Continue These Medications        Instructions Start Date   allopurinol 100 MG tablet  Commonly known as: ZYLOPRIM   100 mg, Oral, Daily      aspirin 81 MG EC tablet   81 mg, Oral, Daily      atorvastatin 80 MG tablet  Commonly known as: LIPITOR   80 mg, Oral, Nightly      BIOFREEZE EX   1 Application, Apply externally, 2 Times Daily      carbidopa-levodopa  MG per tablet  Commonly known as: SINEMET   2 tablets, Oral, 3 Times Daily      carboxymethylcellulose 0.5 % solution  Commonly known as: REFRESH PLUS   2 drops, Both Eyes, 3 Times Daily PRN      Divalproex Sodium 125 MG capsule  Commonly known as: DEPAKOTE SPRINKLE   250 mg, Oral, 2 Times Daily      finasteride 5 MG tablet  Commonly known as: PROSCAR   5 mg, Oral, Daily      losartan 50 MG tablet  Commonly known as: COZAAR   50 mg, Oral, Daily      Magnesium Oxide -Mg Supplement 400 (240 Mg) MG tablet   1 tablet, Oral, Daily      ondansetron ODT 4 MG disintegrating tablet  Commonly known as: ZOFRAN-ODT   4 mg, Translingual, Every 6 Hours PRN      polyethylene glycol 17 g packet  Commonly known as: MIRALAX   17 g, Oral, 2 Times Daily PRN      tamsulosin 0.4 MG capsule 24 hr capsule  Commonly known as: FLOMAX   0.4 mg, Oral, Nightly      vitamin D3 125 MCG (5000 UT) tablet tablet   5,000 Units, Oral, Daily             Stop These Medications      amoxicillin-clavulanate 875-125 MG per tablet  Commonly known as: AUGMENTIN     Centrum Silver Adult 50+ tablet tablet     cyclobenzaprine 10 MG tablet  Commonly known as: FLEXERIL     estrogens (conjugated) 0.45 MG tablet  Commonly known as: PREMARIN     guaifenesin 100 MG/5ML liquid  Commonly known as: ROBITUSSIN     Melatonin 10 MG tablet      Preparation H 1 % cream  Generic drug: hydrocortisone     rivastigmine 1.5 MG capsule  Commonly known as: EXELON     spironolactone 25 MG tablet  Commonly known as: ALDACTONE     UTI-Stat liquid              Discharge Diet:   Diet Instructions       Advance Diet As Tolerated -Target Diet: Cardiac diet.      Target Diet: Cardiac diet.            Activity at Discharge:   Activity Instructions       Activity as Tolerated              Follow-up Appointments:   Follow-up with nursing home physician as soon as able.      Electronically signed by Steve Cody MD, 09/10/24, 09:11 CDT.    Time: Greater than 30 minutes.

## 2024-09-10 NOTE — PROGRESS NOTES
"Enter Query Response Below      Query Response: Metabolic encephalopathy was not supported              If applicable, please update the problem list.   If you have any questions about this query contact me at: [email or web address]     Dr. Cody,    Patient with hx. of atypical parkinsonism and lewy body dementia was admitted with AMS, subjective fevers and generalized pain. Patient was diagnosed with Sepsis due to UTI, ALEA and also has documentation of metabolic encephalopathy. Per ER Provider, \"When speaking with the patient the patient stares off in the distance and does not really speak much from my interview of the patient\" and \"When asked what is his pain level and where he was unable to answer the question\". 9/5 CT head---\"Moderate cerebral and cerebellar atrophy with chronic microvascular disease but no evidence of acute intracranial process\". 9/7 NN--\"Only oriented to self this shift\". 9/10 NN---\"Pt is alert to self but is confused about where he is and why he is here\". Per Discharge Summary, \"Metabolic encephalopathy. Back to baseline\". GCS scores: 9/5 (14), 9/6 (14), 9/7 (14), 9/8 (15), 9/9 (14), 9/10 (14). Treatment included IVF, IV Rocephin, serial labs, cultures, imaging and consults.    After study, was metabolic encephalopathy clinically supported during this admission?    Metabolic encephalopathy was supported with additional clinical indicators:____________  Metabolic encephalopathy was not supported  Other- specify_____________  Unable to determine      By submitting this query, we are merely seeking further clarification of documentation to accurately reflect all conditions that you are monitoring, evaluating, treating or that extend the hospitalization or utilize additional resources of care. Please utilize your independent clinical judgment when addressing the question(s) above.     This query and your response, once completed, will be entered into the legal medical " record.    Sincerely,  Ara Crawford RN  Clinical Documentation Integrity Program

## 2024-09-11 ENCOUNTER — LAB REQUISITION (OUTPATIENT)
Dept: LAB | Facility: HOSPITAL | Age: 79
End: 2024-09-11
Payer: MEDICARE

## 2024-09-11 DIAGNOSIS — E11.9 TYPE 2 DIABETES MELLITUS WITHOUT COMPLICATIONS: ICD-10-CM

## 2024-09-11 DIAGNOSIS — I25.10 ATHEROSCLEROTIC HEART DISEASE OF NATIVE CORONARY ARTERY WITHOUT ANGINA PECTORIS: ICD-10-CM

## 2024-09-11 DIAGNOSIS — G93.40 ENCEPHALOPATHY, UNSPECIFIED: ICD-10-CM

## 2024-09-11 LAB
ALBUMIN SERPL-MCNC: 2.9 G/DL (ref 3.5–5.2)
ALBUMIN SERPL-MCNC: NORMAL G/DL
ALBUMIN/GLOB SERPL: 1 G/DL
ALBUMIN/GLOB SERPL: NORMAL {RATIO}
ALP SERPL-CCNC: 72 U/L (ref 39–117)
ALP SERPL-CCNC: NORMAL U/L
ALT SERPL W P-5'-P-CCNC: 27 U/L (ref 1–41)
ALT SERPL W P-5'-P-CCNC: NORMAL U/L
ANION GAP SERPL CALCULATED.3IONS-SCNC: 13 MMOL/L (ref 5–15)
ANION GAP SERPL CALCULATED.3IONS-SCNC: NORMAL MMOL/L
AST SERPL-CCNC: 40 U/L (ref 1–40)
AST SERPL-CCNC: NORMAL U/L
BASOPHILS # BLD AUTO: 0.05 10*3/MM3 (ref 0–0.2)
BASOPHILS NFR BLD AUTO: 0.6 % (ref 0–1.5)
BILIRUB SERPL-MCNC: 0.3 MG/DL (ref 0–1.2)
BILIRUB SERPL-MCNC: NORMAL MG/DL
BUN SERPL-MCNC: 23 MG/DL (ref 8–23)
BUN SERPL-MCNC: NORMAL MG/DL
BUN/CREAT SERPL: 17.8 (ref 7–25)
BUN/CREAT SERPL: NORMAL
CALCIUM SPEC-SCNC: 8.7 MG/DL (ref 8.6–10.5)
CALCIUM SPEC-SCNC: NORMAL MMOL/L
CHLORIDE SERPL-SCNC: 111 MMOL/L (ref 98–107)
CHLORIDE SERPL-SCNC: NORMAL MMOL/L
CO2 SERPL-SCNC: 19 MMOL/L (ref 22–29)
CO2 SERPL-SCNC: NORMAL MMOL/L
CREAT SERPL-MCNC: 1.29 MG/DL (ref 0.76–1.27)
CREAT SERPL-MCNC: NORMAL MG/DL
DEPRECATED RDW RBC AUTO: 43.3 FL (ref 37–54)
EGFRCR SERPLBLD CKD-EPI 2021: 56.8 ML/MIN/1.73
EGFRCR SERPLBLD CKD-EPI 2021: NORMAL ML/MIN/{1.73_M2}
EOSINOPHIL # BLD AUTO: 0.21 10*3/MM3 (ref 0–0.4)
EOSINOPHIL NFR BLD AUTO: 2.5 % (ref 0.3–6.2)
ERYTHROCYTE [DISTWIDTH] IN BLOOD BY AUTOMATED COUNT: 12.8 % (ref 12.3–15.4)
GLOBULIN UR ELPH-MCNC: 2.8 GM/DL
GLOBULIN UR ELPH-MCNC: NORMAL MG/DL
GLUCOSE SERPL-MCNC: 120 MG/DL (ref 65–99)
GLUCOSE SERPL-MCNC: NORMAL MG/DL
HCT VFR BLD AUTO: 31.1 % (ref 37.5–51)
HGB BLD-MCNC: 10.3 G/DL (ref 13–17.7)
IMM GRANULOCYTES # BLD AUTO: 0.16 10*3/MM3 (ref 0–0.05)
IMM GRANULOCYTES NFR BLD AUTO: 1.9 % (ref 0–0.5)
LYMPHOCYTES # BLD AUTO: 1.23 10*3/MM3 (ref 0.7–3.1)
LYMPHOCYTES NFR BLD AUTO: 14.8 % (ref 19.6–45.3)
MCH RBC QN AUTO: 30.7 PG (ref 26.6–33)
MCHC RBC AUTO-ENTMCNC: 33.1 G/DL (ref 31.5–35.7)
MCV RBC AUTO: 92.8 FL (ref 79–97)
MONOCYTES # BLD AUTO: 0.52 10*3/MM3 (ref 0.1–0.9)
MONOCYTES NFR BLD AUTO: 6.3 % (ref 5–12)
NEUTROPHILS NFR BLD AUTO: 6.12 10*3/MM3 (ref 1.7–7)
NEUTROPHILS NFR BLD AUTO: 73.9 % (ref 42.7–76)
NRBC BLD AUTO-RTO: 0 /100 WBC (ref 0–0.2)
PLATELET # BLD AUTO: 256 10*3/MM3 (ref 140–450)
PMV BLD AUTO: 9.7 FL (ref 6–12)
POTASSIUM SERPL-SCNC: 3.5 MMOL/L (ref 3.5–5.2)
POTASSIUM SERPL-SCNC: NORMAL MMOL/L
PROT SERPL-MCNC: 5.7 G/DL (ref 6–8.5)
PROT SERPL-MCNC: NORMAL G/DL
RBC # BLD AUTO: 3.35 10*6/MM3 (ref 4.14–5.8)
SODIUM SERPL-SCNC: 143 MMOL/L (ref 136–145)
SODIUM SERPL-SCNC: NORMAL MMOL/L
WBC NRBC COR # BLD AUTO: 8.29 10*3/MM3 (ref 3.4–10.8)

## 2024-09-11 PROCEDURE — 36415 COLL VENOUS BLD VENIPUNCTURE: CPT | Performed by: INTERNAL MEDICINE

## 2024-09-11 PROCEDURE — 80053 COMPREHEN METABOLIC PANEL: CPT | Performed by: INTERNAL MEDICINE

## 2024-09-11 PROCEDURE — 85025 COMPLETE CBC W/AUTO DIFF WBC: CPT | Performed by: INTERNAL MEDICINE

## 2024-09-11 NOTE — PROGRESS NOTES
"Enter Query Response Below      Query Response: Severe sepsis causing acute organ dysfunction, as evidenced by (ALEA)              If applicable, please update the problem list.   If you have any questions about this query contact me at: [email or web address]     Dr. Cody,    Patient admitted with Sepsis due to UTI was also diagnosed with ALEA. Vitals on day of admission: T-100.7, HR-119, RR-20, B/P-116/73. Lactate 3.2. Initial creatinine 2.91 with baseline creatinine 1.1-1.2. 9/7 Nephrology consult note-\"Acute kidney injury-prerenal azotemia versus AIN\". Renal ultrasound showed no acute kidney abnormality. Uric acid-4.7. PTH, Intact-67.9. 9/9 Neph note-\"Prerenal azotemia\". 9/10 Neph note-\"Intravascular volume depletion\". Treatment included IVFs and bolus, IV Rocephin, imaging, serial labs and consult. Creatinine 1.38 on day of discharge.    Please clarify if patient treated/monitored for the following:     Severe sepsis causing acute organ dysfunction, as evidenced by (ALEA)  ALEA unrelated to Sepsis  Other (please specify) ___________    By submitting this query, we are merely seeking further clarification of documentation to accurately reflect all conditions that you are monitoring, evaluating, treating or that extend the hospitalization or utilize additional resources of care. Please utilize your independent clinical judgment when addressing the question(s) above.     This query and your response, once completed, will be entered into the legal medical record.    Sincerely,  Ara Crawford RN  Clinical Documentation Integrity Program     "

## 2024-09-12 ENCOUNTER — OFFICE VISIT (OUTPATIENT)
Dept: CARDIOLOGY CLINIC | Age: 79
End: 2024-09-12
Payer: MEDICARE

## 2024-09-12 VITALS
HEART RATE: 89 BPM | DIASTOLIC BLOOD PRESSURE: 76 MMHG | HEIGHT: 69 IN | BODY MASS INDEX: 40.14 KG/M2 | SYSTOLIC BLOOD PRESSURE: 112 MMHG | WEIGHT: 271 LBS | OXYGEN SATURATION: 100 %

## 2024-09-12 DIAGNOSIS — E78.5 DYSLIPIDEMIA: ICD-10-CM

## 2024-09-12 DIAGNOSIS — I10 PRIMARY HYPERTENSION: ICD-10-CM

## 2024-09-12 DIAGNOSIS — F02.818 LEWY BODY DEMENTIA WITH OTHER BEHAVIORAL DISTURBANCE, UNSPECIFIED DEMENTIA SEVERITY (HCC): ICD-10-CM

## 2024-09-12 DIAGNOSIS — E11.69 TYPE 2 DIABETES MELLITUS WITH OTHER SPECIFIED COMPLICATION, WITHOUT LONG-TERM CURRENT USE OF INSULIN (HCC): ICD-10-CM

## 2024-09-12 DIAGNOSIS — I25.10 CORONARY ARTERY DISEASE INVOLVING NATIVE CORONARY ARTERY OF NATIVE HEART WITHOUT ANGINA PECTORIS: Primary | ICD-10-CM

## 2024-09-12 DIAGNOSIS — G31.83 LEWY BODY DEMENTIA WITH OTHER BEHAVIORAL DISTURBANCE, UNSPECIFIED DEMENTIA SEVERITY (HCC): ICD-10-CM

## 2024-09-12 PROCEDURE — 1123F ACP DISCUSS/DSCN MKR DOCD: CPT | Performed by: CLINICAL NURSE SPECIALIST

## 2024-09-12 PROCEDURE — G8427 DOCREV CUR MEDS BY ELIG CLIN: HCPCS | Performed by: CLINICAL NURSE SPECIALIST

## 2024-09-12 PROCEDURE — G8417 CALC BMI ABV UP PARAM F/U: HCPCS | Performed by: CLINICAL NURSE SPECIALIST

## 2024-09-12 PROCEDURE — 99214 OFFICE O/P EST MOD 30 MIN: CPT | Performed by: CLINICAL NURSE SPECIALIST

## 2024-09-12 PROCEDURE — 3078F DIAST BP <80 MM HG: CPT | Performed by: CLINICAL NURSE SPECIALIST

## 2024-09-12 PROCEDURE — 3074F SYST BP LT 130 MM HG: CPT | Performed by: CLINICAL NURSE SPECIALIST

## 2024-09-12 PROCEDURE — 1036F TOBACCO NON-USER: CPT | Performed by: CLINICAL NURSE SPECIALIST

## 2024-09-12 RX ORDER — ACETAMINOPHEN 325 MG/1
650 TABLET ORAL EVERY 6 HOURS PRN
COMMUNITY

## 2024-09-12 RX ORDER — POLYETHYLENE GLYCOL 3350 17 G/17G
17 POWDER, FOR SOLUTION ORAL DAILY PRN
COMMUNITY

## 2024-09-12 RX ORDER — FINASTERIDE 5 MG/1
5 TABLET, FILM COATED ORAL DAILY
COMMUNITY

## 2024-09-12 RX ORDER — TAMSULOSIN HYDROCHLORIDE 0.4 MG/1
0.4 CAPSULE ORAL DAILY
COMMUNITY

## 2024-09-12 RX ORDER — ATORVASTATIN CALCIUM 80 MG/1
80 TABLET, FILM COATED ORAL DAILY
COMMUNITY

## 2024-09-12 RX ORDER — ONDANSETRON 4 MG/1
4 TABLET, FILM COATED ORAL EVERY 8 HOURS PRN
COMMUNITY

## 2024-09-12 ASSESSMENT — ENCOUNTER SYMPTOMS
SHORTNESS OF BREATH: 1
ABDOMINAL PAIN: 0
CHEST TIGHTNESS: 0
VOMITING: 0
WHEEZING: 0
COUGH: 0
EYE REDNESS: 0
NAUSEA: 0
FACIAL SWELLING: 0

## 2024-10-18 ENCOUNTER — LAB REQUISITION (OUTPATIENT)
Dept: LAB | Facility: HOSPITAL | Age: 79
End: 2024-10-18
Payer: MEDICARE

## 2024-10-18 DIAGNOSIS — R31.9 HEMATURIA, UNSPECIFIED: ICD-10-CM

## 2024-10-18 LAB
BACTERIA UR QL AUTO: ABNORMAL /HPF
BILIRUB UR QL STRIP: ABNORMAL
CLARITY UR: ABNORMAL
COLOR UR: ABNORMAL
GLUCOSE UR STRIP-MCNC: NEGATIVE MG/DL
HGB UR QL STRIP.AUTO: ABNORMAL
HYALINE CASTS UR QL AUTO: ABNORMAL /LPF
KETONES UR QL STRIP: ABNORMAL
LEUKOCYTE ESTERASE UR QL STRIP.AUTO: ABNORMAL
NITRITE UR QL STRIP: POSITIVE
PH UR STRIP.AUTO: 8.5 [PH] (ref 5–8)
PROT UR QL STRIP: ABNORMAL
RBC # UR STRIP: ABNORMAL /HPF
REF LAB TEST METHOD: ABNORMAL
SP GR UR STRIP: 1.02 (ref 1–1.03)
SQUAMOUS #/AREA URNS HPF: ABNORMAL /HPF
UROBILINOGEN UR QL STRIP: ABNORMAL
WBC # UR STRIP: ABNORMAL /HPF

## 2024-10-18 PROCEDURE — 87086 URINE CULTURE/COLONY COUNT: CPT | Performed by: NURSE PRACTITIONER

## 2024-10-18 PROCEDURE — 81001 URINALYSIS AUTO W/SCOPE: CPT | Performed by: NURSE PRACTITIONER

## 2024-10-18 PROCEDURE — 87077 CULTURE AEROBIC IDENTIFY: CPT | Performed by: NURSE PRACTITIONER

## 2024-10-18 PROCEDURE — 87186 SC STD MICRODIL/AGAR DIL: CPT | Performed by: NURSE PRACTITIONER

## 2024-10-20 LAB — BACTERIA SPEC AEROBE CULT: ABNORMAL

## 2024-11-13 ENCOUNTER — LAB REQUISITION (OUTPATIENT)
Dept: LAB | Facility: HOSPITAL | Age: 79
End: 2024-11-13
Payer: MEDICARE

## 2024-11-13 DIAGNOSIS — I11.9 HYPERTENSIVE HEART DISEASE WITHOUT HEART FAILURE: ICD-10-CM

## 2024-11-13 DIAGNOSIS — E11.9 TYPE 2 DIABETES MELLITUS WITHOUT COMPLICATIONS: ICD-10-CM

## 2024-11-13 LAB
ALBUMIN SERPL-MCNC: 2.5 G/DL (ref 3.5–5.2)
ALBUMIN/GLOB SERPL: 1 G/DL
ALP SERPL-CCNC: 61 U/L (ref 39–117)
ALT SERPL W P-5'-P-CCNC: 11 U/L (ref 1–41)
ANION GAP SERPL CALCULATED.3IONS-SCNC: 9 MMOL/L (ref 5–15)
AST SERPL-CCNC: 20 U/L (ref 1–40)
BASOPHILS # BLD AUTO: 0.02 10*3/MM3 (ref 0–0.2)
BASOPHILS NFR BLD AUTO: 0.3 % (ref 0–1.5)
BILIRUB SERPL-MCNC: 0.3 MG/DL (ref 0–1.2)
BUN SERPL-MCNC: 26 MG/DL (ref 8–23)
BUN/CREAT SERPL: 20.2 (ref 7–25)
CALCIUM SPEC-SCNC: 8.4 MG/DL (ref 8.6–10.5)
CHLORIDE SERPL-SCNC: 108 MMOL/L (ref 98–107)
CO2 SERPL-SCNC: 25 MMOL/L (ref 22–29)
CREAT SERPL-MCNC: 1.29 MG/DL (ref 0.76–1.27)
DEPRECATED RDW RBC AUTO: 47.4 FL (ref 37–54)
EGFRCR SERPLBLD CKD-EPI 2021: 56.8 ML/MIN/1.73
EOSINOPHIL # BLD AUTO: 0.11 10*3/MM3 (ref 0–0.4)
EOSINOPHIL NFR BLD AUTO: 1.6 % (ref 0.3–6.2)
ERYTHROCYTE [DISTWIDTH] IN BLOOD BY AUTOMATED COUNT: 13.2 % (ref 12.3–15.4)
GLOBULIN UR ELPH-MCNC: 2.4 GM/DL
GLUCOSE SERPL-MCNC: 110 MG/DL (ref 65–99)
HBA1C MFR BLD: 5.5 % (ref 4.8–5.6)
HCT VFR BLD AUTO: 28 % (ref 37.5–51)
HGB BLD-MCNC: 8.9 G/DL (ref 13–17.7)
IMM GRANULOCYTES # BLD AUTO: 0.11 10*3/MM3 (ref 0–0.05)
IMM GRANULOCYTES NFR BLD AUTO: 1.6 % (ref 0–0.5)
LYMPHOCYTES # BLD AUTO: 0.86 10*3/MM3 (ref 0.7–3.1)
LYMPHOCYTES NFR BLD AUTO: 12.8 % (ref 19.6–45.3)
MCH RBC QN AUTO: 30.7 PG (ref 26.6–33)
MCHC RBC AUTO-ENTMCNC: 31.8 G/DL (ref 31.5–35.7)
MCV RBC AUTO: 96.6 FL (ref 79–97)
MONOCYTES # BLD AUTO: 0.77 10*3/MM3 (ref 0.1–0.9)
MONOCYTES NFR BLD AUTO: 11.5 % (ref 5–12)
NEUTROPHILS NFR BLD AUTO: 4.85 10*3/MM3 (ref 1.7–7)
NEUTROPHILS NFR BLD AUTO: 72.2 % (ref 42.7–76)
NRBC BLD AUTO-RTO: 0 /100 WBC (ref 0–0.2)
PLATELET # BLD AUTO: 180 10*3/MM3 (ref 140–450)
PMV BLD AUTO: 9.7 FL (ref 6–12)
POTASSIUM SERPL-SCNC: 3.9 MMOL/L (ref 3.5–5.2)
PROT SERPL-MCNC: 4.9 G/DL (ref 6–8.5)
RBC # BLD AUTO: 2.9 10*6/MM3 (ref 4.14–5.8)
SODIUM SERPL-SCNC: 142 MMOL/L (ref 136–145)
WBC NRBC COR # BLD AUTO: 6.72 10*3/MM3 (ref 3.4–10.8)

## 2024-11-13 PROCEDURE — 80053 COMPREHEN METABOLIC PANEL: CPT | Performed by: NURSE PRACTITIONER

## 2024-11-13 PROCEDURE — 85025 COMPLETE CBC W/AUTO DIFF WBC: CPT | Performed by: NURSE PRACTITIONER

## 2024-11-13 PROCEDURE — 83036 HEMOGLOBIN GLYCOSYLATED A1C: CPT | Performed by: NURSE PRACTITIONER

## 2024-12-13 ENCOUNTER — LAB REQUISITION (OUTPATIENT)
Dept: LAB | Facility: HOSPITAL | Age: 79
End: 2024-12-13
Payer: MEDICARE

## 2024-12-13 DIAGNOSIS — M62.10 OTHER RUPTURE OF MUSCLE (NONTRAUMATIC), UNSPECIFIED SITE: ICD-10-CM

## 2024-12-13 LAB
BASOPHILS # BLD AUTO: 0.02 10*3/MM3 (ref 0–0.2)
BASOPHILS NFR BLD AUTO: 0.3 % (ref 0–1.5)
DEPRECATED RDW RBC AUTO: 48.6 FL (ref 37–54)
EOSINOPHIL # BLD AUTO: 0.15 10*3/MM3 (ref 0–0.4)
EOSINOPHIL NFR BLD AUTO: 2.4 % (ref 0.3–6.2)
ERYTHROCYTE [DISTWIDTH] IN BLOOD BY AUTOMATED COUNT: 14.2 % (ref 12.3–15.4)
HCT VFR BLD AUTO: 22.7 % (ref 37.5–51)
HGB BLD-MCNC: 7.2 G/DL (ref 13–17.7)
IMM GRANULOCYTES # BLD AUTO: 0.03 10*3/MM3 (ref 0–0.05)
IMM GRANULOCYTES NFR BLD AUTO: 0.5 % (ref 0–0.5)
LYMPHOCYTES # BLD AUTO: 1.25 10*3/MM3 (ref 0.7–3.1)
LYMPHOCYTES NFR BLD AUTO: 20.1 % (ref 19.6–45.3)
MCH RBC QN AUTO: 30.1 PG (ref 26.6–33)
MCHC RBC AUTO-ENTMCNC: 31.7 G/DL (ref 31.5–35.7)
MCV RBC AUTO: 95 FL (ref 79–97)
MONOCYTES # BLD AUTO: 0.46 10*3/MM3 (ref 0.1–0.9)
MONOCYTES NFR BLD AUTO: 7.4 % (ref 5–12)
NEUTROPHILS NFR BLD AUTO: 4.3 10*3/MM3 (ref 1.7–7)
NEUTROPHILS NFR BLD AUTO: 69.3 % (ref 42.7–76)
NRBC BLD AUTO-RTO: 0 /100 WBC (ref 0–0.2)
PLATELET # BLD AUTO: 151 10*3/MM3 (ref 140–450)
PMV BLD AUTO: 10.4 FL (ref 6–12)
RBC # BLD AUTO: 2.39 10*6/MM3 (ref 4.14–5.8)
WBC NRBC COR # BLD AUTO: 6.21 10*3/MM3 (ref 3.4–10.8)

## 2024-12-13 PROCEDURE — 85025 COMPLETE CBC W/AUTO DIFF WBC: CPT | Performed by: NURSE PRACTITIONER

## 2025-02-10 ENCOUNTER — LAB REQUISITION (OUTPATIENT)
Dept: LAB | Facility: HOSPITAL | Age: 80
End: 2025-02-10
Payer: MEDICARE

## 2025-02-10 DIAGNOSIS — G31.83 NEUROCOGNITIVE DISORDER WITH LEWY BODIES (CODE): ICD-10-CM

## 2025-02-10 DIAGNOSIS — E11.9 TYPE 2 DIABETES MELLITUS WITHOUT COMPLICATIONS: ICD-10-CM

## 2025-02-10 DIAGNOSIS — Z86.73 PERSONAL HISTORY OF TRANSIENT ISCHEMIC ATTACK (TIA), AND CEREBRAL INFARCTION WITHOUT RESIDUAL DEFICITS: ICD-10-CM

## 2025-02-10 LAB
ALBUMIN SERPL-MCNC: 2.7 G/DL (ref 3.5–5.2)
ALBUMIN/GLOB SERPL: 0.8 G/DL
ALP SERPL-CCNC: 69 U/L (ref 39–117)
ALT SERPL W P-5'-P-CCNC: <5 U/L (ref 1–41)
ANION GAP SERPL CALCULATED.3IONS-SCNC: 11 MMOL/L (ref 5–15)
AST SERPL-CCNC: 10 U/L (ref 1–40)
BILIRUB SERPL-MCNC: 0.3 MG/DL (ref 0–1.2)
BUN SERPL-MCNC: 61 MG/DL (ref 8–23)
BUN/CREAT SERPL: 49.6 (ref 7–25)
CALCIUM SPEC-SCNC: 8.9 MG/DL (ref 8.6–10.5)
CHLORIDE SERPL-SCNC: 118 MMOL/L (ref 98–107)
CO2 SERPL-SCNC: 20 MMOL/L (ref 22–29)
CREAT SERPL-MCNC: 1.23 MG/DL (ref 0.76–1.27)
DEPRECATED RDW RBC AUTO: 53.3 FL (ref 37–54)
EGFRCR SERPLBLD CKD-EPI 2021: 59.7 ML/MIN/1.73
ERYTHROCYTE [DISTWIDTH] IN BLOOD BY AUTOMATED COUNT: 14.6 % (ref 12.3–15.4)
GLOBULIN UR ELPH-MCNC: 3.3 GM/DL
GLUCOSE SERPL-MCNC: 83 MG/DL (ref 65–99)
HBA1C MFR BLD: 5.6 % (ref 4.8–5.6)
HCT VFR BLD AUTO: 26 % (ref 37.5–51)
HGB BLD-MCNC: 7.5 G/DL (ref 13–17.7)
MCH RBC QN AUTO: 29.2 PG (ref 26.6–33)
MCHC RBC AUTO-ENTMCNC: 28.8 G/DL (ref 31.5–35.7)
MCV RBC AUTO: 101.2 FL (ref 79–97)
PLATELET # BLD AUTO: 191 10*3/MM3 (ref 140–450)
PMV BLD AUTO: 10.6 FL (ref 6–12)
POTASSIUM SERPL-SCNC: 4.6 MMOL/L (ref 3.5–5.2)
PROT SERPL-MCNC: 6 G/DL (ref 6–8.5)
RBC # BLD AUTO: 2.57 10*6/MM3 (ref 4.14–5.8)
SODIUM SERPL-SCNC: 149 MMOL/L (ref 136–145)
WBC NRBC COR # BLD AUTO: 7.03 10*3/MM3 (ref 3.4–10.8)

## 2025-02-10 PROCEDURE — 36415 COLL VENOUS BLD VENIPUNCTURE: CPT | Performed by: NURSE PRACTITIONER

## 2025-02-10 PROCEDURE — 80053 COMPREHEN METABOLIC PANEL: CPT | Performed by: NURSE PRACTITIONER

## 2025-02-10 PROCEDURE — 83036 HEMOGLOBIN GLYCOSYLATED A1C: CPT | Performed by: NURSE PRACTITIONER

## 2025-02-10 PROCEDURE — 85027 COMPLETE CBC AUTOMATED: CPT | Performed by: NURSE PRACTITIONER

## 2025-03-18 ENCOUNTER — OFFICE VISIT (OUTPATIENT)
Dept: PALLATIVE CARE | Age: 80
End: 2025-03-18

## 2025-03-18 DIAGNOSIS — R13.10 DYSPHAGIA, UNSPECIFIED TYPE: ICD-10-CM

## 2025-03-18 DIAGNOSIS — G31.83 SEVERE LEWY BODY DEMENTIA WITH OTHER BEHAVIORAL DISTURBANCE: Primary | ICD-10-CM

## 2025-03-18 DIAGNOSIS — Z51.5 ENCOUNTER FOR PALLIATIVE CARE: ICD-10-CM

## 2025-03-18 DIAGNOSIS — Z74.09 IMPAIRED MOBILITY AND ADLS: ICD-10-CM

## 2025-03-18 DIAGNOSIS — F02.C18 SEVERE LEWY BODY DEMENTIA WITH OTHER BEHAVIORAL DISTURBANCE: Primary | ICD-10-CM

## 2025-03-18 DIAGNOSIS — Z78.9 IMPAIRED MOBILITY AND ADLS: ICD-10-CM

## 2025-03-21 NOTE — PROGRESS NOTES
Supportive Care/Community Based Palliative Care  Follow Up Note        Patient Name:  Edwin Gordon  Medical Record Number:  [id number]  YOB: 1945    Date of Visit: 3/18/2025  Location of Visit:  [facility] (admission date 11/6/2024)    Patient Care Team:  Yon Smith as PCP - General (Family Medicine)  Dejuan, José Miguel LÓPEZ MD as Consulting Physician (Cardiology)  Mick Marie APRN - CNP as Advanced Practice Nurse (Nurse Practitioner, Family)    History obtained from:  patient, non-family caregiver - attending APRN, electronic medical record    PALLIATIVE DIAGNOSES AND ORDERS/RECOMMENDATIONS/PLAN:   Edwin \"Tyler Gordon\" was seen today for dementia.    Diagnoses and all orders for this visit:    Severe Lewy body dementia with other behavioral disturbance (HCC)  -     [facility] by Darian Olson    Impaired mobility and ADLs  Refer to hospice  Nursing staff to assist with all ADLs  Patient lift-dependent, transferred to Broda chair per nursing staff    Dysphagia, unspecified type  Nursing staff to feed  Continue nectar thick and puréed diet    Encounter for palliative care  Current goals of care include:  Maximize safety awareness and reduce number of falls, Maintain or improve functional status, Maintain or improve quality of life, Focus on comfort and quality of life without hospitalization, Proceed with hospice care     Refer to hospice  CODE STATUS DNR  Follow up SNF visit as needed if patient does not admit to hospice    CHIEF COMPLAINT:     Chief Complaint   Patient presents with    Dementia     Progression of Lewy body dementia, family interested in pursuing hospice at this time       CLINICAL SUMMARY:          Edwin Gordon is a 79 y.o. 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     Multiple
